# Patient Record
Sex: MALE | Race: WHITE | NOT HISPANIC OR LATINO | Employment: OTHER | URBAN - METROPOLITAN AREA
[De-identification: names, ages, dates, MRNs, and addresses within clinical notes are randomized per-mention and may not be internally consistent; named-entity substitution may affect disease eponyms.]

---

## 2017-01-04 RX ORDER — HYDROCHLOROTHIAZIDE 25 MG/1
TABLET ORAL
Qty: 30 TABLET | Refills: 0 | Status: SHIPPED | OUTPATIENT
Start: 2017-01-04 | End: 2017-03-10 | Stop reason: SDUPTHER

## 2017-01-19 ENCOUNTER — OFFICE VISIT (OUTPATIENT)
Dept: INTERNAL MEDICINE | Facility: CLINIC | Age: 62
End: 2017-01-19
Payer: COMMERCIAL

## 2017-01-19 VITALS
HEIGHT: 67 IN | RESPIRATION RATE: 12 BRPM | TEMPERATURE: 99 F | SYSTOLIC BLOOD PRESSURE: 112 MMHG | WEIGHT: 265 LBS | BODY MASS INDEX: 41.59 KG/M2 | DIASTOLIC BLOOD PRESSURE: 72 MMHG | HEART RATE: 80 BPM

## 2017-01-19 DIAGNOSIS — N40.0 BENIGN NON-NODULAR PROSTATIC HYPERPLASIA WITHOUT LOWER URINARY TRACT SYMPTOMS: ICD-10-CM

## 2017-01-19 DIAGNOSIS — I10 ESSENTIAL HYPERTENSION: ICD-10-CM

## 2017-01-19 DIAGNOSIS — E66.01 MORBID OBESITY WITH BMI OF 40.0-44.9, ADULT: ICD-10-CM

## 2017-01-19 DIAGNOSIS — E11.69 OBESITY, DIABETES, AND HYPERTENSION SYNDROME: ICD-10-CM

## 2017-01-19 DIAGNOSIS — E11.42 TYPE 2 DIABETES MELLITUS WITH DIABETIC POLYNEUROPATHY, WITH LONG-TERM CURRENT USE OF INSULIN: Primary | ICD-10-CM

## 2017-01-19 DIAGNOSIS — I15.2 OBESITY, DIABETES, AND HYPERTENSION SYNDROME: ICD-10-CM

## 2017-01-19 DIAGNOSIS — E66.9 OBESITY, DIABETES, AND HYPERTENSION SYNDROME: ICD-10-CM

## 2017-01-19 DIAGNOSIS — E78.2 MIXED HYPERLIPIDEMIA: ICD-10-CM

## 2017-01-19 DIAGNOSIS — Z12.11 COLON CANCER SCREENING: ICD-10-CM

## 2017-01-19 DIAGNOSIS — Z79.4 TYPE 2 DIABETES MELLITUS WITH DIABETIC POLYNEUROPATHY, WITH LONG-TERM CURRENT USE OF INSULIN: Primary | ICD-10-CM

## 2017-01-19 DIAGNOSIS — Z23 NEED FOR TDAP VACCINATION: ICD-10-CM

## 2017-01-19 DIAGNOSIS — E11.59 HYPERTENSION COMPLICATING DIABETES: ICD-10-CM

## 2017-01-19 DIAGNOSIS — I15.2 HYPERTENSION COMPLICATING DIABETES: ICD-10-CM

## 2017-01-19 DIAGNOSIS — E11.8 TYPE 2 DIABETES MELLITUS WITH COMPLICATION, WITH LONG-TERM CURRENT USE OF INSULIN: ICD-10-CM

## 2017-01-19 DIAGNOSIS — Z79.4 TYPE 2 DIABETES MELLITUS WITH COMPLICATION, WITH LONG-TERM CURRENT USE OF INSULIN: ICD-10-CM

## 2017-01-19 DIAGNOSIS — M16.0 PRIMARY OSTEOARTHRITIS OF BOTH HIPS: ICD-10-CM

## 2017-01-19 DIAGNOSIS — E11.59 OBESITY, DIABETES, AND HYPERTENSION SYNDROME: ICD-10-CM

## 2017-01-19 PROBLEM — E11.9 HYPERTENSION COMPLICATING DIABETES: Status: ACTIVE | Noted: 2017-01-19

## 2017-01-19 PROCEDURE — 1159F MED LIST DOCD IN RCRD: CPT | Mod: S$GLB,,, | Performed by: INTERNAL MEDICINE

## 2017-01-19 PROCEDURE — 3046F HEMOGLOBIN A1C LEVEL >9.0%: CPT | Mod: S$GLB,,, | Performed by: INTERNAL MEDICINE

## 2017-01-19 PROCEDURE — 90471 IMMUNIZATION ADMIN: CPT | Mod: S$GLB,,, | Performed by: INTERNAL MEDICINE

## 2017-01-19 PROCEDURE — 99214 OFFICE O/P EST MOD 30 MIN: CPT | Mod: 25,S$GLB,, | Performed by: INTERNAL MEDICINE

## 2017-01-19 PROCEDURE — 2022F DILAT RTA XM EVC RTNOPTHY: CPT | Mod: S$GLB,,, | Performed by: INTERNAL MEDICINE

## 2017-01-19 PROCEDURE — 4010F ACE/ARB THERAPY RXD/TAKEN: CPT | Mod: S$GLB,,, | Performed by: INTERNAL MEDICINE

## 2017-01-19 PROCEDURE — 3074F SYST BP LT 130 MM HG: CPT | Mod: S$GLB,,, | Performed by: INTERNAL MEDICINE

## 2017-01-19 PROCEDURE — 3078F DIAST BP <80 MM HG: CPT | Mod: S$GLB,,, | Performed by: INTERNAL MEDICINE

## 2017-01-19 PROCEDURE — 99999 PR PBB SHADOW E&M-EST. PATIENT-LVL III: CPT | Mod: PBBFAC,,, | Performed by: INTERNAL MEDICINE

## 2017-01-19 PROCEDURE — 90715 TDAP VACCINE 7 YRS/> IM: CPT | Mod: S$GLB,,, | Performed by: INTERNAL MEDICINE

## 2017-01-19 RX ORDER — LISINOPRIL 40 MG/1
40 TABLET ORAL DAILY
Qty: 90 TABLET | Refills: 3 | Status: SHIPPED | OUTPATIENT
Start: 2017-01-19 | End: 2017-10-24 | Stop reason: SDUPTHER

## 2017-01-19 RX ORDER — TAMSULOSIN HYDROCHLORIDE 0.4 MG/1
1 CAPSULE ORAL DAILY
Qty: 90 CAPSULE | Refills: 3 | Status: SHIPPED | OUTPATIENT
Start: 2017-01-19 | End: 2017-06-06 | Stop reason: SDUPTHER

## 2017-01-19 RX ORDER — NAPROXEN SODIUM 220 MG/1
81 TABLET, FILM COATED ORAL DAILY
COMMUNITY
End: 2018-05-14

## 2017-01-19 NOTE — MR AVS SNAPSHOT
[facility] - Internal Medicine  [address] [place] [address], [address]  [place] [address]  Phone: [phone]  Fax: [phone]                  Andrew Beltran   2017 1:30 PM   Office Visit    Description:  Male : 1955   Provider:  Edin Shetty MD   Department:  [facility] - Internal Medicine           Reason for Visit     Follow-up     Results     Medication Refill     Sciatica           Diagnoses this Visit        Comments    Type 2 diabetes mellitus with diabetic polyneuropathy, with long-term current use of insulin    -  Primary     Essential hypertension         Benign non-nodular prostatic hyperplasia without lower urinary tract symptoms         Mixed hyperlipidemia         Primary osteoarthritis of both hips         Morbid obesity with BMI of 40.0-44.9, adult         Hypertension complicating diabetes         Obesity, diabetes, and hypertension syndrome         Need for Tdap vaccination         Colon cancer screening         Type 2 diabetes mellitus with complication, with long-term current use of insulin                To Do List           Goals (5 Years of Data)     None      Follow-Up and Disposition     Return in about 6 months (around 2017).       These Medications        Disp Refills Start End    lisinopril (PRINIVIL,ZESTRIL) 40 MG tablet 90 tablet 3 2017     Take 1 tablet (40 mg total) by mouth once daily. - Oral    Pharmacy: [facility] - [place], [place] - [address] Ph #: [phone]       Notes to Pharmacy: This prescription was filled today(10/10/2016). Any refills authorized will be placed on file.    tamsulosin (FLOMAX) 0.4 mg Cp24 90 capsule 3 2017     Take 1 capsule (0.4 mg total) by mouth once daily. - Oral    Pharmacy: [facility] - [place], [place] - [address] Ph #: [phone]       Notes to Pharmacy: This prescription was filled today(10/10/2016). Any refills authorized will be placed on file.     insulin NPH and regular human (HUMULIN 70/30 KWIKPEN) 100 unit/mL (70-30) InPn pen 30 mL 3 1/19/2017     60 UNITS TWICE A DAY    Pharmacy: [facility] - [place]  [address] andrew DILLON  #: [phone]       Notes to Pharmacy: This prescription was filled today(12/28/2016). Any refills authorized will be placed on file.      [facility] On Call     [facility] Nurse Care Line - 24/7 Assistance  Registered nurses in the [facility] Center provide clinical advisement, health education, appointment booking, and other advisory services.  Call for this free service at [phone].             Medications           CHANGE how you are taking these medications     Start Taking Instead of    lisinopril (PRINIVIL,ZESTRIL) 40 MG tablet lisinopril (PRINIVIL,ZESTRIL) 40 MG tablet    Dosage:  Take 1 tablet (40 mg total) by mouth once daily. Dosage:  TAKE 1 TABLET BY MOUTH once DAILY    Reason for Change:  Reorder     tamsulosin (FLOMAX) 0.4 mg Cp24 tamsulosin (FLOMAX) 0.4 mg Cp24    Dosage:  Take 1 capsule (0.4 mg total) by mouth once daily. Dosage:  TAKE 1 CAPSULE BY MOUTH once DAILY    Reason for Change:  Reorder     insulin NPH and regular human (HUMULIN 70/30 KWIKPEN) 100 unit/mL (70-30) InPn pen HUMULIN 70/30 KWIKPEN 100 unit/mL (70-30) InPn pen    Dosage:  60 UNITS TWICE A DAY Dosage:  INJECT 60 UNITS TWICE A DAY    Reason for Change:  Reorder       STOP taking these medications     triamcinolone acetonide 0.1% (KENALOG) 0.1 % cream Apply topically 2 (two) times daily.           Verify that the below list of medications is an accurate representation of the medications you are currently taking.  If none reported, the list may be blank. If incorrect, please contact your healthcare provider. Carry this list with you in case of emergency.           Current Medications     allopurinol (ZYLOPRIM) 300 MG tablet Take 1 tablet (300 mg total) by mouth once daily.    aspirin 81 MG Chew Take 81 mg by  "mouth once daily.    blood sugar diagnostic (BLOOD GLUCOSE TEST) Strp by Misc.(Non-Drug; Combo Route) route.    hydrochlorothiazide (HYDRODIURIL) 25 MG tablet TAKE 1 TABLET BY MOUTH ONCE DAILY    insulin NPH and regular human (HUMULIN 70/30 KWIKPEN) 100 unit/mL (70-30) InPn pen 60 UNITS TWICE A DAY    lancets (PRODIGY TWIST TOP LANCET) 28 gauge Misc 1 lancet by Misc.(Non-Drug; Combo Route) route 2 (two) times daily.    lisinopril (PRINIVIL,ZESTRIL) 40 MG tablet Take 1 tablet (40 mg total) by mouth once daily.    MAGNESIUM ORAL Take 750 mg by mouth. 800 mg everyday    metoprolol succinate (TOPROL-XL) 200 MG 24 hr tablet TAKE 1 TABLET BY MOUTH once DAILY    rosuvastatin (CRESTOR) 40 MG Tab Take 1 tablet (40 mg total) by mouth every evening.    SURE COMFORT PEN NEEDLE 31 X 5/16 " Ndle USE THREE TIMES A DAY BEFORE MEALS    tamsulosin (FLOMAX) 0.4 mg Cp24 Take 1 capsule (0.4 mg total) by mouth once daily.    gabapentin (NEURONTIN) 300 MG capsule Take 300 mg by mouth 2 (two) times daily.           Clinical Reference Information           Vital Signs - Last Recorded  Most recent update: 1/19/2017  1:37 PM by Iván Espinal MA    BP Pulse Temp Resp Ht Wt    112/72 80 98.5 °F (36.9 °C) (Oral) 12 5' 7" (1.702 m) 120.2 kg (264 lb 15.9 oz)    BMI                41.5 kg/m2          Blood Pressure          Most Recent Value    BP  112/72      Allergies as of 1/19/2017     No Known Allergies      Immunizations Administered on Date of Encounter - 1/19/2017     Name Date Dose VIS Date Route    TDAP  Incomplete 0.5 mL 2/24/2015 Intramuscular      Orders Placed During Today's Visit      Normal Orders This Visit    Ambulatory referral to Podiatry     Tdap Vaccine (Adult)     Future Labs/Procedures Expected by Expires    Comprehensive metabolic panel  1/19/2017 3/20/2018    Hemoglobin A1c  1/19/2017 3/20/2018    Lipid panel  1/19/2017 3/20/2018      "

## 2017-01-19 NOTE — PROGRESS NOTES
Subjective:       Patient ID: Andrew Beltran is a 61 y.o. male.    Chief Complaint: Follow-up; Results; Medication Refill; and Sciatica (symptoms to right side off and on )    HPI  Checkup.  Labs reviewed.  Noncompliant with diet, not exercising due to r hip pain, occ sciatica.  Eye check next month.  Has occasional pedal paresthesias.  Has had a couple of minor gout flares recently.  No CP, SOB.  Has some ankle edema.  Having nocturia since he ran out of Flomax.  Review of Systems   All other systems reviewed and are negative.      Objective:      Physical Exam   Constitutional: He appears well-developed. No distress.   obese   HENT:   Head: Normocephalic.   Mouth/Throat: Oropharynx is clear and moist.   Eyes: Conjunctivae and EOM are normal. No scleral icterus.   Neck: Normal range of motion. No JVD present. No tracheal deviation present.   Cardiovascular: Normal rate, regular rhythm, normal heart sounds and intact distal pulses.    Pulses:       Dorsalis pedis pulses are 2+ on the right side, and 2+ on the left side.        Posterior tibial pulses are 2+ on the right side, and 2+ on the left side.   Pulmonary/Chest: Effort normal and breath sounds normal. No respiratory distress.   Abdominal: Soft. Bowel sounds are normal. He exhibits no distension and no mass. There is no tenderness.   Musculoskeletal: Normal range of motion. He exhibits edema (1+ ankles).        Right foot: There is normal range of motion and no deformity.        Left foot: There is normal range of motion and no deformity.   Feet:   Right Foot:   Protective Sensation: 6 sites tested. 6 sites sensed.   Skin Integrity: Positive for callus and dry skin.   Left Foot:   Protective Sensation: 6 sites tested. 6 sites sensed.   Skin Integrity: Positive for dry skin.   Neurological: He is alert.   Skin: Skin is warm and dry. No rash noted. He is not diaphoretic. No erythema.   Large SK R CW   Psychiatric: He has a normal mood and affect. His  behavior is normal.   Vitals reviewed.      Assessment:       1. Type 2 diabetes mellitus with diabetic polyneuropathy, with long-term current use of insulin    2. Essential hypertension    3. Benign non-nodular prostatic hyperplasia without lower urinary tract symptoms    4. Mixed hyperlipidemia    5. Primary osteoarthritis of both hips    6. Morbid obesity with BMI of 40.0-44.9, adult    7. Hypertension complicating diabetes    8. Obesity, diabetes, and hypertension syndrome    9. Need for Tdap vaccination    10. Colon cancer screening    11. Type 2 diabetes mellitus with complication, with long-term current use of insulin        Plan:       Andrew was seen today for follow-up, results, medication refill and sciatica.    Diagnoses and all orders for this visit:    Type 2 diabetes mellitus with diabetic polyneuropathy, with long-term current use of insulin  -     Hemoglobin A1c; Future  -     Ambulatory referral to Podiatry   To ride bike daily for exercise    Essential hypertension  -     lisinopril (PRINIVIL,ZESTRIL) 40 MG tablet; Take 1 tablet (40 mg total) by mouth once daily.   Cont other rx    Benign non-nodular prostatic hyperplasia without lower urinary tract symptoms  -     tamsulosin (FLOMAX) 0.4 mg Cp24; Take 1 capsule (0.4 mg total) by mouth once daily.    Mixed hyperlipidemia  -     Comprehensive metabolic panel; Future  -     Lipid panel; Future    Primary osteoarthritis of both hips    Morbid obesity with BMI of 40.0-44.9, adult    Hypertension complicating diabetes    Obesity, diabetes, and hypertension syndrome    Need for Tdap vaccination  -     Tdap Vaccine (Adult)    Colon cancer screening    Type 2 diabetes mellitus with complication, with long-term current use of insulin  -     insulin NPH and regular human (HUMULIN 70/30 KWIKPEN) 100 unit/mL (70-30) InPn pen; 60 UNITS TWICE A DAY      Return in about 6 months (around 7/19/2017).

## 2017-02-09 DIAGNOSIS — M10.9 GOUT OF FOOT, UNSPECIFIED CAUSE, UNSPECIFIED CHRONICITY, UNSPECIFIED LATERALITY: ICD-10-CM

## 2017-02-09 RX ORDER — ALLOPURINOL 300 MG/1
TABLET ORAL
Qty: 90 TABLET | Refills: 3 | Status: SHIPPED | OUTPATIENT
Start: 2017-02-09 | End: 2017-12-05 | Stop reason: SDUPTHER

## 2017-03-10 RX ORDER — HYDROCHLOROTHIAZIDE 25 MG/1
TABLET ORAL
Qty: 90 TABLET | Refills: 3 | Status: SHIPPED | OUTPATIENT
Start: 2017-03-10 | End: 2017-12-11 | Stop reason: SDUPTHER

## 2017-03-16 RX ORDER — PEN NEEDLE, DIABETIC 31 GX3/16"
NEEDLE, DISPOSABLE MISCELLANEOUS
Qty: 270 EACH | Refills: 3 | Status: SHIPPED | OUTPATIENT
Start: 2017-03-16 | End: 2021-01-26

## 2017-04-27 ENCOUNTER — TELEPHONE (OUTPATIENT)
Dept: INTERNAL MEDICINE | Facility: CLINIC | Age: 62
End: 2017-04-27

## 2017-04-27 DIAGNOSIS — L03.119 CELLULITIS OF LOWER EXTREMITY, UNSPECIFIED LATERALITY: Primary | ICD-10-CM

## 2017-04-27 RX ORDER — AMOXICILLIN AND CLAVULANATE POTASSIUM 875; 125 MG/1; MG/1
1 TABLET, FILM COATED ORAL 2 TIMES DAILY
Qty: 20 TABLET | Refills: 0 | Status: SHIPPED | OUTPATIENT
Start: 2017-04-27 | End: 2017-05-07

## 2017-04-27 NOTE — TELEPHONE ENCOUNTER
Patient is out of town in [place] right now and c/o 3-4 inch scratch to right leg from cactus. Poss cellulitis and c/o swollen. No fever. Would like something called in. Thanks

## 2017-04-28 ENCOUNTER — TELEPHONE (OUTPATIENT)
Dept: INTERNAL MEDICINE | Facility: CLINIC | Age: 62
End: 2017-04-28

## 2017-04-28 NOTE — TELEPHONE ENCOUNTER
Since starting antibiotics on yesterday patient has had one episode of urinating a small blood clot all other voids since then has been normal. He states he think he having right kidney pain now too. Should he continue taking antibiotics? He'll be back in town tomorrow. Please advise. Thanks

## 2017-05-04 ENCOUNTER — OFFICE VISIT (OUTPATIENT)
Dept: INTERNAL MEDICINE | Facility: CLINIC | Age: 62
End: 2017-05-04
Payer: COMMERCIAL

## 2017-05-04 VITALS
WEIGHT: 260.94 LBS | HEART RATE: 80 BPM | SYSTOLIC BLOOD PRESSURE: 130 MMHG | BODY MASS INDEX: 40.96 KG/M2 | TEMPERATURE: 96 F | RESPIRATION RATE: 14 BRPM | HEIGHT: 67 IN | DIASTOLIC BLOOD PRESSURE: 70 MMHG

## 2017-05-04 DIAGNOSIS — E11.69 OBESITY, DIABETES, AND HYPERTENSION SYNDROME: ICD-10-CM

## 2017-05-04 DIAGNOSIS — Z12.11 COLON CANCER SCREENING: ICD-10-CM

## 2017-05-04 DIAGNOSIS — Z79.4 TYPE 2 DIABETES MELLITUS WITH DIABETIC POLYNEUROPATHY, WITH LONG-TERM CURRENT USE OF INSULIN: ICD-10-CM

## 2017-05-04 DIAGNOSIS — E78.2 MIXED HYPERLIPIDEMIA: ICD-10-CM

## 2017-05-04 DIAGNOSIS — I15.2 OBESITY, DIABETES, AND HYPERTENSION SYNDROME: ICD-10-CM

## 2017-05-04 DIAGNOSIS — M16.0 PRIMARY OSTEOARTHRITIS OF BOTH HIPS: ICD-10-CM

## 2017-05-04 DIAGNOSIS — R10.9 RIGHT FLANK PAIN: Primary | ICD-10-CM

## 2017-05-04 DIAGNOSIS — R31.9 HEMATURIA: ICD-10-CM

## 2017-05-04 DIAGNOSIS — I10 ESSENTIAL HYPERTENSION: ICD-10-CM

## 2017-05-04 DIAGNOSIS — I15.2 HYPERTENSION COMPLICATING DIABETES: ICD-10-CM

## 2017-05-04 DIAGNOSIS — Z23 NEED FOR PROPHYLACTIC VACCINATION AND INOCULATION AGAINST OTHER SPECIFIED DISEASE: ICD-10-CM

## 2017-05-04 DIAGNOSIS — E11.59 HYPERTENSION COMPLICATING DIABETES: ICD-10-CM

## 2017-05-04 DIAGNOSIS — E11.59 OBESITY, DIABETES, AND HYPERTENSION SYNDROME: ICD-10-CM

## 2017-05-04 DIAGNOSIS — E11.42 TYPE 2 DIABETES MELLITUS WITH DIABETIC POLYNEUROPATHY, WITH LONG-TERM CURRENT USE OF INSULIN: ICD-10-CM

## 2017-05-04 DIAGNOSIS — E66.9 OBESITY, DIABETES, AND HYPERTENSION SYNDROME: ICD-10-CM

## 2017-05-04 LAB
BILIRUB SERPL-MCNC: ABNORMAL MG/DL
BLOOD URINE, POC: ABNORMAL
COLOR, POC UA: YELLOW
GLUCOSE UR QL STRIP: NEGATIVE
KETONES UR QL STRIP: NEGATIVE
LEUKOCYTE ESTERASE URINE, POC: ABNORMAL
NITRITE, POC UA: NEGATIVE
PH, POC UA: 6
PROTEIN, POC: 30
SPECIFIC GRAVITY, POC UA: 1.01
UROBILINOGEN, POC UA: 0.2

## 2017-05-04 PROCEDURE — 99999 PR PBB SHADOW E&M-EST. PATIENT-LVL III: CPT | Mod: PBBFAC,,, | Performed by: INTERNAL MEDICINE

## 2017-05-04 PROCEDURE — 3078F DIAST BP <80 MM HG: CPT | Mod: S$GLB,,, | Performed by: INTERNAL MEDICINE

## 2017-05-04 PROCEDURE — 81002 URINALYSIS NONAUTO W/O SCOPE: CPT | Mod: S$GLB,,, | Performed by: INTERNAL MEDICINE

## 2017-05-04 PROCEDURE — 4010F ACE/ARB THERAPY RXD/TAKEN: CPT | Mod: S$GLB,,, | Performed by: INTERNAL MEDICINE

## 2017-05-04 PROCEDURE — 1160F RVW MEDS BY RX/DR IN RCRD: CPT | Mod: S$GLB,,, | Performed by: INTERNAL MEDICINE

## 2017-05-04 PROCEDURE — 99214 OFFICE O/P EST MOD 30 MIN: CPT | Mod: 25,S$GLB,, | Performed by: INTERNAL MEDICINE

## 2017-05-04 PROCEDURE — 3075F SYST BP GE 130 - 139MM HG: CPT | Mod: S$GLB,,, | Performed by: INTERNAL MEDICINE

## 2017-05-04 PROCEDURE — 90471 IMMUNIZATION ADMIN: CPT | Mod: S$GLB,,, | Performed by: INTERNAL MEDICINE

## 2017-05-04 PROCEDURE — 3046F HEMOGLOBIN A1C LEVEL >9.0%: CPT | Mod: S$GLB,,, | Performed by: INTERNAL MEDICINE

## 2017-05-04 PROCEDURE — 90736 HZV VACCINE LIVE SUBQ: CPT | Mod: S$GLB,,, | Performed by: INTERNAL MEDICINE

## 2017-05-04 RX ORDER — DICYCLOMINE HYDROCHLORIDE 20 MG/1
20 TABLET ORAL 2 TIMES DAILY
Qty: 30 TABLET | Refills: 6 | Status: SHIPPED | OUTPATIENT
Start: 2017-05-04 | End: 2017-06-03

## 2017-05-04 NOTE — MR AVS SNAPSHOT
[facility] - Internal Medicine  [address] [place] [address], [address]  [place] [address]  Phone: [phone]  Fax: [phone]                  Andrew Beltran   2017 1:15 PM   Office Visit    Description:  Male : 1955   Provider:  Edin Shetty MD   Department:  [facility] - Internal Medicine           Reason for Visit     Nephrolithiasis           Diagnoses this Visit        Comments    Right flank pain    -  Primary     Essential hypertension         Type 2 diabetes mellitus with diabetic polyneuropathy, with long-term current use of insulin         Mixed hyperlipidemia         Primary osteoarthritis of both hips         Hypertension complicating diabetes         Obesity, diabetes, and hypertension syndrome         Colon cancer screening         Hematuria         Need for prophylactic vaccination and inoculation against other specified disease                To Do List           Goals (5 Years of Data)     None      Follow-Up and Disposition     Return if symptoms worsen or fail to improve.       These Medications        Disp Refills Start End    dicyclomine (BENTYL) 20 mg tablet 30 tablet 6 2017 6/3/2017    Take 1 tablet (20 mg total) by mouth 2 (two) times daily. - Oral    Pharmacy: [facility] - [place], [place] - [address] Ph #: [phone]         [facility] On Call     [facility] On Call Nurse Care Line -  Assistance  Unless otherwise directed by your provider, please contact [facility], our nurse care line that is available for  assistance.     Registered nurses in the [facility] Center provide: appointment scheduling, clinical advisement, health education, and other advisory services.  Call: [phone] (toll free)               Medications           START taking these NEW medications        Refills    dicyclomine (BENTYL) 20 mg tablet 6    Sig: Take 1 tablet (20 mg total) by mouth 2 (two) times daily.    Class: Normal    Route: Oral          "  Verify that the below list of medications is an accurate representation of the medications you are currently taking.  If none reported, the list may be blank. If incorrect, please contact your healthcare provider. Carry this list with you in case of emergency.           Current Medications     allopurinol (ZYLOPRIM) 300 MG tablet TAKE 1 TABLET BY MOUTH once DAILY    amoxicillin-clavulanate 875-125mg (AUGMENTIN) 875-125 mg per tablet Take 1 tablet by mouth 2 (two) times daily.    aspirin 81 MG Chew Take 81 mg by mouth once daily.    blood sugar diagnostic (BLOOD GLUCOSE TEST) Strp by Misc.(Non-Drug; Combo Route) route.    gabapentin (NEURONTIN) 300 MG capsule Take 300 mg by mouth 2 (two) times daily.    hydrochlorothiazide (HYDRODIURIL) 25 MG tablet TAKE 1 TABLET BY MOUTH ONCE DAILY    insulin NPH and regular human (HUMULIN 70/30 KWIKPEN) 100 unit/mL (70-30) InPn pen 60 UNITS TWICE A DAY    lancets (PRODIGY TWIST TOP LANCET) 28 gauge Misc 1 lancet by Misc.(Non-Drug; Combo Route) route 2 (two) times daily.    lisinopril (PRINIVIL,ZESTRIL) 40 MG tablet Take 1 tablet (40 mg total) by mouth once daily.    MAGNESIUM ORAL Take 750 mg by mouth. 800 mg everyday    metoprolol succinate (TOPROL-XL) 200 MG 24 hr tablet TAKE 1 TABLET BY MOUTH once DAILY    rosuvastatin (CRESTOR) 40 MG Tab Take 1 tablet (40 mg total) by mouth every evening.    SURE COMFORT PEN NEEDLE 31 gauge x 5/16" Ndle USE THREE TIMES A DAY BEFORE MEALS    tamsulosin (FLOMAX) 0.4 mg Cp24 Take 1 capsule (0.4 mg total) by mouth once daily.    dicyclomine (BENTYL) 20 mg tablet Take 1 tablet (20 mg total) by mouth 2 (two) times daily.           Clinical Reference Information           Your Vitals Were     BP Pulse Temp Resp Height Weight    130/70 80 96 °F (35.6 °C) (Oral) 14 5' 7" (1.702 m) 118.3 kg (260 lb 14.6 oz)    BMI                40.86 kg/m2          Blood Pressure          Most Recent Value    BP  130/70      Allergies as of 5/4/2017     No Known " Allergies      Immunizations Administered on Date of Encounter - 5/4/2017     Name Date Dose VIS Date Route    Zoster  Incomplete 0.65 mL 10/6/2009 Subcutaneous      Orders Placed During Today's Visit      Normal Orders This Visit    Case request GI: COLONOSCOPY     Zoster Vaccine - Live     Future Labs/Procedures Expected by Expires    CT Abdomen Pelvis  Without Contrast  5/4/2017 5/4/2018    Prostate Specific Antigen, Diagnostic  5/4/2017 7/3/2018    Microalbumin/creatinine urine ratio  As directed 5/4/2018      Language Assistance Services     ATTENTION: Language assistance services are available, free of charge. Please call [phone].      ATENCIÓN: Si gregoria woodward, tiene a caputo disposición servicios gratuitos de asistencia lingüística. Llame al [phone].     CHÚ Ý: N?u b?n nói Ti?ng Vi?t, có các d?ch v? h? tr? ngôn ng? mi?n phí dành cho b?n. G?i s? [phone].         [facility] complies with applicable Federal civil rights laws and does not discriminate on the basis of race, color, national origin, age, disability, or sex.

## 2017-05-04 NOTE — PROGRESS NOTES
Subjective:       Patient ID: Andrew Beltran is a 61 y.o. male.    Chief Complaint: Nephrolithiasis (possible kidney stone abdominal pain on right side x2 weeks)    HPI  Pt passed what he thought was a clot in his urine 2 weeks ago and since then has had R flank pain radiating to his RLQ.  No more gross hematuria.   No dysuria. No nausea.  BSs have been 140+.  R hip pain has let up some.  Review of Systems    Objective:      Physical Exam   Constitutional: He appears well-developed. No distress.   HENT:   Head: Normocephalic.   Eyes: EOM are normal. No scleral icterus.   Neck: Normal range of motion. No tracheal deviation present.   Cardiovascular: Normal rate, regular rhythm, normal heart sounds and intact distal pulses.    Pulmonary/Chest: Effort normal and breath sounds normal. No respiratory distress.   Abdominal: Soft. Bowel sounds are normal. He exhibits no distension and no mass. There is tenderness (mild RLQ over appendectomy scar). There is no guarding. A hernia (ventral) is present.   Musculoskeletal: Normal range of motion. He exhibits edema (1+ LEs).   Neurological: He is alert.   Skin: Skin is warm and dry. No rash noted. He is not diaphoretic. No erythema.   Psychiatric: He has a normal mood and affect. His behavior is normal.   Vitals reviewed.      Assessment:       1. Right flank pain    2. Essential hypertension    3. Type 2 diabetes mellitus with diabetic polyneuropathy, with long-term current use of insulin    4. Mixed hyperlipidemia    5. Primary osteoarthritis of both hips    6. Hypertension complicating diabetes    7. Obesity, diabetes, and hypertension syndrome    8. Colon cancer screening    9. Hematuria    10. Need for prophylactic vaccination and inoculation against other specified disease        Plan:       Andrew was seen today for nephrolithiasis.    Diagnoses and all orders for this visit:    Right flank pain  -     Microalbumin/creatinine urine ratio; Future  -     dicyclomine  (BENTYL) 20 mg tablet; Take 1 tablet (20 mg total) by mouth 2 (two) times daily.    Essential hypertension   Well-cont    Type 2 diabetes mellitus with diabetic polyneuropathy, with long-term current use of insulin    Mixed hyperlipidemia   Cont rx    Primary osteoarthritis of both hips    Hypertension complicating diabetes    Obesity, diabetes, and hypertension syndrome    Colon cancer screening  -     Case request GI: COLONOSCOPY    Hematuria  -     Prostate Specific Antigen, Diagnostic; Future  -     CT Abdomen Pelvis  Without Contrast; Future    Need for prophylactic vaccination and inoculation against other specified disease  -     Zoster Vaccine - Live      Return if symptoms worsen or fail to improve.

## 2017-05-09 ENCOUNTER — PATIENT MESSAGE (OUTPATIENT)
Dept: INTERNAL MEDICINE | Facility: CLINIC | Age: 62
End: 2017-05-09

## 2017-05-09 DIAGNOSIS — N28.89 LEFT RENAL MASS: Primary | ICD-10-CM

## 2017-05-10 ENCOUNTER — PATIENT MESSAGE (OUTPATIENT)
Dept: INTERNAL MEDICINE | Facility: CLINIC | Age: 62
End: 2017-05-10

## 2017-05-10 DIAGNOSIS — N32.89 BLADDER MASS: Primary | ICD-10-CM

## 2017-05-12 DIAGNOSIS — E11.8 TYPE 2 DIABETES MELLITUS WITH COMPLICATION, WITH LONG-TERM CURRENT USE OF INSULIN: ICD-10-CM

## 2017-05-12 DIAGNOSIS — Z79.4 TYPE 2 DIABETES MELLITUS WITH COMPLICATION, WITH LONG-TERM CURRENT USE OF INSULIN: ICD-10-CM

## 2017-05-12 NOTE — TELEPHONE ENCOUNTER
----- Message from Manisha Cavanaugh sent at 5/12/2017  8:51 AM CDT -----  Need refill for Humulin 70/30 Kwipen  60 unit twice a day. Please send to [facility]

## 2017-05-17 ENCOUNTER — TELEPHONE (OUTPATIENT)
Dept: GASTROENTEROLOGY | Facility: CLINIC | Age: 62
End: 2017-05-17

## 2017-05-19 ENCOUNTER — PROCEDURE VISIT (OUTPATIENT)
Dept: UROLOGY | Facility: CLINIC | Age: 62
End: 2017-05-19
Payer: COMMERCIAL

## 2017-05-19 ENCOUNTER — TELEPHONE (OUTPATIENT)
Dept: UROLOGY | Facility: CLINIC | Age: 62
End: 2017-05-19

## 2017-05-19 ENCOUNTER — OFFICE VISIT (OUTPATIENT)
Dept: UROLOGY | Facility: CLINIC | Age: 62
End: 2017-05-19
Payer: COMMERCIAL

## 2017-05-19 VITALS
HEART RATE: 65 BPM | WEIGHT: 257.94 LBS | TEMPERATURE: 99 F | DIASTOLIC BLOOD PRESSURE: 60 MMHG | SYSTOLIC BLOOD PRESSURE: 130 MMHG | HEIGHT: 67 IN | BODY MASS INDEX: 40.48 KG/M2

## 2017-05-19 VITALS
WEIGHT: 257.94 LBS | BODY MASS INDEX: 40.48 KG/M2 | SYSTOLIC BLOOD PRESSURE: 130 MMHG | HEART RATE: 65 BPM | HEIGHT: 67 IN | TEMPERATURE: 99 F | DIASTOLIC BLOOD PRESSURE: 60 MMHG

## 2017-05-19 DIAGNOSIS — N32.89 BLADDER MASS: Primary | ICD-10-CM

## 2017-05-19 DIAGNOSIS — R35.1 NOCTURIA: ICD-10-CM

## 2017-05-19 DIAGNOSIS — N40.1 BENIGN NON-NODULAR PROSTATIC HYPERPLASIA WITH LOWER URINARY TRACT SYMPTOMS: ICD-10-CM

## 2017-05-19 DIAGNOSIS — N42.0 CALCULI, PROSTATE: ICD-10-CM

## 2017-05-19 DIAGNOSIS — N32.89 BLADDER MASS: ICD-10-CM

## 2017-05-19 DIAGNOSIS — R33.9 INCOMPLETE BLADDER EMPTYING: ICD-10-CM

## 2017-05-19 DIAGNOSIS — R35.0 URINARY FREQUENCY: ICD-10-CM

## 2017-05-19 DIAGNOSIS — R31.9 HEMATURIA: ICD-10-CM

## 2017-05-19 PROCEDURE — 52000 CYSTOURETHROSCOPY: CPT | Mod: S$GLB,,, | Performed by: UROLOGY

## 2017-05-19 PROCEDURE — 99999 PR PBB SHADOW E&M-EST. PATIENT-LVL III: CPT | Mod: PBBFAC,,, | Performed by: UROLOGY

## 2017-05-19 PROCEDURE — 99244 OFF/OP CNSLTJ NEW/EST MOD 40: CPT | Mod: 57,S$GLB,, | Performed by: UROLOGY

## 2017-05-19 RX ORDER — DUTASTERIDE 0.5 MG/1
0.5 CAPSULE, LIQUID FILLED ORAL DAILY
Qty: 30 CAPSULE | Refills: 11 | Status: SHIPPED | OUTPATIENT
Start: 2017-05-19 | End: 2017-05-19 | Stop reason: SDUPTHER

## 2017-05-19 RX ORDER — DUTASTERIDE 0.5 MG/1
0.5 CAPSULE, LIQUID FILLED ORAL DAILY
Qty: 90 CAPSULE | Refills: 3 | Status: SHIPPED | OUTPATIENT
Start: 2017-05-19 | End: 2017-10-04 | Stop reason: SDUPTHER

## 2017-05-19 RX ORDER — SODIUM CHLORIDE 9 MG/ML
INJECTION, SOLUTION INTRAVENOUS CONTINUOUS
Status: CANCELLED | OUTPATIENT
Start: 2017-05-19

## 2017-05-19 RX ORDER — CIPROFLOXACIN 2 MG/ML
400 INJECTION, SOLUTION INTRAVENOUS
Status: CANCELLED | OUTPATIENT
Start: 2017-05-19

## 2017-05-19 RX ORDER — CIPROFLOXACIN 500 MG/1
500 TABLET ORAL 2 TIMES DAILY
Qty: 10 TABLET | Refills: 0 | Status: ON HOLD | OUTPATIENT
Start: 2017-05-19 | End: 2017-05-25 | Stop reason: HOSPADM

## 2017-05-19 NOTE — LETTER
May 19, 2017        Edin Shetty MD  [address] [place], [place] [address]  [address]  [place] [address]             [facility]  [address] [place] [address] [address]  [facility] [address]  Phone: [phone]  Fax: [phone]   Patient: Andrew Beltran   MR Number: [id number]   YOB: 1955   Date of Visit: 5/19/2017       Dear Dr. Shetty:    Thank you for referring Andrew Beltran to me for evaluation. Below are the relevant portions of my assessment and plan of care.       1. Hematuria    2. Bladder mass    3. Calculi, prostate    4. Nocturia    5. Urinary frequency    6. Benign non-nodular prostatic hyperplasia with lower urinary tract symptoms    7. Incomplete bladder emptying         Patient Instructions   Urgent Cysto in office this afternoon to evaluate Bladder mass         If you have questions, please do not hesitate to call me. I look forward to following Andrew along with you.    Sincerely,      Monty Cee MD           CC  No Recipients

## 2017-05-19 NOTE — LETTER
May 19, 2017      Edin Shetty MD  [address] [place], [place] [address]  [address]  [facility] [address]           [facility]  [address] [place] [address] [address]  [facility] [address]  Phone: [phone]  Fax: [phone]          Patient: Andrew Beltran   MR Number: [id number]   YOB: 1955   Date of Visit: 5/19/2017       Dear Dr. Edin Shetty:    Thank you for referring Andrew Beltran to me for evaluation. Attached you will find relevant portions of my assessment and plan of care.    If you have questions, please do not hesitate to call me. I look forward to following Andrew Beltran along with you.    Sincerely,    Monty Cee MD    Enclosure  CC:  No Recipients    If you would like to receive this communication electronically, please contact [email or web address] or [phone] to request more information on [facility] access.    For providers and/or their staff who would like to refer a patient to [facility], please contact us through our one-stop-shop provider referral line, [facility], at [phone].    If you feel you have received this communication in error or would no longer like to receive these types of communications, please e-mail [email or web address]

## 2017-05-19 NOTE — MR AVS SNAPSHOT
[facility]  [address]  [facility] [address]  Phone: [phone]  Fax: [phone]                  Andrew Beltran   2017 4:30 PM   Procedure visit    Description:  Male : 1955   Provider:  Monty Cee MD   Department:  [facility]           Reason for Visit     Hematuria           Diagnoses this Visit        Comments    Bladder mass    -  Primary            To Do List           Future Appointments        Provider Department Dept Phone    2017 4:30 PM Monty Cee MD [facility] [phone]    2017 1:30 PM Monty Cee MD [facility] [phone]      Your Future Surgeries/Procedures     May 25, 2017   Surgery with Monty Cee MD   [facility] (--)    [address] [address]   [phone]              Goals (5 Years of Data)     None      Follow-Up and Disposition     Return in about 5 weeks (around 2017) for f/u..    Follow-up and Disposition History       These Medications        Disp Refills Start End    ciprofloxacin HCl (CIPRO) 500 MG tablet 10 tablet 0 2017    Take 1 tablet (500 mg total) by mouth 2 (two) times daily. - Oral    Pharmacy: [facility] - [place], [place] - [address] Ph #: [phone]         [facility]     [facility] Care [facility] -  Assistance  Unless otherwise directed by your provider, please contact [facility], our nurse care line that is available for  assistance.     Registered nurses in the [facility] Center provide: appointment scheduling, clinical advisement, health education, and other advisory services.  Call: [phone] (toll free)               Medications           START taking these NEW medications        Refills    ciprofloxacin HCl (CIPRO) 500 MG tablet 0    Sig: Take 1 tablet (500 mg total) by mouth 2 (two) times daily.    Class: Normal    Route: Oral     "       Verify that the below list of medications is an accurate representation of the medications you are currently taking.  If none reported, the list may be blank. If incorrect, please contact your healthcare provider. Carry this list with you in case of emergency.           Current Medications     allopurinol (ZYLOPRIM) 300 MG tablet TAKE 1 TABLET BY MOUTH once DAILY    aspirin 81 MG Chew Take 81 mg by mouth once daily.    blood sugar diagnostic (BLOOD GLUCOSE TEST) Strp by Misc.(Non-Drug; Combo Route) route.    dicyclomine (BENTYL) 20 mg tablet Take 1 tablet (20 mg total) by mouth 2 (two) times daily.    gabapentin (NEURONTIN) 300 MG capsule Take 300 mg by mouth 2 (two) times daily.    hydrochlorothiazide (HYDRODIURIL) 25 MG tablet TAKE 1 TABLET BY MOUTH ONCE DAILY    insulin NPH and regular human (HUMULIN 70/30 KWIKPEN) 100 unit/mL (70-30) InPn pen 60 UNITS TWICE A DAY    lancets (PRODIGY TWIST TOP LANCET) 28 gauge Misc 1 lancet by Misc.(Non-Drug; Combo Route) route 2 (two) times daily.    lisinopril (PRINIVIL,ZESTRIL) 40 MG tablet Take 1 tablet (40 mg total) by mouth once daily.    MAGNESIUM ORAL Take 750 mg by mouth. 800 mg everyday    metoprolol succinate (TOPROL-XL) 200 MG 24 hr tablet TAKE 1 TABLET BY MOUTH once DAILY    rosuvastatin (CRESTOR) 40 MG Tab Take 1 tablet (40 mg total) by mouth every evening.    SURE COMFORT PEN NEEDLE 31 gauge x 5/16" Ndle USE THREE TIMES A DAY BEFORE MEALS    tamsulosin (FLOMAX) 0.4 mg Cp24 Take 1 capsule (0.4 mg total) by mouth once daily.    ciprofloxacin HCl (CIPRO) 500 MG tablet Take 1 tablet (500 mg total) by mouth 2 (two) times daily.    dutasteride (AVODART) 0.5 mg capsule Take 1 capsule (0.5 mg total) by mouth once daily.           Clinical Reference Information           Your Vitals Were     BP Pulse Temp Height Weight BMI    130/60 65 98.5 °F (36.9 °C) 5' 7" (1.702 m) 117 kg (257 lb 15 oz) 40.4 kg/m2      Blood Pressure          Most Recent Value    BP  130/60    "   Allergies as of 5/19/2017     No Known Allergies      Immunizations Administered on Date of Encounter - 5/19/2017     None      Orders Placed During Today's Visit      Normal Orders This Visit    Case Request Operating Room: EXCISION-BLADDER TUMOR-TRANSURETHRAL (TURBT) W BIPOLAR, CYSTOSCOPY WITH RETROGRADE PYELOGRAM, PYELOGRAM-RETROGRADE     Cystoscopy     Future Labs/Procedures Expected by Expires    CBC auto differential  5/19/2017 7/18/2018    Urinalysis  5/19/2017 7/18/2018    Urine culture  5/19/2017 7/18/2018      Instructions    [facility].  Cipro  X 5 days.       Language Assistance Services     ATTENTION: Language assistance services are available, free of charge. Please call [phone].      ATENCIÓN: Si gregoria woodward, tiene a caputo disposición servicios gratuitos de asistencia lingüística. Llame al [phone].     CHÚ Ý: N?u b?n nói Ti?ng Vi?t, có các d?ch v? h? tr? ngôn ng? mi?n phí dành cho b?n. G?i s? [phone].         [facility] complies with applicable Federal civil rights laws and does not discriminate on the basis of race, color, national origin, age, disability, or sex.

## 2017-05-19 NOTE — MR AVS SNAPSHOT
[facility]  [address]  [place] [address]  Phone: [phone]  Fax: [phone]                  Andrew Beltran   2017 9:30 AM   Office Visit    Description:  Male : 1955   Provider:  Monty Cee MD   Department:  [place] [facility]           Reason for Visit     Other           Diagnoses this Visit        Comments    Hematuria         Bladder mass         Calculi, prostate         Nocturia         Urinary frequency         Benign non-nodular prostatic hyperplasia with lower urinary tract symptoms         Incomplete bladder emptying                To Do List           Future Appointments        Provider Department Dept Phone    2017 4:30 PM Monty Cee MD [facility] [phone]    2017 1:30 PM [facility] [phone]      Your Future Surgeries/Procedures     May 25, 2017   Surgery with Monty Cee MD   [facility] (--)    [address] [place] [address]   [phone]              Goals (5 Years of Data)     None      Follow-Up and Disposition     Return in about 4 weeks (around 2017).    Follow-up and Disposition History       These Medications        Disp Refills Start End    dutasteride (AVODART) 0.5 mg capsule 90 capsule 3 2017    Take 1 capsule (0.5 mg total) by mouth once daily. - Oral    Pharmacy: [facility] - [place] - [address] Ph #: [phone]         [facility]     [facility] On Call Nurse Care Line -  Assistance  Unless otherwise directed by your provider, please contact [facility], our nurse care line that is available for  assistance.     Registered nurses in the [facility] provide: appointment scheduling, clinical advisement, health education, and other advisory services.  Call: [phone] (toll free)               Medications           START taking these NEW  "medications        Refills    dutasteride (AVODART) 0.5 mg capsule 3    Sig: Take 1 capsule (0.5 mg total) by mouth once daily.    Class: Normal    Route: Oral           Verify that the below list of medications is an accurate representation of the medications you are currently taking.  If none reported, the list may be blank. If incorrect, please contact your healthcare provider. Carry this list with you in case of emergency.           Current Medications     allopurinol (ZYLOPRIM) 300 MG tablet TAKE 1 TABLET BY MOUTH once DAILY    aspirin 81 MG Chew Take 81 mg by mouth once daily.    blood sugar diagnostic (BLOOD GLUCOSE TEST) Strp by Misc.(Non-Drug; Combo Route) route.    dicyclomine (BENTYL) 20 mg tablet Take 1 tablet (20 mg total) by mouth 2 (two) times daily.    gabapentin (NEURONTIN) 300 MG capsule Take 300 mg by mouth 2 (two) times daily.    hydrochlorothiazide (HYDRODIURIL) 25 MG tablet TAKE 1 TABLET BY MOUTH ONCE DAILY    insulin NPH and regular human (HUMULIN 70/30 KWIKPEN) 100 unit/mL (70-30) InPn pen 60 UNITS TWICE A DAY    lancets (PRODIGY TWIST TOP LANCET) 28 gauge Misc 1 lancet by Misc.(Non-Drug; Combo Route) route 2 (two) times daily.    lisinopril (PRINIVIL,ZESTRIL) 40 MG tablet Take 1 tablet (40 mg total) by mouth once daily.    MAGNESIUM ORAL Take 750 mg by mouth. 800 mg everyday    metoprolol succinate (TOPROL-XL) 200 MG 24 hr tablet TAKE 1 TABLET BY MOUTH once DAILY    rosuvastatin (CRESTOR) 40 MG Tab Take 1 tablet (40 mg total) by mouth every evening.    SURE COMFORT PEN NEEDLE 31 gauge x 5/16" Ndle USE THREE TIMES A DAY BEFORE MEALS    tamsulosin (FLOMAX) 0.4 mg Cp24 Take 1 capsule (0.4 mg total) by mouth once daily.    ciprofloxacin HCl (CIPRO) 500 MG tablet Take 1 tablet (500 mg total) by mouth 2 (two) times daily.    dutasteride (AVODART) 0.5 mg capsule Take 1 capsule (0.5 mg total) by mouth once daily.           Clinical Reference Information           Your Vitals Were     BP Pulse Temp " "Height Weight BMI    130/60 65 98.5 °F (36.9 °C) 5' 7" (1.702 m) 117 kg (257 lb 15 oz) 40.4 kg/m2      Blood Pressure          Most Recent Value    BP  130/60      Allergies as of 5/19/2017     No Known Allergies      Immunizations Administered on Date of Encounter - 5/19/2017     None      Orders Placed During Today's Visit      Normal Orders This Visit    Ambulatory Referral to Gastroenterology       Instructions    Urgent Cysto in office this afternoon to evaluate Bladder mass       Language Assistance Services     ATTENTION: Language assistance services are available, free of charge. Please call [phone].      ATENCIÓN: Si habla español, tiene a caputo disposición servicios gratuitos de asistencia lingüística. Llame al [phone].     CHÚ Ý: N?u b?n nói Ti?ng Vi?t, có các d?ch v? h? tr? ngôn ng? mi?n phí dành cho b?n. G?i s? [phone].         [facility] complies with applicable Federal civil rights laws and does not discriminate on the basis of race, color, national origin, age, disability, or sex.        "

## 2017-05-19 NOTE — PROCEDURES
"Cystoscopy  Date/Time: 5/19/2017 11:52 AM  Performed by: VALENTINA BARTHOLOMEW  Authorized by: VALENTINA BARTHOLOMEW     Consent Done?:  Yes (Written)  Time out: Immediately prior to procedure a "time out" was called to verify the correct patient, procedure, equipment, support staff and site/side marked as required.    Indications: hematuria    Indications comment:  Bladder Mass  Position:  Supine  Anesthesia:  See MAR for details  Patient sedated?: No    Preparation: Patient was prepped and draped in usual sterile fashion      Scope type:  Flexible cystoscope  Stent inserted: No    Stent removed: No    External exam normal: Yes    Digital exam performed: No    Urethra normal: Yes    Prostate normal: No          Hyperplasia (Trilobar)(Length (cm):  7)Bladder neck normal: Bladder neck normal   Bladder normal: No         Number of tumors:  1       Tumor 1:          Size (mm):  60         Anatomy:  Sessile         Location:  Floor    Patient tolerance:  Patient tolerated the procedure well with no immediate complications      "

## 2017-05-19 NOTE — TELEPHONE ENCOUNTER
Patient's questions answered and medications went over. Patient will hold his ASA starting 5.20.17 till surgery on 5.25.17. Patient verbalized understanding.

## 2017-05-19 NOTE — PROGRESS NOTES
Subjective:       Patient ID: Andrew Beltran is a 61 y.o. male.    Chief Complaint: Other (Referred from Dr. Shetty for a bladder mass.)    Hematuria   This is a recurrent problem. The current episode started more than 1 month ago. The problem has been waxing and waning since onset. He describes the hematuria as gross hematuria. The hematuria occurs throughout his entire urinary stream. He reports clotting at the beginning of his urine stream. His pain is at a severity of 0/10. He is experiencing no pain. He describes his urine color as bright red. Irritative symptoms include frequency (q 2 hours) and nocturia (x 2). Irritative symptoms do not include urgency. Associated symptoms include flank pain and hesitancy (at night). Pertinent negatives include no abdominal pain, chills, dysuria, facial swelling, fever, genital pain, inability to urinate, nausea, vomiting or sore throat. He is sexually active. His past medical history is significant for hypertension and kidney stones. There is no history of  trauma, recent infection, sickle cell disease, STDs, tobacco use or UTI.   Flank Pain   This is a new problem. The current episode started more than 1 month ago. The problem occurs intermittently. The problem has been waxing and waning since onset. The pain is present in the costovertebral angle. Radiates to: Right lower quadrant. The pain is at a severity of 4/10. The pain is moderate. The pain is worse during the day. The symptoms are aggravated by urinating and bending. Stiffness is present in the morning. Pertinent negatives include no abdominal pain, bladder incontinence, bowel incontinence, chest pain, dysuria, fever, headaches, leg pain, numbness, paresis, paresthesias, pelvic pain, perianal numbness, tingling, weakness or weight loss. Risk factors include lack of exercise, obesity, history of steroid use, renal stones and sedentary lifestyle. He has tried nothing for the symptoms.     Review of Systems    Constitutional: Negative for chills, fever and weight loss.   HENT: Negative for facial swelling and sore throat.    Cardiovascular: Negative for chest pain.   Gastrointestinal: Negative for abdominal pain, bowel incontinence, nausea and vomiting.   Genitourinary: Positive for flank pain, frequency (q 2 hours), hematuria, hesitancy (at night) and nocturia (x 2). Negative for bladder incontinence, dysuria, pelvic pain and urgency.   Neurological: Negative for tingling, weakness, numbness, headaches and paresthesias.       Objective:      Physical Exam   Nursing note and vitals reviewed.  Constitutional: He is oriented to person, place, and time. He appears well-developed and well-nourished.   HENT:   Head: Normocephalic.   Nose: Nose normal.   Mouth/Throat: Oropharynx is clear and moist.   Eyes: Conjunctivae and EOM are normal. Pupils are equal, round, and reactive to light.   Neck: Normal range of motion. Neck supple.   Cardiovascular: Normal rate, regular rhythm, normal heart sounds and intact distal pulses.    Pulmonary/Chest: Effort normal and breath sounds normal.   Abdominal: Soft. Bowel sounds are normal.   Genitourinary: Rectum normal, testes normal and penis normal. Prostate is enlarged. Prostate is not tender.   Genitourinary Comments: Palpable prostate calculi. No Nodules.   Musculoskeletal: Normal range of motion.   Neurological: He is alert and oriented to person, place, and time. He has normal reflexes.   Skin: Skin is warm and dry.     Psychiatric: He has a normal mood and affect. His behavior is normal. Judgment and thought content normal.       Assessment:       1. Hematuria    2. Bladder mass    3. Calculi, prostate    4. Nocturia    5. Urinary frequency    6. Benign non-nodular prostatic hyperplasia with lower urinary tract symptoms    7. Incomplete bladder emptying        Plan:       Patient Instructions   Urgent Cysto in office this afternoon to evaluate Bladder mass

## 2017-05-19 NOTE — TELEPHONE ENCOUNTER
----- Message from Mariann Watts sent at 5/19/2017  2:06 PM CDT -----  Contact: [phone]/self  Patient called in returning your call. Please advise.

## 2017-05-31 ENCOUNTER — TELEPHONE (OUTPATIENT)
Dept: UROLOGY | Facility: CLINIC | Age: 62
End: 2017-05-31

## 2017-05-31 NOTE — TELEPHONE ENCOUNTER
----- Message from Mauro Triana sent at 5/31/2017 11:00 AM CDT -----  Contact: self/[phone]  Patient called to find out about Pathology test results he saw online and have some questions.    Please advise.

## 2017-06-06 ENCOUNTER — OFFICE VISIT (OUTPATIENT)
Dept: UROLOGY | Facility: CLINIC | Age: 62
End: 2017-06-06
Payer: COMMERCIAL

## 2017-06-06 VITALS
BODY MASS INDEX: 38.76 KG/M2 | HEIGHT: 68 IN | DIASTOLIC BLOOD PRESSURE: 80 MMHG | SYSTOLIC BLOOD PRESSURE: 122 MMHG | TEMPERATURE: 98 F | HEART RATE: 84 BPM | WEIGHT: 255.75 LBS

## 2017-06-06 DIAGNOSIS — C67.8 MALIGNANT NEOPLASM OF OVERLAPPING SITES OF BLADDER: ICD-10-CM

## 2017-06-06 DIAGNOSIS — Z98.890 POST-OPERATIVE STATE: ICD-10-CM

## 2017-06-06 DIAGNOSIS — R10.9 RIGHT FLANK PAIN: ICD-10-CM

## 2017-06-06 PROCEDURE — 99999 PR PBB SHADOW E&M-EST. PATIENT-LVL III: CPT | Mod: PBBFAC,,, | Performed by: UROLOGY

## 2017-06-06 PROCEDURE — 99024 POSTOP FOLLOW-UP VISIT: CPT | Mod: S$GLB,,, | Performed by: UROLOGY

## 2017-06-06 NOTE — PROGRESS NOTES
"Andrew Beltran is a 61 y.o. male patient.   1. Malignant neoplasm of overlapping sites of bladder    2. Right flank pain    3. Post-operative state    s/p TURP    Past Medical History:   Diagnosis Date    BPH (benign prostatic hyperplasia)     Diabetes     type 2    Gout     High cholesterol     Hypertension     Sciatica 2016    had injection 3 weeks ago     No past surgical history pertinent negatives on file.  Scheduled Meds:  Continuous Infusions:  PRN Meds:    Review of patient's allergies indicates:  No Known Allergies  There are no hospital problems to display for this patient.    Blood pressure 122/80, pulse 84, temperature 98.4 °F (36.9 °C), height 5' 8" (1.727 m), weight 116 kg (255 lb 11.7 oz).    Subjective:   Diet: Adequate intake.  Patient reports no nausea or vomiting.    Activity level: Returning to normal.    Pain control: Well controlled.      Objective:  Vital signs (most recent): Blood pressure 122/80, pulse 84, temperature 98.4 °F (36.9 °C), height 5' 8" (1.727 m), weight 116 kg (255 lb 11.7 oz).  General appearance: Comfortable, well-appearing, in no acute distress and not in pain.    Lungs:  Normal effort.    Heart: Normal rate.  Regular rhythm.  S1 normal.    Chest: Symmetric chest wall expansion.    Abdomen: Abdomen is soft.    Bowel sounds:  Bowel sounds are hyperactive.   Wound:  Clean.  There is no drainage.    Extremities: There is normal range of motion.    Neurological: The patient is alert and oriented to person, place and time.  Normal strength.  Pupils are equal, round, and reactive to light.       Assessment:   Post-op: 12 days.    Condition: In stable condition.     Plan:  Discharge home.  Encourage ambulation.  Regular diet.  Resume oral medications.      Patient Instructions   F/U in 3 months for Cystoscopy  Continue flomax and dutasteride.  F/U 3 months for cystoscopy.         Monty Cee MD  6/6/2017  "

## 2017-08-04 DIAGNOSIS — E11.8 TYPE 2 DIABETES MELLITUS WITH COMPLICATION, WITH LONG-TERM CURRENT USE OF INSULIN: ICD-10-CM

## 2017-08-04 DIAGNOSIS — Z79.4 TYPE 2 DIABETES MELLITUS WITH COMPLICATION, WITH LONG-TERM CURRENT USE OF INSULIN: ICD-10-CM

## 2017-08-04 RX ORDER — INSULIN HUMAN 100 [IU]/ML
INJECTION, SUSPENSION SUBCUTANEOUS
Qty: 30 ML | Refills: 3 | Status: SHIPPED | OUTPATIENT
Start: 2017-08-04 | End: 2017-11-21 | Stop reason: SDUPTHER

## 2017-08-28 DIAGNOSIS — Z79.4 TYPE 2 DIABETES MELLITUS WITH COMPLICATION, WITH LONG-TERM CURRENT USE OF INSULIN: ICD-10-CM

## 2017-08-28 DIAGNOSIS — E11.8 TYPE 2 DIABETES MELLITUS WITH COMPLICATION, WITH LONG-TERM CURRENT USE OF INSULIN: ICD-10-CM

## 2017-08-28 NOTE — TELEPHONE ENCOUNTER
----- Message from Leeanne Montes De Oca sent at 8/28/2017 12:14 PM CDT -----  Contact: The pt called  Need refill    Insulin

## 2017-09-05 ENCOUNTER — PROCEDURE VISIT (OUTPATIENT)
Dept: UROLOGY | Facility: CLINIC | Age: 62
End: 2017-09-05
Payer: COMMERCIAL

## 2017-09-05 VITALS
WEIGHT: 255 LBS | DIASTOLIC BLOOD PRESSURE: 66 MMHG | HEART RATE: 74 BPM | SYSTOLIC BLOOD PRESSURE: 136 MMHG | HEIGHT: 68 IN | BODY MASS INDEX: 38.65 KG/M2

## 2017-09-05 DIAGNOSIS — D49.4 BLADDER TUMOR: Primary | ICD-10-CM

## 2017-09-05 DIAGNOSIS — E78.2 MIXED HYPERLIPIDEMIA: ICD-10-CM

## 2017-09-05 DIAGNOSIS — G89.29 CHRONIC RIGHT FLANK PAIN: ICD-10-CM

## 2017-09-05 DIAGNOSIS — R10.9 CHRONIC RIGHT FLANK PAIN: ICD-10-CM

## 2017-09-05 DIAGNOSIS — N40.1 BENIGN NON-NODULAR PROSTATIC HYPERPLASIA WITH LOWER URINARY TRACT SYMPTOMS: ICD-10-CM

## 2017-09-05 PROCEDURE — 52000 CYSTOURETHROSCOPY: CPT | Mod: S$GLB,,, | Performed by: UROLOGY

## 2017-09-05 RX ORDER — ROSUVASTATIN CALCIUM 40 MG/1
40 TABLET, COATED ORAL NIGHTLY
Qty: 90 TABLET | Refills: 3 | Status: SHIPPED | OUTPATIENT
Start: 2017-09-05 | End: 2018-10-02 | Stop reason: SDUPTHER

## 2017-09-05 RX ORDER — CIPROFLOXACIN 2 MG/ML
400 INJECTION, SOLUTION INTRAVENOUS
Status: CANCELLED | OUTPATIENT
Start: 2017-09-05

## 2017-09-05 RX ORDER — CIPROFLOXACIN 500 MG/1
500 TABLET ORAL 2 TIMES DAILY
Qty: 10 TABLET | Refills: 0 | Status: SHIPPED | OUTPATIENT
Start: 2017-09-05 | End: 2017-09-10

## 2017-09-05 RX ORDER — SODIUM CHLORIDE 9 MG/ML
INJECTION, SOLUTION INTRAVENOUS CONTINUOUS
Status: CANCELLED | OUTPATIENT
Start: 2017-09-05

## 2017-09-05 NOTE — H&P
Subjective:       Patient ID: Andrew Beltran is a 62 y.o. male.    Chief Complaint: Procedure (cysto)  Right Flank Pain, BPH, Nocturia, Frequency and History of Right Trigone Bladder cancer - Low Grade    Past Medical History:   Diagnosis Date    BPH (benign prostatic hyperplasia)     Diabetes     type 2    Gout     High cholesterol     Hypertension     Sciatica 2016    had injection 3 weeks ago    Urinary tract infection      Past Surgical History:   Procedure Laterality Date    APPENDECTOMY  1968    CYSTOSCOPY      HAND SURGERY Right 12/08/2016    trigger finger    TONSILLECTOMY  1960     Family History   Problem Relation Age of Onset    Diabetes Mother     Prostate cancer Neg Hx     Kidney disease Neg Hx      Social History     Social History    Marital status:      Spouse name: N/A    Number of children: N/A    Years of education: N/A     Social History Main Topics    Smoking status: Former Smoker     Types: Pipe    Smokeless tobacco: Never Used      Comment: quit 40 yrs ago    Alcohol use 0.0 oz/week      Comment: rarely    Drug use: No    Sexual activity: Not Currently     Other Topics Concern    None     Social History Narrative    None       Current Outpatient Prescriptions   Medication Sig Dispense Refill    acetaminophen (TYLENOL) 325 MG tablet Take 1 tablet (325 mg total) by mouth every 6 (six) hours as needed for Pain.      allopurinol (ZYLOPRIM) 300 MG tablet TAKE 1 TABLET BY MOUTH once DAILY 90 tablet 3    aspirin 81 MG Chew Take 81 mg by mouth once daily.      blood sugar diagnostic (BLOOD GLUCOSE TEST) Strp by Misc.(Non-Drug; Combo Route) route.      dutasteride (AVODART) 0.5 mg capsule Take 1 capsule (0.5 mg total) by mouth once daily. 90 capsule 3    HUMULIN 70/30 KWIKPEN 100 unit/mL (70-30) InPn pen INJECT 60 UNITS SUBCUTANEOUSLY TWICE A DAY 30 mL 3    hydrochlorothiazide (HYDRODIURIL) 25 MG tablet TAKE 1 TABLET BY MOUTH ONCE DAILY 90 tablet 3     "hydrocodone-acetaminophen 5-325mg (NORCO) 5-325 mg per tablet Take 1 tablet by mouth every 6 (six) hours as needed for Pain. 30 tablet 0    lancets (PRODIGY TWIST TOP LANCET) 28 gauge Misc 1 lancet by Misc.(Non-Drug; Combo Route) route 2 (two) times daily. 180 each 4    lisinopril (PRINIVIL,ZESTRIL) 40 MG tablet Take 1 tablet (40 mg total) by mouth once daily. 90 tablet 3    MAGNESIUM ORAL Take 500 mg by mouth once daily at 6am. 800 mg everyday       methallen-chucky.blue-s.phos-phsal-hyo (URIBEL) 118-10-40.8-36 mg Cap Take 1 capsule by mouth every 6 to 8 hours as needed (Dysuria). 20 capsule 3    metoprolol succinate (TOPROL-XL) 200 MG 24 hr tablet TAKE 1 TABLET BY MOUTH once DAILY 90 tablet 3    rosuvastatin (CRESTOR) 40 MG Tab Take 1 tablet (40 mg total) by mouth every evening. 90 tablet 3    SURE COMFORT PEN NEEDLE 31 gauge x 5/16" Ndle USE THREE TIMES A DAY BEFORE MEALS 270 each 3    tamsulosin (FLOMAX) 0.4 mg Cp24 Take 1 capsule (0.4 mg total) by mouth every evening. 30 capsule 11    ciprofloxacin HCl (CIPRO) 500 MG tablet Take 1 tablet (500 mg total) by mouth 2 (two) times daily. 10 tablet 0     No current facility-administered medications for this visit.      Review of patient's allergies indicates:  No Known Allergies    Review of Systems   Constitutional: Negative for activity change, appetite change, chills, diaphoresis, fatigue, fever and unexpected weight change.   HENT: Negative for congestion, hearing loss, sinus pressure and trouble swallowing.    Eyes: Negative for photophobia, pain, discharge and visual disturbance.   Respiratory: Negative for apnea, cough and shortness of breath.    Cardiovascular: Negative for chest pain, palpitations and leg swelling.   Gastrointestinal: Negative for abdominal distention, abdominal pain, anal bleeding, blood in stool, constipation, diarrhea, nausea, rectal pain and vomiting.   Endocrine: Negative for cold intolerance, heat intolerance, polydipsia, polyphagia " "and polyuria.   Genitourinary: Positive for flank pain (right, worse with eating) and frequency. Negative for decreased urine volume, difficulty urinating, discharge, dysuria, enuresis, genital sores, hematuria, penile pain, penile swelling, scrotal swelling, testicular pain and urgency.        Nocturia > 3 times   Musculoskeletal: Negative for arthralgias, back pain and myalgias.   Skin: Negative for color change, pallor, rash and wound.   Allergic/Immunologic: Negative for environmental allergies, food allergies and immunocompromised state.   Neurological: Negative for dizziness, seizures, weakness and headaches.   Hematological: Negative for adenopathy. Does not bruise/bleed easily.   Psychiatric/Behavioral: Negative.        Objective:      Vitals:    09/05/17 0825   BP: 136/66   Pulse: 74   Weight: 115.7 kg (255 lb)   Height: 5' 8" (1.727 m)     Physical Exam   Constitutional: He appears well-developed and well-nourished. No distress.   Morbidly Obese   HENT:   Head: Normocephalic and atraumatic.   Nose: Nose normal.   Mouth/Throat: Oropharynx is clear and moist.   Eyes: Conjunctivae and EOM are normal. Pupils are equal, round, and reactive to light.   Neck: Normal range of motion. Neck supple. No thyromegaly present.   Cardiovascular: Normal rate, regular rhythm, normal heart sounds and intact distal pulses.    Pulmonary/Chest: Effort normal and breath sounds normal. No stridor.   Abdominal: Soft. Bowel sounds are normal. He exhibits no distension and no mass. There is no tenderness. There is no rebound and no guarding. No hernia.   Genitourinary: Rectum normal, testes normal and penis normal. Prostate is enlarged. Prostate is not tender. Cremasteric reflex is present. Circumcised.   Genitourinary Comments: Enlarged prostate - Trilobar, > 5 cm   Skin: He is not diaphoretic.   Nursing note and vitals reviewed.      Lab Review:   CBC:   Lab Results   Component Value Date    WBC 10.90 05/22/2017    RBC 6.38 (H) " 05/22/2017    HGB 17.2 05/22/2017    HCT 52.3 05/22/2017     05/22/2017     BMP:   Lab Results   Component Value Date     (H) 05/10/2017     05/10/2017    K 4.5 05/10/2017    CL 98 05/10/2017    CO2 32 (H) 05/10/2017    BUN 21 (H) 05/10/2017    BUN 19 12/22/2014    CREATININE 0.89 05/10/2017    CALCIUM 10.2 05/10/2017          Assessment:       1. Bladder tumor    2. Benign non-nodular prostatic hyperplasia with lower urinary tract symptoms    3. Chronic right flank pain        Plan:       Patient Instructions   Schedule TURBT Small, Right retrograde pyelogram and TURP on 9/18/17.

## 2017-09-05 NOTE — PROCEDURES
"Cystoscopy  Date/Time: 9/5/2017 9:36 AM  Performed by: VALENTINA BARTHOLOMEW  Authorized by: VALENTINA BARTHOLOMEW     Consent Done?:  Yes (Verbal)  Time out: Immediately prior to procedure a "time out" was called to verify the correct patient, procedure, equipment, support staff and site/side marked as required.    Indications: history bladder cancer    Anesthesia:  See MAR for details  Preparation: Patient was prepped and draped in usual sterile fashion      Scope type:  Flexible cystoscope  Stent inserted: No    Stent removed: No    External exam normal: Yes    Digital exam performed: No    Urethra normal: Yes    Prostate normal: No          Hyperplasia (Trilobar)(Length (cm):  5)Bladder neck normal: Bladder neck normal   Bladder normal: No         Number of tumors:  1       Tumor 1:          Size (mm):  10         Anatomy:  Sessile         Location:  Floor    Patient tolerance:  Patient tolerated the procedure well with no immediate complications     Tumor Right  Trigone      "

## 2017-09-14 PROCEDURE — 88112 CYTOPATH CELL ENHANCE TECH: CPT | Mod: 26,,, | Performed by: PATHOLOGY

## 2017-09-14 PROCEDURE — 88112 CYTOPATH CELL ENHANCE TECH: CPT | Performed by: PATHOLOGY

## 2017-09-18 PROBLEM — D49.4 BLADDER TUMOR: Status: ACTIVE | Noted: 2017-09-18

## 2017-09-19 ENCOUNTER — TELEPHONE (OUTPATIENT)
Dept: UROLOGY | Facility: CLINIC | Age: 62
End: 2017-09-19

## 2017-09-19 NOTE — TELEPHONE ENCOUNTER
----- Message from Kenton Nelson sent at 9/19/2017  8:58 AM CDT -----  Contact: self/[phone]  He is returning the nurse call.

## 2017-09-19 NOTE — TELEPHONE ENCOUNTER
----- Message from Leah Fernandez sent at 9/19/2017  8:32 AM CDT -----  No. [phone]   Patient had surgery on Monday, 9/18/17.   He has a question about his medication.   Please call.

## 2017-09-20 ENCOUNTER — TELEPHONE (OUTPATIENT)
Dept: UROLOGY | Facility: CLINIC | Age: 62
End: 2017-09-20

## 2017-09-20 ENCOUNTER — OFFICE VISIT (OUTPATIENT)
Dept: UROLOGY | Facility: CLINIC | Age: 62
End: 2017-09-20
Payer: COMMERCIAL

## 2017-09-20 VITALS — BODY MASS INDEX: 39.4 KG/M2 | HEIGHT: 68 IN | WEIGHT: 260 LBS

## 2017-09-20 DIAGNOSIS — Z98.890 POST-OPERATIVE STATE: Primary | ICD-10-CM

## 2017-09-20 DIAGNOSIS — N40.1 BENIGN NON-NODULAR PROSTATIC HYPERPLASIA WITH LOWER URINARY TRACT SYMPTOMS: ICD-10-CM

## 2017-09-20 PROCEDURE — 99999 PR PBB SHADOW E&M-EST. PATIENT-LVL III: CPT | Mod: PBBFAC,,, | Performed by: UROLOGY

## 2017-09-20 PROCEDURE — 99024 POSTOP FOLLOW-UP VISIT: CPT | Mod: S$GLB,,, | Performed by: UROLOGY

## 2017-09-20 NOTE — TELEPHONE ENCOUNTER
----- Message from Fany Monroe sent at 9/20/2017  1:14 PM CDT -----  Contact: Self/ [phone]  Patient would like to speak with you about a personal matter. Patient stated it is urgent. Please advise.

## 2017-09-20 NOTE — PROGRESS NOTES
"Andrew Beltran is a 62 y.o. male patient.   No diagnosis found.  Past Medical History:   Diagnosis Date    BPH (benign prostatic hyperplasia)     Diabetes     type 2    Gout     High cholesterol     Hypertension     Sciatica 2016    had injection 3 weeks ago    Urinary tract infection      Past Surgical History Pertinent Negatives:   Procedure Date Noted    PROSTATE SURGERY 09/05/2017    VASECTOMY 09/05/2017     Scheduled Meds:  Continuous Infusions:  PRN Meds:    Review of patient's allergies indicates:  No Known Allergies  There are no hospital problems to display for this patient.    Height 5' 8" (1.727 m), weight 117.9 kg (260 lb).    Subjective:   Diet: Adequate intake.  Patient reports no nausea or vomiting.    Activity level: Returning to normal.    Pain control: Well controlled.      Objective:  Vital signs (most recent): Height 5' 8" (1.727 m), weight 117.9 kg (260 lb).  General appearance: Comfortable, well-appearing, in no acute distress and not in pain.    Lungs:  Normal respiratory rate and normal effort.  Breath sounds normal.    Heart: Normal rate.  Regular rhythm.  S1 normal.    Chest: Symmetric chest wall expansion.    Abdomen: Abdomen is soft.    Bowel sounds:  Bowel sounds are normal.    Tenderness: There is no abdominal tenderness tenderness.    Wound:  Objective wound description: Mckeon in place, blood tinged.    Extremities: There is normal range of motion.    Neurological: The patient is alert and oriented to person, place and time.  Normal strength.  Pupils are equal, round, and reactive to light.       Assessment:   Post-op: 2 days.    Condition: In stable condition.     Plan:  Encourage ambulation.  Discontinue drain (Remove mckeon).  Regular diet.  Resume oral medications.    Voiding Trial and F/U 3 weeks       Monty Cee MD  9/20/2017  "

## 2017-09-20 NOTE — TELEPHONE ENCOUNTER
Still have not urinated. Will wait another hour then if no urination will come in for mckeon placement

## 2017-09-21 ENCOUNTER — PATIENT MESSAGE (OUTPATIENT)
Dept: UROLOGY | Facility: CLINIC | Age: 62
End: 2017-09-21

## 2017-09-21 ENCOUNTER — TELEPHONE (OUTPATIENT)
Dept: UROLOGY | Facility: CLINIC | Age: 62
End: 2017-09-21

## 2017-09-21 NOTE — TELEPHONE ENCOUNTER
Spoke to patient. He is urinating. He also wanted a msg sent to dr alberto about taking uribel everyday or prn and also does he take 1 or 2 flomax.    msg sent to dr alberto: Patient would like to speak to you regarding taking the uribel everyday or prn.  He also needs to know if he should be taking 1 flomax or 2 flomax everyday. And she he take flomax and uribel together?

## 2017-09-21 NOTE — TELEPHONE ENCOUNTER
----- Message from Leah Fernandez sent at 9/21/2017  9:38 AM CDT -----  No. [phone]    Patient said the call is a follow up from yesterday.   He would not say more.   Please call.

## 2017-09-25 ENCOUNTER — TELEPHONE (OUTPATIENT)
Dept: FAMILY MEDICINE | Facility: CLINIC | Age: 62
End: 2017-09-25

## 2017-09-25 NOTE — TELEPHONE ENCOUNTER
----- Message from Kenton Nelson sent at 9/25/2017  8:07 AM CDT -----  Contact: self/[phone]  He had surgery a week ago; he has sensations and difficulty with flow and control; please advise.

## 2017-09-27 ENCOUNTER — TELEPHONE (OUTPATIENT)
Dept: UROLOGY | Facility: CLINIC | Age: 62
End: 2017-09-27

## 2017-09-27 NOTE — TELEPHONE ENCOUNTER
----- Message from Mauro Triana sent at 9/27/2017  3:23 PM CDT -----  Contact: Self/ [phone] after 4:15pm  [phone]  Patient would like to speak with you concerning his recovery from his surgery he had on 9/18.     Please call and advise.

## 2017-10-04 ENCOUNTER — OFFICE VISIT (OUTPATIENT)
Dept: UROLOGY | Facility: CLINIC | Age: 62
End: 2017-10-04
Payer: COMMERCIAL

## 2017-10-04 VITALS — WEIGHT: 260 LBS | HEIGHT: 68 IN | BODY MASS INDEX: 39.4 KG/M2

## 2017-10-04 DIAGNOSIS — R35.1 NOCTURIA: ICD-10-CM

## 2017-10-04 DIAGNOSIS — R35.0 URINARY FREQUENCY: ICD-10-CM

## 2017-10-04 DIAGNOSIS — N40.1 BENIGN NON-NODULAR PROSTATIC HYPERPLASIA WITH LOWER URINARY TRACT SYMPTOMS: ICD-10-CM

## 2017-10-04 DIAGNOSIS — Z98.890 POST-OPERATIVE STATE: Primary | ICD-10-CM

## 2017-10-04 PROCEDURE — 99024 POSTOP FOLLOW-UP VISIT: CPT | Mod: S$GLB,,, | Performed by: UROLOGY

## 2017-10-04 PROCEDURE — 99999 PR PBB SHADOW E&M-EST. PATIENT-LVL II: CPT | Mod: PBBFAC,,, | Performed by: UROLOGY

## 2017-10-04 RX ORDER — DUTASTERIDE 0.5 MG/1
0.5 CAPSULE, LIQUID FILLED ORAL DAILY
Qty: 90 CAPSULE | Refills: 3 | Status: SHIPPED | OUTPATIENT
Start: 2017-10-04 | End: 2018-05-14

## 2017-10-04 NOTE — PROGRESS NOTES
"Andrew Beltran is a 62 y.o. male patient.   1. Post-operative state    2. Benign non-nodular prostatic hyperplasia with lower urinary tract symptoms    3. Nocturia    4. Urinary frequency      Past Medical History:   Diagnosis Date    BPH (benign prostatic hyperplasia)     Diabetes     type 2    Gout     High cholesterol     Hypertension     Sciatica 2016    had injection 3 weeks ago    Urinary tract infection      Past Surgical History Pertinent Negatives:   Procedure Date Noted    PROSTATE SURGERY 09/05/2017    VASECTOMY 09/05/2017     Scheduled Meds:  Continuous Infusions:  PRN Meds:    Review of patient's allergies indicates:  No Known Allergies  There are no hospital problems to display for this patient.    Height 5' 8" (1.727 m), weight 117.9 kg (260 lb).    Subjective:   Diet: Adequate intake.  Patient reports no nausea or vomiting.    Activity level: Returning to normal.    Pain control: Well controlled.      Objective:  Vital signs (most recent): Height 5' 8" (1.727 m), weight 117.9 kg (260 lb).  General appearance: Comfortable, well-appearing, in no acute distress and not in pain.    Lungs:  Normal respiratory rate and normal effort.  Breath sounds normal.    Heart: Normal rate.    Chest: Symmetric chest wall expansion.    Abdomen: Abdomen is soft.    Bowel sounds:  Bowel sounds are normal.    Tenderness: There is no abdominal tenderness tenderness.    Extremities: There is normal range of motion.    Neurological: The patient is alert and oriented to person, place and time.  Normal strength.  Pupils are equal, round, and reactive to light.       Assessment:   Post-op: 22 days.    Condition: In stable condition.     Plan:  Encourage ambulation.  Regular diet.  Resume oral medications.      Patient Instructions   Keep water intake high.  Continue Tamsulosin and Dutasteride  F/U 4 weeks           Monty Cee MD  10/4/2017  "

## 2017-10-13 ENCOUNTER — TELEPHONE (OUTPATIENT)
Dept: UROLOGY | Facility: CLINIC | Age: 62
End: 2017-10-13

## 2017-10-13 NOTE — TELEPHONE ENCOUNTER
----- Message from Hellen Farias sent at 10/13/2017  2:19 PM CDT -----  Contact: [phone]  Patient Is requesting to speak with you

## 2017-10-16 ENCOUNTER — TELEPHONE (OUTPATIENT)
Dept: UROLOGY | Facility: CLINIC | Age: 62
End: 2017-10-16

## 2017-10-16 ENCOUNTER — CLINICAL SUPPORT (OUTPATIENT)
Dept: UROLOGY | Facility: CLINIC | Age: 62
End: 2017-10-16
Payer: COMMERCIAL

## 2017-10-16 PROCEDURE — 99999 PR PBB SHADOW E&M-EST. PATIENT-LVL I: CPT | Mod: PBBFAC,,,

## 2017-10-16 NOTE — TELEPHONE ENCOUNTER
----- Message from Seda Denney sent at 10/16/2017  3:42 PM CDT -----  Contact: Self [phone]  Patient Returning Your Phone Call

## 2017-10-17 ENCOUNTER — PROCEDURE VISIT (OUTPATIENT)
Dept: UROLOGY | Facility: CLINIC | Age: 62
End: 2017-10-17
Payer: COMMERCIAL

## 2017-10-17 ENCOUNTER — TELEPHONE (OUTPATIENT)
Dept: UROLOGY | Facility: CLINIC | Age: 62
End: 2017-10-17

## 2017-10-17 DIAGNOSIS — R33.8 CLOT RETENTION OF URINE: ICD-10-CM

## 2017-10-17 DIAGNOSIS — R31.9 HEMATURIA, UNSPECIFIED TYPE: Primary | ICD-10-CM

## 2017-10-17 PROCEDURE — 52000 CYSTOURETHROSCOPY: CPT | Mod: S$GLB,,, | Performed by: UROLOGY

## 2017-10-17 RX ORDER — CIPROFLOXACIN 500 MG/1
500 TABLET ORAL 2 TIMES DAILY
Qty: 10 TABLET | Refills: 0 | Status: SHIPPED | OUTPATIENT
Start: 2017-10-17 | End: 2017-10-22

## 2017-10-17 RX ORDER — SODIUM CHLORIDE 9 MG/ML
INJECTION, SOLUTION INTRAVENOUS CONTINUOUS
Status: CANCELLED | OUTPATIENT
Start: 2017-10-17

## 2017-10-17 RX ORDER — CIPROFLOXACIN 2 MG/ML
400 INJECTION, SOLUTION INTRAVENOUS
Status: CANCELLED | OUTPATIENT
Start: 2017-10-17

## 2017-10-17 NOTE — PROCEDURES
"Cystoscopy  Date/Time: 10/17/2017 11:31 AM  Performed by: VALENTINA BARTHOLOMEW  Authorized by: VALENTINA BARTHOLOMEW     Consent Done?:  Yes (Written)  Time out: Immediately prior to procedure a "time out" was called to verify the correct patient, procedure, equipment, support staff and site/side marked as required.    Indications: history bladder cancer and BPH    Anesthesia:  See MAR for details  Patient sedated?: No    Preparation: Patient was prepped and draped in usual sterile fashion      Scope type:  Flexible cystoscope  Stent inserted: No    Stent removed: No    External exam normal: Yes    Digital exam performed: No    Urethra normal: Yes    Prostate normal: No (S/P TURP)  Bladder neck normal: Bladder neck normal   Bladder normal: No (Clot)      Patient tolerance:  Patient tolerated the procedure well with no immediate complications  Bladder Catheterization  Date/Time: 10/17/2017 11:34 AM  Location procedure was performed: [facility]  Performed by: VALENTINA BARTHOLOMEW  Authorized by: VALENTINA BARTHOLOMEW   Assisting provider: VALENTINA BARTHOLOMEW  Pre-operative diagnosis: clot urinary retention  Post-operative diagnosis: clot urinary retention  Consent Done: Yes  Consent: Verbal consent not obtained. Written consent obtained.  Risks and benefits: risks, benefits and alternatives were discussed  Patient understanding: patient states understanding of the procedure being performed  Patient consent: the patient's understanding of the procedure matches consent given  Procedure consent: procedure consent matches procedure scheduled  Relevant documents: relevant documents present and verified  Test results: test results available and properly labeled  Site marked: the operative site was not marked  Imaging studies: imaging studies not available  Time out: Immediately prior to procedure a "time out" was called to verify the correct patient, procedure, equipment, support staff and site/side marked as required.  Indications: hematuria " and urinary retention  Local anesthesia used: yes  Anesthesia: see MAR for details    Anesthesia:  Local anesthesia used: yes  Local Anesthetic: lidocaine 2% without epinephrine  Anesthetic total: 10 mL  Patient sedated: no  Preparation: Patient was prepped and draped in the usual sterile fashion.  Description of findings: Large amount of clots irrigated   Catheter insertion: indwelling  Catheter type: Phan  Catheter size: 22 Fr  Complicated insertion: no  Altered anatomy: no  Bladder irrigation: yes  Number of attempts: 1  Urine volume: 0 ml  Urine characteristics: bloody and dark  Technical procedures used: Hand irrigation  Significant surgical tasks conducted by the assistant(s): none  Complications: No  Estimated blood loss (mL): 50  Specimens: No  Implants: No  Patient tolerance: Patient tolerated the procedure well with no immediate complications

## 2017-10-17 NOTE — TELEPHONE ENCOUNTER
----- Message from Seda Denney sent at 10/17/2017  2:45 PM CDT -----  Contact: Self [phone]  Calling to talk to nurse has a question concerning his procedure. Please advice

## 2017-10-17 NOTE — ADDENDUM NOTE
Addended by: VALENTINA BARTHOLOMEW on: 10/17/2017 12:12 PM     Modules accepted: Poppy, SmartSet

## 2017-10-24 DIAGNOSIS — I10 ESSENTIAL HYPERTENSION: ICD-10-CM

## 2017-10-24 RX ORDER — METOPROLOL SUCCINATE 200 MG/1
TABLET, EXTENDED RELEASE ORAL
Qty: 90 TABLET | Refills: 1 | Status: SHIPPED | OUTPATIENT
Start: 2017-10-24 | End: 2018-08-13 | Stop reason: SDUPTHER

## 2017-10-24 RX ORDER — LISINOPRIL 40 MG/1
TABLET ORAL
Qty: 90 TABLET | Refills: 1 | Status: SHIPPED | OUTPATIENT
Start: 2017-10-24 | End: 2018-05-15 | Stop reason: SDUPTHER

## 2017-11-03 ENCOUNTER — OFFICE VISIT (OUTPATIENT)
Dept: UROLOGY | Facility: CLINIC | Age: 62
End: 2017-11-03
Payer: COMMERCIAL

## 2017-11-03 VITALS — HEIGHT: 69 IN | BODY MASS INDEX: 38.51 KG/M2 | WEIGHT: 260 LBS

## 2017-11-03 DIAGNOSIS — R35.1 NOCTURIA: ICD-10-CM

## 2017-11-03 DIAGNOSIS — D49.4 BLADDER TUMOR: ICD-10-CM

## 2017-11-03 DIAGNOSIS — R35.0 URINARY FREQUENCY: ICD-10-CM

## 2017-11-03 DIAGNOSIS — N39.3 SUI (STRESS URINARY INCONTINENCE), MALE: ICD-10-CM

## 2017-11-03 DIAGNOSIS — Z98.890 POST-OPERATIVE STATE: Primary | ICD-10-CM

## 2017-11-03 PROCEDURE — 99024 POSTOP FOLLOW-UP VISIT: CPT | Mod: S$GLB,,, | Performed by: UROLOGY

## 2017-11-03 PROCEDURE — 99999 PR PBB SHADOW E&M-EST. PATIENT-LVL II: CPT | Mod: PBBFAC,,, | Performed by: UROLOGY

## 2017-11-03 RX ORDER — METHENAMINE, SODIUM PHOSPHATE, MONOBASIC, MONOHYDRATE, PHENYL SALICYLATE, METHYLENE BLUE, AND HYOSCYAMINE SULFATE 118; 40.8; 36; 10; .12 MG/1; MG/1; MG/1; MG/1; MG/1
CAPSULE ORAL
COMMUNITY
Start: 2017-10-17 | End: 2018-04-04 | Stop reason: HOSPADM

## 2017-11-03 NOTE — PROGRESS NOTES
"Andrew Beltran is a 62 y.o. male patient.   No diagnosis found.  Past Medical History:   Diagnosis Date    BPH (benign prostatic hyperplasia)     Diabetes     type 2    Gout     High cholesterol     Hypertension     Sciatica 2016    had injection 3 weeks ago    Urinary tract infection      Past Surgical History Pertinent Negatives:   Procedure Date Noted    PROSTATE SURGERY 09/05/2017    VASECTOMY 09/05/2017     Scheduled Meds:  Continuous Infusions:  PRN Meds:    Review of patient's allergies indicates:  No Known Allergies  There are no hospital problems to display for this patient.    Height 5' 9" (1.753 m), weight 117.9 kg (260 lb).    Subjective:   Diet: Adequate intake.  Patient reports no nausea or vomiting.    Activity level: Normal.    Pain control: Well controlled.      Objective:  Vital signs (most recent): Height 5' 9" (1.753 m), weight 117.9 kg (260 lb).  General appearance: Comfortable, well-appearing, in no acute distress and not in pain.    Lungs:  Normal respiratory rate and normal effort.  Breath sounds normal.    Heart: Normal rate.  Regular rhythm.  S1 normal.    Chest: Symmetric chest wall expansion.    Abdomen: Abdomen is soft.    Bowel sounds:  Bowel sounds are normal.    Tenderness: There is no abdominal tenderness tenderness.    Extremities: There is normal range of motion.    Neurological: The patient is alert and oriented to person, place and time.  Normal strength.  Pupils are equal, round, and reactive to light.       Assessment:   Post-op: 46 days.    Condition: In stable condition.     Plan:  Encourage ambulation (Kegel exercises).  Regular diet.  Resume oral medications.           Monty Cee MD  11/3/2017  "

## 2017-11-21 DIAGNOSIS — E11.8 TYPE 2 DIABETES MELLITUS WITH COMPLICATION, WITH LONG-TERM CURRENT USE OF INSULIN: ICD-10-CM

## 2017-11-21 DIAGNOSIS — Z79.4 TYPE 2 DIABETES MELLITUS WITH COMPLICATION, WITH LONG-TERM CURRENT USE OF INSULIN: ICD-10-CM

## 2017-11-21 RX ORDER — INSULIN HUMAN 100 [IU]/ML
INJECTION, SUSPENSION SUBCUTANEOUS
Qty: 30 ML | Refills: 0 | Status: SHIPPED | OUTPATIENT
Start: 2017-11-21 | End: 2017-11-27

## 2017-11-27 ENCOUNTER — TELEPHONE (OUTPATIENT)
Dept: INTERNAL MEDICINE | Facility: CLINIC | Age: 62
End: 2017-11-27

## 2017-11-27 ENCOUNTER — PATIENT MESSAGE (OUTPATIENT)
Dept: INTERNAL MEDICINE | Facility: CLINIC | Age: 62
End: 2017-11-27

## 2017-11-27 DIAGNOSIS — E11.8 TYPE 2 DIABETES MELLITUS WITH COMPLICATION, WITH LONG-TERM CURRENT USE OF INSULIN: Primary | ICD-10-CM

## 2017-11-27 DIAGNOSIS — Z79.4 TYPE 2 DIABETES MELLITUS WITH COMPLICATION, WITH LONG-TERM CURRENT USE OF INSULIN: Primary | ICD-10-CM

## 2017-11-27 NOTE — TELEPHONE ENCOUNTER
----- Message from Leeanne Montes De Oca sent at 11/27/2017 10:11 AM CST -----  Contact: Coby irving/[facility]  The pt is OUT of insulin.  The insurance WILL NOT cover the current insulin.  They suggest using Novolin instead.  The pt is ok with it if you are.  Please send to the pharmacy at [facility]

## 2017-12-05 DIAGNOSIS — M10.9 GOUT OF FOOT, UNSPECIFIED CAUSE, UNSPECIFIED CHRONICITY, UNSPECIFIED LATERALITY: ICD-10-CM

## 2017-12-05 RX ORDER — ALLOPURINOL 300 MG/1
TABLET ORAL
Qty: 90 TABLET | Refills: 3 | Status: SHIPPED | OUTPATIENT
Start: 2017-12-05 | End: 2019-01-03 | Stop reason: SDUPTHER

## 2017-12-11 RX ORDER — HYDROCHLOROTHIAZIDE 25 MG/1
TABLET ORAL
Qty: 90 TABLET | Refills: 3 | Status: SHIPPED | OUTPATIENT
Start: 2017-12-11 | End: 2019-01-07 | Stop reason: SDUPTHER

## 2017-12-15 ENCOUNTER — OFFICE VISIT (OUTPATIENT)
Dept: UROLOGY | Facility: CLINIC | Age: 62
End: 2017-12-15
Payer: COMMERCIAL

## 2017-12-15 VITALS — WEIGHT: 260 LBS | BODY MASS INDEX: 38.51 KG/M2 | HEIGHT: 69 IN

## 2017-12-15 DIAGNOSIS — R35.0 URINARY FREQUENCY: ICD-10-CM

## 2017-12-15 DIAGNOSIS — R35.1 NOCTURIA: ICD-10-CM

## 2017-12-15 DIAGNOSIS — N39.3 SUI (STRESS URINARY INCONTINENCE), MALE: Primary | ICD-10-CM

## 2017-12-15 DIAGNOSIS — N40.1 BENIGN NON-NODULAR PROSTATIC HYPERPLASIA WITH LOWER URINARY TRACT SYMPTOMS: ICD-10-CM

## 2017-12-15 LAB
BACTERIA #/AREA URNS AUTO: ABNORMAL /HPF
BILIRUB UR QL STRIP: NEGATIVE
CLARITY UR REFRACT.AUTO: CLEAR
COLOR UR AUTO: ABNORMAL
GLUCOSE UR QL STRIP: ABNORMAL
HGB UR QL STRIP: ABNORMAL
KETONES UR QL STRIP: NEGATIVE
LEUKOCYTE ESTERASE UR QL STRIP: ABNORMAL
MICROSCOPIC COMMENT: ABNORMAL
NITRITE UR QL STRIP: NEGATIVE
PH UR STRIP: 6 [PH] (ref 5–8)
PROT UR QL STRIP: NEGATIVE
RBC #/AREA URNS AUTO: 2 /HPF (ref 0–4)
SP GR UR STRIP: 1.02 (ref 1–1.03)
URN SPEC COLLECT METH UR: ABNORMAL
UROBILINOGEN UR STRIP-ACNC: NEGATIVE EU/DL
WBC #/AREA URNS AUTO: 8 /HPF (ref 0–5)
YEAST UR QL AUTO: ABNORMAL

## 2017-12-15 PROCEDURE — 87086 URINE CULTURE/COLONY COUNT: CPT

## 2017-12-15 PROCEDURE — 99999 PR PBB SHADOW E&M-EST. PATIENT-LVL III: CPT | Mod: PBBFAC,,, | Performed by: UROLOGY

## 2017-12-15 PROCEDURE — 99214 OFFICE O/P EST MOD 30 MIN: CPT | Mod: 24,S$GLB,, | Performed by: UROLOGY

## 2017-12-15 PROCEDURE — 81001 URINALYSIS AUTO W/SCOPE: CPT

## 2017-12-15 RX ORDER — SYRINGE AND NEEDLE,INSULIN,1ML 31 GX5/16"
SYRINGE, EMPTY DISPOSABLE MISCELLANEOUS
COMMUNITY
Start: 2017-11-27 | End: 2018-04-02 | Stop reason: SDUPTHER

## 2017-12-15 NOTE — PATIENT INSTRUCTIONS
ISD - Continue Kegals, consider Urodynamics.   Investigate treatment with Durasphere for ISD treatment with Pt insurance company.  Stop Finasteride.

## 2017-12-16 LAB — BACTERIA UR CULT: NO GROWTH

## 2018-01-16 ENCOUNTER — TELEPHONE (OUTPATIENT)
Dept: UROLOGY | Facility: CLINIC | Age: 63
End: 2018-01-16

## 2018-01-16 NOTE — TELEPHONE ENCOUNTER
----- Message from Rose Mary Steven sent at 1/16/2018  2:33 PM CST -----  Contact: [phone]/self  Pt requesting to speak with you concerning a procedure.  Please call and advise

## 2018-01-19 DIAGNOSIS — Z79.4 TYPE 2 DIABETES MELLITUS WITH COMPLICATION, WITH LONG-TERM CURRENT USE OF INSULIN: ICD-10-CM

## 2018-01-19 DIAGNOSIS — E11.8 TYPE 2 DIABETES MELLITUS WITH COMPLICATION, WITH LONG-TERM CURRENT USE OF INSULIN: ICD-10-CM

## 2018-01-19 NOTE — TELEPHONE ENCOUNTER
----- Message from Mauro Triana sent at 1/19/2018  2:25 PM CST -----  Contact: self/[phone]  Patient is requesting a refill on his medication.  He also requested the medication below be called in today as he will take his last dose later.    Please call and advise.    insulin NPH-insulin regular, 70/30, (NOVOLIN 70/30) 100 unit/mL (70-30) injection    [facility] [place] - [address]

## 2018-01-26 ENCOUNTER — TELEPHONE (OUTPATIENT)
Dept: UROLOGY | Facility: CLINIC | Age: 63
End: 2018-01-26

## 2018-01-26 NOTE — TELEPHONE ENCOUNTER
----- Message from Sofia Lomax sent at 1/26/2018  3:04 PM CST -----  Contact: [phone] self  Patient advised he had 2 procedures done on 09/18/17 and 10/19/17 and need the operative report for the two procedures for his insurance. Please call and advise.

## 2018-03-07 ENCOUNTER — PATIENT MESSAGE (OUTPATIENT)
Dept: UROLOGY | Facility: CLINIC | Age: 63
End: 2018-03-07

## 2018-03-09 DIAGNOSIS — Z79.4 TYPE 2 DIABETES MELLITUS WITH COMPLICATION, WITH LONG-TERM CURRENT USE OF INSULIN: ICD-10-CM

## 2018-03-09 DIAGNOSIS — E11.8 TYPE 2 DIABETES MELLITUS WITH COMPLICATION, WITH LONG-TERM CURRENT USE OF INSULIN: ICD-10-CM

## 2018-03-09 RX ORDER — HUMAN INSULIN 100 [USP'U]/ML
60 INJECTION, SUSPENSION SUBCUTANEOUS 2 TIMES DAILY
Qty: 60 ML | Refills: 0 | Status: SHIPPED | OUTPATIENT
Start: 2018-03-09 | End: 2018-05-01 | Stop reason: SDUPTHER

## 2018-04-02 RX ORDER — SYRINGE AND NEEDLE,INSULIN,1ML 31 GX5/16"
SYRINGE, EMPTY DISPOSABLE MISCELLANEOUS
Qty: 180 EACH | Refills: 3 | Status: SHIPPED | OUTPATIENT
Start: 2018-04-02 | End: 2019-08-27

## 2018-04-04 ENCOUNTER — OFFICE VISIT (OUTPATIENT)
Dept: UROLOGY | Facility: CLINIC | Age: 63
End: 2018-04-04
Payer: COMMERCIAL

## 2018-04-04 ENCOUNTER — LAB VISIT (OUTPATIENT)
Dept: LAB | Facility: HOSPITAL | Age: 63
End: 2018-04-04
Attending: UROLOGY
Payer: COMMERCIAL

## 2018-04-04 VITALS
HEIGHT: 69 IN | DIASTOLIC BLOOD PRESSURE: 72 MMHG | RESPIRATION RATE: 17 BRPM | WEIGHT: 260 LBS | HEART RATE: 84 BPM | SYSTOLIC BLOOD PRESSURE: 126 MMHG | BODY MASS INDEX: 38.51 KG/M2 | OXYGEN SATURATION: 98 %

## 2018-04-04 DIAGNOSIS — R35.1 NOCTURIA: ICD-10-CM

## 2018-04-04 DIAGNOSIS — N40.1 BENIGN NON-NODULAR PROSTATIC HYPERPLASIA WITH LOWER URINARY TRACT SYMPTOMS: Primary | ICD-10-CM

## 2018-04-04 DIAGNOSIS — N39.3 SUI (STRESS URINARY INCONTINENCE), MALE: ICD-10-CM

## 2018-04-04 DIAGNOSIS — R35.0 URINARY FREQUENCY: ICD-10-CM

## 2018-04-04 DIAGNOSIS — N40.1 BENIGN NON-NODULAR PROSTATIC HYPERPLASIA WITH LOWER URINARY TRACT SYMPTOMS: ICD-10-CM

## 2018-04-04 LAB
BACTERIA #/AREA URNS AUTO: NORMAL /HPF
BILIRUB UR QL STRIP: NEGATIVE
CLARITY UR REFRACT.AUTO: CLEAR
COLOR UR AUTO: YELLOW
GLUCOSE UR QL STRIP: ABNORMAL
HGB UR QL STRIP: NEGATIVE
KETONES UR QL STRIP: NEGATIVE
LEUKOCYTE ESTERASE UR QL STRIP: NEGATIVE
MICROSCOPIC COMMENT: NORMAL
NITRITE UR QL STRIP: NEGATIVE
PH UR STRIP: 7 [PH] (ref 5–8)
PROT UR QL STRIP: NEGATIVE
RBC #/AREA URNS AUTO: 0 /HPF (ref 0–4)
SP GR UR STRIP: 1.01 (ref 1–1.03)
URN SPEC COLLECT METH UR: ABNORMAL
UROBILINOGEN UR STRIP-ACNC: NEGATIVE EU/DL
WBC #/AREA URNS AUTO: 1 /HPF (ref 0–5)
YEAST UR QL AUTO: NORMAL

## 2018-04-04 PROCEDURE — 88120 CYTP URNE 3-5 PROBES EA SPEC: CPT

## 2018-04-04 PROCEDURE — 99999 PR PBB SHADOW E&M-EST. PATIENT-LVL III: CPT | Mod: PBBFAC,,, | Performed by: UROLOGY

## 2018-04-04 PROCEDURE — 3074F SYST BP LT 130 MM HG: CPT | Mod: CPTII,S$GLB,, | Performed by: UROLOGY

## 2018-04-04 PROCEDURE — 3078F DIAST BP <80 MM HG: CPT | Mod: CPTII,S$GLB,, | Performed by: UROLOGY

## 2018-04-04 PROCEDURE — 99214 OFFICE O/P EST MOD 30 MIN: CPT | Mod: S$GLB,,, | Performed by: UROLOGY

## 2018-04-04 PROCEDURE — 81001 URINALYSIS AUTO W/SCOPE: CPT

## 2018-04-04 PROCEDURE — 88112 CYTOPATH CELL ENHANCE TECH: CPT | Performed by: PATHOLOGY

## 2018-04-04 PROCEDURE — 88112 CYTOPATH CELL ENHANCE TECH: CPT | Mod: 26,,, | Performed by: PATHOLOGY

## 2018-04-04 NOTE — PROGRESS NOTES
Subjective:       Patient ID: Andrew Beltran is a 62 y.o. male.    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  Mild SAHARA 2-3 damp pads daily. Concerned over repeated cystoscopic exams. Discussed and will  Monitor Urine studies quarterly and cysto as indicated.      Benign Prostatic Hypertrophy   This is a chronic problem. The current episode started more than 1 year ago. The problem has been gradually improving since onset. Irritative symptoms include frequency (q 2 hr), nocturia ( x 2) and urgency (mild). Obstructive symptoms include dribbling. Obstructive symptoms do not include incomplete emptying, an intermittent stream, a slower stream, straining or a weak stream. Pertinent negatives include no chills, dysuria, genital pain, hematuria, hesitancy, nausea or vomiting. AUA score is 0-7. He is sexually active. Nothing aggravates the symptoms. Treatments tried: TURP x 2. The treatment provided significant relief. He has been using treatment for 6 to 12 months.     Review of Systems   Constitutional: Negative for activity change, appetite change, chills, diaphoresis, fatigue, fever and unexpected weight change.   HENT: Negative for congestion, hearing loss, sinus pressure and trouble swallowing.    Eyes: Negative for photophobia, pain, discharge and visual disturbance.   Respiratory: Negative for apnea, cough and shortness of breath.    Cardiovascular: Negative for chest pain, palpitations and leg swelling.   Gastrointestinal: Negative for abdominal distention, abdominal pain, anal bleeding, blood in stool, constipation, diarrhea, nausea, rectal pain and vomiting.   Endocrine: Negative for cold intolerance, heat intolerance, polydipsia, polyphagia and polyuria.   Genitourinary: Positive for frequency (q 2 hr), nocturia ( x 2) and urgency (mild). Negative for decreased urine volume, difficulty urinating, discharge, dysuria, enuresis, flank  pain, genital sores, hematuria, hesitancy, incomplete emptying, penile pain, penile swelling, scrotal swelling and testicular pain.   Musculoskeletal: Negative for arthralgias, back pain and myalgias.   Skin: Negative for color change, pallor, rash and wound.   Allergic/Immunologic: Negative for environmental allergies, food allergies and immunocompromised state.   Neurological: Negative for dizziness, seizures, weakness and headaches.   Hematological: Negative for adenopathy. Does not bruise/bleed easily.   Psychiatric/Behavioral: Negative.        Objective:      Physical Exam   Nursing note and vitals reviewed.  Constitutional: He is oriented to person, place, and time. He appears well-developed and well-nourished.   HENT:   Head: Normocephalic.   Nose: Nose normal.   Mouth/Throat: Oropharynx is clear and moist.   Eyes: Conjunctivae and EOM are normal. Pupils are equal, round, and reactive to light.   Neck: Normal range of motion. Neck supple.   Cardiovascular: Normal rate, regular rhythm, normal heart sounds and intact distal pulses.    Pulmonary/Chest: Effort normal and breath sounds normal.   Abdominal: Soft. Bowel sounds are normal.   Genitourinary: Penis normal.   Musculoskeletal: Normal range of motion.   Neurological: He is alert and oriented to person, place, and time. He has normal reflexes.   Skin: Skin is warm and dry.     Psychiatric: He has a normal mood and affect. His behavior is normal. Judgment and thought content normal.       Assessment:       1. Benign non-nodular prostatic hyperplasia with lower urinary tract symptoms    2. Nocturia    3. SAHARA (stress urinary incontinence), male    4. Urinary frequency        Plan:       Patient Instructions   Check U/A, FISH test and Cytology   F/U 3 months  Continue Kegal's  Cysto as needed p[ending  Urine test f/u.

## 2018-04-04 NOTE — PATIENT INSTRUCTIONS
Check U/A, FISH test and Cytology   F/U 3 months  Continue Kegal's  Cysto as needed p[ending  Urine test f/u.

## 2018-04-05 ENCOUNTER — PATIENT MESSAGE (OUTPATIENT)
Dept: ADMINISTRATIVE | Facility: HOSPITAL | Age: 63
End: 2018-04-05

## 2018-04-06 DIAGNOSIS — E11.9 TYPE 2 DIABETES MELLITUS WITHOUT COMPLICATION: ICD-10-CM

## 2018-04-13 LAB
FUROC - INTERPRETATION: NORMAL
FUROC - REASON FOR REFERRAL: NORMAL
FUROC - RELEASED BY: NORMAL
FUROC - RESULT SUMMARY: NEGATIVE
FUROC - RESULT: NORMAL
FUROC - SPECIMEN: NORMAL
SPECIMEN SOURCE: NORMAL

## 2018-05-01 DIAGNOSIS — E11.8 TYPE 2 DIABETES MELLITUS WITH COMPLICATION, WITH LONG-TERM CURRENT USE OF INSULIN: ICD-10-CM

## 2018-05-01 DIAGNOSIS — Z79.4 TYPE 2 DIABETES MELLITUS WITH COMPLICATION, WITH LONG-TERM CURRENT USE OF INSULIN: ICD-10-CM

## 2018-05-01 RX ORDER — HUMAN INSULIN 100 [USP'U]/ML
60 INJECTION, SUSPENSION SUBCUTANEOUS 2 TIMES DAILY
Qty: 60 ML | Refills: 0 | Status: SHIPPED | OUTPATIENT
Start: 2018-05-01 | End: 2018-06-22 | Stop reason: SDUPTHER

## 2018-05-14 ENCOUNTER — OFFICE VISIT (OUTPATIENT)
Dept: INTERNAL MEDICINE | Facility: CLINIC | Age: 63
End: 2018-05-14
Payer: COMMERCIAL

## 2018-05-14 VITALS
RESPIRATION RATE: 12 BRPM | SYSTOLIC BLOOD PRESSURE: 128 MMHG | BODY MASS INDEX: 39.2 KG/M2 | WEIGHT: 264.69 LBS | DIASTOLIC BLOOD PRESSURE: 78 MMHG | HEART RATE: 68 BPM | HEIGHT: 69 IN | TEMPERATURE: 98 F

## 2018-05-14 DIAGNOSIS — M10.9 GOUT OF FOOT, UNSPECIFIED CAUSE, UNSPECIFIED CHRONICITY, UNSPECIFIED LATERALITY: ICD-10-CM

## 2018-05-14 DIAGNOSIS — I10 ESSENTIAL HYPERTENSION: ICD-10-CM

## 2018-05-14 DIAGNOSIS — E78.2 MIXED HYPERLIPIDEMIA: ICD-10-CM

## 2018-05-14 DIAGNOSIS — M16.0 PRIMARY OSTEOARTHRITIS OF BOTH HIPS: ICD-10-CM

## 2018-05-14 DIAGNOSIS — Z00.00 ROUTINE GENERAL MEDICAL EXAMINATION AT A HEALTH CARE FACILITY: Primary | ICD-10-CM

## 2018-05-14 DIAGNOSIS — E11.42 TYPE 2 DIABETES MELLITUS WITH DIABETIC POLYNEUROPATHY, WITH LONG-TERM CURRENT USE OF INSULIN: ICD-10-CM

## 2018-05-14 DIAGNOSIS — Z79.4 TYPE 2 DIABETES MELLITUS WITH DIABETIC POLYNEUROPATHY, WITH LONG-TERM CURRENT USE OF INSULIN: ICD-10-CM

## 2018-05-14 DIAGNOSIS — Z85.51 HISTORY OF BLADDER CANCER: ICD-10-CM

## 2018-05-14 DIAGNOSIS — E66.01 MORBID OBESITY WITH BMI OF 40.0-44.9, ADULT: ICD-10-CM

## 2018-05-14 PROBLEM — R10.9 RIGHT FLANK PAIN: Status: RESOLVED | Noted: 2017-06-06 | Resolved: 2018-05-14

## 2018-05-14 PROCEDURE — 3046F HEMOGLOBIN A1C LEVEL >9.0%: CPT | Mod: CPTII,S$GLB,, | Performed by: INTERNAL MEDICINE

## 2018-05-14 PROCEDURE — 99396 PREV VISIT EST AGE 40-64: CPT | Mod: S$GLB,,, | Performed by: INTERNAL MEDICINE

## 2018-05-14 PROCEDURE — 3074F SYST BP LT 130 MM HG: CPT | Mod: CPTII,S$GLB,, | Performed by: INTERNAL MEDICINE

## 2018-05-14 PROCEDURE — 3078F DIAST BP <80 MM HG: CPT | Mod: CPTII,S$GLB,, | Performed by: INTERNAL MEDICINE

## 2018-05-14 PROCEDURE — 99999 PR PBB SHADOW E&M-EST. PATIENT-LVL III: CPT | Mod: PBBFAC,,, | Performed by: INTERNAL MEDICINE

## 2018-05-14 RX ORDER — PREDNISONE 20 MG/1
20 TABLET ORAL DAILY
Refills: 0 | COMMUNITY
Start: 2018-05-11 | End: 2019-08-12 | Stop reason: ALTCHOICE

## 2018-05-14 NOTE — PROGRESS NOTES
Subjective:       Patient ID: Andrew Beltran is a 62 y.o. male.    Chief Complaint: Results and Follow-up    [facility]  Wellness check.  Labs reviewed.  Most BSs in 200s.  No paresthesias.  Eye check UTD.  On prednisone for an URI.  No hematuria, having some incontience and nocturia x 2.  Hip pain at baseline.  Noncompliant with diet, no exercise.  Review of Systems   Constitutional: Negative for activity change and unexpected weight change.   HENT: Negative for hearing loss, rhinorrhea and trouble swallowing.    Eyes: Negative for discharge and visual disturbance.   Respiratory: Negative for chest tightness and wheezing.    Cardiovascular: Negative for chest pain and palpitations.   Gastrointestinal: Positive for constipation. Negative for blood in stool, diarrhea and vomiting.   Endocrine: Negative for polydipsia and polyuria.   Genitourinary: Positive for urgency. Negative for difficulty urinating and hematuria.   Musculoskeletal: Negative for arthralgias, joint swelling and neck pain.   Neurological: Negative for weakness and headaches.   Psychiatric/Behavioral: Negative for confusion and dysphoric mood.   All other systems reviewed and are negative.      Objective:      Physical Exam   Constitutional: He appears well-developed. No distress.   obese   HENT:   Head: Normocephalic.   Eyes: EOM are normal. No scleral icterus.   Neck: Normal range of motion. No tracheal deviation present.   Cardiovascular: Normal rate, regular rhythm, normal heart sounds and intact distal pulses.    Pulses:       Dorsalis pedis pulses are 3+ on the right side, and 3+ on the left side.        Posterior tibial pulses are 2+ on the right side, and 2+ on the left side.   Pulmonary/Chest: Effort normal and breath sounds normal. No respiratory distress.   Abdominal: Soft. Bowel sounds are normal. He exhibits no distension. There is no tenderness.   Musculoskeletal: Normal range of motion. He exhibits no edema.   Feet:   Right Foot:    Protective Sensation: 6 sites tested. 6 sites sensed.   Skin Integrity: Positive for callus and dry skin.   Left Foot:   Protective Sensation: 6 sites tested. 6 sites sensed.   Skin Integrity: Positive for dry skin.   Neurological: He is alert.   Skin: Skin is warm and dry. No rash noted. He is not diaphoretic. No erythema.   Psychiatric: He has a normal mood and affect. His behavior is normal.   Vitals reviewed.      Assessment:       1. Routine general medical examination at a health care facility    2. History of bladder cancer    3. Type 2 diabetes mellitus with diabetic polyneuropathy, with long-term current use of insulin    4. Essential hypertension    5. Morbid obesity with BMI of 40.0-44.9, adult    6. Mixed hyperlipidemia    7. Primary osteoarthritis of both hips    8. Gout of foot, unspecified cause, unspecified chronicity, unspecified laterality        Plan:       Andrew was seen today for results and follow-up.    Diagnoses and all orders for this visit:    Routine general medical examination at a health care facility  -     Case request GI: COLONOSCOPY    History of bladder cancer   Per Jaye    Type 2 diabetes mellitus with diabetic polyneuropathy, with long-term current use of insulin  -     Hemoglobin A1c; Future    Essential hypertension   Well-cont    Morbid obesity with BMI of 40.0-44.9, adult   Urged weight loss    Mixed hyperlipidemia   Cont rx    Primary osteoarthritis of both hips   Stable    Gout of foot, unspecified cause, unspecified chronicity, unspecified laterality   In remission    Follow-up in about 3 months (around 8/14/2018).

## 2018-05-15 DIAGNOSIS — I10 ESSENTIAL HYPERTENSION: ICD-10-CM

## 2018-05-15 RX ORDER — LISINOPRIL 40 MG/1
TABLET ORAL
Qty: 90 TABLET | Refills: 3 | Status: SHIPPED | OUTPATIENT
Start: 2018-05-15 | End: 2019-03-08

## 2018-05-25 ENCOUNTER — TELEPHONE (OUTPATIENT)
Dept: GASTROENTEROLOGY | Facility: CLINIC | Age: 63
End: 2018-05-25

## 2018-05-25 NOTE — TELEPHONE ENCOUNTER
Colonoscopy Referral    Referring Physician: Dr. Shetty       Date: 07/11/2018    Reason for Referral: Screening Colon     Family History of:  None   Colon polyp:  Relationship/Age of Onset:     Colon cancer:   Relationship/Age of Onset:     Patient with:   Hemoccults Done: No    Iron deficient: No     On Blood Thinner: No      Valvular heart disease/valve replacement: No      Anemia Present: No     On NSAID: No     Lung disease: No     Kidney disease: No     Hx of polyps: no     Hx of colon cancer: no     Previous colon evalations:  Yes   When: 12 years ago  Where: [facility]  Pertinent symptoms:       Review of patient's allergies indicates:  No Known Allergies    Patient was scheduled for colonoscopy on  07/11/2018 with Dr. Sanchez at [facility]. Golytely  instructions were reviewed with patient.

## 2018-05-25 NOTE — TELEPHONE ENCOUNTER
----- Message from Nayely Castellanos sent at 5/25/2018 11:15 AM CDT -----  Contact: PT  Pt is returning your call and can be reached at [phone].    Thank you

## 2018-05-31 ENCOUNTER — TELEPHONE (OUTPATIENT)
Dept: GASTROENTEROLOGY | Facility: CLINIC | Age: 63
End: 2018-05-31

## 2018-05-31 NOTE — TELEPHONE ENCOUNTER
Referral was sent from Dr. Shetty to schedule an Colonoscopy, left an voicemail for patient to call the office back in regards to scheduling procedure.

## 2018-06-01 RX ORDER — TAMSULOSIN HYDROCHLORIDE 0.4 MG/1
0.4 CAPSULE ORAL NIGHTLY
Qty: 90 CAPSULE | Refills: 3 | Status: SHIPPED | OUTPATIENT
Start: 2018-06-01 | End: 2019-06-03

## 2018-06-04 ENCOUNTER — TELEPHONE (OUTPATIENT)
Dept: GASTROENTEROLOGY | Facility: CLINIC | Age: 63
End: 2018-06-04

## 2018-06-05 ENCOUNTER — PATIENT MESSAGE (OUTPATIENT)
Dept: GASTROENTEROLOGY | Facility: CLINIC | Age: 63
End: 2018-06-05

## 2018-06-08 ENCOUNTER — TELEPHONE (OUTPATIENT)
Dept: GASTROENTEROLOGY | Facility: CLINIC | Age: 63
End: 2018-06-08

## 2018-06-08 NOTE — TELEPHONE ENCOUNTER
----- Message from Rose Mary Steven sent at 6/8/2018 10:13 AM CDT -----  Contact: [phone]/self  Patient requesting to speak with you concerning clarification on instructions and the date of his colonoscopy.    Please call and advise

## 2018-06-22 DIAGNOSIS — E11.8 TYPE 2 DIABETES MELLITUS WITH COMPLICATION, WITH LONG-TERM CURRENT USE OF INSULIN: ICD-10-CM

## 2018-06-22 DIAGNOSIS — Z79.4 TYPE 2 DIABETES MELLITUS WITH COMPLICATION, WITH LONG-TERM CURRENT USE OF INSULIN: ICD-10-CM

## 2018-06-22 RX ORDER — HUMAN INSULIN 100 [USP'U]/ML
60 INJECTION, SUSPENSION SUBCUTANEOUS 2 TIMES DAILY
Qty: 60 ML | Refills: 3 | Status: SHIPPED | OUTPATIENT
Start: 2018-06-22 | End: 2018-11-26 | Stop reason: SDUPTHER

## 2018-07-03 ENCOUNTER — TELEPHONE (OUTPATIENT)
Dept: INTERNAL MEDICINE | Facility: CLINIC | Age: 63
End: 2018-07-03

## 2018-07-03 ENCOUNTER — PATIENT MESSAGE (OUTPATIENT)
Dept: INTERNAL MEDICINE | Facility: CLINIC | Age: 63
End: 2018-07-03

## 2018-07-03 DIAGNOSIS — L03.119 CELLULITIS OF LOWER EXTREMITY, UNSPECIFIED LATERALITY: Primary | ICD-10-CM

## 2018-07-03 RX ORDER — AMOXICILLIN AND CLAVULANATE POTASSIUM 875; 125 MG/1; MG/1
1 TABLET, FILM COATED ORAL 2 TIMES DAILY
Qty: 20 TABLET | Refills: 0 | Status: SHIPPED | OUTPATIENT
Start: 2018-07-03 | End: 2018-07-13

## 2018-07-03 NOTE — TELEPHONE ENCOUNTER
Patient scraped right leg 2 days ago requesting antibiotic. I offered patient an appointment but he's at work and can not come in. Please advise.

## 2018-07-10 ENCOUNTER — ANESTHESIA EVENT (OUTPATIENT)
Dept: ENDOSCOPY | Facility: HOSPITAL | Age: 63
End: 2018-07-10
Payer: COMMERCIAL

## 2018-07-10 ENCOUNTER — SURGERY (OUTPATIENT)
Age: 63
End: 2018-07-10

## 2018-07-10 ENCOUNTER — HOSPITAL ENCOUNTER (OUTPATIENT)
Facility: HOSPITAL | Age: 63
Discharge: HOME | End: 2018-07-10
Attending: INTERNAL MEDICINE | Admitting: INTERNAL MEDICINE
Payer: COMMERCIAL

## 2018-07-10 ENCOUNTER — ANESTHESIA (OUTPATIENT)
Dept: ENDOSCOPY | Facility: HOSPITAL | Age: 63
End: 2018-07-10
Payer: COMMERCIAL

## 2018-07-10 VITALS
OXYGEN SATURATION: 95 % | TEMPERATURE: 98 F | BODY MASS INDEX: 39.25 KG/M2 | HEIGHT: 69 IN | WEIGHT: 265 LBS | HEART RATE: 81 BPM | SYSTOLIC BLOOD PRESSURE: 144 MMHG | DIASTOLIC BLOOD PRESSURE: 83 MMHG | RESPIRATION RATE: 13 BRPM

## 2018-07-10 DIAGNOSIS — Z12.11 SCREENING FOR MALIGNANT NEOPLASM OF COLON: Primary | ICD-10-CM

## 2018-07-10 LAB — GLUCOSE SERPL-MCNC: 117 MG/DL (ref 70–110)

## 2018-07-10 PROCEDURE — 37000008 HC ANESTHESIA 1ST 15 MINUTES: Performed by: INTERNAL MEDICINE

## 2018-07-10 PROCEDURE — 37000009 HC ANESTHESIA EA ADD 15 MINS: Performed by: INTERNAL MEDICINE

## 2018-07-10 PROCEDURE — 63600175 PHARM REV CODE 636 W HCPCS: Performed by: NURSE ANESTHETIST, CERTIFIED REGISTERED

## 2018-07-10 PROCEDURE — 45380 COLONOSCOPY AND BIOPSY: CPT | Performed by: INTERNAL MEDICINE

## 2018-07-10 PROCEDURE — 45380 COLONOSCOPY AND BIOPSY: CPT | Mod: 33,,, | Performed by: INTERNAL MEDICINE

## 2018-07-10 PROCEDURE — 88305 TISSUE EXAM BY PATHOLOGIST: CPT | Mod: 26,,, | Performed by: PATHOLOGY

## 2018-07-10 PROCEDURE — 88305 TISSUE EXAM BY PATHOLOGIST: CPT | Performed by: PATHOLOGY

## 2018-07-10 PROCEDURE — 82962 GLUCOSE BLOOD TEST: CPT | Performed by: INTERNAL MEDICINE

## 2018-07-10 PROCEDURE — 25000003 PHARM REV CODE 250: Performed by: INTERNAL MEDICINE

## 2018-07-10 PROCEDURE — 27201012 HC FORCEPS, HOT/COLD, DISP: Performed by: INTERNAL MEDICINE

## 2018-07-10 RX ORDER — PROPOFOL 10 MG/ML
VIAL (ML) INTRAVENOUS
Status: DISCONTINUED | OUTPATIENT
Start: 2018-07-10 | End: 2018-07-10

## 2018-07-10 RX ORDER — SODIUM CHLORIDE 0.9 % (FLUSH) 0.9 %
3 SYRINGE (ML) INJECTION
Status: DISCONTINUED | OUTPATIENT
Start: 2018-07-10 | End: 2018-07-10 | Stop reason: HOSPADM

## 2018-07-10 RX ORDER — PROPOFOL 10 MG/ML
VIAL (ML) INTRAVENOUS CONTINUOUS PRN
Status: DISCONTINUED | OUTPATIENT
Start: 2018-07-10 | End: 2018-07-10

## 2018-07-10 RX ORDER — LIDOCAINE HCL/PF 100 MG/5ML
SYRINGE (ML) INTRAVENOUS
Status: DISCONTINUED | OUTPATIENT
Start: 2018-07-10 | End: 2018-07-10

## 2018-07-10 RX ORDER — SODIUM CHLORIDE 9 MG/ML
INJECTION, SOLUTION INTRAVENOUS CONTINUOUS
Status: DISCONTINUED | OUTPATIENT
Start: 2018-07-10 | End: 2018-07-10 | Stop reason: HOSPADM

## 2018-07-10 RX ADMIN — SODIUM CHLORIDE: 0.9 INJECTION, SOLUTION INTRAVENOUS at 11:07

## 2018-07-10 RX ADMIN — PROPOFOL 100 MCG/KG/MIN: 10 INJECTION, EMULSION INTRAVENOUS at 01:07

## 2018-07-10 RX ADMIN — LIDOCAINE HYDROCHLORIDE 100 MG: 20 INJECTION, SOLUTION INTRAVENOUS at 01:07

## 2018-07-10 RX ADMIN — PROPOFOL 100 MG: 10 INJECTION, EMULSION INTRAVENOUS at 01:07

## 2018-07-10 NOTE — [employer] PATIENT INSTRUCTIONS
Discharge Summary/Instructions after an Endoscopic Procedure  Patient Name: Andrew Beltran  Patient MRN: [id number]  Patient YOB: 1955  Tuesday, July 10, 2018  Azalea Sanchez MD  RESTRICTIONS:  During your procedure today, you received medications for sedation.  These   medications may affect your judgment, balance and coordination.  Therefore,   for 24 hours, you have the following restrictions:   - DO NOT drive a car, operate machinery, make legal/financial decisions,   sign important papers or drink alcohol.    ACTIVITY:  Today: no heavy lifting, straining or running due to procedural   sedation/anesthesia.  The following day: return to full activity including work.  DIET:  Eat and drink normally unless instructed otherwise.     TREATMENT FOR COMMON SIDE EFFECTS:  - Mild abdominal pain, nausea, belching, bloating or excessive gas:  rest,   eat lightly and use a heating pad.  - Sore Throat: treat with throat lozenges and/or gargle with warm salt   water.  - Because air was used during the procedure, expelling large amounts of air   from your rectum or belching is normal.  - If a bowel prep was taken, you may not have a bowel movement for 1-3 days.    This is normal.  SYMPTOMS TO WATCH FOR AND REPORT TO YOUR PHYSICIAN:  1. Abdominal pain or bloating, other than gas cramps.  2. Chest pain.  3. Back pain.  4. Signs of infection such as: chills or fever occurring within 24 hours   after the procedure.  5. Rectal bleeding, which would show as bright red, maroon, or black stools.   (A tablespoon of blood from the rectum is not serious, especially if   hemorrhoids are present.)  6. Vomiting.  7. Weakness or dizziness.  GO DIRECTLY TO THE NEAREST EMERGENCY ROOM IF YOU HAVE ANY OF THE FOLLOWING:      Difficulty breathing              Chills and/or fever over 101 F   Persistent vomiting and/or vomiting blood   Severe abdominal pain   Severe chest pain   Black, tarry stools   Bleeding- more than one  tablespoon   Any other symptom or condition that you feel may need urgent attention  Your doctor recommends these additional instructions:  If any biopsies were taken, your doctors clinic will contact you in 1 to 2   weeks with any results.  - Discharge patient to home (via wheelchair).   - Patient has a contact number available for emergencies.  The signs and   symptoms of potential delayed complications were discussed with the   patient.  Return to normal activities tomorrow.  Written discharge   instructions were provided to the patient.   - Resume previous diet.   - Continue present medications.   - Await pathology results.   - Repeat colonoscopy date to be determined after pending pathology results   are reviewed for surveillance.  For questions, problems or results please call your physician - Azalea Sanchez MD at Work:  [phone].  EMERGENCY PHONE NUMBER: [phone],  LAB RESULTS: [phone]  IF A COMPLICATION OR EMERGENCY SITUATION ARISES AND YOU ARE UNABLE TO REACH   YOUR PHYSICIAN - GO DIRECTLY TO THE EMERGENCY ROOM.  Azalea Sanchez MD  7/10/2018 1:25:44 PM  This report has been verified and signed electronically.  [employer]

## 2018-07-10 NOTE — ANESTHESIA PREPROCEDURE EVALUATION
07/10/2018  Andrew Beltran is a 63 y.o., male for colonoscopy under MAC    Past Medical History:   Diagnosis Date    BPH (benign prostatic hyperplasia)     Cancer     bladder    Diabetes     type 2    Gout     High cholesterol     Hypertension     Sciatica 2016    had injection 3 weeks ago    Urinary tract infection            Anesthesia Evaluation    I have reviewed the Patient Summary Reports.     I have reviewed the Medications.     Review of Systems  Social:  Former Smoker, Alcohol Use    Hematology/Oncology:         -- Cancer in past history: surgery  Oncology Comments: bladder     Cardiovascular:   Hypertension hyperlipidemia    Hepatic/GI:   Bowel Prep.    Endocrine:   Diabetes        Physical Exam  General:  Well nourished, Obesity       Chest/Lungs:  Chest/Lungs Clear    Heart/Vascular:  Heart Findings: Normal            Anesthesia Plan  Type of Anesthesia, risks & benefits discussed:  Anesthesia Type:  MAC  Patient's Preference:   Intra-op Monitoring Plan:   Intra-op Monitoring Plan Comments:   Post Op Pain Control Plan:   Post Op Pain Control Plan Comments:   Induction:    Beta Blocker:  Patient is on a Beta-Blocker and has received one dose within the past 24 hours (No further documentation required).       Informed Consent: Patient understands risks and agrees with Anesthesia plan.  Questions answered. Anesthesia consent signed with patient.  ASA Score: 3     Day of Surgery Review of History & Physical:            Ready For Surgery From Anesthesia Perspective.

## 2018-07-10 NOTE — ANESTHESIA POSTPROCEDURE EVALUATION
"Anesthesia Post Evaluation    Patient: Andrew Beltran    Procedure(s) Performed: Procedure(s) (LRB):  COLONOSCOPY (N/A)    Final Anesthesia Type: MAC  Patient location during evaluation: GI PACU  Patient participation: Yes- Able to Participate  Level of consciousness: awake and alert  Post-procedure vital signs: reviewed and stable  Pain management: adequate  Airway patency: patent  PONV status at discharge: No PONV  Anesthetic complications: no      Cardiovascular status: blood pressure returned to baseline and hemodynamically stable  Respiratory status: unassisted, spontaneous ventilation and room air  Hydration status: euvolemic  Follow-up not needed.        Visit Vitals  /73 (BP Location: Left arm, Patient Position: Lying)   Pulse 62   Temp 36.4 °C (97.5 °F) (Temporal)   Resp 20   Ht 5' 9" (1.753 m)   Wt 120.2 kg (265 lb)   SpO2 (!) 93%   BMI 39.13 kg/m²       Pain/Juan Score: Pain Assessment Performed: Yes (7/10/2018  1:27 PM)  Presence of Pain: denies (7/10/2018  1:27 PM)  Juan Score: 9 (7/10/2018  1:27 PM)      "

## 2018-07-10 NOTE — TRANSFER OF CARE
"Anesthesia Transfer of Care Note    Patient: Andrew Beltran    Procedure(s) Performed: Procedure(s) (LRB):  COLONOSCOPY (N/A)    Patient location: GI    Anesthesia Type: MAC    Transport from OR: Transported from OR on room air with adequate spontaneous ventilation    Post pain: adequate analgesia    Post assessment: no apparent anesthetic complications    Post vital signs: stable    Level of consciousness: awake    Nausea/Vomiting: no nausea/vomiting    Complications: none          Last vitals:   Visit Vitals  /73 (BP Location: Left arm, Patient Position: Lying)   Pulse 62   Temp 36.4 °C (97.5 °F) (Temporal)   Resp 20   Ht 5' 9" (1.753 m)   Wt 120.2 kg (265 lb)   SpO2 (!) 93%   BMI 39.13 kg/m²     "

## 2018-07-10 NOTE — H&P
[facility]  Gastroenterology  H&P    Patient Name: Andrew Beltran  MRN: [id number]  Admission Date: 7/10/2018  Code Status: No Order    Attending Provider: Azalea Sanchez MD   Primary Care Physician: Edin Shetty MD  Principal Problem:<principal problem not specified>    Subjective:     History of Present Illness: Colon cancer screening    Past Medical History:   Diagnosis Date    BPH (benign prostatic hyperplasia)     Cancer     bladder    Diabetes     type 2    Gout     High cholesterol     Hypertension     Sciatica 2016    had injection 3 weeks ago    Urinary tract infection        Past Surgical History:   Procedure Laterality Date    APPENDECTOMY  1968    BLADDER SURGERY      CYSTOSCOPY      HAND SURGERY Right 12/08/2016    trigger finger    TONSILLECTOMY  1960       Review of patient's allergies indicates:  No Known Allergies  Family History     Problem Relation (Age of Onset)    Diabetes Mother        Social History Main Topics    Smoking status: Former Smoker     Types: Pipe    Smokeless tobacco: Never Used      Comment: quit 40 yrs ago    Alcohol use 0.0 oz/week      Comment: rarely    Drug use: No    Sexual activity: Not Currently     Review of Systems   Constitutional: Negative for appetite change and unexpected weight change.   Cardiovascular: Negative for leg swelling.   Gastrointestinal: Negative for abdominal distention and abdominal pain.     Objective:     Vital Signs (Most Recent):  Temp: 97.9 °F (36.6 °C) (07/10/18 1135)  Pulse: 87 (07/10/18 1135)  Resp: 20 (07/10/18 1135)  BP: 119/88 (07/10/18 1135)  SpO2: 96 % (07/10/18 1135) Vital Signs (24h Range):  Temp:  [97.9 °F (36.6 °C)] 97.9 °F (36.6 °C)  Pulse:  [87] 87  Resp:  [20] 20  SpO2:  [96 %] 96 %  BP: (119)/(88) 119/88     Weight: 120.2 kg (265 lb) (07/10/18 1135)  Body mass index is 39.13 kg/m².    No intake or output data in the 24 hours ending 07/10/18 1212    Lines/Drains/Airways     Peripheral  Intravenous Line                 Peripheral IV - Single Lumen 07/10/18 1145 Right Forearm less than 1 day                Physical Exam   Constitutional: He is oriented to person, place, and time. He appears well-developed and well-nourished. No distress.   HENT:   Head: Normocephalic.   Eyes: Conjunctivae are normal. No scleral icterus.   Neck: No tracheal deviation present. No thyromegaly present.   Cardiovascular: Normal rate, regular rhythm and normal heart sounds.  Exam reveals no gallop and no friction rub.    No murmur heard.  Pulmonary/Chest: Effort normal and breath sounds normal. He has no wheezes. He has no rales.   Abdominal: Soft. Bowel sounds are normal. He exhibits no distension. There is no tenderness. There is no rebound and no guarding.   Musculoskeletal: Normal range of motion. He exhibits no edema or tenderness.   Neurological: He is alert and oriented to person, place, and time.   Skin: He is not diaphoretic.   Psychiatric: He has a normal mood and affect. His behavior is normal.         Assessment/Plan:     Active Diagnoses:    Diagnosis Date Noted POA    Screening for malignant neoplasm of colon [Z12.11] 07/10/2018 Not Applicable      Problems Resolved During this Admission:    Diagnosis Date Noted Date Resolved POA       Plan  1. Colonoscopy, risks/benefits explained in detail    Azalea Sanchez MD  Gastroenterology  [facility]

## 2018-07-12 PROBLEM — D36.9 TUBULAR ADENOMA: Status: ACTIVE | Noted: 2018-07-12

## 2018-07-24 ENCOUNTER — TELEPHONE (OUTPATIENT)
Dept: GASTROENTEROLOGY | Facility: CLINIC | Age: 63
End: 2018-07-24

## 2018-07-24 NOTE — TELEPHONE ENCOUNTER
----- Message from Azalea Sanchez MD sent at 7/19/2018  4:38 AM CDT -----  Tubular adenoma, 5 year recall colonoscopy

## 2018-07-24 NOTE — LETTER
August 1, 2018        Andrew Beltran  [address]  [place] [address]             [facility]  [address] [place] [address]  [place] [address]  Phone: [phone] Dear Mr. Beltran:    The final pathology report on the polyps removed during your recent colonoscopy did not show any cancerous growth. This type of polyp, if not removed, can potentially grow and become malignant. Fortunately, we identified and removed the polyp at a very early stage. I would recommend that you undergo a repeat colonoscopy in approximately five years.    In addition to the repeat tests indicated above, I suggest you adopt the following healthy habits to lower your risk of future polyps and colon cancer: exercise regularly, dont smoke, limit red meat in your diet, and include ample fruits and vegetables in your diet.    If you have any questions or concerns, please do not hesitate to call.    Sincerely,    Azalea Sanchez MD

## 2018-08-13 DIAGNOSIS — I10 ESSENTIAL HYPERTENSION: ICD-10-CM

## 2018-08-13 RX ORDER — METOPROLOL SUCCINATE 200 MG/1
TABLET, EXTENDED RELEASE ORAL
Qty: 90 TABLET | Refills: 1 | Status: SHIPPED | OUTPATIENT
Start: 2018-08-13 | End: 2018-11-19 | Stop reason: SDUPTHER

## 2018-10-02 DIAGNOSIS — E78.2 MIXED HYPERLIPIDEMIA: ICD-10-CM

## 2018-10-02 RX ORDER — ROSUVASTATIN CALCIUM 40 MG/1
TABLET, COATED ORAL
Qty: 90 TABLET | Refills: 3 | Status: SHIPPED | OUTPATIENT
Start: 2018-10-02 | End: 2019-10-27 | Stop reason: SDUPTHER

## 2018-11-19 DIAGNOSIS — I10 ESSENTIAL HYPERTENSION: ICD-10-CM

## 2018-11-19 RX ORDER — METOPROLOL SUCCINATE 200 MG/1
TABLET, EXTENDED RELEASE ORAL
Qty: 90 TABLET | Refills: 1 | Status: SHIPPED | OUTPATIENT
Start: 2018-11-19 | End: 2019-09-12 | Stop reason: SDUPTHER

## 2018-11-26 DIAGNOSIS — E11.8 TYPE 2 DIABETES MELLITUS WITH COMPLICATION, WITH LONG-TERM CURRENT USE OF INSULIN: ICD-10-CM

## 2018-11-26 DIAGNOSIS — Z79.4 TYPE 2 DIABETES MELLITUS WITH COMPLICATION, WITH LONG-TERM CURRENT USE OF INSULIN: ICD-10-CM

## 2018-11-26 RX ORDER — HUMAN INSULIN 100 [USP'U]/ML
INJECTION, SUSPENSION SUBCUTANEOUS
Qty: 60 ML | Refills: 3 | Status: SHIPPED | OUTPATIENT
Start: 2018-11-26 | End: 2019-08-12 | Stop reason: SDUPTHER

## 2019-01-03 DIAGNOSIS — M10.9 GOUT OF FOOT, UNSPECIFIED CAUSE, UNSPECIFIED CHRONICITY, UNSPECIFIED LATERALITY: ICD-10-CM

## 2019-01-03 RX ORDER — ALLOPURINOL 300 MG/1
TABLET ORAL
Qty: 90 TABLET | Refills: 3 | Status: SHIPPED | OUTPATIENT
Start: 2019-01-03 | End: 2020-02-05 | Stop reason: SDUPTHER

## 2019-01-07 RX ORDER — HYDROCHLOROTHIAZIDE 25 MG/1
TABLET ORAL
Qty: 90 TABLET | Refills: 3 | Status: SHIPPED | OUTPATIENT
Start: 2019-01-07 | End: 2019-03-26

## 2019-03-08 ENCOUNTER — PATIENT MESSAGE (OUTPATIENT)
Dept: INTERNAL MEDICINE | Facility: CLINIC | Age: 64
End: 2019-03-08

## 2019-03-08 ENCOUNTER — OFFICE VISIT (OUTPATIENT)
Dept: INTERNAL MEDICINE | Facility: CLINIC | Age: 64
End: 2019-03-08
Payer: COMMERCIAL

## 2019-03-08 VITALS
OXYGEN SATURATION: 91 % | SYSTOLIC BLOOD PRESSURE: 142 MMHG | HEIGHT: 69 IN | HEART RATE: 102 BPM | WEIGHT: 270.19 LBS | BODY MASS INDEX: 40.02 KG/M2 | DIASTOLIC BLOOD PRESSURE: 72 MMHG

## 2019-03-08 DIAGNOSIS — E11.9 TYPE 2 DIABETES MELLITUS WITHOUT COMPLICATION, UNSPECIFIED WHETHER LONG TERM INSULIN USE: ICD-10-CM

## 2019-03-08 DIAGNOSIS — I10 ESSENTIAL HYPERTENSION: ICD-10-CM

## 2019-03-08 DIAGNOSIS — E11.8 TYPE 2 DIABETES MELLITUS WITH COMPLICATION, WITH LONG-TERM CURRENT USE OF INSULIN: ICD-10-CM

## 2019-03-08 DIAGNOSIS — L02.415 CELLULITIS AND ABSCESS OF RIGHT LOWER EXTREMITY: Primary | ICD-10-CM

## 2019-03-08 DIAGNOSIS — E78.00 PURE HYPERCHOLESTEROLEMIA: ICD-10-CM

## 2019-03-08 DIAGNOSIS — Z79.4 TYPE 2 DIABETES MELLITUS WITH COMPLICATION, WITH LONG-TERM CURRENT USE OF INSULIN: ICD-10-CM

## 2019-03-08 DIAGNOSIS — E66.01 MORBID OBESITY WITH BMI OF 40.0-44.9, ADULT: ICD-10-CM

## 2019-03-08 DIAGNOSIS — L03.115 CELLULITIS AND ABSCESS OF RIGHT LOWER EXTREMITY: Primary | ICD-10-CM

## 2019-03-08 PROBLEM — D49.4 BLADDER TUMOR: Status: RESOLVED | Noted: 2017-09-18 | Resolved: 2019-03-08

## 2019-03-08 PROBLEM — N32.89 BLADDER MASS: Status: RESOLVED | Noted: 2017-05-19 | Resolved: 2019-03-08

## 2019-03-08 PROBLEM — Z12.11 SCREENING FOR MALIGNANT NEOPLASM OF COLON: Status: RESOLVED | Noted: 2018-07-10 | Resolved: 2019-03-08

## 2019-03-08 PROBLEM — N40.1 BENIGN NON-NODULAR PROSTATIC HYPERPLASIA WITH LOWER URINARY TRACT SYMPTOMS: Status: RESOLVED | Noted: 2017-05-19 | Resolved: 2019-03-08

## 2019-03-08 PROBLEM — Z98.890 POST-OPERATIVE STATE: Status: RESOLVED | Noted: 2017-06-06 | Resolved: 2019-03-08

## 2019-03-08 PROBLEM — R31.9 HEMATURIA: Status: RESOLVED | Noted: 2017-05-19 | Resolved: 2019-03-08

## 2019-03-08 PROCEDURE — 3077F SYST BP >= 140 MM HG: CPT | Mod: CPTII,S$GLB,, | Performed by: INTERNAL MEDICINE

## 2019-03-08 PROCEDURE — 3008F BODY MASS INDEX DOCD: CPT | Mod: CPTII,S$GLB,, | Performed by: INTERNAL MEDICINE

## 2019-03-08 PROCEDURE — 3077F PR MOST RECENT SYSTOLIC BLOOD PRESSURE >= 140 MM HG: ICD-10-PCS | Mod: CPTII,S$GLB,, | Performed by: INTERNAL MEDICINE

## 2019-03-08 PROCEDURE — 99214 OFFICE O/P EST MOD 30 MIN: CPT | Mod: S$GLB,,, | Performed by: INTERNAL MEDICINE

## 2019-03-08 PROCEDURE — 3046F HEMOGLOBIN A1C LEVEL >9.0%: CPT | Mod: CPTII,S$GLB,, | Performed by: INTERNAL MEDICINE

## 2019-03-08 PROCEDURE — 3078F DIAST BP <80 MM HG: CPT | Mod: CPTII,S$GLB,, | Performed by: INTERNAL MEDICINE

## 2019-03-08 PROCEDURE — 99999 PR PBB SHADOW E&M-EST. PATIENT-LVL III: CPT | Mod: PBBFAC,,, | Performed by: INTERNAL MEDICINE

## 2019-03-08 PROCEDURE — 99999 PR PBB SHADOW E&M-EST. PATIENT-LVL III: ICD-10-PCS | Mod: PBBFAC,,, | Performed by: INTERNAL MEDICINE

## 2019-03-08 PROCEDURE — 3046F PR MOST RECENT HEMOGLOBIN A1C LEVEL > 9.0%: ICD-10-PCS | Mod: CPTII,S$GLB,, | Performed by: INTERNAL MEDICINE

## 2019-03-08 PROCEDURE — 3008F PR BODY MASS INDEX (BMI) DOCUMENTED: ICD-10-PCS | Mod: CPTII,S$GLB,, | Performed by: INTERNAL MEDICINE

## 2019-03-08 PROCEDURE — 3078F PR MOST RECENT DIASTOLIC BLOOD PRESSURE < 80 MM HG: ICD-10-PCS | Mod: CPTII,S$GLB,, | Performed by: INTERNAL MEDICINE

## 2019-03-08 PROCEDURE — 99214 PR OFFICE/OUTPT VISIT, EST, LEVL IV, 30-39 MIN: ICD-10-PCS | Mod: S$GLB,,, | Performed by: INTERNAL MEDICINE

## 2019-03-08 RX ORDER — SULFAMETHOXAZOLE AND TRIMETHOPRIM 800; 160 MG/1; MG/1
1 TABLET ORAL 2 TIMES DAILY
Qty: 20 TABLET | Refills: 0 | Status: SHIPPED | OUTPATIENT
Start: 2019-03-08 | End: 2019-03-18

## 2019-03-08 RX ORDER — LOSARTAN POTASSIUM 100 MG/1
100 TABLET ORAL DAILY
Qty: 90 TABLET | Refills: 3 | Status: SHIPPED | OUTPATIENT
Start: 2019-03-08 | End: 2020-03-31

## 2019-03-08 NOTE — PROGRESS NOTES
Subjective:       Patient ID: Andrew Beltran is a 63 y.o. male.    Chief Complaint: Recurrent Skin Infections    HPI  Pt with recurrence or RLE cellulitis.  It had prev responded to a 5 day course of Bactrim 4-6 weeks ago.  BSs 150-250.  Denies trauma.  Review of Systems   Constitutional: Negative for activity change and unexpected weight change.   HENT: Negative for hearing loss, rhinorrhea and trouble swallowing.    Eyes: Negative for discharge and visual disturbance.   Respiratory: Negative for wheezing.    Cardiovascular: Negative for chest pain and palpitations.   Gastrointestinal: Negative for blood in stool, constipation, diarrhea and vomiting.   Endocrine: Negative for polydipsia and polyuria.   Genitourinary: Negative for difficulty urinating, hematuria and urgency.   Musculoskeletal: Negative for arthralgias, joint swelling and neck pain.   Neurological: Negative for weakness and headaches.   Psychiatric/Behavioral: Negative for confusion and dysphoric mood.   All other systems reviewed and are negative.      Objective:      Physical Exam   Constitutional: He appears well-developed. No distress.   obese   HENT:   Head: Normocephalic.   Eyes: EOM are normal. No scleral icterus.   Neck: Normal range of motion. No tracheal deviation present.   Cardiovascular: Normal rate, regular rhythm, normal heart sounds and intact distal pulses.   Pulmonary/Chest: Effort normal and breath sounds normal. No respiratory distress.   Abdominal: He exhibits no distension.   Musculoskeletal: Normal range of motion. He exhibits no edema.   R shin red, warm   Neurological: He is alert.   Skin: Skin is warm and dry. No rash noted. He is not diaphoretic. No erythema.   Psychiatric: He has a normal mood and affect. His behavior is normal.   Vitals reviewed.      Assessment:       1. Cellulitis and abscess of right lower extremity    2. Essential hypertension    3. Morbid obesity with BMI of 40.0-44.9, adult    4. Type 2 diabetes  mellitus with complication, with long-term current use of insulin    5. Pure hypercholesterolemia        Plan:       Andrew was seen today for recurrent skin infections.    Diagnoses and all orders for this visit:    Cellulitis and abscess of right lower extremity  -     sulfamethoxazole-trimethoprim 800-160mg (BACTRIM DS) 800-160 mg Tab; Take 1 tablet by mouth 2 (two) times daily. for 10 days    Essential hypertension  -     losartan (COZAAR) 100 MG tablet; Take 1 tablet (100 mg total) by mouth once daily.    Morbid obesity with BMI of 40.0-44.9, adult   Monitor    Type 2 diabetes mellitus with complication, with long-term current use of insulin  -     Hemoglobin A1c; Future    Pure hypercholesterolemia  -     Lipid panel; Future      Follow-up in about 2 weeks (around 3/22/2019).

## 2019-03-18 ENCOUNTER — PATIENT MESSAGE (OUTPATIENT)
Dept: INTERNAL MEDICINE | Facility: CLINIC | Age: 64
End: 2019-03-18

## 2019-03-18 DIAGNOSIS — L03.90 CELLULITIS, UNSPECIFIED CELLULITIS SITE: Primary | ICD-10-CM

## 2019-03-18 RX ORDER — LEVOFLOXACIN 500 MG/1
500 TABLET, FILM COATED ORAL DAILY
Qty: 10 TABLET | Refills: 0 | Status: SHIPPED | OUTPATIENT
Start: 2019-03-18 | End: 2019-03-28

## 2019-03-26 ENCOUNTER — OFFICE VISIT (OUTPATIENT)
Dept: INTERNAL MEDICINE | Facility: CLINIC | Age: 64
End: 2019-03-26
Payer: COMMERCIAL

## 2019-03-26 VITALS
WEIGHT: 265.75 LBS | SYSTOLIC BLOOD PRESSURE: 138 MMHG | HEART RATE: 98 BPM | BODY MASS INDEX: 39.25 KG/M2 | OXYGEN SATURATION: 93 % | DIASTOLIC BLOOD PRESSURE: 80 MMHG

## 2019-03-26 DIAGNOSIS — I10 ESSENTIAL HYPERTENSION: ICD-10-CM

## 2019-03-26 DIAGNOSIS — R60.0 LOWER EXTREMITY EDEMA: ICD-10-CM

## 2019-03-26 DIAGNOSIS — E11.42 TYPE 2 DIABETES MELLITUS WITH DIABETIC POLYNEUROPATHY, WITH LONG-TERM CURRENT USE OF INSULIN: ICD-10-CM

## 2019-03-26 DIAGNOSIS — Z79.4 TYPE 2 DIABETES MELLITUS WITH DIABETIC POLYNEUROPATHY, WITH LONG-TERM CURRENT USE OF INSULIN: ICD-10-CM

## 2019-03-26 DIAGNOSIS — L03.115 CELLULITIS AND ABSCESS OF RIGHT LOWER EXTREMITY: Primary | ICD-10-CM

## 2019-03-26 DIAGNOSIS — E78.00 PURE HYPERCHOLESTEROLEMIA: ICD-10-CM

## 2019-03-26 DIAGNOSIS — L02.415 CELLULITIS AND ABSCESS OF RIGHT LOWER EXTREMITY: Primary | ICD-10-CM

## 2019-03-26 PROCEDURE — 3075F PR MOST RECENT SYSTOLIC BLOOD PRESS GE 130-139MM HG: ICD-10-PCS | Mod: CPTII,S$GLB,, | Performed by: INTERNAL MEDICINE

## 2019-03-26 PROCEDURE — 3046F PR MOST RECENT HEMOGLOBIN A1C LEVEL > 9.0%: ICD-10-PCS | Mod: CPTII,S$GLB,, | Performed by: INTERNAL MEDICINE

## 2019-03-26 PROCEDURE — 99396 PREV VISIT EST AGE 40-64: CPT | Mod: S$GLB,,, | Performed by: INTERNAL MEDICINE

## 2019-03-26 PROCEDURE — 99999 PR PBB SHADOW E&M-EST. PATIENT-LVL IV: CPT | Mod: PBBFAC,,, | Performed by: INTERNAL MEDICINE

## 2019-03-26 PROCEDURE — 99999 PR PBB SHADOW E&M-EST. PATIENT-LVL IV: ICD-10-PCS | Mod: PBBFAC,,, | Performed by: INTERNAL MEDICINE

## 2019-03-26 PROCEDURE — 3075F SYST BP GE 130 - 139MM HG: CPT | Mod: CPTII,S$GLB,, | Performed by: INTERNAL MEDICINE

## 2019-03-26 PROCEDURE — 3079F DIAST BP 80-89 MM HG: CPT | Mod: CPTII,S$GLB,, | Performed by: INTERNAL MEDICINE

## 2019-03-26 PROCEDURE — 3046F HEMOGLOBIN A1C LEVEL >9.0%: CPT | Mod: CPTII,S$GLB,, | Performed by: INTERNAL MEDICINE

## 2019-03-26 PROCEDURE — 99396 PR PREVENTIVE VISIT,EST,40-64: ICD-10-PCS | Mod: S$GLB,,, | Performed by: INTERNAL MEDICINE

## 2019-03-26 PROCEDURE — 3079F PR MOST RECENT DIASTOLIC BLOOD PRESSURE 80-89 MM HG: ICD-10-PCS | Mod: CPTII,S$GLB,, | Performed by: INTERNAL MEDICINE

## 2019-03-26 RX ORDER — FUROSEMIDE 20 MG/1
20 TABLET ORAL DAILY
Qty: 90 TABLET | Refills: 3 | Status: SHIPPED | OUTPATIENT
Start: 2019-03-26 | End: 2020-04-14 | Stop reason: SDUPTHER

## 2019-03-26 NOTE — PROGRESS NOTES
Subjective:       Patient ID: Andrew Beltran is a 63 y.o. male.    Chief Complaint: Follow-up    HPI  Wellness check.  RLE slowly healing.  Has persistent edema to LEs.  BSs staying high.  No other complaints.  Review of Systems   All other systems reviewed and are negative.      Objective:      Physical Exam   Constitutional: He appears well-developed. No distress.   HENT:   Head: Normocephalic.   Eyes: EOM are normal. No scleral icterus.   Neck: Normal range of motion. No tracheal deviation present.   Cardiovascular: Normal rate, regular rhythm, normal heart sounds and intact distal pulses.   Pulses:       Dorsalis pedis pulses are 2+ on the right side, and 2+ on the left side.        Posterior tibial pulses are 2+ on the right side, and 2+ on the left side.   Pulmonary/Chest: Effort normal. No respiratory distress.   Abdominal: He exhibits no distension.   Musculoskeletal: Normal range of motion. He exhibits edema (1+ ankles).        Right foot: There is normal range of motion and no deformity.        Left foot: There is normal range of motion and no deformity.   Feet:   Right Foot:   Protective Sensation: 6 sites tested. 5 sites sensed.   Skin Integrity: Positive for dry skin.   Left Foot:   Protective Sensation: 6 sites tested. 6 sites sensed.   Skin Integrity: Positive for dry skin.   Neurological: He is alert.   Skin: Skin is warm and dry. No rash noted. He is not diaphoretic. There is erythema (R shin).   Psychiatric: He has a normal mood and affect. His behavior is normal.   Vitals reviewed.      Assessment:       1. Cellulitis and abscess of right lower extremity    2. Essential hypertension    3. Type 2 diabetes mellitus with diabetic polyneuropathy, with long-term current use of insulin    4. Pure hypercholesterolemia    5. Lower extremity edema        Plan:       Andrew was seen today for follow-up.    Diagnoses and all orders for this visit:    Cellulitis and abscess of right lower extremity   To  finish Levaquin    Essential hypertension   Well-cont    Type 2 diabetes mellitus with diabetic polyneuropathy, with long-term current use of insulin  -     Hemoglobin A1c; Future    Pure hypercholesterolemia   Well-cont    Lower extremity edema  -     furosemide (LASIX) 20 MG tablet; Take 1 tablet (20 mg total) by mouth once daily.      Follow up in about 3 months (around 6/26/2019).

## 2019-05-13 ENCOUNTER — TELEPHONE (OUTPATIENT)
Dept: INTERNAL MEDICINE | Facility: CLINIC | Age: 64
End: 2019-05-13

## 2019-05-13 NOTE — TELEPHONE ENCOUNTER
----- Message from Ally Akins sent at 5/13/2019  2:30 PM CDT -----  Contact: [phone]  Type:  Needs Medical Advice    Who Called: PT  Symptoms (please be specific): BLISTER ON TOP OF LOWER RIGHT LEG    How long has patient had these symptoms: 6 DAYS  Pharmacy name and phone #: [facility]  Would the patient rather a call back or a response via [employer]? CALL BACK  Best Call Back Number: [phone]  Additional Information:

## 2019-06-03 RX ORDER — TAMSULOSIN HYDROCHLORIDE 0.4 MG/1
0.4 CAPSULE ORAL NIGHTLY
Qty: 90 CAPSULE | Refills: 3 | Status: SHIPPED | OUTPATIENT
Start: 2019-06-03 | End: 2020-06-02 | Stop reason: SDUPTHER

## 2019-07-24 ENCOUNTER — PATIENT OUTREACH (OUTPATIENT)
Dept: ADMINISTRATIVE | Facility: OTHER | Age: 64
End: 2019-07-24

## 2019-07-26 ENCOUNTER — OFFICE VISIT (OUTPATIENT)
Dept: UROLOGY | Facility: CLINIC | Age: 64
End: 2019-07-26
Payer: COMMERCIAL

## 2019-07-26 VITALS — HEIGHT: 69 IN | WEIGHT: 265.63 LBS | BODY MASS INDEX: 39.34 KG/M2

## 2019-07-26 DIAGNOSIS — R31.0 HEMATURIA, GROSS: ICD-10-CM

## 2019-07-26 DIAGNOSIS — Z85.51 HISTORY OF BLADDER CANCER: Primary | ICD-10-CM

## 2019-07-26 PROCEDURE — 88112 CYTOPATH CELL ENHANCE TECH: CPT | Performed by: PATHOLOGY

## 2019-07-26 PROCEDURE — 3008F PR BODY MASS INDEX (BMI) DOCUMENTED: ICD-10-PCS | Mod: CPTII,S$GLB,, | Performed by: UROLOGY

## 2019-07-26 PROCEDURE — 99999 PR PBB SHADOW E&M-EST. PATIENT-LVL III: CPT | Mod: PBBFAC,,, | Performed by: UROLOGY

## 2019-07-26 PROCEDURE — 99214 OFFICE O/P EST MOD 30 MIN: CPT | Mod: S$GLB,,, | Performed by: UROLOGY

## 2019-07-26 PROCEDURE — 99214 PR OFFICE/OUTPT VISIT, EST, LEVL IV, 30-39 MIN: ICD-10-PCS | Mod: S$GLB,,, | Performed by: UROLOGY

## 2019-07-26 PROCEDURE — 88112 PR  CYTOPATH, CELL ENHANCE TECH: ICD-10-PCS | Mod: 26,,, | Performed by: PATHOLOGY

## 2019-07-26 PROCEDURE — 99999 PR PBB SHADOW E&M-EST. PATIENT-LVL III: ICD-10-PCS | Mod: PBBFAC,,, | Performed by: UROLOGY

## 2019-07-26 PROCEDURE — 88112 CYTOPATH CELL ENHANCE TECH: CPT | Mod: 26,,, | Performed by: PATHOLOGY

## 2019-07-26 PROCEDURE — 3008F BODY MASS INDEX DOCD: CPT | Mod: CPTII,S$GLB,, | Performed by: UROLOGY

## 2019-07-26 PROCEDURE — 88120 CYTP URNE 3-5 PROBES EA SPEC: CPT

## 2019-07-26 RX ORDER — SODIUM CHLORIDE 9 MG/ML
INJECTION, SOLUTION INTRAVENOUS CONTINUOUS
Status: CANCELLED | OUTPATIENT
Start: 2019-07-26

## 2019-07-26 NOTE — PROGRESS NOTES
Subjective:       Patient ID: Andrew Beltran is a 64 y.o. male.    Chief Complaint: Hematuria    65 yo WM with history of TURBT 5/17. Bladder tumor was low grade and non-invasive. Lost to f/u for 6-8 mo.  Pt began passing blood  7/8/19. Now with Microhematuria. Here for f/u and evaluation of bleeding. Poor diabetes control.    Hematuria   This is a new problem. The current episode started 1 to 4 weeks ago. The problem has been resolved since onset. He describes the hematuria as gross hematuria. The hematuria occurs during the terminal portion of his urinary stream. He reports no clotting in his urine stream. His pain is at a severity of 0/10. He is experiencing no pain. He describes his urine color as light pink. Irritative symptoms include frequency and urgency. Irritative symptoms do not include nocturia. Pertinent negatives include no abdominal pain, chills, dysuria, facial swelling, fever, flank pain, genital pain, hesitancy, inability to urinate, nausea, vomiting or sore throat. He is sexually active. (Hx of TURBT)     Review of Systems   Constitutional: Negative for activity change, appetite change, chills, diaphoresis, fatigue, fever and unexpected weight change.   HENT: Negative for congestion, facial swelling, hearing loss, sinus pressure, sore throat and trouble swallowing.    Eyes: Negative for photophobia, pain, discharge and visual disturbance.   Respiratory: Negative for apnea, cough and shortness of breath.    Cardiovascular: Negative for chest pain, palpitations and leg swelling.   Gastrointestinal: Negative for abdominal distention, abdominal pain, anal bleeding, blood in stool, constipation, diarrhea, nausea, rectal pain and vomiting.   Endocrine: Negative for cold intolerance, heat intolerance, polydipsia, polyphagia and polyuria.   Genitourinary: Positive for frequency, hematuria and urgency. Negative for decreased urine volume, difficulty urinating, discharge, dysuria, enuresis, flank pain,  genital sores, hesitancy, nocturia, penile pain, penile swelling, scrotal swelling and testicular pain.   Musculoskeletal: Negative for arthralgias, back pain and myalgias.   Skin: Negative for color change, pallor, rash and wound.   Allergic/Immunologic: Negative for environmental allergies, food allergies and immunocompromised state.   Neurological: Negative for dizziness, seizures, weakness and headaches.   Hematological: Negative for adenopathy. Does not bruise/bleed easily.   Psychiatric/Behavioral: Negative.        Objective:      Physical Exam   Nursing note and vitals reviewed.  Constitutional: He is oriented to person, place, and time. He appears well-developed and well-nourished.   obese   HENT:   Head: Normocephalic.   Nose: Nose normal.   Mouth/Throat: Oropharynx is clear and moist.   Eyes: Conjunctivae and EOM are normal. Pupils are equal, round, and reactive to light.   Neck: Normal range of motion. Neck supple.   Cardiovascular: Normal rate, regular rhythm, normal heart sounds and intact distal pulses.    Pulmonary/Chest: Effort normal and breath sounds normal.   Abdominal: Soft. Bowel sounds are normal.   Genitourinary: Rectum normal, testes normal and penis normal. Prostate is enlarged.   Musculoskeletal: Normal range of motion.   Neurological: He is alert and oriented to person, place, and time. He has normal reflexes.   Skin: Skin is warm and dry.     Psychiatric: He has a normal mood and affect. His behavior is normal. Judgment and thought content normal.       Assessment:       1. History of bladder cancer    2. Hematuria, gross        Plan:       Patient Instructions   TURBT 8/14/19.

## 2019-08-14 PROBLEM — N32.0 BLADDER NECK CONTRACTURE: Status: ACTIVE | Noted: 2019-08-14

## 2019-08-22 NOTE — TELEPHONE ENCOUNTER
----- Message from Smita Hannon sent at 8/21/2019  4:44 PM CDT -----  Contact: [phone]/self  Type:  RX Refill Request    Who Called:  self  Refill or New Rx: NEW  RX Name and Strength: insulin NPH-insulin regular, 70/30, (NOVOLIN 70/30) 100 unit/mL (70-30) injection  How is the patient currently taking it? (ex. 1XDay): INJECT 60 UNITS INTO THE SKIN TWO TIMES Is this a 30 day or 90 day RX: 60/3 refill  Preferred Pharmacy with phone number: [facility] [place] [facility]  Local or Mail Order:   Ordering Provider:   Would the patient rather a call back or a response via [facility]?  call  Best Call Back Number:   Additional Information:  Patient can not come to office at this time. He had surgery on 8/14.

## 2019-08-27 RX ORDER — SYRINGE,SAFETY WITH NEEDLE,1ML 25GX1"
SYRINGE (EA) MISCELLANEOUS
Qty: 200 EACH | Refills: 4 | Status: SHIPPED | OUTPATIENT
Start: 2019-08-27 | End: 2020-09-21 | Stop reason: SDUPTHER

## 2019-08-30 ENCOUNTER — OFFICE VISIT (OUTPATIENT)
Dept: UROLOGY | Facility: CLINIC | Age: 64
End: 2019-08-30
Payer: COMMERCIAL

## 2019-08-30 VITALS
OXYGEN SATURATION: 98 % | WEIGHT: 276 LBS | RESPIRATION RATE: 18 BRPM | SYSTOLIC BLOOD PRESSURE: 146 MMHG | BODY MASS INDEX: 40.88 KG/M2 | DIASTOLIC BLOOD PRESSURE: 79 MMHG | HEIGHT: 69 IN | HEART RATE: 90 BPM

## 2019-08-30 DIAGNOSIS — C67.8 MALIGNANT NEOPLASM OF OVERLAPPING SITES OF BLADDER: ICD-10-CM

## 2019-08-30 DIAGNOSIS — C67.9 MALIGNANT NEOPLASM OF URINARY BLADDER, UNSPECIFIED SITE: Primary | ICD-10-CM

## 2019-08-30 PROCEDURE — 99024 POSTOP FOLLOW-UP VISIT: CPT | Mod: S$GLB,,, | Performed by: UROLOGY

## 2019-08-30 PROCEDURE — 99999 PR PBB SHADOW E&M-EST. PATIENT-LVL IV: ICD-10-PCS | Mod: PBBFAC,,, | Performed by: UROLOGY

## 2019-08-30 PROCEDURE — 99024 PR POST-OP FOLLOW-UP VISIT: ICD-10-PCS | Mod: S$GLB,,, | Performed by: UROLOGY

## 2019-08-30 PROCEDURE — 99999 PR PBB SHADOW E&M-EST. PATIENT-LVL IV: CPT | Mod: PBBFAC,,, | Performed by: UROLOGY

## 2019-08-30 RX ORDER — CIPROFLOXACIN 2 MG/ML
400 INJECTION, SOLUTION INTRAVENOUS
Status: CANCELLED | OUTPATIENT
Start: 2019-08-30

## 2019-08-30 RX ORDER — SODIUM CHLORIDE 9 MG/ML
INJECTION, SOLUTION INTRAVENOUS CONTINUOUS
Status: CANCELLED | OUTPATIENT
Start: 2019-08-30

## 2019-08-30 NOTE — PROGRESS NOTES
"Andrew Beltran is a 64 y.o. male patient.   No diagnosis found.  Past Medical History:   Diagnosis Date    BPH (benign prostatic hyperplasia)     Cancer     bladder    Diabetes     type 2    Gout     High cholesterol     Hypertension     Sciatica 2016    had injection 3 weeks ago    Urinary tract infection      Past Surgical History Pertinent Negatives:   Procedure Date Noted    PROSTATE SURGERY 09/05/2017    VASECTOMY 09/05/2017     Scheduled Meds:  Continuous Infusions:  PRN Meds:    Review of patient's allergies indicates:  No Known Allergies  There are no hospital problems to display for this patient.    Blood pressure (!) 146/79, pulse 90, resp. rate 18, height 5' 9" (1.753 m), weight 125.2 kg (276 lb), SpO2 98 %.    Subjective:   Diet: Adequate intake.    Activity level: Normal.    Pain control: Well controlled.    Dysuria, Bladder spasm,    Objective:  Vital signs (most recent): Blood pressure (!) 146/79, pulse 90, resp. rate 18, height 5' 9" (1.753 m), weight 125.2 kg (276 lb), SpO2 98 %.  General appearance: Comfortable, well-appearing, in no acute distress and not in pain.    Lungs:  Normal respiratory rate and normal effort.  Breath sounds normal.    Heart: Normal rate.  Regular rhythm.  S1 normal.    Chest: Symmetric chest wall expansion.    Abdomen: Abdomen is soft.    Bowel sounds:  Bowel sounds are normal.    Tenderness: There is no abdominal tenderness tenderness.    Wound:  Clean.    Extremities: There is normal range of motion.    Neurological: The patient is alert and oriented to person, place and time.  Normal strength.  Pupils are equal, round, and reactive to light.       Assessment:   Post-op: 16 days.    Condition: In stable condition.     Plan:  Encourage ambulation.  Regular diet.    Repeat TURBT and Left ureteroscopy with excision/ablation of lesion       Monty Cee MD  8/30/2019  "

## 2019-09-12 DIAGNOSIS — I10 ESSENTIAL HYPERTENSION: ICD-10-CM

## 2019-09-12 RX ORDER — METOPROLOL SUCCINATE 200 MG/1
TABLET, EXTENDED RELEASE ORAL
Qty: 90 TABLET | Refills: 4 | Status: SHIPPED | OUTPATIENT
Start: 2019-09-12 | End: 2020-10-06 | Stop reason: SDUPTHER

## 2019-09-18 ENCOUNTER — TELEPHONE (OUTPATIENT)
Dept: UROLOGY | Facility: CLINIC | Age: 64
End: 2019-09-18

## 2019-09-18 PROBLEM — C61: Status: ACTIVE | Noted: 2019-09-18

## 2019-09-18 PROBLEM — R82.89 URINE CYTOLOGY ABNORMAL: Status: ACTIVE | Noted: 2019-09-18

## 2019-09-18 PROBLEM — C67.9 BLADDER CANCER: Status: ACTIVE | Noted: 2019-09-18

## 2019-09-18 NOTE — TELEPHONE ENCOUNTER
----- Message from Vanesa Araujo sent at 9/18/2019 12:21 PM CDT -----  Contact: Nikki ( Nurse)  [facility]  No [phone]    Nikki is calling to schedule a three week follow up appointment for the patient after he is released from the hospital. The first available is in November.

## 2019-09-19 ENCOUNTER — TELEPHONE (OUTPATIENT)
Dept: FAMILY MEDICINE | Facility: CLINIC | Age: 64
End: 2019-09-19

## 2019-09-19 ENCOUNTER — PATIENT MESSAGE (OUTPATIENT)
Dept: INTERNAL MEDICINE | Facility: CLINIC | Age: 64
End: 2019-09-19

## 2019-09-19 NOTE — TELEPHONE ENCOUNTER
Called and spoke with pt in regards of him only peeing a few drops of urine. Informed pt that per Dr. Cee request that pt needs to go to the ER to replace mckeon and irrigate the patient. Pt verbalized understanding of message.

## 2019-09-19 NOTE — TELEPHONE ENCOUNTER
Called and spoke with pt in regards of his post-op faisal. Pt made his post-op appt for 10/11/2019 with Dr. Cee. Pt also informed me that he noticed this morning that he is peeing only a few drops of urine this morning. Pt states that he is drinking liquids , and is having some pain on his left side. Informed pt will get in touch with Dr. Cee and we will call him back.

## 2019-09-20 ENCOUNTER — TELEPHONE (OUTPATIENT)
Dept: UROLOGY | Facility: CLINIC | Age: 64
End: 2019-09-20

## 2019-09-20 NOTE — TELEPHONE ENCOUNTER
----- Message from Seda Marion sent at 9/20/2019  9:25 AM CDT -----  Contact: Self [phone]  Patient Returning Your Phone Call

## 2019-09-26 ENCOUNTER — TELEPHONE (OUTPATIENT)
Dept: INTERNAL MEDICINE | Facility: CLINIC | Age: 64
End: 2019-09-26

## 2019-09-26 NOTE — TELEPHONE ENCOUNTER
----- Message from Rose Mary Steven sent at 9/26/2019  8:23 AM CDT -----  Contact: [phone]/self  Patient is returning your call.  Please call back to assist at [phone]

## 2019-10-11 ENCOUNTER — OFFICE VISIT (OUTPATIENT)
Dept: UROLOGY | Facility: CLINIC | Age: 64
End: 2019-10-11
Payer: COMMERCIAL

## 2019-10-11 VITALS
TEMPERATURE: 99 F | RESPIRATION RATE: 18 BRPM | SYSTOLIC BLOOD PRESSURE: 133 MMHG | DIASTOLIC BLOOD PRESSURE: 62 MMHG | WEIGHT: 269 LBS | HEIGHT: 69 IN | HEART RATE: 77 BPM | BODY MASS INDEX: 39.84 KG/M2 | OXYGEN SATURATION: 97 %

## 2019-10-11 DIAGNOSIS — R82.89 URINE CYTOLOGY ABNORMAL: ICD-10-CM

## 2019-10-11 DIAGNOSIS — C67.8 MALIGNANT NEOPLASM OF OVERLAPPING SITES OF BLADDER: Primary | ICD-10-CM

## 2019-10-11 DIAGNOSIS — C67.9 MALIGNANT NEOPLASM OF URINARY BLADDER, UNSPECIFIED SITE: ICD-10-CM

## 2019-10-11 PROCEDURE — 99215 PR OFFICE/OUTPT VISIT, EST, LEVL V, 40-54 MIN: ICD-10-PCS | Mod: 24,S$GLB,, | Performed by: UROLOGY

## 2019-10-11 PROCEDURE — 99215 OFFICE O/P EST HI 40 MIN: CPT | Mod: 24,S$GLB,, | Performed by: UROLOGY

## 2019-10-11 PROCEDURE — 99999 PR PBB SHADOW E&M-EST. PATIENT-LVL IV: CPT | Mod: PBBFAC,,, | Performed by: UROLOGY

## 2019-10-11 PROCEDURE — 99999 PR PBB SHADOW E&M-EST. PATIENT-LVL IV: ICD-10-PCS | Mod: PBBFAC,,, | Performed by: UROLOGY

## 2019-10-11 PROCEDURE — 3008F PR BODY MASS INDEX (BMI) DOCUMENTED: ICD-10-PCS | Mod: CPTII,S$GLB,, | Performed by: UROLOGY

## 2019-10-11 PROCEDURE — 3008F BODY MASS INDEX DOCD: CPT | Mod: CPTII,S$GLB,, | Performed by: UROLOGY

## 2019-10-11 RX ORDER — ATORVASTATIN CALCIUM 80 MG/1
TABLET, FILM COATED ORAL
COMMUNITY
End: 2019-10-28

## 2019-10-11 RX ORDER — SOLIFENACIN SUCCINATE 10 MG/1
10 TABLET, FILM COATED ORAL DAILY
Qty: 90 TABLET | Refills: 3 | Status: SHIPPED | OUTPATIENT
Start: 2019-10-11 | End: 2020-03-06

## 2019-10-11 RX ORDER — PREDNISONE 20 MG/1
TABLET ORAL
COMMUNITY
End: 2020-03-06

## 2019-10-11 RX ORDER — AMOXICILLIN 500 MG/1
CAPSULE ORAL
COMMUNITY
End: 2020-02-28

## 2019-10-11 RX ORDER — HYDROCODONE BITARTRATE AND HOMATROPINE METHYLBROMIDE ORAL SOLUTION 5; 1.5 MG/5ML; MG/5ML
LIQUID ORAL
COMMUNITY
End: 2020-03-06

## 2019-10-11 RX ORDER — HYDROCODONE BITARTRATE AND ACETAMINOPHEN 5; 325 MG/1; MG/1
TABLET ORAL
COMMUNITY
End: 2020-06-18 | Stop reason: ALTCHOICE

## 2019-10-11 RX ORDER — LISINOPRIL 40 MG/1
TABLET ORAL
COMMUNITY
End: 2020-03-06

## 2019-10-11 RX ORDER — GABAPENTIN 300 MG/1
CAPSULE ORAL
COMMUNITY
End: 2020-03-06

## 2019-10-11 RX ORDER — AZELASTINE HYDROCHLORIDE, FLUTICASONE PROPIONATE 137; 50 UG/1; UG/1
SPRAY, METERED NASAL
COMMUNITY
End: 2020-03-06 | Stop reason: ALTCHOICE

## 2019-10-11 RX ORDER — HYDROCHLOROTHIAZIDE 25 MG/1
TABLET ORAL
COMMUNITY
End: 2021-01-05

## 2019-10-11 RX ORDER — DICLOFENAC SODIUM 75 MG/1
TABLET, DELAYED RELEASE ORAL
COMMUNITY
End: 2020-03-06 | Stop reason: ALTCHOICE

## 2019-10-11 NOTE — PATIENT INSTRUCTIONS
BCG cycle 1 tx 1 of 6 starting in 2 weeks.  F/U 3 months to Schedule  Cystoscopy and left ureteroscopy with laser tumor ablation and treat with Mytomicin  Vesicare 10 mg daily

## 2019-10-11 NOTE — PROGRESS NOTES
Subjective:       Patient ID: Andrew Beltran is a 64 y.o. male.    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 Here to discuss treatment plans and pathology.    Other   This is a recurrent (TCCA of Bladder and Left Ureter) problem. The current episode started more than 1 month ago. The problem occurs intermittently. The problem has been waxing and waning. Associated symptoms include urinary symptoms (Urgency, Frequency, Bladder Spasm). Pertinent negatives include no abdominal pain, anorexia, arthralgias, change in bowel habit, chest pain, chills, congestion, coughing, diaphoresis, fatigue, fever, headaches, joint swelling, myalgias, nausea, neck pain, numbness, rash, sore throat, swollen glands, vertigo, visual change, vomiting or weakness. Nothing aggravates the symptoms. Treatments tried: TURBT and Laser ablation of renal lesion. The treatment provided significant relief.     Review of Systems   Constitutional: Negative for activity change, appetite change, chills, diaphoresis, fatigue, fever and unexpected weight change.   HENT: Negative for congestion, hearing loss, sinus pressure, sore throat and trouble swallowing.    Eyes: Negative for photophobia, pain, discharge and visual disturbance.   Respiratory: Negative for apnea, cough and shortness of breath.    Cardiovascular: Negative for chest pain, palpitations and leg swelling.   Gastrointestinal: Negative for abdominal distention, abdominal pain, anal bleeding, anorexia, blood in stool, change in bowel habit, constipation, diarrhea, nausea, rectal pain and vomiting.   Endocrine: Negative for cold intolerance, heat intolerance, polydipsia, polyphagia and polyuria.   Genitourinary: Positive for frequency and urgency. Negative for decreased urine volume, difficulty urinating, discharge, dysuria, enuresis, flank pain, genital sores, hematuria, penile pain, penile swelling,  scrotal swelling and testicular pain.        Bladder Spasm, Urgency Incontinence   Musculoskeletal: Negative for arthralgias, back pain, joint swelling, myalgias and neck pain.   Skin: Negative for color change, pallor, rash and wound.   Allergic/Immunologic: Negative for environmental allergies, food allergies and immunocompromised state.   Neurological: Negative for dizziness, vertigo, seizures, weakness, numbness and headaches.   Hematological: Negative for adenopathy. Does not bruise/bleed easily.   Psychiatric/Behavioral: Negative.        Objective:      Physical Exam   Nursing note and vitals reviewed.  Constitutional: He is oriented to person, place, and time. He appears well-developed and well-nourished.   HENT:   Head: Normocephalic.   Nose: Nose normal.   Mouth/Throat: Oropharynx is clear and moist.   Eyes: Conjunctivae and EOM are normal. Pupils are equal, round, and reactive to light.   Neck: Normal range of motion. Neck supple.   Cardiovascular: Normal rate, regular rhythm, normal heart sounds and intact distal pulses.    Pulmonary/Chest: Effort normal and breath sounds normal.   Abdominal: Soft. Bowel sounds are normal.   Genitourinary: Penis normal.   Musculoskeletal: Normal range of motion.   Neurological: He is alert and oriented to person, place, and time. He has normal reflexes.   Skin: Skin is warm and dry.     Psychiatric: He has a normal mood and affect. His behavior is normal. Judgment and thought content normal.       Assessment:       1. Malignant neoplasm of overlapping sites of bladder    2. Malignant neoplasm of urinary bladder, unspecified site    3. Urine cytology abnormal        Plan:       Patient Instructions   BCG cycle 1 tx 1 of 6 starting in 2 weeks.  F/U 3 months to Schedule  Cystoscopy and left ureteroscopy with laser tumor ablation and treat with Mytomicin  Vesicare 10 mg daily

## 2019-10-23 ENCOUNTER — CLINICAL SUPPORT (OUTPATIENT)
Dept: UROLOGY | Facility: CLINIC | Age: 64
End: 2019-10-23
Payer: COMMERCIAL

## 2019-10-23 VITALS
BODY MASS INDEX: 39.83 KG/M2 | SYSTOLIC BLOOD PRESSURE: 134 MMHG | WEIGHT: 268.94 LBS | HEART RATE: 82 BPM | DIASTOLIC BLOOD PRESSURE: 84 MMHG | HEIGHT: 69 IN | TEMPERATURE: 99 F

## 2019-10-23 DIAGNOSIS — C67.9 MALIGNANT NEOPLASM OF URINARY BLADDER, UNSPECIFIED SITE: Primary | ICD-10-CM

## 2019-10-23 PROCEDURE — 51720 PR INSTILL, ANTICANCER AGENT, BLADDER: ICD-10-PCS | Mod: 58,S$GLB,, | Performed by: UROLOGY

## 2019-10-23 PROCEDURE — 99999 PR PBB SHADOW E&M-EST. PATIENT-LVL V: CPT | Mod: PBBFAC,,,

## 2019-10-23 PROCEDURE — 99999 PR PBB SHADOW E&M-EST. PATIENT-LVL V: ICD-10-PCS | Mod: PBBFAC,,,

## 2019-10-23 PROCEDURE — 51720 TREATMENT OF BLADDER LESION: CPT | Mod: 58,S$GLB,, | Performed by: UROLOGY

## 2019-10-27 DIAGNOSIS — E78.2 MIXED HYPERLIPIDEMIA: ICD-10-CM

## 2019-10-28 RX ORDER — ROSUVASTATIN CALCIUM 40 MG/1
TABLET, COATED ORAL
Qty: 90 TABLET | Refills: 4 | Status: SHIPPED | OUTPATIENT
Start: 2019-10-28 | End: 2020-12-01 | Stop reason: SDUPTHER

## 2019-10-30 ENCOUNTER — CLINICAL SUPPORT (OUTPATIENT)
Dept: UROLOGY | Facility: CLINIC | Age: 64
End: 2019-10-30
Payer: COMMERCIAL

## 2019-10-30 VITALS
BODY MASS INDEX: 39.69 KG/M2 | HEIGHT: 69 IN | HEART RATE: 66 BPM | TEMPERATURE: 98 F | RESPIRATION RATE: 18 BRPM | WEIGHT: 268 LBS | SYSTOLIC BLOOD PRESSURE: 130 MMHG | DIASTOLIC BLOOD PRESSURE: 62 MMHG | OXYGEN SATURATION: 98 %

## 2019-10-30 DIAGNOSIS — C67.9 MALIGNANT NEOPLASM OF URINARY BLADDER, UNSPECIFIED SITE: Primary | ICD-10-CM

## 2019-10-30 PROCEDURE — 99999 PR PBB SHADOW E&M-EST. PATIENT-LVL V: ICD-10-PCS | Mod: PBBFAC,,,

## 2019-10-30 PROCEDURE — 99999 PR PBB SHADOW E&M-EST. PATIENT-LVL V: CPT | Mod: PBBFAC,,,

## 2019-10-30 PROCEDURE — 51720 PR INSTILL, ANTICANCER AGENT, BLADDER: ICD-10-PCS | Mod: 58,S$GLB,, | Performed by: UROLOGY

## 2019-10-30 PROCEDURE — 51720 TREATMENT OF BLADDER LESION: CPT | Mod: 58,S$GLB,, | Performed by: UROLOGY

## 2019-11-06 ENCOUNTER — CLINICAL SUPPORT (OUTPATIENT)
Dept: UROLOGY | Facility: CLINIC | Age: 64
End: 2019-11-06
Payer: COMMERCIAL

## 2019-11-06 VITALS
BODY MASS INDEX: 39.69 KG/M2 | SYSTOLIC BLOOD PRESSURE: 130 MMHG | HEART RATE: 66 BPM | HEIGHT: 69 IN | RESPIRATION RATE: 17 BRPM | TEMPERATURE: 98 F | WEIGHT: 268 LBS | OXYGEN SATURATION: 98 % | DIASTOLIC BLOOD PRESSURE: 70 MMHG

## 2019-11-06 DIAGNOSIS — C67.9 MALIGNANT NEOPLASM OF URINARY BLADDER, UNSPECIFIED SITE: Primary | ICD-10-CM

## 2019-11-06 PROCEDURE — 99999 PR PBB SHADOW E&M-EST. PATIENT-LVL V: CPT | Mod: PBBFAC,,,

## 2019-11-06 PROCEDURE — 51720 PR INSTILL, ANTICANCER AGENT, BLADDER: ICD-10-PCS | Mod: 58,S$GLB,, | Performed by: UROLOGY

## 2019-11-06 PROCEDURE — 99999 PR PBB SHADOW E&M-EST. PATIENT-LVL V: ICD-10-PCS | Mod: PBBFAC,,,

## 2019-11-06 PROCEDURE — 51720 TREATMENT OF BLADDER LESION: CPT | Mod: 58,S$GLB,, | Performed by: UROLOGY

## 2019-11-13 ENCOUNTER — CLINICAL SUPPORT (OUTPATIENT)
Dept: UROLOGY | Facility: CLINIC | Age: 64
End: 2019-11-13
Payer: COMMERCIAL

## 2019-11-13 VITALS
DIASTOLIC BLOOD PRESSURE: 76 MMHG | HEIGHT: 69 IN | HEART RATE: 74 BPM | BODY MASS INDEX: 39.7 KG/M2 | WEIGHT: 268.06 LBS | SYSTOLIC BLOOD PRESSURE: 118 MMHG

## 2019-11-13 DIAGNOSIS — C67.9 MALIGNANT NEOPLASM OF URINARY BLADDER, UNSPECIFIED SITE: Primary | ICD-10-CM

## 2019-11-13 PROCEDURE — 99999 PR PBB SHADOW E&M-EST. PATIENT-LVL IV: CPT | Mod: PBBFAC,,,

## 2019-11-13 PROCEDURE — 51720 TREATMENT OF BLADDER LESION: CPT | Mod: 58,S$GLB,, | Performed by: UROLOGY

## 2019-11-13 PROCEDURE — 51720 PR INSTILL, ANTICANCER AGENT, BLADDER: ICD-10-PCS | Mod: 58,S$GLB,, | Performed by: UROLOGY

## 2019-11-13 PROCEDURE — 99999 PR PBB SHADOW E&M-EST. PATIENT-LVL IV: ICD-10-PCS | Mod: PBBFAC,,,

## 2019-11-20 ENCOUNTER — CLINICAL SUPPORT (OUTPATIENT)
Dept: UROLOGY | Facility: CLINIC | Age: 64
End: 2019-11-20
Payer: COMMERCIAL

## 2019-11-20 VITALS
TEMPERATURE: 98 F | HEIGHT: 69 IN | SYSTOLIC BLOOD PRESSURE: 132 MMHG | BODY MASS INDEX: 39.69 KG/M2 | WEIGHT: 268 LBS | DIASTOLIC BLOOD PRESSURE: 65 MMHG | OXYGEN SATURATION: 98 % | RESPIRATION RATE: 18 BRPM | HEART RATE: 74 BPM

## 2019-11-20 DIAGNOSIS — C67.9 MALIGNANT NEOPLASM OF URINARY BLADDER, UNSPECIFIED SITE: Primary | ICD-10-CM

## 2019-11-20 PROCEDURE — 51720 TREATMENT OF BLADDER LESION: CPT | Mod: 79,S$GLB,, | Performed by: UROLOGY

## 2019-11-20 PROCEDURE — 99999 PR PBB SHADOW E&M-EST. PATIENT-LVL V: ICD-10-PCS | Mod: PBBFAC,,,

## 2019-11-20 PROCEDURE — 51720 PR INSTILL, ANTICANCER AGENT, BLADDER: ICD-10-PCS | Mod: 79,S$GLB,, | Performed by: UROLOGY

## 2019-11-20 PROCEDURE — 99999 PR PBB SHADOW E&M-EST. PATIENT-LVL V: CPT | Mod: PBBFAC,,,

## 2019-11-27 ENCOUNTER — CLINICAL SUPPORT (OUTPATIENT)
Dept: UROLOGY | Facility: CLINIC | Age: 64
End: 2019-11-27
Payer: COMMERCIAL

## 2019-11-27 VITALS
WEIGHT: 268 LBS | TEMPERATURE: 99 F | SYSTOLIC BLOOD PRESSURE: 122 MMHG | HEIGHT: 69 IN | DIASTOLIC BLOOD PRESSURE: 80 MMHG | HEART RATE: 76 BPM | BODY MASS INDEX: 39.69 KG/M2

## 2019-11-27 DIAGNOSIS — C67.9 MALIGNANT NEOPLASM OF URINARY BLADDER, UNSPECIFIED SITE: Primary | ICD-10-CM

## 2019-11-27 PROCEDURE — 96402 PR CHEMOTHER HORMON ANTINEOPL SUB-Q/IM: ICD-10-PCS | Mod: S$GLB,,, | Performed by: UROLOGY

## 2019-11-27 PROCEDURE — 96402 CHEMO HORMON ANTINEOPL SQ/IM: CPT | Mod: S$GLB,,, | Performed by: UROLOGY

## 2019-11-27 PROCEDURE — 99999 PR PBB SHADOW E&M-EST. PATIENT-LVL IV: CPT | Mod: PBBFAC,,,

## 2019-11-27 PROCEDURE — 99999 PR PBB SHADOW E&M-EST. PATIENT-LVL IV: ICD-10-PCS | Mod: PBBFAC,,,

## 2019-12-03 ENCOUNTER — PATIENT MESSAGE (OUTPATIENT)
Dept: UROLOGY | Facility: CLINIC | Age: 64
End: 2019-12-03

## 2020-01-07 ENCOUNTER — TELEPHONE (OUTPATIENT)
Dept: UROLOGY | Facility: CLINIC | Age: 65
End: 2020-01-07

## 2020-01-07 NOTE — TELEPHONE ENCOUNTER
----- Message from Fany Monroe sent at 1/7/2020  2:09 PM CST -----  Contact: Self [phone]  Patient would like to speak with you about his paperwork. Please advise

## 2020-02-05 DIAGNOSIS — M10.9 GOUT OF FOOT, UNSPECIFIED CAUSE, UNSPECIFIED CHRONICITY, UNSPECIFIED LATERALITY: ICD-10-CM

## 2020-02-05 RX ORDER — ALLOPURINOL 300 MG/1
300 TABLET ORAL DAILY
Qty: 90 TABLET | Refills: 4 | Status: SHIPPED | OUTPATIENT
Start: 2020-02-05 | End: 2021-03-01 | Stop reason: SDUPTHER

## 2020-02-27 ENCOUNTER — TELEPHONE (OUTPATIENT)
Dept: UROLOGY | Facility: CLINIC | Age: 65
End: 2020-02-27

## 2020-02-27 NOTE — TELEPHONE ENCOUNTER
----- Message from Chelsea BRENNEREboni Lopez sent at 2/27/2020 11:58 AM CST -----  Contact: [phone] self   Pt is requesting to speak with you re: Upcoming appt instructions. Pt would like to know if he needs to fast. Please advise

## 2020-02-28 ENCOUNTER — PROCEDURE VISIT (OUTPATIENT)
Dept: UROLOGY | Facility: CLINIC | Age: 65
End: 2020-02-28
Payer: COMMERCIAL

## 2020-02-28 ENCOUNTER — LAB VISIT (OUTPATIENT)
Dept: LAB | Facility: HOSPITAL | Age: 65
End: 2020-02-28
Attending: UROLOGY
Payer: COMMERCIAL

## 2020-02-28 VITALS
HEART RATE: 101 BPM | HEIGHT: 69 IN | WEIGHT: 268 LBS | RESPIRATION RATE: 18 BRPM | OXYGEN SATURATION: 98 % | BODY MASS INDEX: 39.69 KG/M2 | SYSTOLIC BLOOD PRESSURE: 130 MMHG | TEMPERATURE: 98 F | DIASTOLIC BLOOD PRESSURE: 70 MMHG

## 2020-02-28 DIAGNOSIS — C67.9 MALIGNANT NEOPLASM OF URINARY BLADDER, UNSPECIFIED SITE: Primary | ICD-10-CM

## 2020-02-28 DIAGNOSIS — C67.9 MALIGNANT NEOPLASM OF URINARY BLADDER, UNSPECIFIED SITE: ICD-10-CM

## 2020-02-28 LAB
BACTERIA #/AREA URNS AUTO: ABNORMAL /HPF
BILIRUB UR QL STRIP: NEGATIVE
CLARITY UR REFRACT.AUTO: ABNORMAL
COLOR UR AUTO: YELLOW
GLUCOSE UR QL STRIP: NEGATIVE
HGB UR QL STRIP: ABNORMAL
HYALINE CASTS UR QL AUTO: 0 /LPF
KETONES UR QL STRIP: NEGATIVE
LEUKOCYTE ESTERASE UR QL STRIP: NEGATIVE
MICROSCOPIC COMMENT: ABNORMAL
NITRITE UR QL STRIP: NEGATIVE
PH UR STRIP: 6 [PH] (ref 5–8)
PROT UR QL STRIP: ABNORMAL
RBC #/AREA URNS AUTO: 13 /HPF (ref 0–4)
SP GR UR STRIP: 1.01 (ref 1–1.03)
SQUAMOUS #/AREA URNS AUTO: 0 /HPF
URN SPEC COLLECT METH UR: ABNORMAL
WBC #/AREA URNS AUTO: 2 /HPF (ref 0–5)

## 2020-02-28 PROCEDURE — 81001 URINALYSIS AUTO W/SCOPE: CPT

## 2020-02-28 PROCEDURE — 88112 CYTOPATH CELL ENHANCE TECH: CPT | Mod: 26,,, | Performed by: PATHOLOGY

## 2020-02-28 PROCEDURE — 52000 CYSTOSCOPY: ICD-10-PCS | Mod: S$GLB,,, | Performed by: UROLOGY

## 2020-02-28 PROCEDURE — 88120 CYTP URNE 3-5 PROBES EA SPEC: CPT

## 2020-02-28 PROCEDURE — 87086 URINE CULTURE/COLONY COUNT: CPT

## 2020-02-28 PROCEDURE — 88112 CYTOPATH CELL ENHANCE TECH: CPT | Performed by: PATHOLOGY

## 2020-02-28 PROCEDURE — 88112 PR  CYTOPATH, CELL ENHANCE TECH: ICD-10-PCS | Mod: 26,,, | Performed by: PATHOLOGY

## 2020-02-28 PROCEDURE — 52000 CYSTOURETHROSCOPY: CPT | Mod: S$GLB,,, | Performed by: UROLOGY

## 2020-02-28 RX ORDER — CIPROFLOXACIN 2 MG/ML
400 INJECTION, SOLUTION INTRAVENOUS
Status: CANCELLED | OUTPATIENT
Start: 2020-02-28

## 2020-02-28 RX ORDER — CIPROFLOXACIN 500 MG/1
500 TABLET ORAL 2 TIMES DAILY
Qty: 10 TABLET | Refills: 0 | Status: SHIPPED | OUTPATIENT
Start: 2020-02-28 | End: 2020-03-04

## 2020-02-28 RX ORDER — SODIUM CHLORIDE 9 MG/ML
INJECTION, SOLUTION INTRAVENOUS CONTINUOUS
Status: CANCELLED | OUTPATIENT
Start: 2020-02-28

## 2020-02-28 RX ORDER — LIDOCAINE HYDROCHLORIDE 20 MG/ML
JELLY TOPICAL ONCE
Status: CANCELLED | OUTPATIENT
Start: 2020-02-28 | End: 2020-02-28

## 2020-02-28 NOTE — PATIENT INSTRUCTIONS
Cipro x 5 days  U/A, U C+S, Urine cytology, Urine frequency  Cytology  FISH  TURBT on 3/11/20 and possible Mitomycin -C

## 2020-02-28 NOTE — PROCEDURES
"Cystoscopy  Date/Time: 2/28/2020 8:30 AM  Performed by: Monty Cee MD  Authorized by: Monty Cee MD     Consent Done?:  Yes (Written)  Time out: Immediately prior to procedure a "time out" was called to verify the correct patient, procedure, equipment, support staff and site/side marked as required.    Indications: history bladder cancer    Position:  Supine  Anesthesia:  Intraurethral instillation  Patient sedated?: No    Preparation: Patient was prepped and draped in usual sterile fashion      Scope type:  Flexible cystoscope  Stent inserted: No    Stent removed: No    External exam normal: Yes    Digital exam performed: No    Urethra normal: Yes    Prostate normal: Yes  Bladder neck normal: Bladder neck normal   Bladder normal: No         Number of tumors:  3       Tumor 1:          Size (mm):  10         Anatomy:  Pedunculated         Location:  Dome       Tumor 2:          Size (mm):  10         Anatomy:  Pedunculated         Location:  Dome       Tumor 3:          Size (mm):  40         Anatomy:  Sessile    Patient tolerance:  Patient tolerated the procedure well with no immediate complications     Lesions on anterior wall and anterior bladder neck      "

## 2020-02-29 LAB — BACTERIA UR CULT: NO GROWTH

## 2020-03-04 LAB — FINAL PATHOLOGIC DIAGNOSIS: ABNORMAL

## 2020-03-11 PROBLEM — C67.9 MALIGNANT NEOPLASM OF URINARY BLADDER: Status: ACTIVE | Noted: 2020-03-11

## 2020-03-25 ENCOUNTER — PATIENT MESSAGE (OUTPATIENT)
Dept: UROLOGY | Facility: CLINIC | Age: 65
End: 2020-03-25

## 2020-03-31 DIAGNOSIS — I10 ESSENTIAL HYPERTENSION: ICD-10-CM

## 2020-03-31 RX ORDER — LOSARTAN POTASSIUM 50 MG/1
100 TABLET ORAL DAILY
Qty: 180 TABLET | Refills: 0 | Status: SHIPPED | OUTPATIENT
Start: 2020-03-31 | End: 2020-07-01 | Stop reason: SDUPTHER

## 2020-04-14 ENCOUNTER — PATIENT MESSAGE (OUTPATIENT)
Dept: FAMILY MEDICINE | Facility: CLINIC | Age: 65
End: 2020-04-14

## 2020-04-14 DIAGNOSIS — R60.0 LOWER EXTREMITY EDEMA: ICD-10-CM

## 2020-04-14 RX ORDER — FUROSEMIDE 20 MG/1
20 TABLET ORAL DAILY
Qty: 30 TABLET | Refills: 0 | Status: SHIPPED | OUTPATIENT
Start: 2020-04-14 | End: 2020-05-18 | Stop reason: SDUPTHER

## 2020-04-23 ENCOUNTER — TELEPHONE (OUTPATIENT)
Dept: UROLOGY | Facility: CLINIC | Age: 65
End: 2020-04-23

## 2020-04-23 NOTE — TELEPHONE ENCOUNTER
Has been taking vesicare 10 mg , does not sound like it is working , up every 2 hours at night. Also having some urgency.

## 2020-04-23 NOTE — TELEPHONE ENCOUNTER
----- Message from Jodi Hernandez sent at 4/23/2020  9:17 AM CDT -----  Contact: [phone]-self  Patient is requesting a call back concerning his medication for solifenacin (VESICARE) 10 MG tablet , pt would like to know if the Dr discontinue the medication for the pt. Please call

## 2020-04-24 ENCOUNTER — PATIENT MESSAGE (OUTPATIENT)
Dept: UROLOGY | Facility: CLINIC | Age: 65
End: 2020-04-24

## 2020-05-11 ENCOUNTER — PATIENT MESSAGE (OUTPATIENT)
Dept: ADMINISTRATIVE | Facility: HOSPITAL | Age: 65
End: 2020-05-11

## 2020-05-18 DIAGNOSIS — R60.0 LOWER EXTREMITY EDEMA: ICD-10-CM

## 2020-05-18 RX ORDER — FUROSEMIDE 20 MG/1
20 TABLET ORAL DAILY
Qty: 30 TABLET | Refills: 0 | Status: SHIPPED | OUTPATIENT
Start: 2020-05-18 | End: 2020-06-19 | Stop reason: SDUPTHER

## 2020-05-20 ENCOUNTER — PATIENT OUTREACH (OUTPATIENT)
Dept: ADMINISTRATIVE | Facility: HOSPITAL | Age: 65
End: 2020-05-20

## 2020-05-20 DIAGNOSIS — E11.42 TYPE 2 DIABETES MELLITUS WITH DIABETIC POLYNEUROPATHY, WITH LONG-TERM CURRENT USE OF INSULIN: Primary | ICD-10-CM

## 2020-05-20 DIAGNOSIS — Z79.4 TYPE 2 DIABETES MELLITUS WITH DIABETIC POLYNEUROPATHY, WITH LONG-TERM CURRENT USE OF INSULIN: Primary | ICD-10-CM

## 2020-05-28 ENCOUNTER — TELEPHONE (OUTPATIENT)
Dept: UROLOGY | Facility: CLINIC | Age: 65
End: 2020-05-28

## 2020-05-28 NOTE — TELEPHONE ENCOUNTER
----- Message from Ally Akins sent at 5/28/2020  9:43 AM CDT -----  Contact: patient  Type:  Needs Medical Advice    Who Called: pt  Advice Regarding: should he continue mirabegron (MYRBETRIQ) 25 mg Tb24 ER tablet or wait until his appt on 6/10  Would the patient rather a call back or a response via [facility]? call  Best Call Back Number: [phone]  Additional Information: n/a

## 2020-05-28 NOTE — TELEPHONE ENCOUNTER
Called patient he was asking should he continue to fill the medication or stop it until he sees Dr Cee he stated he will fill the medicine and see Dr Cee on 6/10

## 2020-06-02 RX ORDER — TAMSULOSIN HYDROCHLORIDE 0.4 MG/1
0.4 CAPSULE ORAL NIGHTLY
Qty: 90 CAPSULE | Refills: 4 | Status: SHIPPED | OUTPATIENT
Start: 2020-06-02 | End: 2021-06-07 | Stop reason: SDUPTHER

## 2020-06-09 ENCOUNTER — PATIENT OUTREACH (OUTPATIENT)
Dept: ADMINISTRATIVE | Facility: HOSPITAL | Age: 65
End: 2020-06-09

## 2020-06-10 ENCOUNTER — OFFICE VISIT (OUTPATIENT)
Dept: UROLOGY | Facility: CLINIC | Age: 65
End: 2020-06-10
Payer: COMMERCIAL

## 2020-06-10 VITALS
TEMPERATURE: 98 F | DIASTOLIC BLOOD PRESSURE: 86 MMHG | BODY MASS INDEX: 41.14 KG/M2 | WEIGHT: 277.75 LBS | HEART RATE: 76 BPM | HEIGHT: 69 IN | SYSTOLIC BLOOD PRESSURE: 142 MMHG

## 2020-06-10 DIAGNOSIS — R82.89 URINE CYTOLOGY ABNORMAL: ICD-10-CM

## 2020-06-10 DIAGNOSIS — C67.2 MALIGNANT NEOPLASM OF LATERAL WALL OF URINARY BLADDER: ICD-10-CM

## 2020-06-10 DIAGNOSIS — C67.8 MALIGNANT NEOPLASM OF OVERLAPPING SITES OF BLADDER: Primary | ICD-10-CM

## 2020-06-10 DIAGNOSIS — Z01.818 PRE-OP TESTING: ICD-10-CM

## 2020-06-10 PROCEDURE — 3077F SYST BP >= 140 MM HG: CPT | Mod: CPTII,S$GLB,, | Performed by: UROLOGY

## 2020-06-10 PROCEDURE — 3079F PR MOST RECENT DIASTOLIC BLOOD PRESSURE 80-89 MM HG: ICD-10-PCS | Mod: CPTII,S$GLB,, | Performed by: UROLOGY

## 2020-06-10 PROCEDURE — 3079F DIAST BP 80-89 MM HG: CPT | Mod: CPTII,S$GLB,, | Performed by: UROLOGY

## 2020-06-10 PROCEDURE — 99215 OFFICE O/P EST HI 40 MIN: CPT | Mod: S$GLB,,, | Performed by: UROLOGY

## 2020-06-10 PROCEDURE — 3008F BODY MASS INDEX DOCD: CPT | Mod: CPTII,S$GLB,, | Performed by: UROLOGY

## 2020-06-10 PROCEDURE — 99999 PR PBB SHADOW E&M-EST. PATIENT-LVL V: ICD-10-PCS | Mod: PBBFAC,,, | Performed by: UROLOGY

## 2020-06-10 PROCEDURE — 99215 PR OFFICE/OUTPT VISIT, EST, LEVL V, 40-54 MIN: ICD-10-PCS | Mod: S$GLB,,, | Performed by: UROLOGY

## 2020-06-10 PROCEDURE — 3077F PR MOST RECENT SYSTOLIC BLOOD PRESSURE >= 140 MM HG: ICD-10-PCS | Mod: CPTII,S$GLB,, | Performed by: UROLOGY

## 2020-06-10 PROCEDURE — 3008F PR BODY MASS INDEX (BMI) DOCUMENTED: ICD-10-PCS | Mod: CPTII,S$GLB,, | Performed by: UROLOGY

## 2020-06-10 PROCEDURE — 99999 PR PBB SHADOW E&M-EST. PATIENT-LVL V: CPT | Mod: PBBFAC,,, | Performed by: UROLOGY

## 2020-06-10 RX ORDER — CIPROFLOXACIN 2 MG/ML
400 INJECTION, SOLUTION INTRAVENOUS
Status: CANCELLED | OUTPATIENT
Start: 2020-06-10

## 2020-06-10 RX ORDER — SODIUM CHLORIDE 9 MG/ML
INJECTION, SOLUTION INTRAVENOUS CONTINUOUS
Status: CANCELLED | OUTPATIENT
Start: 2020-06-10

## 2020-06-10 RX ORDER — LIDOCAINE HYDROCHLORIDE 20 MG/ML
JELLY TOPICAL ONCE
Status: CANCELLED | OUTPATIENT
Start: 2020-06-10 | End: 2020-06-10

## 2020-06-10 NOTE — PATIENT INSTRUCTIONS
Schedule cystourethroscopy with bilateral retrograde pyelograms and ureteral renal washings, bladder washings and transurethral section of bladder tumor if noted.  Right-sided ureteral pyeloscopy with resection lesion or laser treatment of lesion if noted.  Possible mitomycin-C installation postoperatively.  I will see a later be safe this will be on June 22, 2020 starting at 9:00 a.m. attached [facility].      The risk, benefits and complications of the procedure were explained to the patient and his wife and informed consent was obtained prior to leaving the office.

## 2020-06-10 NOTE — PROGRESS NOTES
Subjective:       Patient ID: Andrew Beltran is a 64 y.o. male.    Chief Complaint: Bladder Cancer    The patient is 64-year-old gentleman with a history of recurrent bladder cancer.  The patient has history noninvasive high-grade bladder cancer that has been treated with recurrent resection and most recently mitomycin C installation.  On his last bladder tumor resection he was noted to have atypical cytology from the right and left ureters with right-sided have and possibly papillary neoplastic appearing cells.  Patient is here for follow-up in the schedule his 3 month cystourethroscopy with possible bladder tumor resection as well as ureteral washings, renal washings, bladder washings and right ureteral pyeloscopy with treatment a lesion identified.  The patient's wife is with him to discuss his cancer follow-up and treatment.    Other   This is a recurrent (Recurrent bladder cancer) problem. The current episode started more than 1 year ago. The problem occurs constantly. The problem has been waxing and waning. Pertinent negatives include no abdominal pain, anorexia, arthralgias, change in bowel habit, chest pain, chills, congestion, coughing, diaphoresis, fatigue, fever, headaches, joint swelling, myalgias, nausea, neck pain, numbness, rash, sore throat, swollen glands, urinary symptoms, vertigo, visual change, vomiting or weakness. Nothing aggravates the symptoms. Treatments tried: Recurrent bladder tumor resection. The treatment provided significant relief.     Review of Systems   Constitutional: Negative for activity change, appetite change, chills, diaphoresis, fatigue, fever and unexpected weight change.   HENT: Negative for congestion, hearing loss, sinus pressure, sore throat and trouble swallowing.    Eyes: Negative for photophobia, pain, discharge and visual disturbance.   Respiratory: Negative for apnea, cough and shortness of breath.    Cardiovascular: Negative for chest pain, palpitations and leg  swelling.   Gastrointestinal: Negative for abdominal distention, abdominal pain, anal bleeding, anorexia, blood in stool, change in bowel habit, constipation, diarrhea, nausea, rectal pain and vomiting.   Endocrine: Negative for cold intolerance, heat intolerance, polydipsia, polyphagia and polyuria.   Genitourinary: Positive for decreased urine volume (At times at night), frequency and urgency. Negative for difficulty urinating, discharge, dysuria, enuresis, flank pain, genital sores, hematuria, penile pain, penile swelling, scrotal swelling and testicular pain.   Musculoskeletal: Positive for gait problem ( agoDo to sciatic nerve pain). Negative for arthralgias, back pain, joint swelling, myalgias and neck pain.   Skin: Negative for color change, pallor, rash and wound.   Allergic/Immunologic: Negative for environmental allergies, food allergies and immunocompromised state.   Neurological: Negative for dizziness, vertigo, seizures, weakness, numbness and headaches.   Hematological: Negative for adenopathy. Does not bruise/bleed easily.   Psychiatric/Behavioral: The patient is nervous/anxious.        Objective:      Physical Exam   Nursing note and vitals reviewed.  Constitutional: He is oriented to person, place, and time. He appears well-developed and well-nourished.   Morbidly obese   HENT:   Head: Normocephalic.   Nose: Nose normal.   Mouth/Throat: Oropharynx is clear and moist.   Eyes: Conjunctivae and EOM are normal. Pupils are equal, round, and reactive to light.   Neck: Normal range of motion. Neck supple.   Cardiovascular: Normal rate, regular rhythm, normal heart sounds and intact distal pulses.    Pulmonary/Chest: Effort normal and breath sounds normal.   Abdominal: Soft. Bowel sounds are normal.   Genitourinary: Penis normal.   Genitourinary Comments: No CVA tenderness or bladder tenderness   Musculoskeletal: Normal range of motion.   Neurological: He is alert and oriented to person, place, and time. He  has normal reflexes.   Skin: Skin is warm and dry.     Psychiatric: He has a normal mood and affect. His behavior is normal. Judgment and thought content normal.       Assessment:       1. Malignant neoplasm of overlapping sites of bladder    2. Malignant neoplasm of lateral wall of urinary bladder    3. Urine cytology abnormal    4. Pre-op testing        Plan:       Patient Instructions   Schedule cystourethroscopy with bilateral retrograde pyelograms and ureteral renal washings, bladder washings and transurethral section of bladder tumor if noted.  Right-sided ureteral pyeloscopy with resection lesion or laser treatment of lesion if noted.  Possible mitomycin-C installation postoperatively.  I will see a later be safe this will be on June 22, 2020 starting at 9:00 a.m. attached [facility].      The risk, benefits and complications of the procedure were explained to the patient and his wife and informed consent was obtained prior to leaving the office.   Was

## 2020-06-19 ENCOUNTER — LAB VISIT (OUTPATIENT)
Dept: FAMILY MEDICINE | Facility: CLINIC | Age: 65
End: 2020-06-19
Payer: COMMERCIAL

## 2020-06-19 DIAGNOSIS — R60.0 LOWER EXTREMITY EDEMA: ICD-10-CM

## 2020-06-19 DIAGNOSIS — Z01.818 PRE-OP TESTING: ICD-10-CM

## 2020-06-19 PROCEDURE — U0003 INFECTIOUS AGENT DETECTION BY NUCLEIC ACID (DNA OR RNA); SEVERE ACUTE RESPIRATORY SYNDROME CORONAVIRUS 2 (SARS-COV-2) (CORONAVIRUS DISEASE [COVID-19]), AMPLIFIED PROBE TECHNIQUE, MAKING USE OF HIGH THROUGHPUT TECHNOLOGIES AS DESCRIBED BY CMS-2020-01-R: HCPCS

## 2020-06-19 RX ORDER — FUROSEMIDE 20 MG/1
20 TABLET ORAL DAILY
Qty: 30 TABLET | Refills: 4 | Status: SHIPPED | OUTPATIENT
Start: 2020-06-19 | End: 2020-12-01 | Stop reason: SDUPTHER

## 2020-06-20 LAB — SARS-COV-2 RNA RESP QL NAA+PROBE: NOT DETECTED

## 2020-06-24 ENCOUNTER — TELEPHONE (OUTPATIENT)
Dept: UROLOGY | Facility: CLINIC | Age: 65
End: 2020-06-24

## 2020-06-24 NOTE — TELEPHONE ENCOUNTER
Yes. Ibuprofen or Toradol, Norco for breakthrough pain this message is per Dr Cee called patient to inform.

## 2020-06-24 NOTE — TELEPHONE ENCOUNTER
----- Message from Seda Marion sent at 6/24/2020  1:05 PM CDT -----  Regarding: Call back  Contact: [phone]  Patient is calling to talk to nurse in regards to see if he can start taking Ibuprofen. Please advice

## 2020-06-24 NOTE — TELEPHONE ENCOUNTER
Called patient and informed that he could take the ibuprofen. He stated that he is having pain horizontally is this were the sent is?

## 2020-07-01 DIAGNOSIS — I10 ESSENTIAL HYPERTENSION: ICD-10-CM

## 2020-07-01 RX ORDER — LOSARTAN POTASSIUM 100 MG/1
100 TABLET ORAL DAILY
Qty: 90 TABLET | Refills: 0 | Status: SHIPPED | OUTPATIENT
Start: 2020-07-01 | End: 2020-10-14 | Stop reason: SDUPTHER

## 2020-07-15 ENCOUNTER — PROCEDURE VISIT (OUTPATIENT)
Dept: UROLOGY | Facility: CLINIC | Age: 65
End: 2020-07-15
Payer: COMMERCIAL

## 2020-07-15 VITALS
HEART RATE: 77 BPM | OXYGEN SATURATION: 98 % | HEIGHT: 69 IN | DIASTOLIC BLOOD PRESSURE: 62 MMHG | RESPIRATION RATE: 18 BRPM | TEMPERATURE: 99 F | SYSTOLIC BLOOD PRESSURE: 132 MMHG | WEIGHT: 274 LBS | BODY MASS INDEX: 40.58 KG/M2

## 2020-07-15 DIAGNOSIS — Z85.51 HISTORY OF BLADDER CANCER: Primary | ICD-10-CM

## 2020-07-15 PROCEDURE — 52310 CYSTOSCOPY AND TREATMENT: CPT | Mod: S$GLB,,, | Performed by: UROLOGY

## 2020-07-15 PROCEDURE — 52310 CYSTOSCOPY: ICD-10-PCS | Mod: S$GLB,,, | Performed by: UROLOGY

## 2020-07-15 RX ORDER — CIPROFLOXACIN 500 MG/1
500 TABLET ORAL 2 TIMES DAILY
Qty: 10 TABLET | Refills: 0 | Status: SHIPPED | OUTPATIENT
Start: 2020-07-15 | End: 2020-07-20

## 2020-07-15 NOTE — PROCEDURES
"Cystoscopy    Date/Time: 7/15/2020 1:00 PM  Performed by: Monty Cee MD  Authorized by: Monty Cee MD     Consent Done?:  Yes (Written)  Time out: Immediately prior to procedure a "time out" was called to verify the correct patient, procedure, equipment, support staff and site/side marked as required.    Indications: history bladder cancer    Position:  Supine  Anesthesia:  Intraurethral instillation  Patient sedated?: No    Preparation: Patient was prepped and draped in usual sterile fashion      Scope type:  Flexible cystoscope  Stent inserted: No    Stent removed: Yes    Stent encrusted: No    External exam normal: Yes    Digital exam performed: No    Urethra normal: Yes    Prostate normal: Yes  Bladder neck normal: Bladder neck normal   Bladder normal: Yes      Patient tolerance:  Patient tolerated the procedure well with no immediate complications      "

## 2020-09-22 ENCOUNTER — PROCEDURE VISIT (OUTPATIENT)
Dept: UROLOGY | Facility: CLINIC | Age: 65
End: 2020-09-22
Payer: COMMERCIAL

## 2020-09-22 VITALS
WEIGHT: 274 LBS | HEIGHT: 69 IN | BODY MASS INDEX: 40.58 KG/M2 | TEMPERATURE: 98 F | RESPIRATION RATE: 18 BRPM | DIASTOLIC BLOOD PRESSURE: 66 MMHG | SYSTOLIC BLOOD PRESSURE: 130 MMHG | OXYGEN SATURATION: 98 % | HEART RATE: 74 BPM

## 2020-09-22 DIAGNOSIS — N43.40 SPERMATOCELE OF EPIDIDYMIS: Primary | ICD-10-CM

## 2020-09-22 DIAGNOSIS — N39.41 URGE URINARY INCONTINENCE: ICD-10-CM

## 2020-09-22 DIAGNOSIS — C67.9 MALIGNANT NEOPLASM OF URINARY BLADDER, UNSPECIFIED SITE: ICD-10-CM

## 2020-09-22 PROCEDURE — 52000 CYSTOSCOPY: ICD-10-PCS | Mod: S$GLB,,, | Performed by: UROLOGY

## 2020-09-22 PROCEDURE — 52000 CYSTOURETHROSCOPY: CPT | Mod: S$GLB,,, | Performed by: UROLOGY

## 2020-09-22 RX ORDER — CIPROFLOXACIN 500 MG/1
500 TABLET ORAL 2 TIMES DAILY
Qty: 60 TABLET | Refills: 1 | Status: SHIPPED | OUTPATIENT
Start: 2020-09-22 | End: 2020-10-22

## 2020-09-22 RX ORDER — SYRINGE,SAFETY WITH NEEDLE,1ML 25GX1"
SYRINGE (EA) MISCELLANEOUS
Qty: 200 EACH | Refills: 4 | Status: SHIPPED | OUTPATIENT
Start: 2020-09-22 | End: 2022-03-03 | Stop reason: SDUPTHER

## 2020-09-22 NOTE — PROCEDURES
"Cystoscopy    Date/Time: 9/22/2020 1:30 PM  Performed by: Monty Cee MD  Authorized by: Monty Cee MD     Consent Done?:  Yes (Written)  Time out: Immediately prior to procedure a "time out" was called to verify the correct patient, procedure, equipment, support staff and site/side marked as required.    Indications: history bladder cancer    Position:  Supine  Anesthesia:  Intraurethral instillation  Patient sedated?: No    Preparation: Patient was prepped and draped in usual sterile fashion      Scope type:  Flexible cystoscope  Stent inserted: No    Stent removed: No    External exam normal: Yes    Digital exam performed: No    Urethra normal: Yes    Prostate normal: Yes  Bladder neck normal: Bladder neck normal   Bladder normal: Yes (multiple areas of old scab/healing from prior TURBT)        "

## 2020-10-05 ENCOUNTER — PATIENT MESSAGE (OUTPATIENT)
Dept: INTERNAL MEDICINE | Facility: CLINIC | Age: 65
End: 2020-10-05

## 2020-10-06 ENCOUNTER — CLINICAL SUPPORT (OUTPATIENT)
Dept: UROLOGY | Facility: CLINIC | Age: 65
End: 2020-10-06
Payer: COMMERCIAL

## 2020-10-06 DIAGNOSIS — Z85.51 HISTORY OF BLADDER CANCER: Primary | ICD-10-CM

## 2020-10-06 DIAGNOSIS — C67.2 MALIGNANT NEOPLASM OF LATERAL WALL OF URINARY BLADDER: ICD-10-CM

## 2020-10-06 DIAGNOSIS — I10 ESSENTIAL HYPERTENSION: ICD-10-CM

## 2020-10-06 DIAGNOSIS — C67.9 MALIGNANT NEOPLASM OF URINARY BLADDER, UNSPECIFIED SITE: ICD-10-CM

## 2020-10-06 PROCEDURE — 99999 PR PBB SHADOW E&M-EST. PATIENT-LVL I: CPT | Mod: PBBFAC,,,

## 2020-10-06 PROCEDURE — 96372 THER/PROPH/DIAG INJ SC/IM: CPT | Mod: S$GLB,,, | Performed by: UROLOGY

## 2020-10-06 PROCEDURE — 96372 PR INJECTION,THERAP/PROPH/DIAG2ST, IM OR SUBCUT: ICD-10-PCS | Mod: S$GLB,,, | Performed by: UROLOGY

## 2020-10-06 PROCEDURE — 99999 PR PBB SHADOW E&M-EST. PATIENT-LVL I: ICD-10-PCS | Mod: PBBFAC,,,

## 2020-10-07 RX ORDER — METOPROLOL SUCCINATE 200 MG/1
TABLET, EXTENDED RELEASE ORAL
Qty: 90 TABLET | Refills: 4 | Status: SHIPPED | OUTPATIENT
Start: 2020-10-07 | End: 2021-06-18 | Stop reason: ALTCHOICE

## 2020-10-07 NOTE — TELEPHONE ENCOUNTER
----- Message from Isabellabello Fry sent at 10/7/2020  2:08 PM CDT -----  Regarding: refill  Type:  RX Refill Request    Who Called: patient   Refill or New Rx:refill   RX Name and Strength:metoprolol succinate (TOPROL-XL) 200 MG 24 hr   How is the patient currently taking it? (ex. 1XDay):Sig: TAKE 1 TABLET BY MOUTH DAILY  Is this a 30 day or 90 day RX:90  Preferred Pharmacy with phone number:[facility] [place] [facility]  Local or Mail Order:both   Ordering Provider:Edin Shetty MD  Would the patient rather a call back or a response via [facility]? N/a  Best Call Back Number:n/a  Additional Information: n/a

## 2020-10-12 ENCOUNTER — TELEPHONE (OUTPATIENT)
Dept: UROLOGY | Facility: CLINIC | Age: 65
End: 2020-10-12

## 2020-10-12 NOTE — TELEPHONE ENCOUNTER
----- Message from Paloma Arcos sent at 10/12/2020 10:39 AM CDT -----  Contact: [phone]/Self  Patient is requesting to speak with nurse regarding tomorrow's appointments. Please advise.

## 2020-10-12 NOTE — TELEPHONE ENCOUNTER
Called patient he need to verify that his appointment was not for 11pm. Confirmed appointment for tomorrow.

## 2020-10-13 ENCOUNTER — CLINICAL SUPPORT (OUTPATIENT)
Dept: UROLOGY | Facility: CLINIC | Age: 65
End: 2020-10-13
Payer: COMMERCIAL

## 2020-10-13 DIAGNOSIS — C67.8 MALIGNANT NEOPLASM OF OVERLAPPING SITES OF BLADDER: ICD-10-CM

## 2020-10-13 DIAGNOSIS — C67.9 MALIGNANT NEOPLASM OF URINARY BLADDER, UNSPECIFIED SITE: ICD-10-CM

## 2020-10-13 DIAGNOSIS — C67.2 MALIGNANT NEOPLASM OF LATERAL WALL OF URINARY BLADDER: ICD-10-CM

## 2020-10-13 DIAGNOSIS — Z85.51 HISTORY OF BLADDER CANCER: Primary | ICD-10-CM

## 2020-10-13 PROCEDURE — 99999 PR PBB SHADOW E&M-EST. PATIENT-LVL I: ICD-10-PCS | Mod: PBBFAC,,,

## 2020-10-13 PROCEDURE — 99999 PR PBB SHADOW E&M-EST. PATIENT-LVL I: CPT | Mod: PBBFAC,,,

## 2020-10-13 PROCEDURE — 96372 PR INJECTION,THERAP/PROPH/DIAG2ST, IM OR SUBCUT: ICD-10-PCS | Mod: S$GLB,,, | Performed by: UROLOGY

## 2020-10-13 PROCEDURE — 96372 THER/PROPH/DIAG INJ SC/IM: CPT | Mod: S$GLB,,, | Performed by: UROLOGY

## 2020-10-14 ENCOUNTER — TELEPHONE (OUTPATIENT)
Dept: UROLOGY | Facility: CLINIC | Age: 65
End: 2020-10-14

## 2020-10-14 DIAGNOSIS — I10 ESSENTIAL HYPERTENSION: ICD-10-CM

## 2020-10-14 DIAGNOSIS — Z00.00 ROUTINE GENERAL MEDICAL EXAMINATION AT A HEALTH CARE FACILITY: Primary | ICD-10-CM

## 2020-10-14 RX ORDER — LOSARTAN POTASSIUM 100 MG/1
100 TABLET ORAL DAILY
Qty: 30 TABLET | Refills: 0 | Status: SHIPPED | OUTPATIENT
Start: 2020-10-14 | End: 2020-11-17 | Stop reason: SDUPTHER

## 2020-10-14 NOTE — TELEPHONE ENCOUNTER
----- Message from Monty Cee MD sent at 10/13/2020  5:32 PM CDT -----  U/S findings :Impression:     1. No spermatocele identified.  Small left-sided hydrocele.  2. Fat filled right inguinal hernia noted.

## 2020-10-20 ENCOUNTER — CLINICAL SUPPORT (OUTPATIENT)
Dept: UROLOGY | Facility: CLINIC | Age: 65
End: 2020-10-20
Payer: COMMERCIAL

## 2020-10-20 DIAGNOSIS — C67.2 MALIGNANT NEOPLASM OF LATERAL WALL OF URINARY BLADDER: Primary | ICD-10-CM

## 2020-10-20 DIAGNOSIS — Z85.51 HISTORY OF BLADDER CANCER: ICD-10-CM

## 2020-10-20 DIAGNOSIS — C67.9 MALIGNANT NEOPLASM OF URINARY BLADDER, UNSPECIFIED SITE: ICD-10-CM

## 2020-10-20 DIAGNOSIS — C67.8 MALIGNANT NEOPLASM OF OVERLAPPING SITES OF BLADDER: ICD-10-CM

## 2020-10-20 PROCEDURE — 99999 PR PBB SHADOW E&M-EST. PATIENT-LVL I: ICD-10-PCS | Mod: PBBFAC,,,

## 2020-10-20 PROCEDURE — 99999 PR PBB SHADOW E&M-EST. PATIENT-LVL I: CPT | Mod: PBBFAC,,,

## 2020-10-20 PROCEDURE — 96372 THER/PROPH/DIAG INJ SC/IM: CPT | Mod: S$GLB,,, | Performed by: UROLOGY

## 2020-10-20 PROCEDURE — 96372 PR INJECTION,THERAP/PROPH/DIAG2ST, IM OR SUBCUT: ICD-10-PCS | Mod: S$GLB,,, | Performed by: UROLOGY

## 2020-11-17 ENCOUNTER — TELEPHONE (OUTPATIENT)
Dept: INTERNAL MEDICINE | Facility: CLINIC | Age: 65
End: 2020-11-17

## 2020-11-17 DIAGNOSIS — I10 ESSENTIAL HYPERTENSION: ICD-10-CM

## 2020-11-17 RX ORDER — LOSARTAN POTASSIUM 100 MG/1
100 TABLET ORAL DAILY
Qty: 30 TABLET | Refills: 0 | Status: SHIPPED | OUTPATIENT
Start: 2020-11-17 | End: 2020-12-18 | Stop reason: SDUPTHER

## 2020-11-17 RX ORDER — LOSARTAN POTASSIUM 100 MG/1
100 TABLET ORAL DAILY
Qty: 30 TABLET | Refills: 0 | OUTPATIENT
Start: 2020-11-17 | End: 2021-11-17

## 2020-11-17 NOTE — TELEPHONE ENCOUNTER
----- Message from Jodi Hernandez sent at 11/17/2020  9:52 AM CST -----  Contact: Ourvglo-[phone]  Type:  Patient Returning Call    Who Called:PT  Who Left Message for Patient: Melissa  Does the patient know what this is regarding?: Medication  Would the patient rather a call back or a response via [facility]? Call back  Best Call Back Number: [phone]

## 2020-11-24 ENCOUNTER — PATIENT OUTREACH (OUTPATIENT)
Dept: ADMINISTRATIVE | Facility: HOSPITAL | Age: 65
End: 2020-11-24

## 2020-12-01 DIAGNOSIS — R60.0 LOWER EXTREMITY EDEMA: ICD-10-CM

## 2020-12-01 DIAGNOSIS — E78.2 MIXED HYPERLIPIDEMIA: ICD-10-CM

## 2020-12-01 RX ORDER — ROSUVASTATIN CALCIUM 40 MG/1
TABLET, COATED ORAL
Qty: 90 TABLET | Refills: 4 | Status: SHIPPED | OUTPATIENT
Start: 2020-12-01 | End: 2022-02-11 | Stop reason: SDUPTHER

## 2020-12-01 RX ORDER — FUROSEMIDE 20 MG/1
20 TABLET ORAL DAILY
Qty: 30 TABLET | Refills: 4 | Status: SHIPPED | OUTPATIENT
Start: 2020-12-01 | End: 2020-12-07 | Stop reason: SDUPTHER

## 2020-12-07 ENCOUNTER — OFFICE VISIT (OUTPATIENT)
Dept: INTERNAL MEDICINE | Facility: CLINIC | Age: 65
End: 2020-12-07
Payer: COMMERCIAL

## 2020-12-07 VITALS
OXYGEN SATURATION: 98 % | DIASTOLIC BLOOD PRESSURE: 98 MMHG | BODY MASS INDEX: 40.06 KG/M2 | HEART RATE: 84 BPM | WEIGHT: 271.25 LBS | SYSTOLIC BLOOD PRESSURE: 184 MMHG

## 2020-12-07 DIAGNOSIS — Z79.4 TYPE 2 DIABETES MELLITUS WITH DIABETIC POLYNEUROPATHY, WITH LONG-TERM CURRENT USE OF INSULIN: ICD-10-CM

## 2020-12-07 DIAGNOSIS — E66.01 MORBID OBESITY WITH BMI OF 40.0-44.9, ADULT: ICD-10-CM

## 2020-12-07 DIAGNOSIS — E78.00 PURE HYPERCHOLESTEROLEMIA: ICD-10-CM

## 2020-12-07 DIAGNOSIS — I10 ESSENTIAL HYPERTENSION: ICD-10-CM

## 2020-12-07 DIAGNOSIS — Z00.00 ROUTINE GENERAL MEDICAL EXAMINATION AT A HEALTH CARE FACILITY: Primary | ICD-10-CM

## 2020-12-07 DIAGNOSIS — E11.42 TYPE 2 DIABETES MELLITUS WITH DIABETIC POLYNEUROPATHY, WITH LONG-TERM CURRENT USE OF INSULIN: ICD-10-CM

## 2020-12-07 DIAGNOSIS — M10.9 GOUT OF FOOT, UNSPECIFIED CAUSE, UNSPECIFIED CHRONICITY, UNSPECIFIED LATERALITY: ICD-10-CM

## 2020-12-07 DIAGNOSIS — R60.0 LOWER EXTREMITY EDEMA: ICD-10-CM

## 2020-12-07 PROBLEM — R82.89 URINE CYTOLOGY ABNORMAL: Status: RESOLVED | Noted: 2019-09-18 | Resolved: 2020-12-07

## 2020-12-07 PROBLEM — R35.1 NOCTURIA: Status: RESOLVED | Noted: 2017-05-19 | Resolved: 2020-12-07

## 2020-12-07 PROBLEM — C67.9 BLADDER CANCER: Status: RESOLVED | Noted: 2019-09-18 | Resolved: 2020-12-07

## 2020-12-07 PROBLEM — Z85.51 HISTORY OF BLADDER CANCER: Status: RESOLVED | Noted: 2018-05-14 | Resolved: 2020-12-07

## 2020-12-07 PROBLEM — C67.8 MALIGNANT NEOPLASM OF OVERLAPPING SITES OF BLADDER: Status: RESOLVED | Noted: 2017-06-06 | Resolved: 2020-12-07

## 2020-12-07 PROBLEM — R35.0 URINARY FREQUENCY: Status: RESOLVED | Noted: 2017-05-19 | Resolved: 2020-12-07

## 2020-12-07 PROCEDURE — 99397 PER PM REEVAL EST PAT 65+ YR: CPT | Mod: S$GLB,,, | Performed by: INTERNAL MEDICINE

## 2020-12-07 PROCEDURE — 3008F PR BODY MASS INDEX (BMI) DOCUMENTED: ICD-10-PCS | Mod: CPTII,S$GLB,, | Performed by: INTERNAL MEDICINE

## 2020-12-07 PROCEDURE — 3077F SYST BP >= 140 MM HG: CPT | Mod: CPTII,S$GLB,, | Performed by: INTERNAL MEDICINE

## 2020-12-07 PROCEDURE — 3052F PR MOST RECENT HEMOGLOBIN A1C LEVEL 8.0 - < 9.0%: ICD-10-PCS | Mod: CPTII,S$GLB,, | Performed by: INTERNAL MEDICINE

## 2020-12-07 PROCEDURE — 1101F PT FALLS ASSESS-DOCD LE1/YR: CPT | Mod: CPTII,S$GLB,, | Performed by: INTERNAL MEDICINE

## 2020-12-07 PROCEDURE — 1157F PR ADVANCE CARE PLAN OR EQUIV PRESENT IN MEDICAL RECORD: ICD-10-PCS | Mod: S$GLB,,, | Performed by: INTERNAL MEDICINE

## 2020-12-07 PROCEDURE — 3080F PR MOST RECENT DIASTOLIC BLOOD PRESSURE >= 90 MM HG: ICD-10-PCS | Mod: CPTII,S$GLB,, | Performed by: INTERNAL MEDICINE

## 2020-12-07 PROCEDURE — 1157F ADVNC CARE PLAN IN RCRD: CPT | Mod: S$GLB,,, | Performed by: INTERNAL MEDICINE

## 2020-12-07 PROCEDURE — 3077F PR MOST RECENT SYSTOLIC BLOOD PRESSURE >= 140 MM HG: ICD-10-PCS | Mod: CPTII,S$GLB,, | Performed by: INTERNAL MEDICINE

## 2020-12-07 PROCEDURE — 1126F PR PAIN SEVERITY QUANTIFIED, NO PAIN PRESENT: ICD-10-PCS | Mod: S$GLB,,, | Performed by: INTERNAL MEDICINE

## 2020-12-07 PROCEDURE — 99397 PR PREVENTIVE VISIT,EST,65 & OVER: ICD-10-PCS | Mod: S$GLB,,, | Performed by: INTERNAL MEDICINE

## 2020-12-07 PROCEDURE — 99999 PR PBB SHADOW E&M-EST. PATIENT-LVL IV: ICD-10-PCS | Mod: PBBFAC,,, | Performed by: INTERNAL MEDICINE

## 2020-12-07 PROCEDURE — 1126F AMNT PAIN NOTED NONE PRSNT: CPT | Mod: S$GLB,,, | Performed by: INTERNAL MEDICINE

## 2020-12-07 PROCEDURE — 1101F PR PT FALLS ASSESS DOC 0-1 FALLS W/OUT INJ PAST YR: ICD-10-PCS | Mod: CPTII,S$GLB,, | Performed by: INTERNAL MEDICINE

## 2020-12-07 PROCEDURE — 3008F BODY MASS INDEX DOCD: CPT | Mod: CPTII,S$GLB,, | Performed by: INTERNAL MEDICINE

## 2020-12-07 PROCEDURE — 3080F DIAST BP >= 90 MM HG: CPT | Mod: CPTII,S$GLB,, | Performed by: INTERNAL MEDICINE

## 2020-12-07 PROCEDURE — 3288F FALL RISK ASSESSMENT DOCD: CPT | Mod: CPTII,S$GLB,, | Performed by: INTERNAL MEDICINE

## 2020-12-07 PROCEDURE — 99999 PR PBB SHADOW E&M-EST. PATIENT-LVL IV: CPT | Mod: PBBFAC,,, | Performed by: INTERNAL MEDICINE

## 2020-12-07 PROCEDURE — 3288F PR FALLS RISK ASSESSMENT DOCUMENTED: ICD-10-PCS | Mod: CPTII,S$GLB,, | Performed by: INTERNAL MEDICINE

## 2020-12-07 PROCEDURE — 3052F HG A1C>EQUAL 8.0%<EQUAL 9.0%: CPT | Mod: CPTII,S$GLB,, | Performed by: INTERNAL MEDICINE

## 2020-12-07 RX ORDER — FUROSEMIDE 20 MG/1
20 TABLET ORAL DAILY
Qty: 90 TABLET | Refills: 4 | Status: SHIPPED | OUTPATIENT
Start: 2020-12-07 | End: 2021-01-05

## 2020-12-07 NOTE — PROGRESS NOTES
Subjective:       Patient ID: Andrew Beltran is a 65 y.o. male.    Chief Complaint: Follow-up    HPI  Wellness check.  Chart reviewed.  Weight up 6 lbs/ 2 years.  Last bS 1 week ago 180.  C/O polyuria.  Eye check due.  No CP, SOB.  No gout flares.  Out of Lasix.  Review of Systems   Constitutional: Negative for fatigue.   Cardiovascular: Negative for chest pain.   Endocrine: Positive for polyuria. Negative for polydipsia and polyphagia.   Skin: Negative for pallor.   Neurological: Negative for dizziness, tremors, seizures, speech difficulty, weakness and headaches.   Psychiatric/Behavioral: Negative for confusion. The patient is not nervous/anxious.    All other systems reviewed and are negative.      Objective:      Physical Exam  Vitals signs reviewed.   Constitutional:       General: He is not in acute distress.     Appearance: He is well-developed. He is obese.   HENT:      Head: Normocephalic.      Mouth/Throat:      Mouth: Mucous membranes are moist.   Eyes:      General: No scleral icterus.     Extraocular Movements: Extraocular movements intact.      Conjunctiva/sclera: Conjunctivae normal.   Neck:      Musculoskeletal: Normal range of motion.   Cardiovascular:      Rate and Rhythm: Normal rate and regular rhythm.      Pulses:           Dorsalis pedis pulses are 2+ on the right side and 2+ on the left side.        Posterior tibial pulses are 1+ on the right side and 1+ on the left side.      Heart sounds: Normal heart sounds.   Pulmonary:      Effort: Pulmonary effort is normal.      Breath sounds: Normal breath sounds.   Abdominal:      General: Bowel sounds are normal. There is no distension.      Palpations: There is no mass.   Musculoskeletal: Normal range of motion.      Right lower leg: Edema (2+) present.      Left lower leg: Left lower leg edema: 2+   Feet:      Right foot:      Protective Sensation: 0 sites tested. 0 sites sensed.      Skin integrity: Dry skin present.      Toenail Condition:  Right toenails are long.      Left foot:      Protective Sensation: 0 sites tested. 0 sites sensed.      Skin integrity: Dry skin present.      Toenail Condition: Left toenails are long.   Skin:     General: Skin is warm and dry.   Neurological:      General: No focal deficit present.      Mental Status: He is alert.   Psychiatric:         Mood and Affect: Mood normal.         Assessment:       1. Routine general medical examination at a health care facility    2. Essential hypertension    3. Type 2 diabetes mellitus with diabetic polyneuropathy, with long-term current use of insulin    4. Pure hypercholesterolemia    5. Morbid obesity with BMI of 40.0-44.9, adult    6. Gout of foot, unspecified cause, unspecified chronicity, unspecified laterality    7. Lower extremity edema        Plan:       Andrew was seen today for follow-up.    Diagnoses and all orders for this visit:    Routine general medical examination at a health care facility    Essential hypertension  -     furosemide (LASIX) 20 MG tablet; Take 1 tablet (20 mg total) by mouth once daily.    Type 2 diabetes mellitus with diabetic polyneuropathy, with long-term current use of insulin  -     insulin NPH-insulin regular, 70/30, (NOVOLIN 70/30) 100 unit/mL (70-30) injection; INJECT 60 UNITS INTO THE SKIN TWO TIMES DAILY  -     Hemoglobin A1C; Future  -     Ambulatory referral/consult to Podiatry; Future    Pure hypercholesterolemia  -     Comprehensive Metabolic Panel; Future  -     Lipid Panel; Future    Morbid obesity with BMI of 40.0-44.9, adult   Monitor    Gout of foot, unspecified cause, unspecified chronicity, unspecified laterality  -     CBC Auto Differential; Future  -     Uric Acid; Future    Lower extremity edema  -     furosemide (LASIX) 20 MG tablet; Take 1 tablet (20 mg total) by mouth once daily.      Follow up in about 1 month (around 1/7/2021).

## 2020-12-08 ENCOUNTER — TELEPHONE (OUTPATIENT)
Dept: INTERNAL MEDICINE | Facility: CLINIC | Age: 65
End: 2020-12-08

## 2020-12-09 ENCOUNTER — PATIENT OUTREACH (OUTPATIENT)
Dept: ADMINISTRATIVE | Facility: OTHER | Age: 65
End: 2020-12-09

## 2020-12-09 DIAGNOSIS — E11.9 TYPE 2 DIABETES MELLITUS WITHOUT COMPLICATION, UNSPECIFIED WHETHER LONG TERM INSULIN USE: ICD-10-CM

## 2020-12-09 DIAGNOSIS — Z79.4 TYPE 2 DIABETES MELLITUS WITH DIABETIC POLYNEUROPATHY, WITH LONG-TERM CURRENT USE OF INSULIN: Primary | ICD-10-CM

## 2020-12-09 DIAGNOSIS — E11.42 TYPE 2 DIABETES MELLITUS WITH DIABETIC POLYNEUROPATHY, WITH LONG-TERM CURRENT USE OF INSULIN: Primary | ICD-10-CM

## 2020-12-09 NOTE — PROGRESS NOTES
Updates were requested from care everywhere.  Chart was reviewed for overdue Proactive [facility] Encounters ([facility]) topics (CRS, Breast Cancer Screening, Eye exam)  Health Maintenance has been updated.  LINKS not responding.

## 2020-12-11 ENCOUNTER — OFFICE VISIT (OUTPATIENT)
Dept: PODIATRY | Facility: CLINIC | Age: 65
End: 2020-12-11
Payer: COMMERCIAL

## 2020-12-11 VITALS
DIASTOLIC BLOOD PRESSURE: 76 MMHG | HEIGHT: 69 IN | SYSTOLIC BLOOD PRESSURE: 138 MMHG | HEART RATE: 64 BPM | RESPIRATION RATE: 16 BRPM | BODY MASS INDEX: 41.35 KG/M2 | WEIGHT: 279.19 LBS

## 2020-12-11 DIAGNOSIS — Z79.4 TYPE 2 DIABETES MELLITUS WITH DIABETIC POLYNEUROPATHY, WITH LONG-TERM CURRENT USE OF INSULIN: ICD-10-CM

## 2020-12-11 DIAGNOSIS — R60.9 PITTING EDEMA: ICD-10-CM

## 2020-12-11 DIAGNOSIS — I73.9 PERIPHERAL VASCULAR DISEASE: ICD-10-CM

## 2020-12-11 DIAGNOSIS — E11.42 TYPE 2 DIABETES MELLITUS WITH DIABETIC POLYNEUROPATHY, WITH LONG-TERM CURRENT USE OF INSULIN: ICD-10-CM

## 2020-12-11 DIAGNOSIS — B35.1 ONYCHOMYCOSIS DUE TO DERMATOPHYTE: ICD-10-CM

## 2020-12-11 DIAGNOSIS — E11.9 COMPREHENSIVE DIABETIC FOOT EXAMINATION, TYPE 2 DM, ENCOUNTER FOR: Primary | ICD-10-CM

## 2020-12-11 PROCEDURE — 1126F AMNT PAIN NOTED NONE PRSNT: CPT | Mod: S$GLB,,, | Performed by: PODIATRIST

## 2020-12-11 PROCEDURE — 3075F SYST BP GE 130 - 139MM HG: CPT | Mod: CPTII,S$GLB,, | Performed by: PODIATRIST

## 2020-12-11 PROCEDURE — 3078F PR MOST RECENT DIASTOLIC BLOOD PRESSURE < 80 MM HG: ICD-10-PCS | Mod: CPTII,S$GLB,, | Performed by: PODIATRIST

## 2020-12-11 PROCEDURE — 1126F PR PAIN SEVERITY QUANTIFIED, NO PAIN PRESENT: ICD-10-PCS | Mod: S$GLB,,, | Performed by: PODIATRIST

## 2020-12-11 PROCEDURE — 3078F DIAST BP <80 MM HG: CPT | Mod: CPTII,S$GLB,, | Performed by: PODIATRIST

## 2020-12-11 PROCEDURE — 99204 PR OFFICE/OUTPT VISIT, NEW, LEVL IV, 45-59 MIN: ICD-10-PCS | Mod: 25,S$GLB,, | Performed by: PODIATRIST

## 2020-12-11 PROCEDURE — 1157F ADVNC CARE PLAN IN RCRD: CPT | Mod: S$GLB,,, | Performed by: PODIATRIST

## 2020-12-11 PROCEDURE — 1101F PT FALLS ASSESS-DOCD LE1/YR: CPT | Mod: CPTII,S$GLB,, | Performed by: PODIATRIST

## 2020-12-11 PROCEDURE — 3288F PR FALLS RISK ASSESSMENT DOCUMENTED: ICD-10-PCS | Mod: CPTII,S$GLB,, | Performed by: PODIATRIST

## 2020-12-11 PROCEDURE — 3075F PR MOST RECENT SYSTOLIC BLOOD PRESS GE 130-139MM HG: ICD-10-PCS | Mod: CPTII,S$GLB,, | Performed by: PODIATRIST

## 2020-12-11 PROCEDURE — 1157F PR ADVANCE CARE PLAN OR EQUIV PRESENT IN MEDICAL RECORD: ICD-10-PCS | Mod: S$GLB,,, | Performed by: PODIATRIST

## 2020-12-11 PROCEDURE — 99999 PR PBB SHADOW E&M-EST. PATIENT-LVL V: ICD-10-PCS | Mod: PBBFAC,,, | Performed by: PODIATRIST

## 2020-12-11 PROCEDURE — 3008F PR BODY MASS INDEX (BMI) DOCUMENTED: ICD-10-PCS | Mod: CPTII,S$GLB,, | Performed by: PODIATRIST

## 2020-12-11 PROCEDURE — 3052F HG A1C>EQUAL 8.0%<EQUAL 9.0%: CPT | Mod: CPTII,S$GLB,, | Performed by: PODIATRIST

## 2020-12-11 PROCEDURE — 99999 PR PBB SHADOW E&M-EST. PATIENT-LVL V: CPT | Mod: PBBFAC,,, | Performed by: PODIATRIST

## 2020-12-11 PROCEDURE — 3052F PR MOST RECENT HEMOGLOBIN A1C LEVEL 8.0 - < 9.0%: ICD-10-PCS | Mod: CPTII,S$GLB,, | Performed by: PODIATRIST

## 2020-12-11 PROCEDURE — 3288F FALL RISK ASSESSMENT DOCD: CPT | Mod: CPTII,S$GLB,, | Performed by: PODIATRIST

## 2020-12-11 PROCEDURE — 11721 PR DEBRIDEMENT OF NAILS, 6 OR MORE: ICD-10-PCS | Mod: Q8,S$GLB,, | Performed by: PODIATRIST

## 2020-12-11 PROCEDURE — 1101F PR PT FALLS ASSESS DOC 0-1 FALLS W/OUT INJ PAST YR: ICD-10-PCS | Mod: CPTII,S$GLB,, | Performed by: PODIATRIST

## 2020-12-11 PROCEDURE — 99204 OFFICE O/P NEW MOD 45 MIN: CPT | Mod: 25,S$GLB,, | Performed by: PODIATRIST

## 2020-12-11 PROCEDURE — 11721 DEBRIDE NAIL 6 OR MORE: CPT | Mod: Q8,S$GLB,, | Performed by: PODIATRIST

## 2020-12-11 PROCEDURE — 3008F BODY MASS INDEX DOCD: CPT | Mod: CPTII,S$GLB,, | Performed by: PODIATRIST

## 2020-12-11 NOTE — PROGRESS NOTES
Subjective:      Patient ID: Andrew Beltran is a 65 y.o. male.    Chief Complaint: Diabetic Foot Exam (B/L feet), bump (right foot been there for 20yrs.), Nail Care (B/L feet), and Dr. Shetty 12/07/2020 (PCP visit)      Andrew is a 65 y.o. male who presents to the clinic upon referral from Dr. Shetty  for evaluation and treatment of diabetic feet. Andrew has a past medical history of BPH (benign prostatic hyperplasia), Cancer, Diabetes, Gout, High cholesterol, Hypertension, Sciatica (2016), and Urinary tract infection. Patient relates no major problem with feet. Only complaints today consist of numbness and tingling bilateral lower extremities, painful elongated nails times 10, pitting edema bilateral lower extremity.  History of cellulitis bilateral lower extremity due to peripheral vascular disease.       PCP: Edin Shetty MD    Date Last Seen by PCP: in epic     Current shoe gear: Tennis shoes    Hemoglobin A1C   Date Value Ref Range Status   06/19/2020 8.8 (H) 4.0 - 5.6 % Final     Comment:     ADA Screening Guidelines:  5.7-6.4%  Consistent with prediabetes  >or=6.5%  Consistent with diabetes  High levels of fetal hemoglobin interfere with the HbA1C  assay. Heterozygous hemoglobin variants (HbS, HgC, etc)do  not significantly interfere with this assay.   However, presence of multiple variants may affect accuracy.     08/14/2019 11.4 (H) 4.0 - 5.6 % Final     Comment:     ADA Screening Guidelines:  5.7-6.4%  Consistent with prediabetes  >or=6.5%  Consistent with diabetes  High levels of fetal hemoglobin interfere with the HbA1C  assay. Heterozygous hemoglobin variants (HbS, HgC, etc)do  not significantly interfere with this assay.   However, presence of multiple variants may affect accuracy.     03/21/2019 9.8 (H) 4.0 - 5.6 % Final     Comment:     ADA Screening Guidelines:  5.7-6.4%  Consistent with prediabetes  >or=6.5%  Consistent with diabetes  High levels of fetal hemoglobin interfere with the  HbA1C  assay. Heterozygous hemoglobin variants (HbS, HgC, etc)do  not significantly interfere with this assay.   However, presence of multiple variants may affect accuracy.             Review of Systems   Constitution: Negative for chills, decreased appetite, fever and malaise/fatigue.   HENT: Negative for congestion, ear discharge and sore throat.    Eyes: Negative for discharge and pain.   Cardiovascular: Negative for chest pain, claudication and leg swelling.   Respiratory: Negative for cough and shortness of breath.    Skin: Positive for color change. Negative for nail changes and rash.   Musculoskeletal: Positive for arthritis, joint swelling and stiffness. Negative for muscle weakness.   Gastrointestinal: Negative for bloating, abdominal pain, diarrhea, nausea and vomiting.   Genitourinary: Negative for flank pain and hematuria.   Neurological: Positive for numbness. Negative for headaches and weakness.   Psychiatric/Behavioral: Negative for altered mental status.             Past Medical History:   Diagnosis Date    BPH (benign prostatic hyperplasia)     Cancer     bladder    Diabetes     type 2    Gout     High cholesterol     Hypertension     Sciatica 2016    had injection 3 weeks ago    Urinary tract infection        Past Surgical History:   Procedure Laterality Date    APPENDECTOMY  1968    BALLOON DILATION OF URETER Left 9/18/2019    Procedure: DILATION, URETER, USING BALLOON;  Surgeon: Monty Cee MD;  Location: [facility] OR;  Service: Urology;  Laterality: Left;    BARBOTAGE OF BLADDER N/A 8/14/2019    Procedure: BARBOTAGE, BLADDER;  Surgeon: Monty Cee MD;  Location: [facility] OR;  Service: Urology;  Laterality: N/A;    BARBOTAGE OF URETER Bilateral 8/14/2019    Procedure: BARBOTAGE, URETER;  Surgeon: Monty Cee MD;  Location: [facility] OR;  Service: Urology;  Laterality: Bilateral;    BARBOTAGE OF URETER Left 9/18/2019    Procedure: BARBOTAGE, URETER;  Surgeon: Monty eCe MD;   Location: [facility];  Service: Urology;  Laterality: Left;  left renal/ ureteral washing.    BLADDER SURGERY      COLONOSCOPY N/A 7/10/2018    Procedure: COLONOSCOPY;  Surgeon: Azalea Sanchez MD;  Location: [facility] ENDO;  Service: Endoscopy;  Laterality: N/A;  [facility], faxed to [employer] [phone]    CYSTOSCOPY      CYSTOSCOPY W/ RETROGRADES Bilateral 8/14/2019    Procedure: CYSTOSCOPY, WITH RETROGRADE PYELOGRAM;  Surgeon: Monty Cee MD;  Location: [facility];  Service: Urology;  Laterality: Bilateral;    CYSTOSCOPY W/ RETROGRADES Left 9/18/2019    Procedure: CYSTOSCOPY, WITH RETROGRADE PYELOGRAM;  Surgeon: Monty Cee MD;  Location: [facility];  Service: Urology;  Laterality: Left;  cystourethroscopy with left retrograde pyelogram     CYSTOSCOPY W/ RETROGRADES Bilateral 3/11/2020    Procedure: CYSTOSCOPY, WITH RETROGRADE PYELOGRAM Ureteral renal washings and bladder washings;  Surgeon: Monty Cee MD;  Location: [facility];  Service: Urology;  Laterality: Bilateral;    CYSTOSCOPY WITH URETEROSCOPY, RETROGRADE PYELOGRAPHY, AND INSERTION OF STENT Bilateral 6/22/2020    Procedure: CYSTOSCOPY, WITH RETROGRADE PYELOGRAM AND URETERAL STENT INSERTION;  Surgeon: Monty Cee MD;  Location: [facility];  Service: Urology;  Laterality: Bilateral;    HAND SURGERY Right 12/08/2016    trigger finger    INSTILLATION OF URINARY BLADDER N/A 3/11/2020    Procedure: INSTILLATION, BLADDER - mitomyin - c;  Surgeon: Monty Cee MD;  Location: [facility];  Service: Urology;  Laterality: N/A;    INSTILLATION OF URINARY BLADDER  6/22/2020    Procedure: INSTILLATION, BLADDER;  Surgeon: Monty Cee MD;  Location: [facility];  Service: Urology;;  mitomycin 40mg    LASER ABLATION Left 9/18/2019    Procedure: ABLATION, USING LASER;  Surgeon: Monty Cee MD;  Location: [facility];  Service: Urology;  Laterality: Left;  laser ablation of lower pole calyceal transitional cell lesion     PYELOSCOPY Left 9/18/2019    Procedure: PYELOSCOPY;  Surgeon: Monty Cee MD;  Location: [facility] OR;  Service: Urology;  Laterality: Left;  Left ureteral pyeloscopy    TONSILLECTOMY  1960    TRANSURETHRAL RESECTION OF PROSTATE N/A 9/18/2019    Procedure: TURP (TRANSURETHRAL RESECTION OF PROSTATE);  Surgeon: Monty Cee MD;  Location: [facility] OR;  Service: Urology;  Laterality: N/A;  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    TRANSURETHRAL RESECTION OF PROSTATE USING BIPOLAR CAUTERY N/A 3/11/2020    Procedure: TURP, USING BIPOLAR CAUTERY;  Surgeon: Monty Cee MD;  Location: [facility] OR;  Service: Urology;  Laterality: N/A;    TRANSURETHRAL SURGICAL REMOVAL OF STRICTURE OF BLADDER NECK  8/14/2019    Procedure: EXCISION, CONTRACTURE, BLADDER NECK, TRANSURETHRAL;  Surgeon: Monty Cee MD;  Location: [facility] OR;  Service: Urology;;    URETEROSCOPY Left 9/18/2019    Procedure: URETEROSCOPY;  Surgeon: Monty Cee MD;  Location: [facility] OR;  Service: Urology;  Laterality: Left;    URETEROSCOPY Right 6/22/2020    Procedure: URETEROSCOPY;  Surgeon: Monty Cee MD;  Location: [facility] OR;  Service: Urology;  Laterality: Right;       Family History   Problem Relation Age of Onset    Diabetes Mother     Prostate cancer Neg Hx     Kidney disease Neg Hx        Social History     Socioeconomic History    Marital status:      Spouse name: Not on file    Number of children: Not on file    Years of education: Not on file    Highest education level: Not on file   Occupational History    Not on file   Social Needs    Financial resource strain: Not hard at all    Food insecurity     Worry: Never true     Inability: Never true    Transportation needs     Medical: No     Non-medical: No   Tobacco Use    Smoking status: Former Smoker     Types: Pipe    Smokeless tobacco: Never Used    Tobacco comment: quit 40 yrs ago   Substance and Sexual Activity    Alcohol use: Yes      Alcohol/week: 0.0 standard drinks     Frequency: Monthly or less     Drinks per session: 1 or 2     Binge frequency: Never     Comment: rarely    Drug use: No    Sexual activity: Not Currently   Lifestyle    Physical activity     Days per week: 1 day     Minutes per session: 30 min    Stress: Only a little   Relationships    Social connections     Talks on phone: More than three times a week     Gets together: Twice a week     Attends Buddhism service: Not on file     Active member of club or organization: Yes     Attends meetings of clubs or organizations: More than 4 times per year     Relationship status:    Other Topics Concern    Not on file   Social History Narrative    Not on file       Current Outpatient Medications   Medication Sig Dispense Refill    allopurinoL (ZYLOPRIM) 300 MG tablet Take 1 tablet (300 mg total) by mouth once daily. 90 tablet 4    blood sugar diagnostic (BLOOD GLUCOSE TEST) Strp by Misc.(Non-Drug; Combo Route) route.      furosemide (LASIX) 20 MG tablet Take 1 tablet (20 mg total) by mouth once daily. 90 tablet 4    hydroCHLOROthiazide (HYDRODIURIL) 25 MG tablet hydrochlorothiazide 25 mg tablet      ibuprofen (ADVIL,MOTRIN) 400 MG tablet Take 400 mg by mouth every 6 (six) hours as needed for Other.      insulin NPH-insulin regular, 70/30, (NOVOLIN 70/30) 100 unit/mL (70-30) injection INJECT 60 UNITS INTO THE SKIN TWO TIMES DAILY 60 mL 4    insulin syringe-needle U-100 1 mL 31 gauge x 5/16 Syrg USE AS DIRECTED TWICE A  each 4    lancets (PRODIGY TWIST TOP LANCET) 28 gauge Misc 1 lancet by Misc.(Non-Drug; Combo Route) route 2 (two) times daily. 180 each 4    losartan (COZAAR) 100 MG tablet Take 1 tablet (100 mg total) by mouth once daily. 30 tablet 0    MAGNESIUM ORAL Take 400 mg by mouth once daily at 6am. 800 mg everyday       metoprolol succinate (TOPROL-XL) 200 MG 24 hr tablet TAKE 1 TABLET BY MOUTH DAILY 90 tablet 4    rosuvastatin (CRESTOR) 40 MG  "Tab TAKE 1 TABLET BY MOUTH EVERY EVENING 90 tablet 4    tamsulosin (FLOMAX) 0.4 mg Cap Take 1 capsule (0.4 mg total) by mouth every evening. 90 capsule 4    HYDROcodone-acetaminophen (NORCO) 5-325 mg per tablet Take 1 tablet by mouth every 6 (six) hours as needed for Pain. (Patient not taking: Reported on 7/15/2020) 28 tablet 0    mirabegron (MYRBETRIQ) 25 mg Tb24 ER tablet Take 1 tablet (25 mg total) by mouth once daily. 30 tablet 11    SURE COMFORT PEN NEEDLE 31 gauge x 5/16" Ndle USE THREE TIMES A DAY BEFORE MEALS 270 each 3     No current facility-administered medications for this visit.      Facility-Administered Medications Ordered in Other Visits   Medication Dose Route Frequency Provider Last Rate Last Dose    lidocaine HCl 2% urojet   Mucous Membrane Once Monty Cee MD        mitoMYcin (MUTAMYCIN) 40 mg in sodium chloride 0.9% infusion  40 mg Intravesical Once Monty Cee MD           Review of patient's allergies indicates:  No Known Allergies    Vitals:    12/11/20 1041   BP: 138/76   Pulse: 64   Resp: 16   Weight: 126.6 kg (279 lb 3.2 oz)   Height: 5' 9" (1.753 m)   PainSc: 0-No pain       Objective:      Physical Exam  Constitutional:       General: He is not in acute distress.     Appearance: He is well-developed.   HENT:      Nose: Nose normal.   Eyes:      Conjunctiva/sclera: Conjunctivae normal.   Neck:      Musculoskeletal: Normal range of motion.   Pulmonary:      Effort: Pulmonary effort is normal.   Chest:      Chest wall: No tenderness.   Abdominal:      Tenderness: There is no abdominal tenderness.   Neurological:      Mental Status: He is alert and oriented to person, place, and time.   Psychiatric:         Behavior: Behavior normal.         Vascular: Distal DP/PT pulses palpable 0/4. CRT < 3 sec to tips of toes. + vericosities noted to LEs. Hair growth present LE, warm to touch LE, + pitting edema noted to LE.    Dermatologic: No open lesions, lacerations or wounds. " Interdigital spaces clean, dry and intact. No erythema, rubor, calor noted LE  Toenails 1-5 bilaterally are elongated by 2-3 mm, thickened by 2-3 mm, discolored/yellowed, dystrophic, brittle with subungual debris. No incurvation.     Musculoskeletal: MMT 5/5 in DF/PF/Inv/Ev resistance with no reproduction of pain in any direction. Passive range of motion of ankle and pedal joints is painless. No calf tenderness LE, Compartments soft/compressible. ROM of ankles with < 10 deg DF is noted b/l     Neurological: Light touch, proprioception, and sharp/dull sensation are all intact. Protective threshold with the Lodi-Wienstein monofilament is intact. Vibratory sensation intact.           Assessment:       Encounter Diagnoses   Name Primary?    Type 2 diabetes mellitus with diabetic polyneuropathy, with long-term current use of insulin     Pitting edema     Comprehensive diabetic foot examination, type 2 DM, encounter for Yes    Onychomycosis due to dermatophyte     Peripheral vascular disease          Plan:       Andrew was seen today for diabetic foot exam, bump, nail care and dr. solares 12/07/2020.    Diagnoses and all orders for this visit:    Comprehensive diabetic foot examination, type 2 DM, encounter for    Type 2 diabetes mellitus with diabetic polyneuropathy, with long-term current use of insulin  -     Ambulatory referral/consult to Podiatry    Pitting edema  -     DIABETIC SHOES FOR HOME USE  -     COMPRESSION STOCKINGS    Onychomycosis due to dermatophyte    Peripheral vascular disease      I counseled the patient on his conditions, their implications and medical management.    65 y.o. male with diabetic foot risk assessment.     -Rx diabetic shoe, compression stocking  -nails times 10 sharply debrided with nail Nipper.  Patient tolerated well.  Verbal consent was obtained.  I recommend continue with compression stocking to prevent any problems with lower extremities.    -It was discussed the importance  of wearing shoes with adequate room in toe box to accommodate toe deformities. Recommended New Balance/Asics shoe brands with adequate arch supports to alleviate abnormal pressure and improve stability of foot while walking. Avoid flat shoes and barefoot walking as these will exacerbate or worsen symptoms.   -Advised for optimal glucose control and maintenance per primary care physician. Patient was also educated on healthy diet that is naturally rich in nutrients and low in fat and calories.   -Patient was advised of signs and symptoms of infection including redness, drainage, purulence, odor, streaking, fever, chills and I advised patient to seek medical attention (ER or urgent care) if these symptoms arise.   -The nature of the condition, options for management, as well as potential risks and complications were discussed in detail with patient. Patient was amenable to my recommendations and left my office fully informed and will follow up as instructed or sooner if necessary.    -f/u 6 months or sooner     Note dictated with voice recognition software, please excuse any grammatical errors.

## 2020-12-11 NOTE — LETTER
December 11, 2020      Edin Shetty MD  [address]  [address]  [place] [address]           [facility]  [address], [facility] [address]  [place] [address]  Phone: [phone]  Fax: [phone]          Patient: Andrew Beltran   MR Number: [id number]   YOB: 1955   Date of Visit: 12/11/2020       Dear Dr. Edin Shetty:    Thank you for referring Andrew Beltran to me for evaluation. Attached you will find relevant portions of my assessment and plan of care.    If you have questions, please do not hesitate to call me. I look forward to following Andrew Beltran along with you.    Sincerely,    Candelaria Fuller, KARINA    Enclosure  CC:  No Recipients    If you would like to receive this communication electronically, please contact [email or web address] or [phone] to request more information on [employer] Link access.    For providers and/or their staff who would like to refer a patient to [facility], please contact us through our one-stop-shop provider referral line, [facility], at [phone].    If you feel you have received this communication in error or would no longer like to receive these types of communications, please e-mail [email or web address]

## 2020-12-18 DIAGNOSIS — I10 ESSENTIAL HYPERTENSION: ICD-10-CM

## 2020-12-18 RX ORDER — LOSARTAN POTASSIUM 100 MG/1
100 TABLET ORAL DAILY
Qty: 90 TABLET | Refills: 4 | Status: SHIPPED | OUTPATIENT
Start: 2020-12-18 | End: 2021-08-04 | Stop reason: SDUPTHER

## 2021-01-05 ENCOUNTER — OFFICE VISIT (OUTPATIENT)
Dept: INTERNAL MEDICINE | Facility: CLINIC | Age: 66
End: 2021-01-05
Payer: COMMERCIAL

## 2021-01-05 VITALS
HEART RATE: 93 BPM | WEIGHT: 279.56 LBS | HEIGHT: 69 IN | SYSTOLIC BLOOD PRESSURE: 138 MMHG | BODY MASS INDEX: 41.41 KG/M2 | DIASTOLIC BLOOD PRESSURE: 70 MMHG | OXYGEN SATURATION: 98 %

## 2021-01-05 DIAGNOSIS — E78.00 PURE HYPERCHOLESTEROLEMIA: ICD-10-CM

## 2021-01-05 DIAGNOSIS — E11.42 TYPE 2 DIABETES MELLITUS WITH DIABETIC POLYNEUROPATHY, WITH LONG-TERM CURRENT USE OF INSULIN: ICD-10-CM

## 2021-01-05 DIAGNOSIS — M10.9 GOUT OF FOOT, UNSPECIFIED CAUSE, UNSPECIFIED CHRONICITY, UNSPECIFIED LATERALITY: ICD-10-CM

## 2021-01-05 DIAGNOSIS — Z79.4 TYPE 2 DIABETES MELLITUS WITH DIABETIC POLYNEUROPATHY, WITH LONG-TERM CURRENT USE OF INSULIN: ICD-10-CM

## 2021-01-05 DIAGNOSIS — E66.01 MORBID OBESITY WITH BMI OF 40.0-44.9, ADULT: ICD-10-CM

## 2021-01-05 DIAGNOSIS — I10 ESSENTIAL HYPERTENSION: Primary | ICD-10-CM

## 2021-01-05 PROCEDURE — 99999 PR PBB SHADOW E&M-EST. PATIENT-LVL IV: ICD-10-PCS | Mod: PBBFAC,,, | Performed by: INTERNAL MEDICINE

## 2021-01-05 PROCEDURE — 99214 PR OFFICE/OUTPT VISIT, EST, LEVL IV, 30-39 MIN: ICD-10-PCS | Mod: S$GLB,,, | Performed by: INTERNAL MEDICINE

## 2021-01-05 PROCEDURE — 3075F PR MOST RECENT SYSTOLIC BLOOD PRESS GE 130-139MM HG: ICD-10-PCS | Mod: CPTII,S$GLB,, | Performed by: INTERNAL MEDICINE

## 2021-01-05 PROCEDURE — 3078F DIAST BP <80 MM HG: CPT | Mod: CPTII,S$GLB,, | Performed by: INTERNAL MEDICINE

## 2021-01-05 PROCEDURE — 3078F PR MOST RECENT DIASTOLIC BLOOD PRESSURE < 80 MM HG: ICD-10-PCS | Mod: CPTII,S$GLB,, | Performed by: INTERNAL MEDICINE

## 2021-01-05 PROCEDURE — 3008F PR BODY MASS INDEX (BMI) DOCUMENTED: ICD-10-PCS | Mod: CPTII,S$GLB,, | Performed by: INTERNAL MEDICINE

## 2021-01-05 PROCEDURE — 99999 PR PBB SHADOW E&M-EST. PATIENT-LVL IV: CPT | Mod: PBBFAC,,, | Performed by: INTERNAL MEDICINE

## 2021-01-05 PROCEDURE — 1101F PT FALLS ASSESS-DOCD LE1/YR: CPT | Mod: CPTII,S$GLB,, | Performed by: INTERNAL MEDICINE

## 2021-01-05 PROCEDURE — 99214 OFFICE O/P EST MOD 30 MIN: CPT | Mod: S$GLB,,, | Performed by: INTERNAL MEDICINE

## 2021-01-05 PROCEDURE — 3288F PR FALLS RISK ASSESSMENT DOCUMENTED: ICD-10-PCS | Mod: CPTII,S$GLB,, | Performed by: INTERNAL MEDICINE

## 2021-01-05 PROCEDURE — 1126F PR PAIN SEVERITY QUANTIFIED, NO PAIN PRESENT: ICD-10-PCS | Mod: S$GLB,,, | Performed by: INTERNAL MEDICINE

## 2021-01-05 PROCEDURE — 1157F ADVNC CARE PLAN IN RCRD: CPT | Mod: S$GLB,,, | Performed by: INTERNAL MEDICINE

## 2021-01-05 PROCEDURE — 3052F PR MOST RECENT HEMOGLOBIN A1C LEVEL 8.0 - < 9.0%: ICD-10-PCS | Mod: CPTII,S$GLB,, | Performed by: INTERNAL MEDICINE

## 2021-01-05 PROCEDURE — 3288F FALL RISK ASSESSMENT DOCD: CPT | Mod: CPTII,S$GLB,, | Performed by: INTERNAL MEDICINE

## 2021-01-05 PROCEDURE — 1101F PR PT FALLS ASSESS DOC 0-1 FALLS W/OUT INJ PAST YR: ICD-10-PCS | Mod: CPTII,S$GLB,, | Performed by: INTERNAL MEDICINE

## 2021-01-05 PROCEDURE — 3052F HG A1C>EQUAL 8.0%<EQUAL 9.0%: CPT | Mod: CPTII,S$GLB,, | Performed by: INTERNAL MEDICINE

## 2021-01-05 PROCEDURE — 1157F PR ADVANCE CARE PLAN OR EQUIV PRESENT IN MEDICAL RECORD: ICD-10-PCS | Mod: S$GLB,,, | Performed by: INTERNAL MEDICINE

## 2021-01-05 PROCEDURE — 3008F BODY MASS INDEX DOCD: CPT | Mod: CPTII,S$GLB,, | Performed by: INTERNAL MEDICINE

## 2021-01-05 PROCEDURE — 1126F AMNT PAIN NOTED NONE PRSNT: CPT | Mod: S$GLB,,, | Performed by: INTERNAL MEDICINE

## 2021-01-05 PROCEDURE — 3075F SYST BP GE 130 - 139MM HG: CPT | Mod: CPTII,S$GLB,, | Performed by: INTERNAL MEDICINE

## 2021-01-05 RX ORDER — FUROSEMIDE 80 MG/1
80 TABLET ORAL DAILY
Qty: 90 TABLET | Refills: 3 | Status: SHIPPED | OUTPATIENT
Start: 2021-01-05 | End: 2021-06-18 | Stop reason: SDUPTHER

## 2021-01-22 ENCOUNTER — IMMUNIZATION (OUTPATIENT)
Dept: FAMILY MEDICINE | Facility: CLINIC | Age: 66
End: 2021-01-22
Payer: COMMERCIAL

## 2021-01-22 DIAGNOSIS — Z23 NEED FOR VACCINATION: Primary | ICD-10-CM

## 2021-01-22 PROCEDURE — 91300 COVID-19, MRNA, LNP-S, PF, 30 MCG/0.3 ML DOSE VACCINE: CPT | Mod: PBBFAC | Performed by: FAMILY MEDICINE

## 2021-01-26 ENCOUNTER — PROCEDURE VISIT (OUTPATIENT)
Dept: UROLOGY | Facility: CLINIC | Age: 66
End: 2021-01-26
Payer: COMMERCIAL

## 2021-01-26 VITALS
TEMPERATURE: 98 F | RESPIRATION RATE: 18 BRPM | BODY MASS INDEX: 41.32 KG/M2 | DIASTOLIC BLOOD PRESSURE: 62 MMHG | HEART RATE: 74 BPM | HEIGHT: 69 IN | SYSTOLIC BLOOD PRESSURE: 130 MMHG | WEIGHT: 279 LBS | OXYGEN SATURATION: 98 %

## 2021-01-26 DIAGNOSIS — C67.8 MALIGNANT NEOPLASM OF OVERLAPPING SITES OF BLADDER: Primary | ICD-10-CM

## 2021-01-26 DIAGNOSIS — N39.41 URGE URINARY INCONTINENCE: ICD-10-CM

## 2021-01-26 DIAGNOSIS — N32.0 BLADDER NECK CONTRACTURE: ICD-10-CM

## 2021-01-26 DIAGNOSIS — C61 TRANSITIONAL CELL CARCINOMA OF PROSTATE: ICD-10-CM

## 2021-01-26 PROCEDURE — 88112 PR  CYTOPATH, CELL ENHANCE TECH: ICD-10-PCS | Mod: 26,,, | Performed by: PATHOLOGY

## 2021-01-26 PROCEDURE — 52000 CYSTOSCOPY: ICD-10-PCS | Mod: S$GLB,,, | Performed by: UROLOGY

## 2021-01-26 PROCEDURE — 88112 CYTOPATH CELL ENHANCE TECH: CPT | Mod: 26,,, | Performed by: PATHOLOGY

## 2021-01-26 PROCEDURE — 52000 CYSTOURETHROSCOPY: CPT | Mod: S$GLB,,, | Performed by: UROLOGY

## 2021-01-26 PROCEDURE — 88112 CYTOPATH CELL ENHANCE TECH: CPT | Performed by: PATHOLOGY

## 2021-01-26 PROCEDURE — 88120 CYTP URNE 3-5 PROBES EA SPEC: CPT

## 2021-01-26 RX ORDER — CIPROFLOXACIN 500 MG/1
500 TABLET ORAL 2 TIMES DAILY
Qty: 10 TABLET | Refills: 0 | Status: SHIPPED | OUTPATIENT
Start: 2021-01-26 | End: 2021-01-31

## 2021-02-01 LAB — FINAL PATHOLOGIC DIAGNOSIS: NORMAL

## 2021-02-12 ENCOUNTER — IMMUNIZATION (OUTPATIENT)
Dept: FAMILY MEDICINE | Facility: CLINIC | Age: 66
End: 2021-02-12
Payer: COMMERCIAL

## 2021-02-12 DIAGNOSIS — Z23 NEED FOR VACCINATION: Primary | ICD-10-CM

## 2021-02-12 PROCEDURE — 0002A COVID-19, MRNA, LNP-S, PF, 30 MCG/0.3 ML DOSE VACCINE: CPT | Mod: PBBFAC | Performed by: FAMILY MEDICINE

## 2021-02-12 PROCEDURE — 91300 COVID-19, MRNA, LNP-S, PF, 30 MCG/0.3 ML DOSE VACCINE: CPT | Mod: PBBFAC | Performed by: FAMILY MEDICINE

## 2021-03-01 DIAGNOSIS — M10.9 GOUT OF FOOT, UNSPECIFIED CAUSE, UNSPECIFIED CHRONICITY, UNSPECIFIED LATERALITY: ICD-10-CM

## 2021-03-01 RX ORDER — ALLOPURINOL 300 MG/1
300 TABLET ORAL DAILY
Qty: 90 TABLET | Refills: 4 | Status: SHIPPED | OUTPATIENT
Start: 2021-03-01 | End: 2021-08-04 | Stop reason: SDUPTHER

## 2021-03-23 ENCOUNTER — PATIENT OUTREACH (OUTPATIENT)
Dept: ADMINISTRATIVE | Facility: HOSPITAL | Age: 66
End: 2021-03-23

## 2021-04-12 ENCOUNTER — OFFICE VISIT (OUTPATIENT)
Dept: INTERNAL MEDICINE | Facility: CLINIC | Age: 66
End: 2021-04-12
Payer: COMMERCIAL

## 2021-04-12 VITALS
SYSTOLIC BLOOD PRESSURE: 132 MMHG | OXYGEN SATURATION: 95 % | TEMPERATURE: 99 F | BODY MASS INDEX: 42.31 KG/M2 | WEIGHT: 285.63 LBS | DIASTOLIC BLOOD PRESSURE: 78 MMHG | HEIGHT: 69 IN | HEART RATE: 110 BPM

## 2021-04-12 DIAGNOSIS — E66.01 MORBID OBESITY WITH BMI OF 40.0-44.9, ADULT: ICD-10-CM

## 2021-04-12 DIAGNOSIS — E11.42 TYPE 2 DIABETES MELLITUS WITH DIABETIC POLYNEUROPATHY, WITH LONG-TERM CURRENT USE OF INSULIN: Primary | ICD-10-CM

## 2021-04-12 DIAGNOSIS — I10 ESSENTIAL HYPERTENSION: ICD-10-CM

## 2021-04-12 DIAGNOSIS — Z79.4 TYPE 2 DIABETES MELLITUS WITH DIABETIC POLYNEUROPATHY, WITH LONG-TERM CURRENT USE OF INSULIN: Primary | ICD-10-CM

## 2021-04-12 DIAGNOSIS — M16.0 PRIMARY OSTEOARTHRITIS OF BOTH HIPS: ICD-10-CM

## 2021-04-12 DIAGNOSIS — E78.00 PURE HYPERCHOLESTEROLEMIA: ICD-10-CM

## 2021-04-12 DIAGNOSIS — M10.9 GOUT OF FOOT, UNSPECIFIED CAUSE, UNSPECIFIED CHRONICITY, UNSPECIFIED LATERALITY: ICD-10-CM

## 2021-04-12 LAB
BILIRUB SERPL-MCNC: ABNORMAL MG/DL
BLOOD URINE, POC: ABNORMAL
CLARITY, POC UA: CLEAR
COLOR, POC UA: YELLOW
GLUCOSE UR QL STRIP: NORMAL
KETONES UR QL STRIP: ABNORMAL
LEUKOCYTE ESTERASE URINE, POC: ABNORMAL
NITRITE, POC UA: ABNORMAL
PH, POC UA: 5
PROTEIN, POC: ABNORMAL
SPECIFIC GRAVITY, POC UA: 1
UROBILINOGEN, POC UA: NORMAL

## 2021-04-12 PROCEDURE — 3008F BODY MASS INDEX DOCD: CPT | Mod: CPTII,S$GLB,, | Performed by: INTERNAL MEDICINE

## 2021-04-12 PROCEDURE — 81002 POCT URINE DIPSTICK WITHOUT MICROSCOPE: ICD-10-PCS | Mod: S$GLB,,, | Performed by: INTERNAL MEDICINE

## 2021-04-12 PROCEDURE — 1101F PR PT FALLS ASSESS DOC 0-1 FALLS W/OUT INJ PAST YR: ICD-10-PCS | Mod: CPTII,S$GLB,, | Performed by: INTERNAL MEDICINE

## 2021-04-12 PROCEDURE — 99999 PR PBB SHADOW E&M-EST. PATIENT-LVL IV: CPT | Mod: PBBFAC,,, | Performed by: INTERNAL MEDICINE

## 2021-04-12 PROCEDURE — 1101F PT FALLS ASSESS-DOCD LE1/YR: CPT | Mod: CPTII,S$GLB,, | Performed by: INTERNAL MEDICINE

## 2021-04-12 PROCEDURE — 1126F AMNT PAIN NOTED NONE PRSNT: CPT | Mod: S$GLB,,, | Performed by: INTERNAL MEDICINE

## 2021-04-12 PROCEDURE — 3288F PR FALLS RISK ASSESSMENT DOCUMENTED: ICD-10-PCS | Mod: CPTII,S$GLB,, | Performed by: INTERNAL MEDICINE

## 2021-04-12 PROCEDURE — 81002 URINALYSIS NONAUTO W/O SCOPE: CPT | Mod: S$GLB,,, | Performed by: INTERNAL MEDICINE

## 2021-04-12 PROCEDURE — 3288F FALL RISK ASSESSMENT DOCD: CPT | Mod: CPTII,S$GLB,, | Performed by: INTERNAL MEDICINE

## 2021-04-12 PROCEDURE — 1126F PR PAIN SEVERITY QUANTIFIED, NO PAIN PRESENT: ICD-10-PCS | Mod: S$GLB,,, | Performed by: INTERNAL MEDICINE

## 2021-04-12 PROCEDURE — 3046F PR MOST RECENT HEMOGLOBIN A1C LEVEL > 9.0%: ICD-10-PCS | Mod: CPTII,S$GLB,, | Performed by: INTERNAL MEDICINE

## 2021-04-12 PROCEDURE — 3008F PR BODY MASS INDEX (BMI) DOCUMENTED: ICD-10-PCS | Mod: CPTII,S$GLB,, | Performed by: INTERNAL MEDICINE

## 2021-04-12 PROCEDURE — 99215 PR OFFICE/OUTPT VISIT, EST, LEVL V, 40-54 MIN: ICD-10-PCS | Mod: 25,S$GLB,, | Performed by: INTERNAL MEDICINE

## 2021-04-12 PROCEDURE — 1157F ADVNC CARE PLAN IN RCRD: CPT | Mod: S$GLB,,, | Performed by: INTERNAL MEDICINE

## 2021-04-12 PROCEDURE — 3046F HEMOGLOBIN A1C LEVEL >9.0%: CPT | Mod: CPTII,S$GLB,, | Performed by: INTERNAL MEDICINE

## 2021-04-12 PROCEDURE — 99999 PR PBB SHADOW E&M-EST. PATIENT-LVL IV: ICD-10-PCS | Mod: PBBFAC,,, | Performed by: INTERNAL MEDICINE

## 2021-04-12 PROCEDURE — 99215 OFFICE O/P EST HI 40 MIN: CPT | Mod: 25,S$GLB,, | Performed by: INTERNAL MEDICINE

## 2021-04-12 PROCEDURE — 1157F PR ADVANCE CARE PLAN OR EQUIV PRESENT IN MEDICAL RECORD: ICD-10-PCS | Mod: S$GLB,,, | Performed by: INTERNAL MEDICINE

## 2021-04-12 RX ORDER — TRAMADOL HYDROCHLORIDE 50 MG/1
50 TABLET ORAL EVERY 6 HOURS PRN
Qty: 100 TABLET | Refills: 3 | Status: SHIPPED | OUTPATIENT
Start: 2021-04-12 | End: 2021-04-22

## 2021-04-22 LAB
LEFT EYE DM RETINOPATHY: POSITIVE
RIGHT EYE DM RETINOPATHY: POSITIVE

## 2021-04-27 ENCOUNTER — PROCEDURE VISIT (OUTPATIENT)
Dept: UROLOGY | Facility: CLINIC | Age: 66
End: 2021-04-27
Payer: COMMERCIAL

## 2021-04-27 VITALS — BODY MASS INDEX: 42.18 KG/M2 | HEIGHT: 69 IN

## 2021-04-27 DIAGNOSIS — C67.8 MALIGNANT NEOPLASM OF OVERLAPPING SITES OF BLADDER: ICD-10-CM

## 2021-04-27 DIAGNOSIS — N32.0 BLADDER NECK CONTRACTURE: Primary | ICD-10-CM

## 2021-04-27 PROCEDURE — 52000 CYSTOURETHROSCOPY: CPT | Mod: ,,, | Performed by: UROLOGY

## 2021-04-27 PROCEDURE — 88112 PR  CYTOPATH, CELL ENHANCE TECH: ICD-10-PCS | Mod: 26,,, | Performed by: PATHOLOGY

## 2021-04-27 PROCEDURE — 88112 CYTOPATH CELL ENHANCE TECH: CPT | Mod: 26,,, | Performed by: PATHOLOGY

## 2021-04-27 PROCEDURE — 88112 CYTOPATH CELL ENHANCE TECH: CPT | Performed by: PATHOLOGY

## 2021-04-27 PROCEDURE — 52000 CYSTOSCOPY: ICD-10-PCS | Mod: ,,, | Performed by: UROLOGY

## 2021-04-28 ENCOUNTER — LAB VISIT (OUTPATIENT)
Dept: LAB | Facility: HOSPITAL | Age: 66
End: 2021-04-28
Attending: UROLOGY
Payer: COMMERCIAL

## 2021-04-28 DIAGNOSIS — C67.8 MALIGNANT NEOPLASM OF OVERLAPPING SITES OF BLADDER: ICD-10-CM

## 2021-04-28 LAB
FINAL PATHOLOGIC DIAGNOSIS: NORMAL
Lab: NORMAL

## 2021-04-28 PROCEDURE — 88120 CYTP URNE 3-5 PROBES EA SPEC: CPT | Performed by: UROLOGY

## 2021-04-29 ENCOUNTER — PATIENT MESSAGE (OUTPATIENT)
Dept: UROLOGY | Facility: CLINIC | Age: 66
End: 2021-04-29

## 2021-04-30 DIAGNOSIS — C67.8 MALIGNANT NEOPLASM OF OVERLAPPING SITES OF BLADDER: ICD-10-CM

## 2021-04-30 DIAGNOSIS — N32.0 BLADDER NECK CONTRACTURE: Primary | ICD-10-CM

## 2021-04-30 DIAGNOSIS — R33.9 INCOMPLETE BLADDER EMPTYING: ICD-10-CM

## 2021-04-30 DIAGNOSIS — Z01.818 PRE-OP TESTING: ICD-10-CM

## 2021-04-30 RX ORDER — SODIUM CHLORIDE 9 MG/ML
INJECTION, SOLUTION INTRAVENOUS CONTINUOUS
Status: CANCELLED | OUTPATIENT
Start: 2021-04-30

## 2021-06-07 RX ORDER — TAMSULOSIN HYDROCHLORIDE 0.4 MG/1
0.4 CAPSULE ORAL NIGHTLY
Qty: 90 CAPSULE | Refills: 0 | Status: SHIPPED | OUTPATIENT
Start: 2021-06-07 | End: 2021-08-04 | Stop reason: SDUPTHER

## 2021-06-07 RX ORDER — TAMSULOSIN HYDROCHLORIDE 0.4 MG/1
0.4 CAPSULE ORAL NIGHTLY
Qty: 90 CAPSULE | Refills: 4 | Status: CANCELLED | OUTPATIENT
Start: 2021-06-07

## 2021-06-08 ENCOUNTER — TELEPHONE (OUTPATIENT)
Dept: UROLOGY | Facility: CLINIC | Age: 66
End: 2021-06-08

## 2021-06-09 DIAGNOSIS — E11.9 TYPE 2 DIABETES MELLITUS WITHOUT COMPLICATION: ICD-10-CM

## 2021-06-17 ENCOUNTER — PATIENT OUTREACH (OUTPATIENT)
Dept: ADMINISTRATIVE | Facility: OTHER | Age: 66
End: 2021-06-17

## 2021-06-18 ENCOUNTER — OFFICE VISIT (OUTPATIENT)
Dept: CARDIOLOGY | Facility: CLINIC | Age: 66
End: 2021-06-18
Payer: COMMERCIAL

## 2021-06-18 ENCOUNTER — PATIENT MESSAGE (OUTPATIENT)
Dept: CARDIOLOGY | Facility: CLINIC | Age: 66
End: 2021-06-18

## 2021-06-18 VITALS
HEART RATE: 97 BPM | BODY MASS INDEX: 42.21 KG/M2 | OXYGEN SATURATION: 96 % | HEIGHT: 69 IN | WEIGHT: 285 LBS | DIASTOLIC BLOOD PRESSURE: 90 MMHG | SYSTOLIC BLOOD PRESSURE: 168 MMHG

## 2021-06-18 DIAGNOSIS — I10 ESSENTIAL HYPERTENSION: ICD-10-CM

## 2021-06-18 DIAGNOSIS — I15.2 HYPERTENSION COMPLICATING DIABETES: ICD-10-CM

## 2021-06-18 DIAGNOSIS — C61 TRANSITIONAL CELL CARCINOMA OF PROSTATE: ICD-10-CM

## 2021-06-18 DIAGNOSIS — L03.116 CELLULITIS OF LEFT LOWER EXTREMITY: ICD-10-CM

## 2021-06-18 DIAGNOSIS — R60.0 PERIPHERAL EDEMA: ICD-10-CM

## 2021-06-18 DIAGNOSIS — E66.01 MORBID OBESITY WITH BMI OF 40.0-44.9, ADULT: ICD-10-CM

## 2021-06-18 DIAGNOSIS — Z79.4 TYPE 2 DIABETES MELLITUS WITH DIABETIC POLYNEUROPATHY, WITH LONG-TERM CURRENT USE OF INSULIN: ICD-10-CM

## 2021-06-18 DIAGNOSIS — E78.2 MIXED HYPERLIPIDEMIA: ICD-10-CM

## 2021-06-18 DIAGNOSIS — E11.42 TYPE 2 DIABETES MELLITUS WITH DIABETIC POLYNEUROPATHY, WITH LONG-TERM CURRENT USE OF INSULIN: ICD-10-CM

## 2021-06-18 DIAGNOSIS — R60.0 BILATERAL LOWER EXTREMITY EDEMA: ICD-10-CM

## 2021-06-18 DIAGNOSIS — E11.59 HYPERTENSION COMPLICATING DIABETES: ICD-10-CM

## 2021-06-18 PROCEDURE — 99999 PR PBB SHADOW E&M-EST. PATIENT-LVL IV: CPT | Mod: PBBFAC,,, | Performed by: INTERNAL MEDICINE

## 2021-06-18 PROCEDURE — 3046F PR MOST RECENT HEMOGLOBIN A1C LEVEL > 9.0%: ICD-10-PCS | Mod: CPTII,S$GLB,, | Performed by: INTERNAL MEDICINE

## 2021-06-18 PROCEDURE — 3077F PR MOST RECENT SYSTOLIC BLOOD PRESSURE >= 140 MM HG: ICD-10-PCS | Mod: CPTII,S$GLB,, | Performed by: INTERNAL MEDICINE

## 2021-06-18 PROCEDURE — 1126F AMNT PAIN NOTED NONE PRSNT: CPT | Mod: S$GLB,,, | Performed by: INTERNAL MEDICINE

## 2021-06-18 PROCEDURE — 1157F PR ADVANCE CARE PLAN OR EQUIV PRESENT IN MEDICAL RECORD: ICD-10-PCS | Mod: S$GLB,,, | Performed by: INTERNAL MEDICINE

## 2021-06-18 PROCEDURE — 1126F PR PAIN SEVERITY QUANTIFIED, NO PAIN PRESENT: ICD-10-PCS | Mod: S$GLB,,, | Performed by: INTERNAL MEDICINE

## 2021-06-18 PROCEDURE — 99204 OFFICE O/P NEW MOD 45 MIN: CPT | Mod: 25,S$GLB,, | Performed by: INTERNAL MEDICINE

## 2021-06-18 PROCEDURE — 93000 EKG 12-LEAD: ICD-10-PCS | Mod: S$GLB,,, | Performed by: INTERNAL MEDICINE

## 2021-06-18 PROCEDURE — 93000 ELECTROCARDIOGRAM COMPLETE: CPT | Mod: S$GLB,,, | Performed by: INTERNAL MEDICINE

## 2021-06-18 PROCEDURE — 3080F PR MOST RECENT DIASTOLIC BLOOD PRESSURE >= 90 MM HG: ICD-10-PCS | Mod: CPTII,S$GLB,, | Performed by: INTERNAL MEDICINE

## 2021-06-18 PROCEDURE — 3077F SYST BP >= 140 MM HG: CPT | Mod: CPTII,S$GLB,, | Performed by: INTERNAL MEDICINE

## 2021-06-18 PROCEDURE — 1101F PR PT FALLS ASSESS DOC 0-1 FALLS W/OUT INJ PAST YR: ICD-10-PCS | Mod: CPTII,S$GLB,, | Performed by: INTERNAL MEDICINE

## 2021-06-18 PROCEDURE — 3008F BODY MASS INDEX DOCD: CPT | Mod: CPTII,S$GLB,, | Performed by: INTERNAL MEDICINE

## 2021-06-18 PROCEDURE — 3046F HEMOGLOBIN A1C LEVEL >9.0%: CPT | Mod: CPTII,S$GLB,, | Performed by: INTERNAL MEDICINE

## 2021-06-18 PROCEDURE — 99999 PR PBB SHADOW E&M-EST. PATIENT-LVL IV: ICD-10-PCS | Mod: PBBFAC,,, | Performed by: INTERNAL MEDICINE

## 2021-06-18 PROCEDURE — 99204 PR OFFICE/OUTPT VISIT, NEW, LEVL IV, 45-59 MIN: ICD-10-PCS | Mod: 25,S$GLB,, | Performed by: INTERNAL MEDICINE

## 2021-06-18 PROCEDURE — 3008F PR BODY MASS INDEX (BMI) DOCUMENTED: ICD-10-PCS | Mod: CPTII,S$GLB,, | Performed by: INTERNAL MEDICINE

## 2021-06-18 PROCEDURE — 3080F DIAST BP >= 90 MM HG: CPT | Mod: CPTII,S$GLB,, | Performed by: INTERNAL MEDICINE

## 2021-06-18 PROCEDURE — 3288F PR FALLS RISK ASSESSMENT DOCUMENTED: ICD-10-PCS | Mod: CPTII,S$GLB,, | Performed by: INTERNAL MEDICINE

## 2021-06-18 PROCEDURE — 1157F ADVNC CARE PLAN IN RCRD: CPT | Mod: S$GLB,,, | Performed by: INTERNAL MEDICINE

## 2021-06-18 PROCEDURE — 3288F FALL RISK ASSESSMENT DOCD: CPT | Mod: CPTII,S$GLB,, | Performed by: INTERNAL MEDICINE

## 2021-06-18 PROCEDURE — 1101F PT FALLS ASSESS-DOCD LE1/YR: CPT | Mod: CPTII,S$GLB,, | Performed by: INTERNAL MEDICINE

## 2021-06-18 RX ORDER — SPIRONOLACTONE 25 MG/1
25 TABLET ORAL DAILY
Qty: 30 TABLET | Refills: 11 | Status: SHIPPED | OUTPATIENT
Start: 2021-06-18 | End: 2022-08-01 | Stop reason: SDUPTHER

## 2021-06-18 RX ORDER — CARVEDILOL 25 MG/1
25 TABLET ORAL 2 TIMES DAILY WITH MEALS
Qty: 60 TABLET | Refills: 11 | Status: SHIPPED | OUTPATIENT
Start: 2021-06-18 | End: 2021-10-18 | Stop reason: SDUPTHER

## 2021-06-18 RX ORDER — FUROSEMIDE 20 MG/1
20 TABLET ORAL DAILY
Qty: 90 TABLET | Refills: 3
Start: 2021-06-18 | End: 2022-01-18 | Stop reason: SDUPTHER

## 2021-06-25 ENCOUNTER — OFFICE VISIT (OUTPATIENT)
Dept: UROLOGY | Facility: CLINIC | Age: 66
End: 2021-06-25
Payer: COMMERCIAL

## 2021-06-25 VITALS
SYSTOLIC BLOOD PRESSURE: 146 MMHG | HEART RATE: 78 BPM | DIASTOLIC BLOOD PRESSURE: 68 MMHG | HEIGHT: 69 IN | WEIGHT: 285 LBS | BODY MASS INDEX: 42.21 KG/M2

## 2021-06-25 DIAGNOSIS — N32.0 BLADDER NECK CONTRACTURE: Primary | ICD-10-CM

## 2021-06-25 PROCEDURE — 3008F PR BODY MASS INDEX (BMI) DOCUMENTED: ICD-10-PCS | Mod: CPTII,S$GLB,, | Performed by: UROLOGY

## 2021-06-25 PROCEDURE — 99999 PR PBB SHADOW E&M-EST. PATIENT-LVL IV: CPT | Mod: PBBFAC,,, | Performed by: UROLOGY

## 2021-06-25 PROCEDURE — 3008F BODY MASS INDEX DOCD: CPT | Mod: CPTII,S$GLB,, | Performed by: UROLOGY

## 2021-06-25 PROCEDURE — 99024 PR POST-OP FOLLOW-UP VISIT: ICD-10-PCS | Mod: S$GLB,,, | Performed by: UROLOGY

## 2021-06-25 PROCEDURE — 1126F AMNT PAIN NOTED NONE PRSNT: CPT | Mod: S$GLB,,, | Performed by: UROLOGY

## 2021-06-25 PROCEDURE — 99024 POSTOP FOLLOW-UP VISIT: CPT | Mod: S$GLB,,, | Performed by: UROLOGY

## 2021-06-25 PROCEDURE — 1126F PR PAIN SEVERITY QUANTIFIED, NO PAIN PRESENT: ICD-10-PCS | Mod: S$GLB,,, | Performed by: UROLOGY

## 2021-06-25 PROCEDURE — 99999 PR PBB SHADOW E&M-EST. PATIENT-LVL IV: ICD-10-PCS | Mod: PBBFAC,,, | Performed by: UROLOGY

## 2021-06-25 PROCEDURE — 1157F PR ADVANCE CARE PLAN OR EQUIV PRESENT IN MEDICAL RECORD: ICD-10-PCS | Mod: S$GLB,,, | Performed by: UROLOGY

## 2021-06-25 PROCEDURE — 1157F ADVNC CARE PLAN IN RCRD: CPT | Mod: S$GLB,,, | Performed by: UROLOGY

## 2021-07-07 ENCOUNTER — PATIENT MESSAGE (OUTPATIENT)
Dept: ADMINISTRATIVE | Facility: HOSPITAL | Age: 66
End: 2021-07-07

## 2021-07-09 ENCOUNTER — PATIENT OUTREACH (OUTPATIENT)
Dept: ADMINISTRATIVE | Facility: HOSPITAL | Age: 66
End: 2021-07-09

## 2021-07-14 ENCOUNTER — TELEPHONE (OUTPATIENT)
Dept: FAMILY MEDICINE | Facility: CLINIC | Age: 66
End: 2021-07-14

## 2021-07-19 ENCOUNTER — TELEPHONE (OUTPATIENT)
Dept: FAMILY MEDICINE | Facility: CLINIC | Age: 66
End: 2021-07-19

## 2021-07-21 ENCOUNTER — PATIENT OUTREACH (OUTPATIENT)
Dept: ADMINISTRATIVE | Facility: HOSPITAL | Age: 66
End: 2021-07-21

## 2021-08-04 ENCOUNTER — OFFICE VISIT (OUTPATIENT)
Dept: FAMILY MEDICINE | Facility: CLINIC | Age: 66
End: 2021-08-04
Payer: COMMERCIAL

## 2021-08-04 VITALS
WEIGHT: 276.81 LBS | RESPIRATION RATE: 18 BRPM | SYSTOLIC BLOOD PRESSURE: 138 MMHG | HEIGHT: 69 IN | DIASTOLIC BLOOD PRESSURE: 78 MMHG | HEART RATE: 85 BPM | OXYGEN SATURATION: 97 % | BODY MASS INDEX: 41 KG/M2

## 2021-08-04 DIAGNOSIS — E11.42 TYPE 2 DIABETES MELLITUS WITH DIABETIC POLYNEUROPATHY, WITH LONG-TERM CURRENT USE OF INSULIN: ICD-10-CM

## 2021-08-04 DIAGNOSIS — C67.9 MALIGNANT NEOPLASM OF URINARY BLADDER, UNSPECIFIED SITE: Primary | ICD-10-CM

## 2021-08-04 DIAGNOSIS — Z79.4 TYPE 2 DIABETES MELLITUS WITH DIABETIC POLYNEUROPATHY, WITH LONG-TERM CURRENT USE OF INSULIN: ICD-10-CM

## 2021-08-04 DIAGNOSIS — M10.9 GOUT OF FOOT, UNSPECIFIED CAUSE, UNSPECIFIED CHRONICITY, UNSPECIFIED LATERALITY: ICD-10-CM

## 2021-08-04 DIAGNOSIS — I10 ESSENTIAL HYPERTENSION: ICD-10-CM

## 2021-08-04 DIAGNOSIS — C61 TRANSITIONAL CELL CARCINOMA OF PROSTATE: ICD-10-CM

## 2021-08-04 DIAGNOSIS — E66.01 MORBID OBESITY WITH BMI OF 40.0-44.9, ADULT: ICD-10-CM

## 2021-08-04 DIAGNOSIS — E78.2 MIXED HYPERLIPIDEMIA: ICD-10-CM

## 2021-08-04 PROCEDURE — 1157F PR ADVANCE CARE PLAN OR EQUIV PRESENT IN MEDICAL RECORD: ICD-10-PCS | Mod: CPTII,S$GLB,, | Performed by: FAMILY MEDICINE

## 2021-08-04 PROCEDURE — 3288F PR FALLS RISK ASSESSMENT DOCUMENTED: ICD-10-PCS | Mod: CPTII,S$GLB,, | Performed by: FAMILY MEDICINE

## 2021-08-04 PROCEDURE — 99214 OFFICE O/P EST MOD 30 MIN: CPT | Mod: S$GLB,,, | Performed by: FAMILY MEDICINE

## 2021-08-04 PROCEDURE — 1160F RVW MEDS BY RX/DR IN RCRD: CPT | Mod: CPTII,S$GLB,, | Performed by: FAMILY MEDICINE

## 2021-08-04 PROCEDURE — 3075F PR MOST RECENT SYSTOLIC BLOOD PRESS GE 130-139MM HG: ICD-10-PCS | Mod: CPTII,S$GLB,, | Performed by: FAMILY MEDICINE

## 2021-08-04 PROCEDURE — 1126F AMNT PAIN NOTED NONE PRSNT: CPT | Mod: CPTII,S$GLB,, | Performed by: FAMILY MEDICINE

## 2021-08-04 PROCEDURE — 1159F PR MEDICATION LIST DOCUMENTED IN MEDICAL RECORD: ICD-10-PCS | Mod: CPTII,S$GLB,, | Performed by: FAMILY MEDICINE

## 2021-08-04 PROCEDURE — 1160F PR REVIEW ALL MEDS BY PRESCRIBER/CLIN PHARMACIST DOCUMENTED: ICD-10-PCS | Mod: CPTII,S$GLB,, | Performed by: FAMILY MEDICINE

## 2021-08-04 PROCEDURE — 99999 PR PBB SHADOW E&M-EST. PATIENT-LVL V: ICD-10-PCS | Mod: PBBFAC,,, | Performed by: FAMILY MEDICINE

## 2021-08-04 PROCEDURE — 1159F MED LIST DOCD IN RCRD: CPT | Mod: CPTII,S$GLB,, | Performed by: FAMILY MEDICINE

## 2021-08-04 PROCEDURE — 3075F SYST BP GE 130 - 139MM HG: CPT | Mod: CPTII,S$GLB,, | Performed by: FAMILY MEDICINE

## 2021-08-04 PROCEDURE — 3078F PR MOST RECENT DIASTOLIC BLOOD PRESSURE < 80 MM HG: ICD-10-PCS | Mod: CPTII,S$GLB,, | Performed by: FAMILY MEDICINE

## 2021-08-04 PROCEDURE — 3078F DIAST BP <80 MM HG: CPT | Mod: CPTII,S$GLB,, | Performed by: FAMILY MEDICINE

## 2021-08-04 PROCEDURE — 1101F PR PT FALLS ASSESS DOC 0-1 FALLS W/OUT INJ PAST YR: ICD-10-PCS | Mod: CPTII,S$GLB,, | Performed by: FAMILY MEDICINE

## 2021-08-04 PROCEDURE — 3046F PR MOST RECENT HEMOGLOBIN A1C LEVEL > 9.0%: ICD-10-PCS | Mod: CPTII,S$GLB,, | Performed by: FAMILY MEDICINE

## 2021-08-04 PROCEDURE — 3288F FALL RISK ASSESSMENT DOCD: CPT | Mod: CPTII,S$GLB,, | Performed by: FAMILY MEDICINE

## 2021-08-04 PROCEDURE — 3008F PR BODY MASS INDEX (BMI) DOCUMENTED: ICD-10-PCS | Mod: CPTII,S$GLB,, | Performed by: FAMILY MEDICINE

## 2021-08-04 PROCEDURE — 3008F BODY MASS INDEX DOCD: CPT | Mod: CPTII,S$GLB,, | Performed by: FAMILY MEDICINE

## 2021-08-04 PROCEDURE — 99999 PR PBB SHADOW E&M-EST. PATIENT-LVL V: CPT | Mod: PBBFAC,,, | Performed by: FAMILY MEDICINE

## 2021-08-04 PROCEDURE — 99214 PR OFFICE/OUTPT VISIT, EST, LEVL IV, 30-39 MIN: ICD-10-PCS | Mod: S$GLB,,, | Performed by: FAMILY MEDICINE

## 2021-08-04 PROCEDURE — 1126F PR PAIN SEVERITY QUANTIFIED, NO PAIN PRESENT: ICD-10-PCS | Mod: CPTII,S$GLB,, | Performed by: FAMILY MEDICINE

## 2021-08-04 PROCEDURE — 1157F ADVNC CARE PLAN IN RCRD: CPT | Mod: CPTII,S$GLB,, | Performed by: FAMILY MEDICINE

## 2021-08-04 PROCEDURE — 1101F PT FALLS ASSESS-DOCD LE1/YR: CPT | Mod: CPTII,S$GLB,, | Performed by: FAMILY MEDICINE

## 2021-08-04 PROCEDURE — 3046F HEMOGLOBIN A1C LEVEL >9.0%: CPT | Mod: CPTII,S$GLB,, | Performed by: FAMILY MEDICINE

## 2021-08-05 RX ORDER — ALLOPURINOL 300 MG/1
300 TABLET ORAL DAILY
Qty: 90 TABLET | Refills: 4 | Status: SHIPPED | OUTPATIENT
Start: 2021-08-05 | End: 2022-10-24 | Stop reason: SDUPTHER

## 2021-08-05 RX ORDER — ACETAMINOPHEN 500 MG
500 TABLET ORAL EVERY 6 HOURS PRN
Qty: 90 TABLET | Refills: 0 | Status: SHIPPED | OUTPATIENT
Start: 2021-08-05 | End: 2021-11-03

## 2021-08-05 RX ORDER — LOSARTAN POTASSIUM 100 MG/1
100 TABLET ORAL DAILY
Qty: 90 TABLET | Refills: 4 | Status: SHIPPED | OUTPATIENT
Start: 2021-08-05 | End: 2022-11-21 | Stop reason: SDUPTHER

## 2021-08-05 RX ORDER — TAMSULOSIN HYDROCHLORIDE 0.4 MG/1
0.4 CAPSULE ORAL NIGHTLY
Qty: 90 CAPSULE | Refills: 0 | Status: SHIPPED | OUTPATIENT
Start: 2021-08-05 | End: 2021-12-13 | Stop reason: SDUPTHER

## 2021-09-14 ENCOUNTER — TELEPHONE (OUTPATIENT)
Dept: CARDIOLOGY | Facility: CLINIC | Age: 66
End: 2021-09-14

## 2021-09-14 ENCOUNTER — TELEPHONE (OUTPATIENT)
Dept: UROLOGY | Facility: CLINIC | Age: 66
End: 2021-09-14

## 2021-09-21 ENCOUNTER — TELEPHONE (OUTPATIENT)
Dept: UROLOGY | Facility: CLINIC | Age: 66
End: 2021-09-21

## 2021-09-21 ENCOUNTER — PATIENT MESSAGE (OUTPATIENT)
Dept: UROLOGY | Facility: CLINIC | Age: 66
End: 2021-09-21

## 2021-10-04 ENCOUNTER — PATIENT MESSAGE (OUTPATIENT)
Dept: ADMINISTRATIVE | Facility: HOSPITAL | Age: 66
End: 2021-10-04

## 2021-10-11 ENCOUNTER — OFFICE VISIT (OUTPATIENT)
Dept: FAMILY MEDICINE | Facility: CLINIC | Age: 66
End: 2021-10-11
Payer: COMMERCIAL

## 2021-10-11 ENCOUNTER — PATIENT OUTREACH (OUTPATIENT)
Dept: ADMINISTRATIVE | Facility: OTHER | Age: 66
End: 2021-10-11

## 2021-10-11 VITALS
WEIGHT: 281.38 LBS | SYSTOLIC BLOOD PRESSURE: 160 MMHG | HEART RATE: 88 BPM | OXYGEN SATURATION: 97 % | BODY MASS INDEX: 41.68 KG/M2 | HEIGHT: 69 IN | RESPIRATION RATE: 18 BRPM | TEMPERATURE: 98 F | DIASTOLIC BLOOD PRESSURE: 70 MMHG

## 2021-10-11 DIAGNOSIS — E11.42 TYPE 2 DIABETES MELLITUS WITH DIABETIC POLYNEUROPATHY, WITH LONG-TERM CURRENT USE OF INSULIN: ICD-10-CM

## 2021-10-11 DIAGNOSIS — Z79.4 TYPE 2 DIABETES MELLITUS WITH DIABETIC POLYNEUROPATHY, WITH LONG-TERM CURRENT USE OF INSULIN: Primary | ICD-10-CM

## 2021-10-11 DIAGNOSIS — E11.42 TYPE 2 DIABETES MELLITUS WITH DIABETIC POLYNEUROPATHY, WITH LONG-TERM CURRENT USE OF INSULIN: Primary | ICD-10-CM

## 2021-10-11 DIAGNOSIS — Z79.4 TYPE 2 DIABETES MELLITUS WITH DIABETIC POLYNEUROPATHY, WITH LONG-TERM CURRENT USE OF INSULIN: ICD-10-CM

## 2021-10-11 DIAGNOSIS — L03.115 CELLULITIS OF RIGHT LOWER EXTREMITY: Primary | ICD-10-CM

## 2021-10-11 PROCEDURE — 3008F BODY MASS INDEX DOCD: CPT | Mod: CPTII,S$GLB,, | Performed by: FAMILY MEDICINE

## 2021-10-11 PROCEDURE — 3288F PR FALLS RISK ASSESSMENT DOCUMENTED: ICD-10-PCS | Mod: CPTII,S$GLB,, | Performed by: FAMILY MEDICINE

## 2021-10-11 PROCEDURE — 1101F PT FALLS ASSESS-DOCD LE1/YR: CPT | Mod: CPTII,S$GLB,, | Performed by: FAMILY MEDICINE

## 2021-10-11 PROCEDURE — 3062F PR POS MACROALBUMINURIA RESULT DOCUMENTED/REVIEW: ICD-10-PCS | Mod: CPTII,S$GLB,, | Performed by: FAMILY MEDICINE

## 2021-10-11 PROCEDURE — 1159F PR MEDICATION LIST DOCUMENTED IN MEDICAL RECORD: ICD-10-PCS | Mod: CPTII,S$GLB,, | Performed by: FAMILY MEDICINE

## 2021-10-11 PROCEDURE — 1159F MED LIST DOCD IN RCRD: CPT | Mod: CPTII,S$GLB,, | Performed by: FAMILY MEDICINE

## 2021-10-11 PROCEDURE — 3046F PR MOST RECENT HEMOGLOBIN A1C LEVEL > 9.0%: ICD-10-PCS | Mod: CPTII,S$GLB,, | Performed by: FAMILY MEDICINE

## 2021-10-11 PROCEDURE — 3066F PR DOCUMENTATION OF TREATMENT FOR NEPHROPATHY: ICD-10-PCS | Mod: CPTII,S$GLB,, | Performed by: FAMILY MEDICINE

## 2021-10-11 PROCEDURE — 4010F PR ACE/ARB THEARPY RXD/TAKEN: ICD-10-PCS | Mod: CPTII,S$GLB,, | Performed by: FAMILY MEDICINE

## 2021-10-11 PROCEDURE — 3288F FALL RISK ASSESSMENT DOCD: CPT | Mod: CPTII,S$GLB,, | Performed by: FAMILY MEDICINE

## 2021-10-11 PROCEDURE — 3062F POS MACROALBUMINURIA REV: CPT | Mod: CPTII,S$GLB,, | Performed by: FAMILY MEDICINE

## 2021-10-11 PROCEDURE — 1101F PR PT FALLS ASSESS DOC 0-1 FALLS W/OUT INJ PAST YR: ICD-10-PCS | Mod: CPTII,S$GLB,, | Performed by: FAMILY MEDICINE

## 2021-10-11 PROCEDURE — 1157F PR ADVANCE CARE PLAN OR EQUIV PRESENT IN MEDICAL RECORD: ICD-10-PCS | Mod: CPTII,S$GLB,, | Performed by: FAMILY MEDICINE

## 2021-10-11 PROCEDURE — 3066F NEPHROPATHY DOC TX: CPT | Mod: CPTII,S$GLB,, | Performed by: FAMILY MEDICINE

## 2021-10-11 PROCEDURE — 99999 PR PBB SHADOW E&M-EST. PATIENT-LVL V: CPT | Mod: PBBFAC,,, | Performed by: FAMILY MEDICINE

## 2021-10-11 PROCEDURE — 3046F HEMOGLOBIN A1C LEVEL >9.0%: CPT | Mod: CPTII,S$GLB,, | Performed by: FAMILY MEDICINE

## 2021-10-11 PROCEDURE — 4010F ACE/ARB THERAPY RXD/TAKEN: CPT | Mod: CPTII,S$GLB,, | Performed by: FAMILY MEDICINE

## 2021-10-11 PROCEDURE — 3077F SYST BP >= 140 MM HG: CPT | Mod: CPTII,S$GLB,, | Performed by: FAMILY MEDICINE

## 2021-10-11 PROCEDURE — 3078F PR MOST RECENT DIASTOLIC BLOOD PRESSURE < 80 MM HG: ICD-10-PCS | Mod: CPTII,S$GLB,, | Performed by: FAMILY MEDICINE

## 2021-10-11 PROCEDURE — 1160F RVW MEDS BY RX/DR IN RCRD: CPT | Mod: CPTII,S$GLB,, | Performed by: FAMILY MEDICINE

## 2021-10-11 PROCEDURE — 1157F ADVNC CARE PLAN IN RCRD: CPT | Mod: CPTII,S$GLB,, | Performed by: FAMILY MEDICINE

## 2021-10-11 PROCEDURE — 3077F PR MOST RECENT SYSTOLIC BLOOD PRESSURE >= 140 MM HG: ICD-10-PCS | Mod: CPTII,S$GLB,, | Performed by: FAMILY MEDICINE

## 2021-10-11 PROCEDURE — 3078F DIAST BP <80 MM HG: CPT | Mod: CPTII,S$GLB,, | Performed by: FAMILY MEDICINE

## 2021-10-11 PROCEDURE — 99999 PR PBB SHADOW E&M-EST. PATIENT-LVL V: ICD-10-PCS | Mod: PBBFAC,,, | Performed by: FAMILY MEDICINE

## 2021-10-11 PROCEDURE — 1160F PR REVIEW ALL MEDS BY PRESCRIBER/CLIN PHARMACIST DOCUMENTED: ICD-10-PCS | Mod: CPTII,S$GLB,, | Performed by: FAMILY MEDICINE

## 2021-10-11 PROCEDURE — 99214 PR OFFICE/OUTPT VISIT, EST, LEVL IV, 30-39 MIN: ICD-10-PCS | Mod: S$GLB,,, | Performed by: FAMILY MEDICINE

## 2021-10-11 PROCEDURE — 99214 OFFICE O/P EST MOD 30 MIN: CPT | Mod: S$GLB,,, | Performed by: FAMILY MEDICINE

## 2021-10-11 PROCEDURE — 3008F PR BODY MASS INDEX (BMI) DOCUMENTED: ICD-10-PCS | Mod: CPTII,S$GLB,, | Performed by: FAMILY MEDICINE

## 2021-10-11 RX ORDER — MUPIROCIN 20 MG/G
OINTMENT TOPICAL 3 TIMES DAILY
Qty: 22 G | Refills: 0 | Status: SHIPPED | OUTPATIENT
Start: 2021-10-11 | End: 2022-07-25

## 2021-10-11 RX ORDER — CLINDAMYCIN HYDROCHLORIDE 300 MG/1
300 CAPSULE ORAL EVERY 8 HOURS
Qty: 30 CAPSULE | Refills: 0 | Status: SHIPPED | OUTPATIENT
Start: 2021-10-11 | End: 2021-10-21

## 2021-10-11 RX ORDER — CLINDAMYCIN HYDROCHLORIDE 300 MG/1
300 CAPSULE ORAL EVERY 8 HOURS
Qty: 30 CAPSULE | Refills: 0 | Status: SHIPPED | OUTPATIENT
Start: 2021-10-11 | End: 2021-10-11

## 2021-10-12 ENCOUNTER — PATIENT MESSAGE (OUTPATIENT)
Dept: CARDIOLOGY | Facility: CLINIC | Age: 66
End: 2021-10-12

## 2021-10-15 ENCOUNTER — TELEPHONE (OUTPATIENT)
Dept: CARDIOLOGY | Facility: CLINIC | Age: 66
End: 2021-10-15

## 2021-10-15 PROBLEM — T14.8XXA OPEN WOUND: Status: ACTIVE | Noted: 2021-10-15

## 2021-10-18 ENCOUNTER — OFFICE VISIT (OUTPATIENT)
Dept: CARDIOLOGY | Facility: CLINIC | Age: 66
End: 2021-10-18
Payer: COMMERCIAL

## 2021-10-18 VITALS
WEIGHT: 279.81 LBS | OXYGEN SATURATION: 97 % | BODY MASS INDEX: 40.06 KG/M2 | HEIGHT: 70 IN | SYSTOLIC BLOOD PRESSURE: 130 MMHG | HEART RATE: 96 BPM | DIASTOLIC BLOOD PRESSURE: 78 MMHG

## 2021-10-18 DIAGNOSIS — E78.2 MIXED HYPERLIPIDEMIA: ICD-10-CM

## 2021-10-18 DIAGNOSIS — I15.2 HYPERTENSION COMPLICATING DIABETES: ICD-10-CM

## 2021-10-18 DIAGNOSIS — E11.59 HYPERTENSION COMPLICATING DIABETES: ICD-10-CM

## 2021-10-18 DIAGNOSIS — Z79.4 TYPE 2 DIABETES MELLITUS WITH DIABETIC POLYNEUROPATHY, WITH LONG-TERM CURRENT USE OF INSULIN: ICD-10-CM

## 2021-10-18 DIAGNOSIS — E66.01 MORBID OBESITY WITH BMI OF 40.0-44.9, ADULT: ICD-10-CM

## 2021-10-18 DIAGNOSIS — I87.2 CHRONIC VENOUS STASIS DERMATITIS OF BOTH LOWER EXTREMITIES: ICD-10-CM

## 2021-10-18 DIAGNOSIS — C67.5 MALIGNANT NEOPLASM OF URINARY BLADDER NECK: ICD-10-CM

## 2021-10-18 DIAGNOSIS — I10 ESSENTIAL HYPERTENSION: ICD-10-CM

## 2021-10-18 DIAGNOSIS — R60.0 BILATERAL LOWER EXTREMITY EDEMA: ICD-10-CM

## 2021-10-18 DIAGNOSIS — C61 TRANSITIONAL CELL CARCINOMA OF PROSTATE: ICD-10-CM

## 2021-10-18 DIAGNOSIS — E11.42 TYPE 2 DIABETES MELLITUS WITH DIABETIC POLYNEUROPATHY, WITH LONG-TERM CURRENT USE OF INSULIN: ICD-10-CM

## 2021-10-18 PROCEDURE — 1126F PR PAIN SEVERITY QUANTIFIED, NO PAIN PRESENT: ICD-10-PCS | Mod: CPTII,S$GLB,, | Performed by: INTERNAL MEDICINE

## 2021-10-18 PROCEDURE — 4010F PR ACE/ARB THEARPY RXD/TAKEN: ICD-10-PCS | Mod: CPTII,S$GLB,, | Performed by: INTERNAL MEDICINE

## 2021-10-18 PROCEDURE — 1159F MED LIST DOCD IN RCRD: CPT | Mod: CPTII,S$GLB,, | Performed by: INTERNAL MEDICINE

## 2021-10-18 PROCEDURE — 3078F DIAST BP <80 MM HG: CPT | Mod: CPTII,S$GLB,, | Performed by: INTERNAL MEDICINE

## 2021-10-18 PROCEDURE — 1160F PR REVIEW ALL MEDS BY PRESCRIBER/CLIN PHARMACIST DOCUMENTED: ICD-10-PCS | Mod: CPTII,S$GLB,, | Performed by: INTERNAL MEDICINE

## 2021-10-18 PROCEDURE — 1160F RVW MEDS BY RX/DR IN RCRD: CPT | Mod: CPTII,S$GLB,, | Performed by: INTERNAL MEDICINE

## 2021-10-18 PROCEDURE — 4010F ACE/ARB THERAPY RXD/TAKEN: CPT | Mod: CPTII,S$GLB,, | Performed by: INTERNAL MEDICINE

## 2021-10-18 PROCEDURE — 3008F BODY MASS INDEX DOCD: CPT | Mod: CPTII,S$GLB,, | Performed by: INTERNAL MEDICINE

## 2021-10-18 PROCEDURE — 3046F HEMOGLOBIN A1C LEVEL >9.0%: CPT | Mod: CPTII,S$GLB,, | Performed by: INTERNAL MEDICINE

## 2021-10-18 PROCEDURE — 99999 PR PBB SHADOW E&M-EST. PATIENT-LVL V: ICD-10-PCS | Mod: PBBFAC,,, | Performed by: INTERNAL MEDICINE

## 2021-10-18 PROCEDURE — 3066F NEPHROPATHY DOC TX: CPT | Mod: CPTII,S$GLB,, | Performed by: INTERNAL MEDICINE

## 2021-10-18 PROCEDURE — 1101F PR PT FALLS ASSESS DOC 0-1 FALLS W/OUT INJ PAST YR: ICD-10-PCS | Mod: CPTII,S$GLB,, | Performed by: INTERNAL MEDICINE

## 2021-10-18 PROCEDURE — 1126F AMNT PAIN NOTED NONE PRSNT: CPT | Mod: CPTII,S$GLB,, | Performed by: INTERNAL MEDICINE

## 2021-10-18 PROCEDURE — 3066F PR DOCUMENTATION OF TREATMENT FOR NEPHROPATHY: ICD-10-PCS | Mod: CPTII,S$GLB,, | Performed by: INTERNAL MEDICINE

## 2021-10-18 PROCEDURE — 99214 PR OFFICE/OUTPT VISIT, EST, LEVL IV, 30-39 MIN: ICD-10-PCS | Mod: S$GLB,,, | Performed by: INTERNAL MEDICINE

## 2021-10-18 PROCEDURE — 3288F PR FALLS RISK ASSESSMENT DOCUMENTED: ICD-10-PCS | Mod: CPTII,S$GLB,, | Performed by: INTERNAL MEDICINE

## 2021-10-18 PROCEDURE — 3075F SYST BP GE 130 - 139MM HG: CPT | Mod: CPTII,S$GLB,, | Performed by: INTERNAL MEDICINE

## 2021-10-18 PROCEDURE — 3062F POS MACROALBUMINURIA REV: CPT | Mod: CPTII,S$GLB,, | Performed by: INTERNAL MEDICINE

## 2021-10-18 PROCEDURE — 99214 OFFICE O/P EST MOD 30 MIN: CPT | Mod: S$GLB,,, | Performed by: INTERNAL MEDICINE

## 2021-10-18 PROCEDURE — 3075F PR MOST RECENT SYSTOLIC BLOOD PRESS GE 130-139MM HG: ICD-10-PCS | Mod: CPTII,S$GLB,, | Performed by: INTERNAL MEDICINE

## 2021-10-18 PROCEDURE — 1157F ADVNC CARE PLAN IN RCRD: CPT | Mod: CPTII,S$GLB,, | Performed by: INTERNAL MEDICINE

## 2021-10-18 PROCEDURE — 1157F PR ADVANCE CARE PLAN OR EQUIV PRESENT IN MEDICAL RECORD: ICD-10-PCS | Mod: CPTII,S$GLB,, | Performed by: INTERNAL MEDICINE

## 2021-10-18 PROCEDURE — 1101F PT FALLS ASSESS-DOCD LE1/YR: CPT | Mod: CPTII,S$GLB,, | Performed by: INTERNAL MEDICINE

## 2021-10-18 PROCEDURE — 3062F PR POS MACROALBUMINURIA RESULT DOCUMENTED/REVIEW: ICD-10-PCS | Mod: CPTII,S$GLB,, | Performed by: INTERNAL MEDICINE

## 2021-10-18 PROCEDURE — 3288F FALL RISK ASSESSMENT DOCD: CPT | Mod: CPTII,S$GLB,, | Performed by: INTERNAL MEDICINE

## 2021-10-18 PROCEDURE — 99999 PR PBB SHADOW E&M-EST. PATIENT-LVL V: CPT | Mod: PBBFAC,,, | Performed by: INTERNAL MEDICINE

## 2021-10-18 PROCEDURE — 3078F PR MOST RECENT DIASTOLIC BLOOD PRESSURE < 80 MM HG: ICD-10-PCS | Mod: CPTII,S$GLB,, | Performed by: INTERNAL MEDICINE

## 2021-10-18 PROCEDURE — 3008F PR BODY MASS INDEX (BMI) DOCUMENTED: ICD-10-PCS | Mod: CPTII,S$GLB,, | Performed by: INTERNAL MEDICINE

## 2021-10-18 PROCEDURE — 3046F PR MOST RECENT HEMOGLOBIN A1C LEVEL > 9.0%: ICD-10-PCS | Mod: CPTII,S$GLB,, | Performed by: INTERNAL MEDICINE

## 2021-10-18 PROCEDURE — 1159F PR MEDICATION LIST DOCUMENTED IN MEDICAL RECORD: ICD-10-PCS | Mod: CPTII,S$GLB,, | Performed by: INTERNAL MEDICINE

## 2021-10-18 RX ORDER — CARVEDILOL 12.5 MG/1
12.5 TABLET ORAL 2 TIMES DAILY WITH MEALS
Qty: 180 TABLET | Refills: 3 | Status: SHIPPED | OUTPATIENT
Start: 2021-10-18 | End: 2022-12-05 | Stop reason: SDUPTHER

## 2021-10-22 ENCOUNTER — IMMUNIZATION (OUTPATIENT)
Dept: FAMILY MEDICINE | Facility: CLINIC | Age: 66
End: 2021-10-22
Payer: COMMERCIAL

## 2021-10-22 DIAGNOSIS — Z23 NEED FOR VACCINATION: Primary | ICD-10-CM

## 2021-10-22 PROCEDURE — 91300 COVID-19, MRNA, LNP-S, PF, 30 MCG/0.3 ML DOSE VACCINE: CPT | Mod: PBBFAC | Performed by: FAMILY MEDICINE

## 2021-10-22 PROCEDURE — 0003A COVID-19, MRNA, LNP-S, PF, 30 MCG/0.3 ML DOSE VACCINE: CPT | Mod: PBBFAC | Performed by: FAMILY MEDICINE

## 2021-11-01 ENCOUNTER — HOSPITAL ENCOUNTER (OUTPATIENT)
Dept: CARDIOLOGY | Facility: HOSPITAL | Age: 66
Discharge: HOME | End: 2021-11-01
Attending: INTERNAL MEDICINE
Payer: COMMERCIAL

## 2021-11-01 DIAGNOSIS — I87.2 CHRONIC VENOUS STASIS DERMATITIS OF BOTH LOWER EXTREMITIES: ICD-10-CM

## 2021-11-01 DIAGNOSIS — R60.0 BILATERAL LOWER EXTREMITY EDEMA: ICD-10-CM

## 2021-11-01 LAB
IMMEDIATE ARM BP: 210 MMHG
IMMEDIATE LEFT ABI: 1.04
IMMEDIATE LEFT TIBIAL: 218 MMHG
IMMEDIATE RIGHT ABI: 1.07
IMMEDIATE RIGHT TIBIAL: 225 MMHG
LEFT ABI: 1.21
LEFT ARM BP: 156 MMHG
LEFT DORSALIS PEDIS: 182 MMHG
LEFT POSTERIOR TIBIAL: 192 MMHG
LEFT TBI: 0.87
LEFT TOE PRESSURE: 138 MMHG
POST1 ARM BP: 210 MMHG
POST1 LEFT ABI: 0.99
POST1 LEFT TIBIAL: 208 MMHG
POST1 RIGHT ABI: 1.1
POST1 RIGHT TIBIAL: 230 MMHG
POST2 ARM BP: 207 MMHG
POST2 LEFT ABI: 0.92
POST2 LEFT TIBIAL: 190 MMHG
POST2 RIGHT ABI: 0.99
POST2 RIGHT TIBIAL: 204 MMHG
RIGHT ABI: 1.16
RIGHT ARM BP: 159 MMHG
RIGHT DORSALIS PEDIS: 180 MMHG
RIGHT POSTERIOR TIBIAL: 185 MMHG
RIGHT TBI: 1.02
RIGHT TOE PRESSURE: 162 MMHG
TREADMILL GRADE: 10 %
TREADMILL SPEED: 1.7 MPH
TREADMILL TIME: 3 MIN

## 2021-11-01 PROCEDURE — 93924 ANKLE BRACHIAL INDICES (ABI): ICD-10-PCS | Mod: 26,,, | Performed by: INTERNAL MEDICINE

## 2021-11-01 PROCEDURE — 93924 LWR XTR VASC STDY BILAT: CPT

## 2021-11-01 PROCEDURE — 93924 LWR XTR VASC STDY BILAT: CPT | Mod: 26,,, | Performed by: INTERNAL MEDICINE

## 2021-11-04 ENCOUNTER — OFFICE VISIT (OUTPATIENT)
Dept: FAMILY MEDICINE | Facility: CLINIC | Age: 66
End: 2021-11-04
Payer: COMMERCIAL

## 2021-11-04 VITALS
SYSTOLIC BLOOD PRESSURE: 142 MMHG | DIASTOLIC BLOOD PRESSURE: 70 MMHG | BODY MASS INDEX: 40.91 KG/M2 | HEIGHT: 69 IN | WEIGHT: 276.19 LBS | HEART RATE: 73 BPM | OXYGEN SATURATION: 97 %

## 2021-11-04 DIAGNOSIS — Z79.4 TYPE 2 DIABETES MELLITUS WITH DIABETIC POLYNEUROPATHY, WITH LONG-TERM CURRENT USE OF INSULIN: Primary | ICD-10-CM

## 2021-11-04 DIAGNOSIS — E11.42 TYPE 2 DIABETES MELLITUS WITH DIABETIC POLYNEUROPATHY, WITH LONG-TERM CURRENT USE OF INSULIN: Primary | ICD-10-CM

## 2021-11-04 PROCEDURE — 3046F PR MOST RECENT HEMOGLOBIN A1C LEVEL > 9.0%: ICD-10-PCS | Mod: CPTII,S$GLB,, | Performed by: FAMILY MEDICINE

## 2021-11-04 PROCEDURE — 3046F HEMOGLOBIN A1C LEVEL >9.0%: CPT | Mod: CPTII,S$GLB,, | Performed by: FAMILY MEDICINE

## 2021-11-04 PROCEDURE — 99999 PR PBB SHADOW E&M-EST. PATIENT-LVL IV: CPT | Mod: PBBFAC,,, | Performed by: FAMILY MEDICINE

## 2021-11-04 PROCEDURE — 1159F MED LIST DOCD IN RCRD: CPT | Mod: CPTII,S$GLB,, | Performed by: FAMILY MEDICINE

## 2021-11-04 PROCEDURE — 3066F NEPHROPATHY DOC TX: CPT | Mod: CPTII,S$GLB,, | Performed by: FAMILY MEDICINE

## 2021-11-04 PROCEDURE — 3066F PR DOCUMENTATION OF TREATMENT FOR NEPHROPATHY: ICD-10-PCS | Mod: CPTII,S$GLB,, | Performed by: FAMILY MEDICINE

## 2021-11-04 PROCEDURE — 99214 PR OFFICE/OUTPT VISIT, EST, LEVL IV, 30-39 MIN: ICD-10-PCS | Mod: S$GLB,,, | Performed by: FAMILY MEDICINE

## 2021-11-04 PROCEDURE — 3077F SYST BP >= 140 MM HG: CPT | Mod: CPTII,S$GLB,, | Performed by: FAMILY MEDICINE

## 2021-11-04 PROCEDURE — 1157F PR ADVANCE CARE PLAN OR EQUIV PRESENT IN MEDICAL RECORD: ICD-10-PCS | Mod: CPTII,S$GLB,, | Performed by: FAMILY MEDICINE

## 2021-11-04 PROCEDURE — 3288F PR FALLS RISK ASSESSMENT DOCUMENTED: ICD-10-PCS | Mod: CPTII,S$GLB,, | Performed by: FAMILY MEDICINE

## 2021-11-04 PROCEDURE — 99214 OFFICE O/P EST MOD 30 MIN: CPT | Mod: S$GLB,,, | Performed by: FAMILY MEDICINE

## 2021-11-04 PROCEDURE — 1126F AMNT PAIN NOTED NONE PRSNT: CPT | Mod: CPTII,S$GLB,, | Performed by: FAMILY MEDICINE

## 2021-11-04 PROCEDURE — 3008F BODY MASS INDEX DOCD: CPT | Mod: CPTII,S$GLB,, | Performed by: FAMILY MEDICINE

## 2021-11-04 PROCEDURE — 4010F ACE/ARB THERAPY RXD/TAKEN: CPT | Mod: CPTII,S$GLB,, | Performed by: FAMILY MEDICINE

## 2021-11-04 PROCEDURE — 3077F PR MOST RECENT SYSTOLIC BLOOD PRESSURE >= 140 MM HG: ICD-10-PCS | Mod: CPTII,S$GLB,, | Performed by: FAMILY MEDICINE

## 2021-11-04 PROCEDURE — 3078F DIAST BP <80 MM HG: CPT | Mod: CPTII,S$GLB,, | Performed by: FAMILY MEDICINE

## 2021-11-04 PROCEDURE — 1157F ADVNC CARE PLAN IN RCRD: CPT | Mod: CPTII,S$GLB,, | Performed by: FAMILY MEDICINE

## 2021-11-04 PROCEDURE — 1101F PT FALLS ASSESS-DOCD LE1/YR: CPT | Mod: CPTII,S$GLB,, | Performed by: FAMILY MEDICINE

## 2021-11-04 PROCEDURE — 3078F PR MOST RECENT DIASTOLIC BLOOD PRESSURE < 80 MM HG: ICD-10-PCS | Mod: CPTII,S$GLB,, | Performed by: FAMILY MEDICINE

## 2021-11-04 PROCEDURE — 99999 PR PBB SHADOW E&M-EST. PATIENT-LVL IV: ICD-10-PCS | Mod: PBBFAC,,, | Performed by: FAMILY MEDICINE

## 2021-11-04 PROCEDURE — 1159F PR MEDICATION LIST DOCUMENTED IN MEDICAL RECORD: ICD-10-PCS | Mod: CPTII,S$GLB,, | Performed by: FAMILY MEDICINE

## 2021-11-04 PROCEDURE — 1126F PR PAIN SEVERITY QUANTIFIED, NO PAIN PRESENT: ICD-10-PCS | Mod: CPTII,S$GLB,, | Performed by: FAMILY MEDICINE

## 2021-11-04 PROCEDURE — 3062F PR POS MACROALBUMINURIA RESULT DOCUMENTED/REVIEW: ICD-10-PCS | Mod: CPTII,S$GLB,, | Performed by: FAMILY MEDICINE

## 2021-11-04 PROCEDURE — 4010F PR ACE/ARB THEARPY RXD/TAKEN: ICD-10-PCS | Mod: CPTII,S$GLB,, | Performed by: FAMILY MEDICINE

## 2021-11-04 PROCEDURE — 1101F PR PT FALLS ASSESS DOC 0-1 FALLS W/OUT INJ PAST YR: ICD-10-PCS | Mod: CPTII,S$GLB,, | Performed by: FAMILY MEDICINE

## 2021-11-04 PROCEDURE — 3288F FALL RISK ASSESSMENT DOCD: CPT | Mod: CPTII,S$GLB,, | Performed by: FAMILY MEDICINE

## 2021-11-04 PROCEDURE — 3008F PR BODY MASS INDEX (BMI) DOCUMENTED: ICD-10-PCS | Mod: CPTII,S$GLB,, | Performed by: FAMILY MEDICINE

## 2021-11-04 PROCEDURE — 3062F POS MACROALBUMINURIA REV: CPT | Mod: CPTII,S$GLB,, | Performed by: FAMILY MEDICINE

## 2021-11-04 RX ORDER — DULAGLUTIDE 0.75 MG/.5ML
0.75 INJECTION, SOLUTION SUBCUTANEOUS
Qty: 12 PEN | Refills: 3 | Status: SHIPPED | OUTPATIENT
Start: 2021-11-04 | End: 2022-10-20

## 2021-12-14 RX ORDER — TAMSULOSIN HYDROCHLORIDE 0.4 MG/1
0.4 CAPSULE ORAL NIGHTLY
Qty: 90 CAPSULE | Refills: 0 | Status: SHIPPED | OUTPATIENT
Start: 2021-12-14 | End: 2022-03-14 | Stop reason: SDUPTHER

## 2022-01-10 ENCOUNTER — PATIENT MESSAGE (OUTPATIENT)
Dept: ADMINISTRATIVE | Facility: HOSPITAL | Age: 67
End: 2022-01-10
Payer: COMMERCIAL

## 2022-01-18 ENCOUNTER — PATIENT MESSAGE (OUTPATIENT)
Dept: FAMILY MEDICINE | Facility: CLINIC | Age: 67
End: 2022-01-18
Payer: COMMERCIAL

## 2022-01-18 ENCOUNTER — PATIENT MESSAGE (OUTPATIENT)
Dept: UROLOGY | Facility: CLINIC | Age: 67
End: 2022-01-18
Payer: COMMERCIAL

## 2022-01-18 DIAGNOSIS — I10 ESSENTIAL HYPERTENSION: ICD-10-CM

## 2022-01-18 RX ORDER — FUROSEMIDE 20 MG/1
20 TABLET ORAL DAILY
Qty: 90 TABLET | Refills: 3
Start: 2022-01-18 | End: 2022-01-24 | Stop reason: SDUPTHER

## 2022-01-18 NOTE — TELEPHONE ENCOUNTER
Care Due:                  Date            Visit Type   Department     Provider  --------------------------------------------------------------------------------                                             [facility]  Last Visit: 11-      None         FAMILY Seymour Schwab  Next Visit: None Scheduled  None         None Found                                                            Last  Test          Frequency    Reason                     Performed    Due Date  --------------------------------------------------------------------------------    CMP.........  12 months..  allopurinoL..............  Not Found    Overdue    Powered by [employer]. Reference number: [id number].   1/18/2022 1:47:33 PM CST

## 2022-01-24 DIAGNOSIS — I10 ESSENTIAL HYPERTENSION: ICD-10-CM

## 2022-01-24 RX ORDER — FUROSEMIDE 20 MG/1
20 TABLET ORAL DAILY
Qty: 90 TABLET | Refills: 3 | Status: SHIPPED | OUTPATIENT
Start: 2022-01-24 | End: 2023-03-27 | Stop reason: SDUPTHER

## 2022-01-24 NOTE — TELEPHONE ENCOUNTER
No new care gaps identified.  Powered by [employer]. Reference number: [id number].   1/24/2022 9:43:11 AM CST

## 2022-02-04 NOTE — PATIENT INSTRUCTIONS
Follow-up in 1 week to start BCG cycle 2 , treatment 1 of 3.  Follow-up in 4 months for cystoscopy and 1st of 3 treatments for cycle 3 of BCG what undergo.  Will undergo 7 cycles of BCG at 3 month intervals.  Patient to undergo scrotal ultrasound to evaluate spermatocele.  
1

## 2022-02-10 ENCOUNTER — PATIENT OUTREACH (OUTPATIENT)
Dept: ADMINISTRATIVE | Facility: OTHER | Age: 67
End: 2022-02-10
Payer: COMMERCIAL

## 2022-02-10 NOTE — PROGRESS NOTES
Health Maintenance Due   Topic Date Due    Shingles Vaccine (2 of 3) 06/29/2017    Pneumococcal Vaccines (Age 65+) (3 of 4 - PPSV23) 09/21/2021    Hemoglobin A1c  10/12/2021     Updates were requested from care everywhere.  Chart was reviewed for overdue Proactive [facility] Encounters ([facility]) topics (CRS, Breast Cancer Screening, Eye exam)  Health Maintenance has been updated.  LINKS immunization registry triggered.  Immunizations were reconciled.       Subjective:      Ashley Taylor is a 3 y.o. male here with mother. Patient brought in for follow up hospital visit      History of Present Illness:  Pt. Injured finger 4 days ago on 4 [address].  Seen in ER, cleaned well and placed on abx.  Mom says pt. Will c/o pain if bumps it on something but not often.  Taking tylenol about 2x/day.           Review of Systems   Constitutional: Negative for activity change and appetite change.   Skin: Positive for wound.       Objective:     Physical Exam   Constitutional:   crying   Neurological: He is alert.   Skin:        Removed dressing and cleaned with saline and iodine then dressed lesion.  Tolerated ok.        Assessment:        1. Injury of nail bed of finger of left hand, subsequent encounter         Plan:   Ashley was seen today for follow up hospital visit.    Diagnoses and all orders for this visit:    Injury of nail bed of finger of left hand, subsequent encounter      Patient Instructions   Keep wrapped until complete antibiotics  Once completed clean daily with soap and water   Call for any signs of infection or fever  Return to office with any concerns

## 2022-02-11 ENCOUNTER — OFFICE VISIT (OUTPATIENT)
Dept: CARDIOLOGY | Facility: CLINIC | Age: 67
End: 2022-02-11
Payer: COMMERCIAL

## 2022-02-11 VITALS
SYSTOLIC BLOOD PRESSURE: 128 MMHG | WEIGHT: 281.81 LBS | HEART RATE: 68 BPM | DIASTOLIC BLOOD PRESSURE: 76 MMHG | HEIGHT: 70 IN | BODY MASS INDEX: 40.35 KG/M2

## 2022-02-11 DIAGNOSIS — E11.59 HYPERTENSION COMPLICATING DIABETES: ICD-10-CM

## 2022-02-11 DIAGNOSIS — C67.9 MALIGNANT NEOPLASM OF URINARY BLADDER, UNSPECIFIED SITE: ICD-10-CM

## 2022-02-11 DIAGNOSIS — E11.42 TYPE 2 DIABETES MELLITUS WITH DIABETIC POLYNEUROPATHY, WITH LONG-TERM CURRENT USE OF INSULIN: ICD-10-CM

## 2022-02-11 DIAGNOSIS — I15.2 HYPERTENSION COMPLICATING DIABETES: ICD-10-CM

## 2022-02-11 DIAGNOSIS — E78.2 MIXED HYPERLIPIDEMIA: ICD-10-CM

## 2022-02-11 DIAGNOSIS — C61 TRANSITIONAL CELL CARCINOMA OF PROSTATE: ICD-10-CM

## 2022-02-11 DIAGNOSIS — Z79.4 TYPE 2 DIABETES MELLITUS WITH DIABETIC POLYNEUROPATHY, WITH LONG-TERM CURRENT USE OF INSULIN: ICD-10-CM

## 2022-02-11 DIAGNOSIS — I87.2 CHRONIC VENOUS STASIS DERMATITIS OF BOTH LOWER EXTREMITIES: ICD-10-CM

## 2022-02-11 DIAGNOSIS — I10 ESSENTIAL HYPERTENSION: ICD-10-CM

## 2022-02-11 DIAGNOSIS — E66.01 MORBID OBESITY WITH BMI OF 40.0-44.9, ADULT: ICD-10-CM

## 2022-02-11 PROCEDURE — 1126F PR PAIN SEVERITY QUANTIFIED, NO PAIN PRESENT: ICD-10-PCS | Mod: CPTII,S$GLB,, | Performed by: INTERNAL MEDICINE

## 2022-02-11 PROCEDURE — 4010F ACE/ARB THERAPY RXD/TAKEN: CPT | Mod: CPTII,S$GLB,, | Performed by: INTERNAL MEDICINE

## 2022-02-11 PROCEDURE — 99214 OFFICE O/P EST MOD 30 MIN: CPT | Mod: S$GLB,,, | Performed by: INTERNAL MEDICINE

## 2022-02-11 PROCEDURE — 1160F RVW MEDS BY RX/DR IN RCRD: CPT | Mod: CPTII,S$GLB,, | Performed by: INTERNAL MEDICINE

## 2022-02-11 PROCEDURE — 3008F PR BODY MASS INDEX (BMI) DOCUMENTED: ICD-10-PCS | Mod: CPTII,S$GLB,, | Performed by: INTERNAL MEDICINE

## 2022-02-11 PROCEDURE — 1159F MED LIST DOCD IN RCRD: CPT | Mod: CPTII,S$GLB,, | Performed by: INTERNAL MEDICINE

## 2022-02-11 PROCEDURE — 1126F AMNT PAIN NOTED NONE PRSNT: CPT | Mod: CPTII,S$GLB,, | Performed by: INTERNAL MEDICINE

## 2022-02-11 PROCEDURE — 1157F PR ADVANCE CARE PLAN OR EQUIV PRESENT IN MEDICAL RECORD: ICD-10-PCS | Mod: CPTII,S$GLB,, | Performed by: INTERNAL MEDICINE

## 2022-02-11 PROCEDURE — 3078F PR MOST RECENT DIASTOLIC BLOOD PRESSURE < 80 MM HG: ICD-10-PCS | Mod: CPTII,S$GLB,, | Performed by: INTERNAL MEDICINE

## 2022-02-11 PROCEDURE — 99999 PR PBB SHADOW E&M-EST. PATIENT-LVL IV: CPT | Mod: PBBFAC,,, | Performed by: INTERNAL MEDICINE

## 2022-02-11 PROCEDURE — 3008F BODY MASS INDEX DOCD: CPT | Mod: CPTII,S$GLB,, | Performed by: INTERNAL MEDICINE

## 2022-02-11 PROCEDURE — 3074F SYST BP LT 130 MM HG: CPT | Mod: CPTII,S$GLB,, | Performed by: INTERNAL MEDICINE

## 2022-02-11 PROCEDURE — 1157F ADVNC CARE PLAN IN RCRD: CPT | Mod: CPTII,S$GLB,, | Performed by: INTERNAL MEDICINE

## 2022-02-11 PROCEDURE — 4010F PR ACE/ARB THEARPY RXD/TAKEN: ICD-10-PCS | Mod: CPTII,S$GLB,, | Performed by: INTERNAL MEDICINE

## 2022-02-11 PROCEDURE — 3074F PR MOST RECENT SYSTOLIC BLOOD PRESSURE < 130 MM HG: ICD-10-PCS | Mod: CPTII,S$GLB,, | Performed by: INTERNAL MEDICINE

## 2022-02-11 PROCEDURE — 99214 PR OFFICE/OUTPT VISIT, EST, LEVL IV, 30-39 MIN: ICD-10-PCS | Mod: S$GLB,,, | Performed by: INTERNAL MEDICINE

## 2022-02-11 PROCEDURE — 99999 PR PBB SHADOW E&M-EST. PATIENT-LVL IV: ICD-10-PCS | Mod: PBBFAC,,, | Performed by: INTERNAL MEDICINE

## 2022-02-11 PROCEDURE — 3078F DIAST BP <80 MM HG: CPT | Mod: CPTII,S$GLB,, | Performed by: INTERNAL MEDICINE

## 2022-02-11 PROCEDURE — 1159F PR MEDICATION LIST DOCUMENTED IN MEDICAL RECORD: ICD-10-PCS | Mod: CPTII,S$GLB,, | Performed by: INTERNAL MEDICINE

## 2022-02-11 PROCEDURE — 1160F PR REVIEW ALL MEDS BY PRESCRIBER/CLIN PHARMACIST DOCUMENTED: ICD-10-PCS | Mod: CPTII,S$GLB,, | Performed by: INTERNAL MEDICINE

## 2022-02-11 RX ORDER — ROSUVASTATIN CALCIUM 40 MG/1
TABLET, COATED ORAL
Qty: 90 TABLET | Refills: 3 | Status: SHIPPED | OUTPATIENT
Start: 2022-02-11 | End: 2023-02-09 | Stop reason: SDUPTHER

## 2022-02-11 NOTE — ASSESSMENT & PLAN NOTE
Controlled.  Goal BP < 130/80.  Compliant w/ meds.    - continue medical therapy   - risk factor and lifestyle modifications   - pt will plan to enroll in digital medicine prgm when he gets his medicare setup

## 2022-02-11 NOTE — ASSESSMENT & PLAN NOTE
Controlled.  Goal LDL < 100, last LDL 97 4/1/2021.  Compliant w/ rosuvastatin.    - continue medical therapy  - risk factor and lifestyle modifications

## 2022-02-11 NOTE — ASSESSMENT & PLAN NOTE
Pt aware of health complications a/w obesity and desires to lose weight.    - encouraged lifestyle modifications (diet, exercise, and weight loss)

## 2022-02-11 NOTE — PROGRESS NOTES
Subjective:    Patient ID:  Andrew Beltran is a 66 y.o. male who presents for follow-up of Follow-up      PCP: Nato Schwab Jr, MD     Referring Provider: Monty Cee MD    Follow-up  Pertinent negatives include no abdominal pain, chest pain, congestion, diaphoresis, fever or nausea.     Pt is a 65 yo M w/ PMH of HTN, HLD, DM2, and former smoker (4 pk/yrs, quit 1980) who presents for f/u of HTN.  He was last seen 10/18/2021 and was noted to be doing well however his BP carvedilol was decreased to 12.5 mg BID and had an BRISA which was negative for PAD.  He states that he has been doing well and has been following w/ PT for wound care.  He has been walking more often and states that he has diabetic blisters and was started on abx and is in wound care w/ PT.  He is happy that he is about to retire soon.  He notes his BLE edema and wounds has improved.  He denies cp, sob, orthopnea, PND, presyncope, LOC, palpitations, and claudication.  He notes compliance w/ meds and denies side effects.  He does not monitor his BP at home.  He does not exercise due to hip pain but has been doing yard work following the storm and denies limitations.          Past Medical History:   Diagnosis Date    BPH (benign prostatic hyperplasia)     Cancer     bladder    Diabetes     type 2    Gout     High cholesterol     Hypertension     Sciatica 2016    had injection 3 weeks ago    Urinary tract infection      Past Surgical History:   Procedure Laterality Date    APPENDECTOMY  1968    BALLOON DILATION OF URETER Left 9/18/2019    Procedure: DILATION, URETER, USING BALLOON;  Surgeon: Monty Cee MD;  Location: [facility] OR;  Service: Urology;  Laterality: Left;    BARBOTAGE OF BLADDER N/A 8/14/2019    Procedure: BARBOTAGE, BLADDER;  Surgeon: Monty Cee MD;  Location: [facility] OR;  Service: Urology;  Laterality: N/A;    BARBOTAGE OF URETER Bilateral 8/14/2019    Procedure: BARBOTAGE, URETER;  Surgeon: Monty Cee MD;   Location: [facility];  Service: Urology;  Laterality: Bilateral;    BARBOTAGE OF URETER Left 9/18/2019    Procedure: BARBOTAGE, URETER;  Surgeon: Monty Cee MD;  Location: [facility];  Service: Urology;  Laterality: Left;  left renal/ ureteral washing.    BLADDER SURGERY      COLONOSCOPY N/A 7/10/2018    Procedure: COLONOSCOPY;  Surgeon: Azalea Sanchez MD;  Location: [facility] ENDO;  Service: Endoscopy;  Laterality: N/A;  Schedule at [facility], faxed to [employer] [phone]    CYSTOSCOPY      CYSTOSCOPY W/ RETROGRADES Bilateral 8/14/2019    Procedure: CYSTOSCOPY, WITH RETROGRADE PYELOGRAM;  Surgeon: Monty Cee MD;  Location: [facility];  Service: Urology;  Laterality: Bilateral;    CYSTOSCOPY W/ RETROGRADES Left 9/18/2019    Procedure: CYSTOSCOPY, WITH RETROGRADE PYELOGRAM;  Surgeon: Monty Cee MD;  Location: [facility];  Service: Urology;  Laterality: Left;  cystourethroscopy with left retrograde pyelogram     CYSTOSCOPY W/ RETROGRADES Bilateral 3/11/2020    Procedure: CYSTOSCOPY, WITH RETROGRADE PYELOGRAM Ureteral renal washings and bladder washings;  Surgeon: Monty Cee MD;  Location: [facility];  Service: Urology;  Laterality: Bilateral;    CYSTOSCOPY WITH URETEROSCOPY, RETROGRADE PYELOGRAPHY, AND INSERTION OF STENT Bilateral 6/22/2020    Procedure: CYSTOSCOPY, WITH RETROGRADE PYELOGRAM AND URETERAL STENT INSERTION;  Surgeon: Monty Cee MD;  Location: [facility];  Service: Urology;  Laterality: Bilateral;    CYSTOSCOPY WITH URETHRAL DILATION N/A 6/10/2021    Procedure: CYSTOSCOPY, WITH URETHRAL DILATION;  Surgeon: Monty Cee MD;  Location: [facility];  Service: Urology;  Laterality: N/A;    HAND SURGERY Right 12/08/2016    trigger finger    INSTILLATION OF URINARY BLADDER N/A 3/11/2020    Procedure: INSTILLATION, BLADDER - mitomyin - c;  Surgeon: Monty Cee MD;  Location: [facility];  Service: Urology;  Laterality: N/A;    INSTILLATION OF URINARY BLADDER   6/22/2020    Procedure: INSTILLATION, BLADDER;  Surgeon: Monty Cee MD;  Location: [facility] OR;  Service: Urology;;  mitomycin 40mg    LASER ABLATION Left 9/18/2019    Procedure: ABLATION, USING LASER;  Surgeon: Monty Cee MD;  Location: [facility] OR;  Service: Urology;  Laterality: Left;  laser ablation of lower pole calyceal transitional cell lesion    PYELOSCOPY Left 9/18/2019    Procedure: PYELOSCOPY;  Surgeon: Monty Cee MD;  Location: [facility];  Service: Urology;  Laterality: Left;  Left ureteral pyeloscopy    TONSILLECTOMY  1960    TRANSURETHRAL RESECTION OF PROSTATE N/A 9/18/2019    Procedure: TURP (TRANSURETHRAL RESECTION OF PROSTATE);  Surgeon: Monty Cee MD;  Location: [facility];  Service: Urology;  Laterality: N/A;  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    TRANSURETHRAL RESECTION OF PROSTATE USING BIPOLAR CAUTERY N/A 3/11/2020    Procedure: TURP, USING BIPOLAR CAUTERY;  Surgeon: Monty Cee MD;  Location: [facility];  Service: Urology;  Laterality: N/A;    TRANSURETHRAL SURGICAL REMOVAL OF STRICTURE OF BLADDER NECK  8/14/2019    Procedure: EXCISION, CONTRACTURE, BLADDER NECK, TRANSURETHRAL;  Surgeon: Monty Cee MD;  Location: [facility];  Service: Urology;;    TRANSURETHRAL SURGICAL REMOVAL OF STRICTURE OF BLADDER NECK N/A 6/10/2021    Procedure: EXCISION, CONTRACTURE, BLADDER NECK, TRANSURETHRAL;  Surgeon: Monty Cee MD;  Location: [facility];  Service: Urology;  Laterality: N/A;  using laser    URETEROSCOPY Left 9/18/2019    Procedure: URETEROSCOPY;  Surgeon: Monty Cee MD;  Location: [facility] OR;  Service: Urology;  Laterality: Left;    URETEROSCOPY Right 6/22/2020    Procedure: URETEROSCOPY;  Surgeon: Monty Cee MD;  Location: [facility];  Service: Urology;  Laterality: Right;     Social History     Socioeconomic History    Marital status:    Tobacco Use    Smoking status: Former Smoker     Types: Pipe    Smokeless  tobacco: Never Used    Tobacco comment: quit 40 yrs ago   Substance and Sexual Activity    Alcohol use: Yes     Alcohol/week: 0.0 standard drinks     Comment: rarely    Drug use: No    Sexual activity: Yes     Partners: Female     Social Determinants of Health     Financial Resource Strain: Low Risk     Difficulty of Paying Living Expenses: Not hard at all   Food Insecurity: No Food Insecurity    Worried About Running Out of Food in the Last Year: Never true    Ran Out of Food in the Last Year: Never true   Transportation Needs: No Transportation Needs    Lack of Transportation (Medical): No    Lack of Transportation (Non-Medical): No   Physical Activity: Insufficiently Active    Days of Exercise per Week: 1 day    Minutes of Exercise per Session: 30 min   Stress: No Stress Concern Present    Feeling of Stress : Not at all   Social Connections: Unknown    Frequency of Communication with Friends and Family: Three times a week    Frequency of Social Gatherings with Friends and Family: Once a week    Active Member of Clubs or Organizations: Yes    Attends Club or Organization Meetings: More than 4 times per year    Marital Status:    Housing Stability: Low Risk     Unable to Pay for Housing in the Last Year: No    Number of Places Lived in the Last Year: 2    Unstable Housing in the Last Year: No     Family History   Problem Relation Age of Onset    Diabetes Mother     Prostate cancer Neg Hx     Kidney disease Neg Hx        Review of patient's allergies indicates:  No Known Allergies    Medication List with Changes/Refills   Current Medications    ALLOPURINOL (ZYLOPRIM) 300 MG TABLET    Take 1 tablet (300 mg total) by mouth once daily.    ASPIRIN (ECOTRIN) 81 MG EC TABLET    Take 81 mg by mouth once daily.    BLOOD SUGAR DIAGNOSTIC STRP    by Misc.(Non-Drug; Combo Route) route.    CARVEDILOL (COREG) 12.5 MG TABLET    Take 1 tablet (12.5 mg total) by mouth 2 (two) times daily with meals.     DULAGLUTIDE (TRULICITY) 0.75 MG/0.5 ML PEN INJECTOR    Inject 0.75 mg into the skin every 7 days.    FUROSEMIDE (LASIX) 20 MG TABLET    Take 1 tablet (20 mg total) by mouth once daily.    IBUPROFEN (ADVIL,MOTRIN) 400 MG TABLET    Take 400 mg by mouth every 6 (six) hours as needed for Other.    INSULIN NPH-INSULIN REGULAR, 70/30, (NOVOLIN 70/30) 100 UNIT/ML (70-30) INJECTION    INJECT 60 UNITS INTO THE SKIN TWO TIMES DAILY    INSULIN SYRINGE-NEEDLE U-100 1 ML 31 GAUGE X 5/16 SYRG    USE AS DIRECTED TWICE A DAY    LANCETS (PRODIGY TWIST TOP LANCET) 28 GAUGE MISC    1 lancet by Misc.(Non-Drug; Combo Route) route 2 (two) times daily.    LOSARTAN (COZAAR) 100 MG TABLET    Take 1 tablet (100 mg total) by mouth once daily.    MAGNESIUM ORAL    Take 400 mg by mouth once daily at 6am. 800 mg everyday    MUPIROCIN (BACTROBAN) 2 % OINTMENT    Apply topically 2 (two) times daily. TO AFFECTED AREAS UNTIL AREA HEALS    MUPIROCIN (BACTROBAN) 2 % OINTMENT    Apply to affected area 3 (three) times daily.    ROSUVASTATIN (CRESTOR) 40 MG TAB    TAKE 1 TABLET BY MOUTH EVERY EVENING    SPIRONOLACTONE (ALDACTONE) 25 MG TABLET    Take 1 tablet (25 mg total) by mouth once daily.    TAMSULOSIN (FLOMAX) 0.4 MG CAP    Take 1 capsule (0.4 mg total) by mouth every evening.       Review of Systems   Constitutional: Negative for diaphoresis and fever.   HENT: Negative for congestion and hearing loss.    Eyes: Negative for blurred vision and pain.   Cardiovascular: Positive for leg swelling. Negative for chest pain, claudication, dyspnea on exertion, near-syncope, palpitations and syncope.   Respiratory: Negative for shortness of breath and sleep disturbances due to breathing.    Hematologic/Lymphatic: Negative for bleeding problem. Does not bruise/bleed easily.   Skin: Negative for color change and poor wound healing.   Gastrointestinal: Negative for abdominal pain and nausea.   Genitourinary: Negative for bladder incontinence and flank pain.  "  Neurological: Negative for focal weakness and light-headedness.        Objective:   /76   Pulse 68   Ht 5' 9.5" (1.765 m)   Wt 127.8 kg (281 lb 12.8 oz)   BMI 41.02 kg/m²    Physical Exam  Constitutional:       Appearance: He is well-developed. He is not diaphoretic.   HENT:      Head: Normocephalic and atraumatic.   Eyes:      General: No scleral icterus.     Pupils: Pupils are equal, round, and reactive to light.   Neck:      Vascular: No JVD.   Cardiovascular:      Rate and Rhythm: Normal rate and regular rhythm.      Pulses: Intact distal pulses.      Heart sounds: S1 normal and S2 normal. No murmur heard.  No friction rub. No gallop.    Pulmonary:      Effort: Pulmonary effort is normal. No respiratory distress.      Breath sounds: Normal breath sounds. No wheezing or rales.   Chest:      Chest wall: No tenderness.   Abdominal:      General: Bowel sounds are normal. There is no distension.      Palpations: Abdomen is soft. There is no mass.      Tenderness: There is no abdominal tenderness. There is no rebound.   Musculoskeletal:         General: No tenderness. Normal range of motion.      Cervical back: Normal range of motion and neck supple.      Right lower leg: Edema present.      Left lower leg: Edema present.      Comments: Trace pitting edema of BLE    Skin:     General: Skin is warm and dry.      Coloration: Skin is not pale.   Neurological:      Mental Status: He is alert and oriented to person, place, and time.      Coordination: Coordination normal.      Deep Tendon Reflexes: Reflexes normal.   Psychiatric:         Behavior: Behavior normal.         Judgment: Judgment normal.           EC2021- reviewed.  Sinus rhythm w/ 1st AVB, otherwise NL ECG.      Echo: 2021- reviewed.    Summary    · The left ventricle is normal in size with concentric hypertrophy and normal systolic function.  · The estimated ejection fraction is 65%.  · Indeterminate left ventricular diastolic " function.  · The left ventricular global longitudinal strain is -12.6%.  · Mild right ventricular enlargement with normal right ventricular systolic function.  · Mild left atrial enlargement.  · Normal central venous pressure (3 mmHg).    BRISA: 11/1/2021- reviewed.    Conclusion    · Normal bilateral ABIs at rest and with exercise.      Assessment:       1. Mixed hyperlipidemia    2. Chronic venous stasis dermatitis of both lower extremities    3. Essential hypertension    4. Hypertension complicating diabetes    5. Malignant neoplasm of urinary bladder, unspecified site    6. Transitional cell carcinoma of prostate    7. Morbid obesity with BMI of 40.0-44.9, adult    8. Type 2 diabetes mellitus with diabetic polyneuropathy, with long-term current use of insulin         Plan:         Chronic venous stasis dermatitis of both lower extremities  Plan as per BLE edema.  Normal ABIs w/ exercise and at rest.  Improved w/ continued wound care.    Essential hypertension  Controlled.  Goal BP < 130/80.  Compliant w/ meds.    - continue medical therapy   - risk factor and lifestyle modifications   - pt will plan to enroll in [employer] prgm when he gets his medicare setup    Hypertension complicating diabetes  Plan as above    Mixed hyperlipidemia  Controlled.  Goal LDL < 100, last LDL 97 4/1/2021.  Compliant w/ rosuvastatin.    - continue medical therapy  - risk factor and lifestyle modifications     Malignant neoplasm of urinary bladder  Followed by urology.      Transitional cell carcinoma of prostate  Followed by urology.      Morbid obesity with BMI of 40.0-44.9, adult  Pt aware of health complications a/w obesity and desires to lose weight.    - encouraged lifestyle modifications (diet, exercise, and weight loss)     Type 2 diabetes mellitus with diabetic polyneuropathy, with long-term current use of insulin  Followed by PCP.        Total duration of face to face visit time 30 minutes.  Total time spent counseling  greater than fifty percent of total visit time.  Counseling included discussion regarding imaging findings, diagnosis, possibilities, treatment options, risks and benefits.  The patient had many questions regarding the options and long-term effects      Lucian Dodd M.D.  Interventional Cardiology

## 2022-02-18 ENCOUNTER — PATIENT OUTREACH (OUTPATIENT)
Dept: ADMINISTRATIVE | Facility: HOSPITAL | Age: 67
End: 2022-02-18
Payer: COMMERCIAL

## 2022-03-03 RX ORDER — SYRINGE,SAFETY WITH NEEDLE,1ML 25GX1"
SYRINGE (EA) MISCELLANEOUS
Qty: 200 EACH | Refills: 4 | Status: SHIPPED | OUTPATIENT
Start: 2022-03-03 | End: 2023-04-15 | Stop reason: SDUPTHER

## 2022-03-03 NOTE — TELEPHONE ENCOUNTER
Left voicemail and informed him that refill was requested but Dr. Schwab is out of office until Monday.

## 2022-03-03 NOTE — TELEPHONE ENCOUNTER
----- Message from Dinah Arita sent at 3/3/2022  9:12 AM CST -----  Regarding: Needs Syringe By End of Day  Type:  RX Refill Request    Who Called: pt    Refill or New Rx: refill    RX Name and Strength: insulin syringe-needle U-100 1 mL 31 gauge x 5/16 Syrg    Preferred Pharmacy with phone number: [facility]  [id number] [facility] [address] [place] [address]  Phone: [phone]    Would the patient rather a call back or a response via [facility]? Call    Best Call Back Number: [phone]    Additional Information: needs by end of day

## 2022-03-03 NOTE — TELEPHONE ENCOUNTER
----- Message from Dinah Arita sent at 3/3/2022  9:12 AM CST -----  Regarding: Needs Syringe By End of Day  Type:  RX Refill Request    Who Called: pt    Refill or New Rx: refill    RX Name and Strength: insulin syringe-needle U-100 1 mL 31 gauge x 5/16 Syrg    Preferred Pharmacy with phone number: [facility]  [id number] [facility] [address] [address]  Phone: [phone]    Would the patient rather a call back or a response via [facility]? Call    Best Call Back Number: [phone]    Additional Information: needs by end of day

## 2022-03-03 NOTE — TELEPHONE ENCOUNTER
Care Due:                  Date            Visit Type   Department     Provider  --------------------------------------------------------------------------------                                EP -                              PRIMARY [facility]  Last Visit: 11-      CARE ([facility])   [facility] Seymour Schwab                              EP - PRIMARY [facility]  Next Visit: 03-      [facility]                                                            Last  Test          Frequency    Reason                     Performed    Due Date  --------------------------------------------------------------------------------    CMP.........  12 months..  allopurinoL..............  04- 03-    HBA1C.......  6 months...  dulaglutide, insulin.....  07- 01-    Uric Acid...  12 months..  allopurinoL..............  12- 12-    Powered by [employer]. Reference number: [id number].   3/03/2022 9:33:52 AM CST

## 2022-03-07 ENCOUNTER — PATIENT OUTREACH (OUTPATIENT)
Dept: ADMINISTRATIVE | Facility: HOSPITAL | Age: 67
End: 2022-03-07
Payer: COMMERCIAL

## 2022-03-14 RX ORDER — TAMSULOSIN HYDROCHLORIDE 0.4 MG/1
0.4 CAPSULE ORAL NIGHTLY
Qty: 90 CAPSULE | Refills: 0 | Status: SHIPPED | OUTPATIENT
Start: 2022-03-14 | End: 2022-06-16 | Stop reason: SDUPTHER

## 2022-03-14 NOTE — TELEPHONE ENCOUNTER

## 2022-03-14 NOTE — TELEPHONE ENCOUNTER
Care Due:                  Date            Visit Type   Department     Provider  --------------------------------------------------------------------------------                                EP -                              PRIMARY      [facility]  Last Visit: 11-      CARE ([facility])   [facility] Seymour Schwab                               -                              PRIMARY      [facility]  Next Visit: 03-      [facility]   [facility]                                                            Last  Test          Frequency    Reason                     Performed    Due Date  --------------------------------------------------------------------------------    CBC.........  12 months..  allopurinoL..............  06- 06-    Powered by [employer]. Reference number: [id number].   3/14/2022 9:23:38 AM CDT

## 2022-03-21 ENCOUNTER — OFFICE VISIT (OUTPATIENT)
Dept: FAMILY MEDICINE | Facility: CLINIC | Age: 67
End: 2022-03-21
Payer: COMMERCIAL

## 2022-03-21 ENCOUNTER — OFFICE VISIT (OUTPATIENT)
Dept: PODIATRY | Facility: CLINIC | Age: 67
End: 2022-03-21
Payer: COMMERCIAL

## 2022-03-21 VITALS
HEIGHT: 69 IN | SYSTOLIC BLOOD PRESSURE: 140 MMHG | DIASTOLIC BLOOD PRESSURE: 70 MMHG | BODY MASS INDEX: 41.62 KG/M2 | WEIGHT: 281 LBS

## 2022-03-21 DIAGNOSIS — E66.01 MORBID OBESITY WITH BMI OF 40.0-44.9, ADULT: ICD-10-CM

## 2022-03-21 DIAGNOSIS — E11.49 TYPE II DIABETES MELLITUS WITH NEUROLOGICAL MANIFESTATIONS: ICD-10-CM

## 2022-03-21 DIAGNOSIS — Z79.4 TYPE 2 DIABETES MELLITUS WITH DIABETIC POLYNEUROPATHY, WITH LONG-TERM CURRENT USE OF INSULIN: ICD-10-CM

## 2022-03-21 DIAGNOSIS — E11.9 TYPE 2 DIABETES MELLITUS WITHOUT COMPLICATION, WITH LONG-TERM CURRENT USE OF INSULIN: ICD-10-CM

## 2022-03-21 DIAGNOSIS — I83.009 VENOUS STASIS ULCER, UNSPECIFIED SITE, UNSPECIFIED ULCER STAGE, UNSPECIFIED WHETHER VARICOSE VEINS PRESENT: Primary | ICD-10-CM

## 2022-03-21 DIAGNOSIS — L03.90 CELLULITIS, UNSPECIFIED CELLULITIS SITE: ICD-10-CM

## 2022-03-21 DIAGNOSIS — L97.909 VENOUS STASIS ULCER, UNSPECIFIED SITE, UNSPECIFIED ULCER STAGE, UNSPECIFIED WHETHER VARICOSE VEINS PRESENT: Primary | ICD-10-CM

## 2022-03-21 DIAGNOSIS — E66.01 MORBID OBESITY: ICD-10-CM

## 2022-03-21 DIAGNOSIS — E78.2 MIXED HYPERLIPIDEMIA: ICD-10-CM

## 2022-03-21 DIAGNOSIS — L60.0 INGROWN NAIL: ICD-10-CM

## 2022-03-21 DIAGNOSIS — I10 ESSENTIAL HYPERTENSION: ICD-10-CM

## 2022-03-21 DIAGNOSIS — L03.115 CELLULITIS OF FOOT, RIGHT: Primary | ICD-10-CM

## 2022-03-21 DIAGNOSIS — E11.42 TYPE 2 DIABETES MELLITUS WITH DIABETIC POLYNEUROPATHY, WITH LONG-TERM CURRENT USE OF INSULIN: ICD-10-CM

## 2022-03-21 DIAGNOSIS — Z79.4 TYPE 2 DIABETES MELLITUS WITHOUT COMPLICATION, WITH LONG-TERM CURRENT USE OF INSULIN: ICD-10-CM

## 2022-03-21 DIAGNOSIS — I70.209 DIABETES MELLITUS WITH ATHEROSCLEROSIS OF ARTERIES OF EXTREMITIES: ICD-10-CM

## 2022-03-21 DIAGNOSIS — E11.51 DIABETES MELLITUS WITH ATHEROSCLEROSIS OF ARTERIES OF EXTREMITIES: ICD-10-CM

## 2022-03-21 DIAGNOSIS — Z12.5 PROSTATE CANCER SCREENING: ICD-10-CM

## 2022-03-21 DIAGNOSIS — L03.115 CELLULITIS OF RIGHT LOWER EXTREMITY: ICD-10-CM

## 2022-03-21 PROCEDURE — 1157F PR ADVANCE CARE PLAN OR EQUIV PRESENT IN MEDICAL RECORD: ICD-10-PCS | Mod: CPTII,S$GLB,, | Performed by: PODIATRIST

## 2022-03-21 PROCEDURE — 4010F PR ACE/ARB THEARPY RXD/TAKEN: ICD-10-PCS | Mod: CPTII,S$GLB,, | Performed by: FAMILY MEDICINE

## 2022-03-21 PROCEDURE — 1125F PR PAIN SEVERITY QUANTIFIED, PAIN PRESENT: ICD-10-PCS | Mod: CPTII,S$GLB,, | Performed by: FAMILY MEDICINE

## 2022-03-21 PROCEDURE — 99999 PR PBB SHADOW E&M-EST. PATIENT-LVL V: CPT | Mod: PBBFAC,,, | Performed by: FAMILY MEDICINE

## 2022-03-21 PROCEDURE — 96372 PR INJECTION,THERAP/PROPH/DIAG2ST, IM OR SUBCUT: ICD-10-PCS | Mod: S$GLB,,, | Performed by: FAMILY MEDICINE

## 2022-03-21 PROCEDURE — 1159F MED LIST DOCD IN RCRD: CPT | Mod: CPTII,S$GLB,, | Performed by: PODIATRIST

## 2022-03-21 PROCEDURE — 1159F PR MEDICATION LIST DOCUMENTED IN MEDICAL RECORD: ICD-10-PCS | Mod: CPTII,S$GLB,, | Performed by: PODIATRIST

## 2022-03-21 PROCEDURE — 11042 WOUND DEBRIDEMENT: ICD-10-PCS | Mod: S$GLB,,, | Performed by: PODIATRIST

## 2022-03-21 PROCEDURE — 1159F PR MEDICATION LIST DOCUMENTED IN MEDICAL RECORD: ICD-10-PCS | Mod: CPTII,S$GLB,, | Performed by: FAMILY MEDICINE

## 2022-03-21 PROCEDURE — 1157F ADVNC CARE PLAN IN RCRD: CPT | Mod: CPTII,S$GLB,, | Performed by: PODIATRIST

## 2022-03-21 PROCEDURE — 1125F AMNT PAIN NOTED PAIN PRSNT: CPT | Mod: CPTII,S$GLB,, | Performed by: FAMILY MEDICINE

## 2022-03-21 PROCEDURE — 3077F PR MOST RECENT SYSTOLIC BLOOD PRESSURE >= 140 MM HG: ICD-10-PCS | Mod: CPTII,S$GLB,, | Performed by: FAMILY MEDICINE

## 2022-03-21 PROCEDURE — 3008F PR BODY MASS INDEX (BMI) DOCUMENTED: ICD-10-PCS | Mod: CPTII,S$GLB,, | Performed by: FAMILY MEDICINE

## 2022-03-21 PROCEDURE — 3008F BODY MASS INDEX DOCD: CPT | Mod: CPTII,S$GLB,, | Performed by: FAMILY MEDICINE

## 2022-03-21 PROCEDURE — 3288F FALL RISK ASSESSMENT DOCD: CPT | Mod: CPTII,S$GLB,, | Performed by: FAMILY MEDICINE

## 2022-03-21 PROCEDURE — 99214 PR OFFICE/OUTPT VISIT, EST, LEVL IV, 30-39 MIN: ICD-10-PCS | Mod: 25,S$GLB,, | Performed by: FAMILY MEDICINE

## 2022-03-21 PROCEDURE — 11042 DBRDMT SUBQ TIS 1ST 20SQCM/<: CPT | Mod: S$GLB,,, | Performed by: PODIATRIST

## 2022-03-21 PROCEDURE — 4010F ACE/ARB THERAPY RXD/TAKEN: CPT | Mod: CPTII,S$GLB,, | Performed by: FAMILY MEDICINE

## 2022-03-21 PROCEDURE — 11045 WOUND DEBRIDEMENT: ICD-10-PCS | Mod: S$GLB,,, | Performed by: PODIATRIST

## 2022-03-21 PROCEDURE — 99214 OFFICE O/P EST MOD 30 MIN: CPT | Mod: 25,S$GLB,, | Performed by: PODIATRIST

## 2022-03-21 PROCEDURE — 1101F PR PT FALLS ASSESS DOC 0-1 FALLS W/OUT INJ PAST YR: ICD-10-PCS | Mod: CPTII,S$GLB,, | Performed by: FAMILY MEDICINE

## 2022-03-21 PROCEDURE — 1157F ADVNC CARE PLAN IN RCRD: CPT | Mod: CPTII,S$GLB,, | Performed by: FAMILY MEDICINE

## 2022-03-21 PROCEDURE — 3078F PR MOST RECENT DIASTOLIC BLOOD PRESSURE < 80 MM HG: ICD-10-PCS | Mod: CPTII,S$GLB,, | Performed by: FAMILY MEDICINE

## 2022-03-21 PROCEDURE — 99999 PR PBB SHADOW E&M-EST. PATIENT-LVL III: ICD-10-PCS | Mod: PBBFAC,,, | Performed by: PODIATRIST

## 2022-03-21 PROCEDURE — 99999 PR PBB SHADOW E&M-EST. PATIENT-LVL III: CPT | Mod: PBBFAC,,, | Performed by: PODIATRIST

## 2022-03-21 PROCEDURE — 1159F MED LIST DOCD IN RCRD: CPT | Mod: CPTII,S$GLB,, | Performed by: FAMILY MEDICINE

## 2022-03-21 PROCEDURE — 4010F PR ACE/ARB THEARPY RXD/TAKEN: ICD-10-PCS | Mod: CPTII,S$GLB,, | Performed by: PODIATRIST

## 2022-03-21 PROCEDURE — 99214 PR OFFICE/OUTPT VISIT, EST, LEVL IV, 30-39 MIN: ICD-10-PCS | Mod: 25,S$GLB,, | Performed by: PODIATRIST

## 2022-03-21 PROCEDURE — 4010F ACE/ARB THERAPY RXD/TAKEN: CPT | Mod: CPTII,S$GLB,, | Performed by: PODIATRIST

## 2022-03-21 PROCEDURE — 96372 THER/PROPH/DIAG INJ SC/IM: CPT | Mod: S$GLB,,, | Performed by: FAMILY MEDICINE

## 2022-03-21 PROCEDURE — 1157F PR ADVANCE CARE PLAN OR EQUIV PRESENT IN MEDICAL RECORD: ICD-10-PCS | Mod: CPTII,S$GLB,, | Performed by: FAMILY MEDICINE

## 2022-03-21 PROCEDURE — 3077F SYST BP >= 140 MM HG: CPT | Mod: CPTII,S$GLB,, | Performed by: FAMILY MEDICINE

## 2022-03-21 PROCEDURE — 1160F PR REVIEW ALL MEDS BY PRESCRIBER/CLIN PHARMACIST DOCUMENTED: ICD-10-PCS | Mod: CPTII,S$GLB,, | Performed by: PODIATRIST

## 2022-03-21 PROCEDURE — 3288F PR FALLS RISK ASSESSMENT DOCUMENTED: ICD-10-PCS | Mod: CPTII,S$GLB,, | Performed by: FAMILY MEDICINE

## 2022-03-21 PROCEDURE — 11045 DBRDMT SUBQ TISS EACH ADDL: CPT | Mod: S$GLB,,, | Performed by: PODIATRIST

## 2022-03-21 PROCEDURE — 1101F PT FALLS ASSESS-DOCD LE1/YR: CPT | Mod: CPTII,S$GLB,, | Performed by: FAMILY MEDICINE

## 2022-03-21 PROCEDURE — 99999 PR PBB SHADOW E&M-EST. PATIENT-LVL V: ICD-10-PCS | Mod: PBBFAC,,, | Performed by: FAMILY MEDICINE

## 2022-03-21 PROCEDURE — 99214 OFFICE O/P EST MOD 30 MIN: CPT | Mod: 25,S$GLB,, | Performed by: FAMILY MEDICINE

## 2022-03-21 PROCEDURE — 3078F DIAST BP <80 MM HG: CPT | Mod: CPTII,S$GLB,, | Performed by: FAMILY MEDICINE

## 2022-03-21 PROCEDURE — 1160F RVW MEDS BY RX/DR IN RCRD: CPT | Mod: CPTII,S$GLB,, | Performed by: PODIATRIST

## 2022-03-21 RX ORDER — LIDOCAINE HYDROCHLORIDE 10 MG/ML
1 INJECTION, SOLUTION EPIDURAL; INFILTRATION; INTRACAUDAL; PERINEURAL
Status: COMPLETED | OUTPATIENT
Start: 2022-03-21 | End: 2022-03-21

## 2022-03-21 RX ORDER — CLINDAMYCIN HYDROCHLORIDE 300 MG/1
300 CAPSULE ORAL 3 TIMES DAILY
Qty: 30 CAPSULE | Refills: 0 | Status: SHIPPED | OUTPATIENT
Start: 2022-03-21 | End: 2022-03-31

## 2022-03-21 RX ORDER — COLLAGENASE SANTYL 250 [ARB'U]/G
OINTMENT TOPICAL DAILY
Qty: 30 G | Refills: 0 | Status: SHIPPED | OUTPATIENT
Start: 2022-03-21 | End: 2022-07-15

## 2022-03-21 RX ORDER — LIDOCAINE HYDROCHLORIDE 10 MG/ML
2 INJECTION INFILTRATION; PERINEURAL
Status: DISCONTINUED | OUTPATIENT
Start: 2022-03-21 | End: 2022-03-21

## 2022-03-21 RX ORDER — CEFTRIAXONE 500 MG/1
500 INJECTION, POWDER, FOR SOLUTION INTRAMUSCULAR; INTRAVENOUS
Status: COMPLETED | OUTPATIENT
Start: 2022-03-21 | End: 2022-03-21

## 2022-03-21 RX ORDER — LIDOCAINE HYDROCHLORIDE 10 MG/ML
2 INJECTION, SOLUTION EPIDURAL; INFILTRATION; INTRACAUDAL; PERINEURAL ONCE
Status: DISCONTINUED | OUTPATIENT
Start: 2022-03-21 | End: 2022-03-21

## 2022-03-21 RX ORDER — LIDOCAINE HYDROCHLORIDE 10 MG/ML
1 INJECTION INFILTRATION; PERINEURAL
Status: DISCONTINUED | OUTPATIENT
Start: 2022-03-21 | End: 2022-03-21

## 2022-03-21 RX ORDER — MUPIROCIN 20 MG/G
OINTMENT TOPICAL 3 TIMES DAILY
Qty: 22 G | Refills: 0 | Status: SHIPPED | OUTPATIENT
Start: 2022-03-21 | End: 2022-07-15 | Stop reason: SDUPTHER

## 2022-03-21 RX ADMIN — LIDOCAINE HYDROCHLORIDE 10 MG: 10 INJECTION, SOLUTION EPIDURAL; INFILTRATION; INTRACAUDAL; PERINEURAL at 10:03

## 2022-03-21 RX ADMIN — CEFTRIAXONE 500 MG: 500 INJECTION, POWDER, FOR SOLUTION INTRAMUSCULAR; INTRAVENOUS at 10:03

## 2022-03-21 NOTE — PROGRESS NOTES
[facility] Note    Chief Complaint      Chief Complaint   Patient presents with    Recurrent Skin Infections    Follow-up    Leg Swelling    Leg Pain     History of Present Illness     Andrew Beltran is a 66 y.o. male who presents today with:    Patient here for eval of worsening lower extremity swelling with ulcerations to posterior left leg that has been there for the past two months and worsening.  Patient was not seen or nor did he call in about it.  Patient has had poor glucose control and diabetic f/u and compliance.  Patient also noting a week ago having infected right great toes with ascending cellulitis up to right thigh.      Diabetes  He presents for his follow-up diabetic visit. He has type 2 diabetes mellitus. His disease course has been worsening. There are no hypoglycemic associated symptoms. Pertinent negatives for hypoglycemia include no confusion, dizziness, headaches, hunger, seizures, sweats or tremors. Associated symptoms include foot paresthesias and foot ulcerations. Pertinent negatives for diabetes include no blurred vision, no chest pain, no fatigue, no polydipsia, no polyphagia, no polyuria, no visual change, no weakness and no weight loss. There are no hypoglycemic complications. Symptoms are worsening. Diabetic complications include nephropathy, peripheral neuropathy and PVD. Pertinent negatives for diabetic complications include no autonomic neuropathy, CVA, heart disease, impotence or retinopathy. Risk factors for coronary artery disease include diabetes mellitus, dyslipidemia, sedentary lifestyle, hypertension and obesity. Current diabetic treatment includes oral agent (dual therapy). He is compliant with treatment some of the time. He has not had a previous visit with a dietitian. He never participates in exercise. His home blood glucose trend is increasing rapidly. An ACE inhibitor/angiotensin II receptor blocker is being taken. He does not see a  podiatrist.Eye exam is not current.       Problem List Items Addressed This Visit        Cardiac/Vascular    Essential hypertension    Mixed hyperlipidemia    Relevant Orders    Lipid Panel       Endocrine    Morbid obesity with BMI of 40.0-44.9, adult    Type 2 diabetes mellitus with diabetic polyneuropathy, with long-term current use of insulin    Relevant Orders    CBC Auto Differential    Comprehensive Metabolic Panel    Hemoglobin A1C    Microalbumin/Creatinine Ratio, Urine    Ambulatory referral/consult to Podiatry      Other Visit Diagnoses     Cellulitis of foot, right    -  Primary    Relevant Medications    cefTRIAXone injection 500 mg (Completed)    clindamycin (CLEOCIN) 300 MG capsule    mupirocin (BACTROBAN) 2 % ointment    LIDOcaine (PF) 10 mg/ml (1%) injection 10 mg (Completed)    Prostate cancer screening        Relevant Orders    PSA, Screening    Cellulitis of right lower extremity              Health Maintenance   Topic Date Due    Hemoglobin A1c  10/12/2021    Foot Exam  04/12/2022    Lipid Panel  04/01/2022    Eye Exam  04/22/2022    Low Dose Statin  03/21/2023    TETANUS VACCINE  01/19/2027    Hepatitis C Screening  Completed    Abdominal Aortic Aneurysm Screening  Completed       Past Medical History:   Diagnosis Date    BPH (benign prostatic hyperplasia)     Cancer     bladder    Diabetes     type 2    Gout     High cholesterol     Hypertension     Sciatica 2016    had injection 3 weeks ago    Urinary tract infection        Past Surgical History:   Procedure Laterality Date    APPENDECTOMY  1968    BALLOON DILATION OF URETER Left 9/18/2019    Procedure: DILATION, URETER, USING BALLOON;  Surgeon: Monty Cee MD;  Location: [facility] OR;  Service: Urology;  Laterality: Left;    BARBOTAGE OF BLADDER N/A 8/14/2019    Procedure: BARBOTAGE, BLADDER;  Surgeon: Monty Cee MD;  Location: [facility] OR;  Service: Urology;  Laterality: N/A;    BARBOTAGE OF URETER Bilateral 8/14/2019     Procedure: BARBOTAGE, URETER;  Surgeon: Monty Cee MD;  Location: [facility];  Service: Urology;  Laterality: Bilateral;    BARBOTAGE OF URETER Left 9/18/2019    Procedure: BARBOTAGE, URETER;  Surgeon: Monty Cee MD;  Location: [facility];  Service: Urology;  Laterality: Left;  left renal/ ureteral washing.    BLADDER SURGERY      COLONOSCOPY N/A 7/10/2018    Procedure: COLONOSCOPY;  Surgeon: Azalea Sanchez MD;  Location: [facility];  Service: Endoscopy;  Laterality: N/A;  Schedule at [facility], faxed to [employer] [phone]    CYSTOSCOPY      CYSTOSCOPY W/ RETROGRADES Bilateral 8/14/2019    Procedure: CYSTOSCOPY, WITH RETROGRADE PYELOGRAM;  Surgeon: Monty Cee MD;  Location: [facility];  Service: Urology;  Laterality: Bilateral;    CYSTOSCOPY W/ RETROGRADES Left 9/18/2019    Procedure: CYSTOSCOPY, WITH RETROGRADE PYELOGRAM;  Surgeon: Monty Cee MD;  Location: [facility];  Service: Urology;  Laterality: Left;  cystourethroscopy with left retrograde pyelogram     CYSTOSCOPY W/ RETROGRADES Bilateral 3/11/2020    Procedure: CYSTOSCOPY, WITH RETROGRADE PYELOGRAM Ureteral renal washings and bladder washings;  Surgeon: Monty Cee MD;  Location: [facility];  Service: Urology;  Laterality: Bilateral;    CYSTOSCOPY WITH URETEROSCOPY, RETROGRADE PYELOGRAPHY, AND INSERTION OF STENT Bilateral 6/22/2020    Procedure: CYSTOSCOPY, WITH RETROGRADE PYELOGRAM AND URETERAL STENT INSERTION;  Surgeon: Monty Cee MD;  Location: [facility];  Service: Urology;  Laterality: Bilateral;    CYSTOSCOPY WITH URETHRAL DILATION N/A 6/10/2021    Procedure: CYSTOSCOPY, WITH URETHRAL DILATION;  Surgeon: Monty Cee MD;  Location: [facility];  Service: Urology;  Laterality: N/A;    HAND SURGERY Right 12/08/2016    trigger finger    INSTILLATION OF URINARY BLADDER N/A 3/11/2020    Procedure: INSTILLATION, BLADDER - mitomyin - c;  Surgeon: Monty Cee MD;  Location: [facility];  Service:  Urology;  Laterality: N/A;    INSTILLATION OF URINARY BLADDER  6/22/2020    Procedure: INSTILLATION, BLADDER;  Surgeon: Monty Cee MD;  Location: [facility] OR;  Service: Urology;;  mitomycin 40mg    LASER ABLATION Left 9/18/2019    Procedure: ABLATION, USING LASER;  Surgeon: Monty Cee MD;  Location: [facility] OR;  Service: Urology;  Laterality: Left;  laser ablation of lower pole calyceal transitional cell lesion    PYELOSCOPY Left 9/18/2019    Procedure: PYELOSCOPY;  Surgeon: Monty Cee MD;  Location: [facility] OR;  Service: Urology;  Laterality: Left;  Left ureteral pyeloscopy    TONSILLECTOMY  1960    TRANSURETHRAL RESECTION OF PROSTATE N/A 9/18/2019    Procedure: TURP (TRANSURETHRAL RESECTION OF PROSTATE);  Surgeon: Monty Cee MD;  Location: [facility];  Service: Urology;  Laterality: N/A;  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    TRANSURETHRAL RESECTION OF PROSTATE USING BIPOLAR CAUTERY N/A 3/11/2020    Procedure: TURP, USING BIPOLAR CAUTERY;  Surgeon: Monty Cee MD;  Location: [facility] OR;  Service: Urology;  Laterality: N/A;    TRANSURETHRAL SURGICAL REMOVAL OF STRICTURE OF BLADDER NECK  8/14/2019    Procedure: EXCISION, CONTRACTURE, BLADDER NECK, TRANSURETHRAL;  Surgeon: Monty Cee MD;  Location: [facility] OR;  Service: Urology;;    TRANSURETHRAL SURGICAL REMOVAL OF STRICTURE OF BLADDER NECK N/A 6/10/2021    Procedure: EXCISION, CONTRACTURE, BLADDER NECK, TRANSURETHRAL;  Surgeon: Monty Cee MD;  Location: [facility];  Service: Urology;  Laterality: N/A;  using laser    URETEROSCOPY Left 9/18/2019    Procedure: URETEROSCOPY;  Surgeon: Monty Cee MD;  Location: [facility] OR;  Service: Urology;  Laterality: Left;    URETEROSCOPY Right 6/22/2020    Procedure: URETEROSCOPY;  Surgeon: Monty Cee MD;  Location: [facility];  Service: Urology;  Laterality: Right;       family history includes Diabetes in his mother.     Social History     Tobacco Use     Smoking status: Former Smoker     Types: Pipe    Smokeless tobacco: Never Used    Tobacco comment: quit 40 yrs ago   Substance Use Topics    Alcohol use: Yes     Alcohol/week: 0.0 standard drinks     Comment: rarely    Drug use: No       Review of Systems   Constitutional: Negative for chills, fatigue, fever, malaise/fatigue and weight loss.   HENT: Negative for congestion, ear discharge and ear pain.    Eyes: Negative for blurred vision.   Respiratory: Negative for cough and shortness of breath.    Cardiovascular: Negative for chest pain and leg swelling.   Gastrointestinal: Negative for abdominal pain, constipation, diarrhea and vomiting.   Genitourinary: Negative for dysuria and impotence.   Musculoskeletal: Positive for joint pain. Negative for myalgias.   Skin: Positive for rash.   Neurological: Positive for sensory change. Negative for dizziness, tingling, tremors, speech change, focal weakness, seizures, loss of consciousness, weakness and headaches.   Endo/Heme/Allergies: Negative for polydipsia and polyphagia. Does not bruise/bleed easily.   Psychiatric/Behavioral: Negative for confusion, depression and suicidal ideas.        Outpatient Encounter Medications as of 3/21/2022   Medication Sig Note Dispense Refill    aspirin (ECOTRIN) 81 MG EC tablet Take 81 mg by mouth once daily.       blood sugar diagnostic Strp by Misc.(Non-Drug; Combo Route) route.       carvediloL (COREG) 12.5 MG tablet Take 1 tablet (12.5 mg total) by mouth 2 (two) times daily with meals.  180 tablet 3    dulaglutide (TRULICITY) 0.75 mg/0.5 mL pen injector Inject 0.75 mg into the skin every 7 days.  12 pen 3    furosemide (LASIX) 20 MG tablet Take 1 tablet (20 mg total) by mouth once daily.  90 tablet 3    ibuprofen (ADVIL,MOTRIN) 400 MG tablet Take 400 mg by mouth every 6 (six) hours as needed for Other.       insulin NPH-insulin regular, 70/30, (NOVOLIN 70/30) 100 unit/mL (70-30) injection INJECT 60 UNITS INTO THE SKIN TWO  TIMES DAILY  60 mL 4    insulin syringe-needle U-100 1 mL 31 gauge x 5/16 Syrg USE AS DIRECTED TWICE A DAY  200 each 4    lancets (PRODIGY TWIST TOP LANCET) 28 gauge Misc 1 lancet by Misc.(Non-Drug; Combo Route) route 2 (two) times daily.  180 each 4    losartan (COZAAR) 100 MG tablet Take 1 tablet (100 mg total) by mouth once daily.  90 tablet 4    MAGNESIUM ORAL Take 400 mg by mouth once daily at 6am. 800 mg everyday 6/18/2021: States takes 400 mg daily      mupirocin (BACTROBAN) 2 % ointment Apply to affected area 3 (three) times daily.  22 g 0    rosuvastatin (CRESTOR) 40 MG Tab TAKE 1 TABLET BY MOUTH EVERY EVENING  90 tablet 3    spironolactone (ALDACTONE) 25 MG tablet Take 1 tablet (25 mg total) by mouth once daily.  30 tablet 11    tamsulosin (FLOMAX) 0.4 mg Cap Take 1 capsule (0.4 mg total) by mouth every evening.  90 capsule 0    allopurinoL (ZYLOPRIM) 300 MG tablet Take 1 tablet (300 mg total) by mouth once daily. (Patient not taking: No sig reported)  90 tablet 4    clindamycin (CLEOCIN) 300 MG capsule Take 1 capsule (300 mg total) by mouth 3 (three) times daily. for 10 days  30 capsule 0    mupirocin (BACTROBAN) 2 % ointment Apply topically 2 (two) times daily. TO AFFECTED AREAS UNTIL AREA HEALS  22 g 11    mupirocin (BACTROBAN) 2 % ointment Apply topically 3 (three) times daily.  22 g 0     Facility-Administered Encounter Medications as of 3/21/2022   Medication Dose Route Frequency Provider Last Rate Last Admin    [COMPLETED] cefTRIAXone injection 500 mg  500 mg Intramuscular 1 time in Clinic/HOD Nato Schwab Jr., MD   500 mg at 03/21/22 1012    [COMPLETED] LIDOcaine (PF) 10 mg/ml (1%) injection 10 mg  1 mL Other 1 time in Clinic/HOD Nato Schwab Jr., MD   10 mg at 03/21/22 1045    lidocaine HCl 2% urojet   Mucous Membrane Once Monty Cee MD        mitoMYcin (MUTAMYCIN) 40 mg in sodium chloride 0.9% infusion  40 mg Intravesical Once Monty Cee MD         "[DISCONTINUED] LIDOcaine (PF) 10 mg/ml (1%) injection 20 mg  2 mL Other Once Nato Schwab Jr., MD        [DISCONTINUED] LIDOcaine HCL 10 mg/ml (1%) injection 1 mL  1 mL Other 1 time in Clinic/HOD Nato Schwab Jr., MD        [DISCONTINUED] LIDOcaine HCL 10 mg/ml (1%) injection 2 mL  2 mL Other 1 time in Clinic/HOD Nato Schwab Jr., MD           Review of patient's allergies indicates:  No Known Allergies    Physical Exam      Vital Signs  BP: (!) 140/70  Pain Score:   7  Pain Loc: Leg  Height and Weight  Height: 5' 9" (175.3 cm)  Weight: 127.5 kg (281 lb)  BSA (Calculated - sq m): 2.49 sq meters  BMI (Calculated): 41.5  Weight in (lb) to have BMI = 25: 168.9]    Physical Exam  Vitals reviewed.   Constitutional:       General: He is not in acute distress.     Appearance: Normal appearance. He is normal weight. He is not ill-appearing.   HENT:      Head: Normocephalic.      Nose: Nose normal.      Mouth/Throat:      Mouth: Mucous membranes are moist.      Pharynx: Oropharynx is clear.   Eyes:      Extraocular Movements: Extraocular movements intact.      Conjunctiva/sclera: Conjunctivae normal.   Pulmonary:      Effort: Pulmonary effort is normal.      Breath sounds: Normal breath sounds.   Musculoskeletal:         General: Normal range of motion.      Cervical back: Normal range of motion and neck supple.      Right lower leg: Edema present.      Left lower leg: Edema present.        Feet:    Feet:      Right foot:      Skin integrity: Blister, skin breakdown, erythema, warmth and dry skin present.      Toenail Condition: Right toenails are abnormally thick and ingrown.      Left foot:      Toenail Condition: Left toenails are abnormally thick.   Skin:     General: Skin is warm and dry.      Findings: No rash.   Neurological:      General: No focal deficit present.      Mental Status: He is alert and oriented to person, place, and time.      Motor: No weakness.      Gait: Gait normal.   Psychiatric:   "       Mood and Affect: Mood normal.         Behavior: Behavior normal.         Thought Content: Thought content normal.         Judgment: Judgment normal.                                      Laboratory:  CBC:  No results for input(s): WBC, RBC, HGB, HCT, PLT, MCV, MCH, MCHC in the last 2160 hours.  CMP:  No results for input(s): GLU, CALCIUM, ALBUMIN, PROT, NA, K, CO2, CL, BUN, ALKPHOS, ALT, AST, BILITOT in the last 2160 hours.    Invalid input(s): CREATININ  URINALYSIS:  No results for input(s): COLORU, CLARITYU, SPECGRAV, PHUR, PROTEINUA, GLUCOSEU, BILIRUBINCON, BLOODU, WBCU, RBCU, BACTERIA, MUCUS, NITRITE, LEUKOCYTESUR, UROBILINOGEN, HYALINECASTS in the last 2160 hours.   LIPIDS:  No results for input(s): TSH, HDL, CHOL, TRIG, LDLCALC, CHOLHDL, NONHDLCHOL, TOTALCHOLEST in the last 2160 hours.  TSH:  No results for input(s): TSH in the last 2160 hours.  A1C:  No results for input(s): HGBA1C in the last 2160 hours.    Radiology:      Assessment/Plan     Andrew Beltran is a 66 y.o.male with:    1. Cellulitis of foot, right  - cefTRIAXone injection 500 mg  - clindamycin (CLEOCIN) 300 MG capsule; Take 1 capsule (300 mg total) by mouth 3 (three) times daily. for 10 days  Dispense: 30 capsule; Refill: 0  - mupirocin (BACTROBAN) 2 % ointment; Apply topically 3 (three) times daily.  Dispense: 22 g; Refill: 0  - LIDOcaine (PF) 10 mg/ml (1%) injection 10 mg    2. Type 2 diabetes mellitus with diabetic polyneuropathy, with long-term current use of insulin  - CBC Auto Differential; Future  - Comprehensive Metabolic Panel; Future  - Hemoglobin A1C; Future  - Microalbumin/Creatinine Ratio, Urine; Future  - Ambulatory referral/consult to Podiatry; Future    3. Essential hypertension    4. Mixed hyperlipidemia  - Lipid Panel; Future    5. Morbid obesity with BMI of 40.0-44.9, adult    6. Prostate cancer screening  - PSA, Screening; Future    7. Cellulitis of right lower extremity    Had patient seen by podiatry today.   Patient has strict instruction to go to ER if no improvement in 24-48 hours.   Chronic conditions stable.  Will continue to monitor.     Follow up as discussed    If symptoms worsen or do not improve return to clinic, go to Urgent Care or ER  Take Medications as directed    Follow up in 1 week.     -Continue current medications and maintain follow up with specialists.         Nato Schwab Jr, MD  [facility]

## 2022-03-21 NOTE — PROGRESS NOTES
Subjective:      Patient ID: Andrew Beltran is a 66 y.o. male.    Chief Complaint: No chief complaint on file.      Andrew is a 66 y.o. male who presents to the clinic upon referral from Dr. Schwab  for evaluation and treatment of diabetic feet. Andrew has a past medical history of BPH (benign prostatic hyperplasia), Cancer, Diabetes, Gout, High cholesterol, Hypertension, Sciatica (2016), and Urinary tract infection. Patient relates no major problem with feet. Only complaints today consist of numbness and tingling bilateral lower extremities, painful elongated nails times 10, pitting edema bilateral lower extremity.  History of cellulitis bilateral lower extremity due to peripheral vascular disease.     3/21/22 presenting with R hallux ingrown toenail, b/l LE cellulitis with PVD venous stasis ulcer. Was evaluated by Dr. Schwab and referred to me. Pt received ceftriaxone in clinic and discharged with clindamycin. B/l LE wounds has been opened/closed. Has been dealing with re occurrence of ulcer on b/l LE a month ago. Has been treating this with local wound care. Glucose is not well controlled. Tells me he usually wears compression stocking but not presenting with it today. Also points medial hallux of right foot. Tells me it is painful to touch and noticed some redness around. Known to Dr. Dodd.     PCP: Nato Schwab Jr, MD    Date Last Seen by PCP: in epic     Hemoglobin A1C   Date Value Ref Range Status   07/12/2021 9.3 (H) 4.0 - 5.6 % Final     Comment:     ADA Screening Guidelines:  5.7-6.4%  Consistent with prediabetes  >or=6.5%  Consistent with diabetes    High levels of fetal hemoglobin interfere with the HbA1C  assay. Heterozygous hemoglobin variants (HbS, HgC, etc)do  not significantly interfere with this assay.   However, presence of multiple variants may affect accuracy.     04/01/2021 9.6 (H) 4.0 - 5.6 % Final     Comment:     ADA Screening Guidelines:  5.7-6.4%  Consistent with  prediabetes  >or=6.5%  Consistent with diabetes    High levels of fetal hemoglobin interfere with the HbA1C  assay. Heterozygous hemoglobin variants (HbS, HgC, etc)do  not significantly interfere with this assay.   However, presence of multiple variants may affect accuracy.     12/29/2020 8.5 (H) 4.0 - 5.6 % Final     Comment:     ADA Screening Guidelines:  5.7-6.4%  Consistent with prediabetes  >or=6.5%  Consistent with diabetes  High levels of fetal hemoglobin interfere with the HbA1C  assay. Heterozygous hemoglobin variants (HbS, HgC, etc)do  not significantly interfere with this assay.   However, presence of multiple variants may affect accuracy.         Review of Systems   Constitutional: Negative for chills, decreased appetite, fever and malaise/fatigue.   HENT: Negative for congestion, ear discharge and sore throat.    Eyes: Negative for discharge and pain.   Cardiovascular: Positive for leg swelling. Negative for chest pain and claudication.   Respiratory: Negative for cough and shortness of breath.    Skin: Positive for color change. Negative for nail changes and rash.   Musculoskeletal: Positive for arthritis, joint swelling and stiffness. Negative for muscle weakness.   Gastrointestinal: Negative for bloating, abdominal pain, diarrhea, nausea and vomiting.   Genitourinary: Negative for flank pain and hematuria.   Neurological: Positive for numbness. Negative for headaches and weakness.   Psychiatric/Behavioral: Negative for altered mental status.             Past Medical History:   Diagnosis Date    BPH (benign prostatic hyperplasia)     Cancer     bladder    Diabetes     type 2    Gout     High cholesterol     Hypertension     Sciatica 2016    had injection 3 weeks ago    Urinary tract infection        Past Surgical History:   Procedure Laterality Date    APPENDECTOMY  1968    BALLOON DILATION OF URETER Left 9/18/2019    Procedure: DILATION, URETER, USING BALLOON;  Surgeon: Monty Cee MD;   Location: [facility] OR;  Service: Urology;  Laterality: Left;    BARBOTAGE OF BLADDER N/A 8/14/2019    Procedure: BARBOTAGE, BLADDER;  Surgeon: Monty Cee MD;  Location: [facility] OR;  Service: Urology;  Laterality: N/A;    BARBOTAGE OF URETER Bilateral 8/14/2019    Procedure: BARBOTAGE, URETER;  Surgeon: Monty Cee MD;  Location: [facility] OR;  Service: Urology;  Laterality: Bilateral;    BARBOTAGE OF URETER Left 9/18/2019    Procedure: BARBOTAGE, URETER;  Surgeon: Monty Cee MD;  Location: [facility] OR;  Service: Urology;  Laterality: Left;  left renal/ ureteral washing.    BLADDER SURGERY      COLONOSCOPY N/A 7/10/2018    Procedure: COLONOSCOPY;  Surgeon: Azalea Sanchez MD;  Location: [facility] ENDO;  Service: Endoscopy;  Laterality: N/A;  Schedule at [facility], faxed to [facility] office [phone]    CYSTOSCOPY      CYSTOSCOPY W/ RETROGRADES Bilateral 8/14/2019    Procedure: CYSTOSCOPY, WITH RETROGRADE PYELOGRAM;  Surgeon: Monty Cee MD;  Location: [facility];  Service: Urology;  Laterality: Bilateral;    CYSTOSCOPY W/ RETROGRADES Left 9/18/2019    Procedure: CYSTOSCOPY, WITH RETROGRADE PYELOGRAM;  Surgeon: Monty Cee MD;  Location: [facility];  Service: Urology;  Laterality: Left;  cystourethroscopy with left retrograde pyelogram     CYSTOSCOPY W/ RETROGRADES Bilateral 3/11/2020    Procedure: CYSTOSCOPY, WITH RETROGRADE PYELOGRAM Ureteral renal washings and bladder washings;  Surgeon: Monty Cee MD;  Location: [facility];  Service: Urology;  Laterality: Bilateral;    CYSTOSCOPY WITH URETEROSCOPY, RETROGRADE PYELOGRAPHY, AND INSERTION OF STENT Bilateral 6/22/2020    Procedure: CYSTOSCOPY, WITH RETROGRADE PYELOGRAM AND URETERAL STENT INSERTION;  Surgeon: Monty Cee MD;  Location: [facility] OR;  Service: Urology;  Laterality: Bilateral;    CYSTOSCOPY WITH URETHRAL DILATION N/A 6/10/2021    Procedure: CYSTOSCOPY, WITH URETHRAL DILATION;  Surgeon: Monty Cee MD;  Location: [facility]  OR;  Service: Urology;  Laterality: N/A;    HAND SURGERY Right 12/08/2016    trigger finger    INSTILLATION OF URINARY BLADDER N/A 3/11/2020    Procedure: INSTILLATION, BLADDER - mitomyin - c;  Surgeon: Monty Cee MD;  Location: [facility] OR;  Service: Urology;  Laterality: N/A;    INSTILLATION OF URINARY BLADDER  6/22/2020    Procedure: INSTILLATION, BLADDER;  Surgeon: Monty Cee MD;  Location: [facility] OR;  Service: Urology;;  mitomycin 40mg    LASER ABLATION Left 9/18/2019    Procedure: ABLATION, USING LASER;  Surgeon: Monty Cee MD;  Location: [facility] OR;  Service: Urology;  Laterality: Left;  laser ablation of lower pole calyceal transitional cell lesion    PYELOSCOPY Left 9/18/2019    Procedure: PYELOSCOPY;  Surgeon: Monty Cee MD;  Location: [facility] OR;  Service: Urology;  Laterality: Left;  Left ureteral pyeloscopy    TONSILLECTOMY  1960    TRANSURETHRAL RESECTION OF PROSTATE N/A 9/18/2019    Procedure: TURP (TRANSURETHRAL RESECTION OF PROSTATE);  Surgeon: Monty Cee MD;  Location: [facility] OR;  Service: Urology;  Laterality: N/A;  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    TRANSURETHRAL RESECTION OF PROSTATE USING BIPOLAR CAUTERY N/A 3/11/2020    Procedure: TURP, USING BIPOLAR CAUTERY;  Surgeon: Monty Cee MD;  Location: [facility] OR;  Service: Urology;  Laterality: N/A;    TRANSURETHRAL SURGICAL REMOVAL OF STRICTURE OF BLADDER NECK  8/14/2019    Procedure: EXCISION, CONTRACTURE, BLADDER NECK, TRANSURETHRAL;  Surgeon: Monty Cee MD;  Location: [facility] OR;  Service: Urology;;    TRANSURETHRAL SURGICAL REMOVAL OF STRICTURE OF BLADDER NECK N/A 6/10/2021    Procedure: EXCISION, CONTRACTURE, BLADDER NECK, TRANSURETHRAL;  Surgeon: Monty Cee MD;  Location: [facility] OR;  Service: Urology;  Laterality: N/A;  using laser    URETEROSCOPY Left 9/18/2019    Procedure: URETEROSCOPY;  Surgeon: Monty Cee MD;  Location: [facility] OR;  Service: Urology;   Laterality: Left;    URETEROSCOPY Right 6/22/2020    Procedure: URETEROSCOPY;  Surgeon: Monty Cee MD;  Location: [facility];  Service: Urology;  Laterality: Right;       Family History   Problem Relation Age of Onset    Diabetes Mother     Prostate cancer Neg Hx     Kidney disease Neg Hx        Social History     Socioeconomic History    Marital status:    Tobacco Use    Smoking status: Former Smoker     Types: Pipe    Smokeless tobacco: Never Used    Tobacco comment: quit 40 yrs ago   Substance and Sexual Activity    Alcohol use: Yes     Alcohol/week: 0.0 standard drinks     Comment: rarely    Drug use: No    Sexual activity: Yes     Partners: Female     Social Determinants of Health     Financial Resource Strain: Low Risk     Difficulty of Paying Living Expenses: Not very hard   Food Insecurity: No Food Insecurity    Worried About Running Out of Food in the Last Year: Never true    Ran Out of Food in the Last Year: Never true   Transportation Needs: No Transportation Needs    Lack of Transportation (Medical): No    Lack of Transportation (Non-Medical): No   Physical Activity: Insufficiently Active    Days of Exercise per Week: 2 days    Minutes of Exercise per Session: 10 min   Stress: No Stress Concern Present    Feeling of Stress : Only a little   Social Connections: Unknown    Frequency of Communication with Friends and Family: Three times a week    Frequency of Social Gatherings with Friends and Family: Twice a week    Active Member of Clubs or Organizations: Yes    Attends Club or Organization Meetings: More than 4 times per year    Marital Status:    Housing Stability: Low Risk     Unable to Pay for Housing in the Last Year: No    Number of Places Lived in the Last Year: 2    Unstable Housing in the Last Year: No       Current Outpatient Medications   Medication Sig Dispense Refill    allopurinoL (ZYLOPRIM) 300 MG tablet Take 1 tablet (300 mg total) by mouth once  daily. (Patient not taking: No sig reported) 90 tablet 4    aspirin (ECOTRIN) 81 MG EC tablet Take 81 mg by mouth once daily.      blood sugar diagnostic Strp by Misc.(Non-Drug; Combo Route) route.      carvediloL (COREG) 12.5 MG tablet Take 1 tablet (12.5 mg total) by mouth 2 (two) times daily with meals. 180 tablet 3    clindamycin (CLEOCIN) 300 MG capsule Take 1 capsule (300 mg total) by mouth 3 (three) times daily. for 10 days 30 capsule 0    collagenase (SANTYL) ointment Apply topically once daily. 30 g 0    dulaglutide (TRULICITY) 0.75 mg/0.5 mL pen injector Inject 0.75 mg into the skin every 7 days. 12 pen 3    furosemide (LASIX) 20 MG tablet Take 1 tablet (20 mg total) by mouth once daily. 90 tablet 3    ibuprofen (ADVIL,MOTRIN) 400 MG tablet Take 400 mg by mouth every 6 (six) hours as needed for Other.      insulin NPH-insulin regular, 70/30, (NOVOLIN 70/30) 100 unit/mL (70-30) injection INJECT 60 UNITS INTO THE SKIN TWO TIMES DAILY 60 mL 4    insulin syringe-needle U-100 1 mL 31 gauge x 5/16 Syrg USE AS DIRECTED TWICE A  each 4    lancets (PRODIGY TWIST TOP LANCET) 28 gauge Misc 1 lancet by Misc.(Non-Drug; Combo Route) route 2 (two) times daily. 180 each 4    losartan (COZAAR) 100 MG tablet Take 1 tablet (100 mg total) by mouth once daily. 90 tablet 4    MAGNESIUM ORAL Take 400 mg by mouth once daily at 6am. 800 mg everyday      mupirocin (BACTROBAN) 2 % ointment Apply topically 2 (two) times daily. TO AFFECTED AREAS UNTIL AREA HEALS 22 g 11    mupirocin (BACTROBAN) 2 % ointment Apply to affected area 3 (three) times daily. 22 g 0    mupirocin (BACTROBAN) 2 % ointment Apply topically 3 (three) times daily. 22 g 0    rosuvastatin (CRESTOR) 40 MG Tab TAKE 1 TABLET BY MOUTH EVERY EVENING 90 tablet 3    spironolactone (ALDACTONE) 25 MG tablet Take 1 tablet (25 mg total) by mouth once daily. 30 tablet 11    tamsulosin (FLOMAX) 0.4 mg Cap Take 1 capsule (0.4 mg total) by mouth every  evening. 90 capsule 0     No current facility-administered medications for this visit.     Facility-Administered Medications Ordered in Other Visits   Medication Dose Route Frequency Provider Last Rate Last Admin    lidocaine HCl 2% urojet   Mucous Membrane Once Monty Cee MD        mitoMYcin (MUTAMYCIN) 40 mg in sodium chloride 0.9% infusion  40 mg Intravesical Once Monty Cee MD           Review of patient's allergies indicates:  No Known Allergies    There were no vitals filed for this visit.    Objective:      Physical Exam  Constitutional:       General: He is not in acute distress.     Appearance: He is well-developed.   HENT:      Nose: Nose normal.   Eyes:      Conjunctiva/sclera: Conjunctivae normal.   Pulmonary:      Effort: Pulmonary effort is normal.   Chest:      Chest wall: No tenderness.   Abdominal:      Tenderness: There is no abdominal tenderness.   Musculoskeletal:      Cervical back: Normal range of motion.   Neurological:      Mental Status: He is alert and oriented to person, place, and time.   Psychiatric:         Behavior: Behavior normal.         Vascular: Distal DP/PT pulses palpable 0/4. + vericosities noted to LEs. Hair growth absent LE, warm to touch LE, + pitting edema noted to LE.  (11/2021)  Left BRISA 1.21         Right BRISA 1.16         Dermatologic:  LLE: venous stasis ulcer noted lateral lower leg. 5cm x 4cm x 0.3cm. rubor with erythema, serous drainage, no probe to bone. No sinus tract, no undermining, no crepitus, no pus. 4cm x 2cm x 0.3cm venous stasis ulcer noted posterior calf with surrounding erythema, rubor. Fibrotic wound base on both wounds.   RLE: rubor with erythema lower RLE. No deep wounds/ulcers, no crepitus, no fluctuance. Incurvated nail plate along medial border of hallux. Mild rubor along medial border of hallux.     Musculoskeletal: MMT 4/5 in DF/PF/Inv/Ev resistance. Passive range of motion of ankle and pedal joints is painless. +calf tenderness LE,  Compartments soft/compressible. ROM of ankles with < 10 deg DF is noted b/l     Neurological:   Light touch, proprioception, and sharp/dull sensation are decreased. Protective threshold with the [place]ienstein monofilament is decreased. Vibratory sensation decreased.                    Assessment:       Encounter Diagnoses   Name Primary?   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     Type 2 diabetes mellitus without complication, with long-term current use of insulin          Plan:       Diagnoses and all orders for this visit:    Venous stasis ulcer, unspecified site, unspecified ulcer stage, unspecified whether varicose veins present  -     PT wound care; Future    Type 2 diabetes mellitus with diabetic polyneuropathy, with long-term current use of insulin  -     Ambulatory referral/consult to Podiatry    Type II diabetes mellitus with neurological manifestations    Ingrown nail    Cellulitis, unspecified cellulitis site    Morbid obesity    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      I counseled the patient on his conditions, their implications and medical management.    66 y.o. male with b/l LE venous stasis ulcers with cellulitis, R foot ingrown toenail.     -rx. Santyl, physical therapy wound care.   -slant back for ingrown toenail medially of right hallux. Tolerated well. Mild pus noted from the medial hallux. Irrigated with saline.  Recommend to follow soaking instruction with triple abx.   -b/l LE cellulitis with PVD/venous stasis ulcers. Wound debridement. Tolerated well. Received rocephine here in the office (PCP) and discharged with PO clinda. Need to closely monitor. I told him to come to ED for IV abx if redness/swelling continues after 2-3 days. Verbalized understanding. Recommend local wound care with santyl for venous stasis ulcers.     -It was discussed the importance of wearing shoes with adequate room in toe box to accommodate toe deformities. Recommended New Balance/[employer] shoe brands with adequate arch supports to alleviate abnormal pressure and improve stability of foot while walking. Avoid flat shoes and barefoot walking as these will exacerbate or worsen symptoms.   -Advised for optimal glucose control and maintenance per primary care physician. Patient was also educated on healthy diet that is naturally rich in nutrients and low in fat and calories.   -Patient was advised of signs and symptoms of infection including redness, drainage, purulence, odor, streaking, fever, chills and I advised patient to seek medical attention (ER or urgent care) if these symptoms arise.   -The nature of the condition, options for management, as well as potential risks and complications were discussed in detail with patient. Patient was amenable to my recommendations and left my office fully informed and will follow up as instructed or sooner if necessary.    -f/u 1 wk    Note dictated with voice recognition software, please excuse any grammatical errors.

## 2022-03-22 ENCOUNTER — TELEPHONE (OUTPATIENT)
Dept: PODIATRY | Facility: CLINIC | Age: 67
End: 2022-03-22
Payer: COMMERCIAL

## 2022-03-22 ENCOUNTER — PATIENT MESSAGE (OUTPATIENT)
Dept: ADMINISTRATIVE | Facility: HOSPITAL | Age: 67
End: 2022-03-22
Payer: COMMERCIAL

## 2022-03-22 ENCOUNTER — PATIENT MESSAGE (OUTPATIENT)
Dept: FAMILY MEDICINE | Facility: CLINIC | Age: 67
End: 2022-03-22
Payer: COMMERCIAL

## 2022-03-22 DIAGNOSIS — E11.9 TYPE 2 DIABETES MELLITUS WITHOUT COMPLICATION, UNSPECIFIED WHETHER LONG TERM INSULIN USE: ICD-10-CM

## 2022-03-22 NOTE — PROCEDURES
"Wound Debridement    Date/Time: 3/21/2022 9:00 AM  Performed by: Candelaria Fuller DPM  Authorized by: Candelaria Fuller DPM     Time out: Immediately prior to procedure a "time out" was called to verify the correct patient, procedure, equipment, support staff and site/side marked as required.    Consent Done?:  Yes (Verbal)    Preparation: Patient was prepped and draped in usual sterile fashion      Wound Details:    Location:  Left leg    Type of Debridement:  Excisional       Length (cm):  5       Area (sq cm):  20       Width (cm):  4       Percent Debrided (%):  100       Depth (cm):  0.3       Total Area Debrided (sq cm):  20    Depth of debridement:  Subcutaneous tissue    Tissue debrided:  Subcutaneous    Devitalized tissue debrided:  Callus, Biofilm and Slough    Instruments:  Blade and Curette    Additional wounds:  1    2nd Wound Details:     Location:  Left leg    Type of Debridement:  Excisional       Length (cm):  4       Area (sq cm):  8       Width (cm):  2       Percent Debrided (%):  100       Depth (cm):  0.3       Total Area Debrided (sq cm):  8    Depth of debridement:  Subcutaneous tissue    Tissue debrided:  Subcutaneous    Devitalized tissue debrided:  Biofilm, Callus and Slough    Instruments:  Curette and Blade    Bleeding:  Minimal  Hemostasis Achieved: Yes    Method Used:  Pressure  Patient tolerance:  Patient tolerated the procedure well with no immediate complications     Timeout was performed w/ pt in the room. Side, laterality, procedure was confirmed.         "

## 2022-03-22 NOTE — TELEPHONE ENCOUNTER
----- Message from Dinah Arita sent at 3/22/2022  2:31 PM CDT -----  Regarding: Medication & Wound Care Inquiry  Type:  Needs Medical Advice    Who Called: pt    Pharmacy name and phone #:  [facility]  [id number] [place] [address]  Phone: [phone] Fax: [phone]    Would the patient rather a call back or a response via [facility]? Call    Best Call Back Number: [phone]    Additional Information: pharmacy waiting for autho for collagenase (SANTYL) ointment & wound car isn't available until late April//would like advice on the next steps to take

## 2022-03-22 NOTE — TELEPHONE ENCOUNTER
Called pt back to inform him that  and staff are already out of clinic and they will get back with him as soon as possible tomorrow

## 2022-03-23 ENCOUNTER — PATIENT MESSAGE (OUTPATIENT)
Dept: UROLOGY | Facility: CLINIC | Age: 67
End: 2022-03-23
Payer: COMMERCIAL

## 2022-03-28 ENCOUNTER — TELEPHONE (OUTPATIENT)
Dept: PHARMACY | Facility: CLINIC | Age: 67
End: 2022-03-28
Payer: COMMERCIAL

## 2022-03-29 ENCOUNTER — TELEPHONE (OUTPATIENT)
Dept: UROLOGY | Facility: CLINIC | Age: 67
End: 2022-03-29
Payer: COMMERCIAL

## 2022-03-29 ENCOUNTER — PROCEDURE VISIT (OUTPATIENT)
Dept: UROLOGY | Facility: CLINIC | Age: 67
End: 2022-03-29
Payer: COMMERCIAL

## 2022-03-29 VITALS
OXYGEN SATURATION: 96 % | WEIGHT: 281.75 LBS | HEIGHT: 69 IN | SYSTOLIC BLOOD PRESSURE: 140 MMHG | HEART RATE: 101 BPM | RESPIRATION RATE: 16 BRPM | DIASTOLIC BLOOD PRESSURE: 62 MMHG | TEMPERATURE: 98 F | BODY MASS INDEX: 41.73 KG/M2

## 2022-03-29 DIAGNOSIS — N39.41 URGE URINARY INCONTINENCE: ICD-10-CM

## 2022-03-29 DIAGNOSIS — C61 TRANSITIONAL CELL CARCINOMA OF PROSTATE: ICD-10-CM

## 2022-03-29 DIAGNOSIS — C67.9 MALIGNANT NEOPLASM OF URINARY BLADDER, UNSPECIFIED SITE: Primary | ICD-10-CM

## 2022-03-29 DIAGNOSIS — N39.3 SUI (STRESS URINARY INCONTINENCE), MALE: ICD-10-CM

## 2022-03-29 DIAGNOSIS — N32.0 BLADDER NECK CONTRACTURE: ICD-10-CM

## 2022-03-29 PROCEDURE — 52000 CYSTOSCOPY: ICD-10-PCS | Mod: S$GLB,,, | Performed by: UROLOGY

## 2022-03-29 PROCEDURE — 88112 CYTOPATH CELL ENHANCE TECH: CPT | Mod: 26,,, | Performed by: PATHOLOGY

## 2022-03-29 PROCEDURE — 88112 CYTOPATH CELL ENHANCE TECH: CPT | Performed by: PATHOLOGY

## 2022-03-29 PROCEDURE — 88112 PR  CYTOPATH, CELL ENHANCE TECH: ICD-10-PCS | Mod: 26,,, | Performed by: PATHOLOGY

## 2022-03-29 PROCEDURE — 52000 CYSTOURETHROSCOPY: CPT | Mod: S$GLB,,, | Performed by: UROLOGY

## 2022-03-29 RX ORDER — FLUCONAZOLE 200 MG/1
200 TABLET ORAL DAILY
Qty: 5 TABLET | Refills: 0 | Status: SHIPPED | OUTPATIENT
Start: 2022-03-29 | End: 2022-04-03

## 2022-03-29 RX ORDER — CLINDAMYCIN HYDROCHLORIDE 150 MG/1
150 CAPSULE ORAL 3 TIMES DAILY
COMMUNITY
Start: 2022-01-23 | End: 2022-03-29 | Stop reason: ALTCHOICE

## 2022-03-29 NOTE — TELEPHONE ENCOUNTER
Spoke with pt and informed him that fasting was not necessary and that he would be able to drive himself. Pt verbalized understanding

## 2022-03-29 NOTE — TELEPHONE ENCOUNTER
----- Message from Seda Marion sent at 3/29/2022 11:18 AM CDT -----  Regarding: call back  Contact: [phone]  Who Called: PT     Patient is calling to talk to nurse in regards to see if he will need to fast for his procedure today and can he drive himself to the appointment.

## 2022-03-29 NOTE — PROCEDURES
Cystoscopy    Date/Time: 3/29/2022 2:30 PM  Performed by: Monty Cee MD  Authorized by: Monty Cee MD

## 2022-03-29 NOTE — PROCEDURES
"Cystoscopy    Date/Time: 3/29/2022 2:30 PM  Performed by: Monty Cee MD  Authorized by: Monty Cee MD     Consent Done?:  Yes (Written)  Time out: Immediately prior to procedure a "time out" was called to verify the correct patient, procedure, equipment, support staff and site/side marked as required.    Indications: history bladder cancer    Position:  Supine  Anesthesia:  Intraurethral instillation  Patient sedated?: No    Preparation: Patient was prepped and draped in usual sterile fashion      Scope type:  Flexible cystoscope  Stent inserted: No    Stent removed: No    External exam normal: Yes    Digital exam performed: No    Urethra normal: Yes    Prostate normal: No (TUR defect)          Hyperplasia (Bilobar)Bladder neck normal: Bladder neck normal   Bladder normal: Yes       2 areas of scr tissue noted      "

## 2022-03-30 ENCOUNTER — LAB VISIT (OUTPATIENT)
Dept: LAB | Facility: HOSPITAL | Age: 67
End: 2022-03-30
Attending: UROLOGY
Payer: COMMERCIAL

## 2022-03-30 DIAGNOSIS — N39.3 SUI (STRESS URINARY INCONTINENCE), MALE: ICD-10-CM

## 2022-03-30 DIAGNOSIS — C67.9 MALIGNANT NEOPLASM OF URINARY BLADDER, UNSPECIFIED SITE: ICD-10-CM

## 2022-03-30 DIAGNOSIS — N32.0 BLADDER NECK CONTRACTURE: ICD-10-CM

## 2022-03-30 DIAGNOSIS — N39.41 URGE URINARY INCONTINENCE: ICD-10-CM

## 2022-03-30 DIAGNOSIS — C61 TRANSITIONAL CELL CARCINOMA OF PROSTATE: ICD-10-CM

## 2022-03-30 PROCEDURE — 88120 CYTP URNE 3-5 PROBES EA SPEC: CPT | Performed by: UROLOGY

## 2022-03-31 LAB
FINAL PATHOLOGIC DIAGNOSIS: NORMAL
Lab: NORMAL

## 2022-04-05 ENCOUNTER — PATIENT MESSAGE (OUTPATIENT)
Dept: PODIATRY | Facility: CLINIC | Age: 67
End: 2022-04-05
Payer: MEDICARE

## 2022-04-06 ENCOUNTER — PATIENT MESSAGE (OUTPATIENT)
Dept: UROLOGY | Facility: CLINIC | Age: 67
End: 2022-04-06
Payer: MEDICARE

## 2022-04-07 ENCOUNTER — PATIENT MESSAGE (OUTPATIENT)
Dept: UROLOGY | Facility: CLINIC | Age: 67
End: 2022-04-07
Payer: MEDICARE

## 2022-04-14 ENCOUNTER — PATIENT MESSAGE (OUTPATIENT)
Dept: PODIATRY | Facility: CLINIC | Age: 67
End: 2022-04-14
Payer: MEDICARE

## 2022-04-14 ENCOUNTER — TELEPHONE (OUTPATIENT)
Dept: PODIATRY | Facility: CLINIC | Age: 67
End: 2022-04-14
Payer: MEDICARE

## 2022-04-14 NOTE — TELEPHONE ENCOUNTER
----- Message from Chris Amaya sent at 4/14/2022  3:35 PM CDT -----  Type:  Patient Requesting Call Back    Who Called:Andrew Beltran  Would the patient rather a call back or a response via [facility]? Call back  Best Call Back Number:[phone]  Additional Information:  Patient Requesting Call Back regarding podiatry

## 2022-04-14 NOTE — TELEPHONE ENCOUNTER
Informed patient that he can continue to use the santyl until his wound care appt next week. He gave verbal understanding. Asked patient about signs of infection, patient states wound is clean and cool to the touch and does not smell.

## 2022-04-18 ENCOUNTER — TELEPHONE (OUTPATIENT)
Dept: FAMILY MEDICINE | Facility: CLINIC | Age: 67
End: 2022-04-18
Payer: MEDICARE

## 2022-04-18 NOTE — TELEPHONE ENCOUNTER
----- Message from Merari Myers Patient Care Assistant sent at 4/18/2022  4:12 PM CDT -----  Type:  Patient Returning Call    Who Called: pt  Who Left Message for Patient: office  Does the patient know what this is regarding?: case of celulitis over the weekend.  Would the patient rather a call back or a response via [facility]?  call  Best Call Back Number: [phone]   Additional Information:

## 2022-04-18 NOTE — TELEPHONE ENCOUNTER
----- Message from Rose Mary Steven sent at 4/18/2022  9:33 AM CDT -----  Contact: patient  [phone]  Type:  Same Day Appointment Request    Caller is requesting a same day appointment.  Caller declined first available appointment listed below.    Name of Caller: patient   When is the first available appointment? 4-26-22  Symptoms: painful rash  Best Call Back Number: [phone]  Additional Information: none

## 2022-04-18 NOTE — TELEPHONE ENCOUNTER
----- Message from Chelsie Castañeda sent at 4/18/2022  7:08 AM CDT -----  Type:  Needs Medical Advice    Who Called: self  Reason:Developed  a case of celulitis over the weekend. Ca you call in a antibiotic?  Would the patient rather a call back or a response [facility]? call  Best Call Back Number: [phone]  Additional Information: none

## 2022-04-22 ENCOUNTER — HOSPITAL ENCOUNTER (OUTPATIENT)
Dept: WOUND CARE | Facility: HOSPITAL | Age: 67
Discharge: HOME | End: 2022-04-22
Attending: SURGERY
Payer: MEDICARE

## 2022-04-22 VITALS
DIASTOLIC BLOOD PRESSURE: 70 MMHG | SYSTOLIC BLOOD PRESSURE: 148 MMHG | HEART RATE: 83 BPM | HEIGHT: 69 IN | BODY MASS INDEX: 41.18 KG/M2 | TEMPERATURE: 97 F | WEIGHT: 278 LBS

## 2022-04-22 DIAGNOSIS — I83.009 VENOUS STASIS ULCER, UNSPECIFIED SITE, UNSPECIFIED ULCER STAGE, UNSPECIFIED WHETHER VARICOSE VEINS PRESENT: ICD-10-CM

## 2022-04-22 DIAGNOSIS — I87.2 CHRONIC VENOUS STASIS DERMATITIS OF BOTH LOWER EXTREMITIES: ICD-10-CM

## 2022-04-22 DIAGNOSIS — L97.909 VENOUS STASIS ULCER, UNSPECIFIED SITE, UNSPECIFIED ULCER STAGE, UNSPECIFIED WHETHER VARICOSE VEINS PRESENT: ICD-10-CM

## 2022-04-22 DIAGNOSIS — I87.333 CHRONIC VENOUS HYPERTENSION (IDIOPATHIC) WITH ULCER AND INFLAMMATION OF BILATERAL LOWER EXTREMITY (CODE): Primary | ICD-10-CM

## 2022-04-22 PROCEDURE — 11042 DBRDMT SUBQ TIS 1ST 20SQCM/<: CPT

## 2022-04-22 PROCEDURE — 27201912 HC WOUND CARE DEBRIDEMENT SUPPLIES

## 2022-04-22 NOTE — PROGRESS NOTES
Subjective:       Patient ID: Andrew Beltran is a 66 y.o. male.    Chief Complaint: Venous Stasis and Non-healing Wound    4/22/22 Patient was seen in clinic for initial visit due to LLE cellulitis and ulcers.  Reported that blisters started about month ago, then opened up.  Was started on Bactrim for cellulitis 5 days ago.  Stated that erythema and edema improved significantly since.  Persisted BLE dark erythema, mild edema.  Ulcers LLE were debrided.  Right bunion plantar aspect with small open area with hypergranulation tissue and periwound maceration.  Silver Nitrate used on hypergranulation.  Tolerated procedures and compression well.    Review of Systems   All other systems reviewed and are negative.        Objective:      Temp:  [97 °F (36.1 °C)]   Pulse:  [83]   BP: (148)/(70)   Physical Exam       Wound 04/22/22 0800 Left lower;lateral Leg (Active)   04/22/22 0800    Pre-existing:    Primary Wound Type:    Side: Left   Orientation: lower;lateral   Location: Leg   Wound Number:    Ankle-Brachial Index:    Pulses:    Removal Indication and Assessment:    Wound Outcome:    (Retired) Wound Type:    (Retired) Wound Length (cm):    (Retired) Wound Width (cm):    (Retired) Depth (cm):    Wound Description (Comments):    Removal Indications:    Wound Image   04/22/22 1031   Dressing Appearance Dry 04/22/22 1031   Drainage Amount Scant 04/22/22 1031   Drainage Characteristics/Odor Serous 04/22/22 1031   Appearance Red;Slough 04/22/22 1031   Tissue loss description Full thickness 04/22/22 1031   Red (%), Wound Tissue Color 25 % 04/22/22 1031   Yellow (%), Wound Tissue Color 75 % 04/22/22 1031   Periwound Area Intact;Dry;Edematous;Redness 04/22/22 1031   Wound Edges Defined;Open;Irregular 04/22/22 1031   Wound Length (cm) 1.5 cm 04/22/22 1031   Wound Width (cm) 2.5 cm 04/22/22 1031   Wound Depth (cm) 0.2 cm 04/22/22 1031   Wound Volume (cm^3) 0.75 cm^3 04/22/22 1031   Wound Surface Area (cm^2) 3.75 cm^2  04/22/22 1031   Care Cleansed with:;Soap and water;Sterile normal saline;Debrided 04/22/22 1031   Dressing Applied;Silver;Calcium alginate 04/22/22 1031   Compression Two layer compression 04/22/22 1031   Dressing Change Due 04/29/22 04/22/22 1031            Wound 04/22/22 0800 Left lower;posterior Calf (Active)   04/22/22 0800    Pre-existing:    Primary Wound Type:    Side: Left   Orientation: lower;posterior   Location: Calf   Wound Number:    Ankle-Brachial Index:    Pulses:    Removal Indication and Assessment:    Wound Outcome:    (Retired) Wound Type:    (Retired) Wound Length (cm):    (Retired) Wound Width (cm):    (Retired) Depth (cm):    Wound Description (Comments):    Removal Indications:    Wound Image   04/22/22 1031   Dressing Appearance Moist drainage 04/22/22 1031   Drainage Amount Small 04/22/22 1031   Drainage Characteristics/Odor Serous 04/22/22 1031   Appearance Slough 04/22/22 1031   Tissue loss description Full thickness 04/22/22 1031   Yellow (%), Wound Tissue Color 100 % 04/22/22 1031   Periwound Area Edematous;Dry;Redness;Satellite lesion 04/22/22 1031   Wound Edges Defined;Open;Irregular 04/22/22 1031   Wound Length (cm) 1.7 cm 04/22/22 1031   Wound Width (cm) 1 cm 04/22/22 1031   Wound Depth (cm) 0.2 cm 04/22/22 1031   Wound Volume (cm^3) 0.34 cm^3 04/22/22 1031   Wound Surface Area (cm^2) 1.7 cm^2 04/22/22 1031   Care Cleansed with:;Soap and water;Sterile normal saline;Debrided 04/22/22 1031   Dressing Applied;Calcium alginate;Silver 04/22/22 1031   Compression Two layer compression 04/22/22 1031   Dressing Change Due 04/29/22 04/22/22 1031         Assessment:         ICD-10-CM ICD-9-CM   1. Chronic venous hypertension (idiopathic) with ulcer and inflammation of bilateral lower extremity (CODE)  I87.333 459.33   2. Venous stasis ulcer, unspecified site, unspecified ulcer stage, unspecified whether varicose veins present  I83.009 454.0    J75.906    3. Chronic venous stasis dermatitis of  both lower extremities  I87.2 454.1         Plan:   Tissue pathology and/or culture taken:  [] Yes [x] No   Sharp debridement performed:   [x] Yes [] No   Labs ordered this visit:   [] Yes [x] No   Imaging ordered this visit:   [x] Yes [] No           Orders Placed This Encounter   Procedures    US Lower Extremity Arteries Bilateral     Standing Status:   Future     Standing Expiration Date:   4/22/2023    US Lower Extremity Veins Bilateral Insuf     Standing Status:   Future     Standing Expiration Date:   4/22/2023     Order Specific Question:   May the Radiologist modify the order per protocol to meet the clinical needs of the patient?     Answer:   Yes     Order Specific Question:   Release to patient     Answer:   Immediate    Change dressing     LLE wounds:    Cleanse wound with:NS  Lidocaine:prn  Silver nitrate:prn  Periwound care: AF cream to toes; Sween ointment to leg  Primary dressing: Aquacel Ag  Edema control: K2 or Profore 4 layers  Frequency: weekly  Follow-up: in 1 week with Dr Jett    Other Orders: Double Tubigrip size F to RLE  Venous and arterial u/s to BLE    PT wound care     Standing Status:   Standing     Number of Occurrences:   1        Follow up in about 1 week (around 4/29/2022) for Dr Jett.

## 2022-04-29 ENCOUNTER — HOSPITAL ENCOUNTER (OUTPATIENT)
Dept: WOUND CARE | Facility: HOSPITAL | Age: 67
Discharge: HOME | End: 2022-04-29
Attending: SURGERY
Payer: MEDICARE

## 2022-04-29 VITALS
DIASTOLIC BLOOD PRESSURE: 66 MMHG | WEIGHT: 278 LBS | BODY MASS INDEX: 41.18 KG/M2 | SYSTOLIC BLOOD PRESSURE: 142 MMHG | HEART RATE: 94 BPM | TEMPERATURE: 98 F | HEIGHT: 69 IN

## 2022-04-29 DIAGNOSIS — L97.909 VENOUS STASIS ULCER, UNSPECIFIED SITE, UNSPECIFIED ULCER STAGE, UNSPECIFIED WHETHER VARICOSE VEINS PRESENT: ICD-10-CM

## 2022-04-29 DIAGNOSIS — I87.2 CHRONIC VENOUS STASIS DERMATITIS OF BOTH LOWER EXTREMITIES: ICD-10-CM

## 2022-04-29 DIAGNOSIS — I87.333 CHRONIC VENOUS HYPERTENSION (IDIOPATHIC) WITH ULCER AND INFLAMMATION OF BILATERAL LOWER EXTREMITY (CODE): Primary | ICD-10-CM

## 2022-04-29 DIAGNOSIS — I83.009 VENOUS STASIS ULCER, UNSPECIFIED SITE, UNSPECIFIED ULCER STAGE, UNSPECIFIED WHETHER VARICOSE VEINS PRESENT: ICD-10-CM

## 2022-04-29 PROCEDURE — 29581 APPL MULTLAYER CMPRN SYS LEG: CPT

## 2022-04-29 NOTE — PROGRESS NOTES
Subjective:       Patient ID: Andrew Beltran is a 66 y.o. male.    Chief Complaint: Non-healing Wound Follow Up and Venous Ulcer    4/29/30 Patient was seen in clinic for evaluation and treatment of LLE ulcers.  Decreased edema of LLE appreciated.  Patient tolerated compression well, did verbalize discomfort at posterior wound when propping leg on pillows.  MD re-assessed patient.  Rx for ABT given. Tolerated 4 layers of compression application well.    Review of Systems   Constitutional: Negative.    HENT: Negative.    Eyes: Negative.    Respiratory: Negative.    Cardiovascular: Negative.    Gastrointestinal: Negative.    Genitourinary: Negative.    Musculoskeletal: Negative.    Neurological: Negative.    Psychiatric/Behavioral: Negative.          Objective:      Temp:  [98.2 °F (36.8 °C)]   Pulse:  [94]   BP: (142)/(66)   Physical Exam  Constitutional:       Appearance: He is well-developed.   HENT:      Head: Normocephalic.   Eyes:      Conjunctiva/sclera: Conjunctivae normal.      Pupils: Pupils are equal, round, and reactive to light.   Cardiovascular:      Rate and Rhythm: Normal rate and regular rhythm.      Heart sounds: Normal heart sounds.   Pulmonary:      Effort: Pulmonary effort is normal.      Breath sounds: Normal breath sounds.   Abdominal:      General: Bowel sounds are normal.      Palpations: Abdomen is soft.   Musculoskeletal:         General: Normal range of motion.      Cervical back: Normal range of motion and neck supple.   Skin:     General: Skin is warm and dry.   Neurological:      Mental Status: He is alert and oriented to person, place, and time.      Deep Tendon Reflexes: Reflexes are normal and symmetric.            Wound 04/22/22 0800 Left lower;lateral Leg (Active)   04/22/22 0800    Pre-existing:    Primary Wound Type:    Side: Left   Orientation: lower;lateral   Location: Leg   Wound Number:    Ankle-Brachial Index:    Pulses:    Removal Indication and Assessment:    Wound  Outcome:    (Retired) Wound Type:    (Retired) Wound Length (cm):    (Retired) Wound Width (cm):    (Retired) Depth (cm):    Wound Description (Comments):    Removal Indications:    Wound Image   04/29/22 1236   Dressing Appearance Intact;Clean 04/29/22 1236   Drainage Amount None 04/29/22 1236   Tissue loss description Full thickness 04/29/22 1236   Black (%), Wound Tissue Color 100 % 04/29/22 1236   Periwound Area Intact;Dry 04/29/22 1236   Wound Edges Rolled/closed;Irregular;Defined 04/29/22 1236   Wound Length (cm) 10.5 cm 04/29/22 1236   Wound Width (cm) 2 cm 04/29/22 1236   Wound Depth (cm) 0.1 cm 04/29/22 1236   Wound Volume (cm^3) 2.1 cm^3 04/29/22 1236   Wound Surface Area (cm^2) 21 cm^2 04/29/22 1236   Care Cleansed with:;Soap and water;Sterile normal saline 04/29/22 1236   Dressing Applied;Foam;Other (comment) 04/29/22 1236   Periwound Care Moisture barrier applied 04/29/22 1236   Compression Four layer compression 04/29/22 1236   Dressing Change Due 05/04/22 04/29/22 1236            Wound 04/22/22 0800 Left lower;posterior Calf (Active)   04/22/22 0800    Pre-existing:    Primary Wound Type:    Side: Left   Orientation: lower;posterior   Location: Calf   Wound Number:    Ankle-Brachial Index:    Pulses:    Removal Indication and Assessment:    Wound Outcome:    (Retired) Wound Type:    (Retired) Wound Length (cm):    (Retired) Wound Width (cm):    (Retired) Depth (cm):    Wound Description (Comments):    Removal Indications:    Wound Image    04/29/22 1236   Dressing Appearance Intact;Clean 04/29/22 1236   Drainage Amount Small 04/29/22 1236   Drainage Characteristics/Odor Serosanguineous 04/29/22 1236   Appearance Pink 04/29/22 1236   Tissue loss description Full thickness 04/29/22 1236   Red (%), Wound Tissue Color 100 % 04/29/22 1236   Wound Edges Open 04/29/22 1236   Wound Length (cm) 1.5 cm 04/29/22 1236   Wound Width (cm) 0.7 cm 04/29/22 1236   Wound Depth (cm) 0.1 cm 04/29/22 1236   Wound Volume  (cm^3) 0.105 cm^3 04/29/22 1236   Wound Surface Area (cm^2) 1.05 cm^2 04/29/22 1236   Care Cleansed with:;Soap and water;Sterile normal saline 04/29/22 1236   Dressing Applied;Foam;Other (comment) 04/29/22 1236   Periwound Care Moisturizer applied 04/29/22 1236   Compression Four layer compression 04/29/22 1236   Dressing Change Due 05/04/22 04/29/22 1236         Assessment:         ICD-10-CM ICD-9-CM   1. Chronic venous hypertension (idiopathic) with ulcer and inflammation of bilateral lower extremity (CODE)  I87.333 459.33   2. Venous stasis ulcer, unspecified site, unspecified ulcer stage, unspecified whether varicose veins present  I83.009 454.0    L97.909    3. Chronic venous stasis dermatitis of both lower extremities  I87.2 454.1         Plan:   Tissue pathology and/or culture taken:  [] Yes [x] No   Sharp debridement performed:   [] Yes [x] No   Labs ordered this visit:   [] Yes [x] No   Imaging ordered this visit:   [] Yes [x] No           Orders Placed This Encounter   Procedures    Change dressing     LLE wounds:     Cleanse wound with:NS   Lidocaine:prn   Silver nitrate:prn   Periwound care: AF cream to toes; Sween ointment to leg   Primary dressing: Bacitracin ointment + foam  Edema control: Profore 3 or 4 layers   Frequency: weekly   Follow-up: in 2 week with Dr Jett +nurse visit 5/4/22      Other Orders: Double Tubigrip size F to RLE   Rx for Bactrim DS PO bid #30 given    Venous and arterial u/s to BLE scheduled for 5/13/22        Follow up in about 2 weeks (around 5/13/2022) for Dr Jett.            Time spent 20 minutes.

## 2022-05-04 ENCOUNTER — HOSPITAL ENCOUNTER (OUTPATIENT)
Dept: WOUND CARE | Facility: HOSPITAL | Age: 67
Discharge: HOME | End: 2022-05-04
Attending: PREVENTIVE MEDICINE
Payer: MEDICARE

## 2022-05-04 DIAGNOSIS — I87.2 CHRONIC VENOUS STASIS DERMATITIS OF BOTH LOWER EXTREMITIES: ICD-10-CM

## 2022-05-04 DIAGNOSIS — T14.8XXA OPEN WOUND: Primary | ICD-10-CM

## 2022-05-04 PROCEDURE — 29581 APPL MULTLAYER CMPRN SYS LEG: CPT

## 2022-05-04 NOTE — PROGRESS NOTES
Subjective:       Patient ID: Andrew Beltran is a 66 y.o. male.    Chief Complaint: Venous Stasis (Ulcer Lt. Lower Medial Leg with Edema)    5/4/2022: Patient seen in clinic for Nurse Visit. Dressings to Left Lower Leg changed and Compression Wrap done. Double TubiGrip compression done  on Right Lower Leg, tolerated it well. No changes or increased pain stated.     Review of Systems      Objective:         Physical Exam       Wound 04/22/22 0800 Left lower;posterior Calf (Active)   04/22/22 0800    Pre-existing:    Primary Wound Type:    Side: Left   Orientation: lower;posterior   Location: Calf   Wound Number:    Ankle-Brachial Index:    Pulses:    Removal Indication and Assessment:    Wound Outcome:    (Retired) Wound Type:    (Retired) Wound Length (cm):    (Retired) Wound Width (cm):    (Retired) Depth (cm):    Wound Description (Comments):    Removal Indications:    Dressing Appearance Intact;Moist drainage 05/04/22 0900   Drainage Amount Scant 05/04/22 0900   Drainage Characteristics/Odor Serosanguineous 05/04/22 0900   Appearance Pink;Red;Intact 05/04/22 0900   Tissue loss description Partial thickness 05/04/22 0900   Red (%), Wound Tissue Color 100 % 05/04/22 0900   Periwound Area Intact;Dry 05/04/22 0900   Wound Edges Irregular 05/04/22 0900   Wound Length (cm) 1.5 cm 05/04/22 0900   Wound Width (cm) 2 cm 05/04/22 0900   Wound Depth (cm) 0.1 cm 05/04/22 0900   Wound Volume (cm^3) 0.3 cm^3 05/04/22 0900   Wound Surface Area (cm^2) 3 cm^2 05/04/22 0900   Care Cleansed with:;Soap and water;Sterile normal saline 05/04/22 0900   Dressing Compression wrap;Foam 05/04/22 0900   Periwound Care Moisturizer applied 05/04/22 0900   Compression Four layer compression 05/04/22 0900   Dressing Change Due 05/11/22 05/04/22 0900         Assessment:         ICD-10-CM ICD-9-CM   1. Open wound  T14.8XXA 879.8   2. Chronic venous stasis dermatitis of both lower extremities  I87.2 454.1         Plan:   Tissue pathology  and/or culture taken:  [] Yes [x] No   Sharp debridement performed:   [] Yes [x] No   Labs ordered this visit:   [] Yes [x] No   Imaging ordered this visit:   [] Yes [x] No           Orders Placed This Encounter   Procedures    Change dressing     LLE wounds:     Cleanse wound with:NS   Lidocaine:prn   Silver nitrate:prn   Periwound care: AF cream to toes; Sween ointment to leg   Primary dressing: Bacitracin ointment + foam to Left posterior Leg  Edema control: Profore 3 or 4 layers to Left Leg  Frequency: weekly   Follow-up: in 2 week with Dr Jett +nurse visit 5/13/2022      Other Orders: Double Tubigrip size F to RLE   Rx for Bactrim DS PO bid #30 given     Venous and arterial u/s to BLE scheduled for 5/13/22    Change dressing     Comments    LLE wounds:     Cleanse wound with:NS   Lidocaine:prn   Silver nitrate:prn   Periwound care: AF cream to toes; Sween ointment to leg   Primary dressing: Bacitracin ointment + foam to Left Leg  Edema control: Profore 3 or 4 layers  to left leg  Frequency: weekly   Follow-up: in 2 week with Dr Jett +nurse visit 5/13/2022      Other Orders: Double Tubigrip size F to RLE   Rx for Bactrim DS PO bid #30 given     Venous and arterial u/s to BLE scheduled for 5/13/22        Follow up in about 9 days (around 5/13/2022) for Dr. Jett.

## 2022-05-13 ENCOUNTER — HOSPITAL ENCOUNTER (OUTPATIENT)
Dept: WOUND CARE | Facility: HOSPITAL | Age: 67
Discharge: HOME | End: 2022-05-13
Attending: PREVENTIVE MEDICINE
Payer: MEDICARE

## 2022-05-13 ENCOUNTER — HOSPITAL ENCOUNTER (OUTPATIENT)
Dept: RADIOLOGY | Facility: HOSPITAL | Age: 67
Discharge: HOME | End: 2022-05-13
Attending: PREVENTIVE MEDICINE
Payer: MEDICARE

## 2022-05-13 VITALS
HEIGHT: 69 IN | HEART RATE: 81 BPM | BODY MASS INDEX: 41.18 KG/M2 | TEMPERATURE: 98 F | DIASTOLIC BLOOD PRESSURE: 72 MMHG | SYSTOLIC BLOOD PRESSURE: 154 MMHG | WEIGHT: 278 LBS

## 2022-05-13 DIAGNOSIS — I87.2 CHRONIC VENOUS STASIS DERMATITIS OF BOTH LOWER EXTREMITIES: ICD-10-CM

## 2022-05-13 DIAGNOSIS — L97.909 VENOUS STASIS ULCER, UNSPECIFIED SITE, UNSPECIFIED ULCER STAGE, UNSPECIFIED WHETHER VARICOSE VEINS PRESENT: ICD-10-CM

## 2022-05-13 DIAGNOSIS — I87.333 CHRONIC VENOUS HYPERTENSION (IDIOPATHIC) WITH ULCER AND INFLAMMATION OF BILATERAL LOWER EXTREMITY (CODE): Primary | ICD-10-CM

## 2022-05-13 DIAGNOSIS — I83.009 VENOUS STASIS ULCER, UNSPECIFIED SITE, UNSPECIFIED ULCER STAGE, UNSPECIFIED WHETHER VARICOSE VEINS PRESENT: ICD-10-CM

## 2022-05-13 PROCEDURE — 93925 LOWER EXTREMITY STUDY: CPT | Mod: TC

## 2022-05-13 PROCEDURE — 29581 APPL MULTLAYER CMPRN SYS LEG: CPT

## 2022-05-13 PROCEDURE — 93970 EXTREMITY STUDY: CPT | Mod: TC

## 2022-05-13 PROCEDURE — 93970 EXTREMITY STUDY: CPT | Mod: 26,,, | Performed by: STUDENT IN AN ORGANIZED HEALTH CARE EDUCATION/TRAINING PROGRAM

## 2022-05-13 PROCEDURE — 93970 US LOWER EXTREMITY VEINS BILATERAL INSUFFICIENCY: ICD-10-PCS | Mod: 26,,, | Performed by: STUDENT IN AN ORGANIZED HEALTH CARE EDUCATION/TRAINING PROGRAM

## 2022-05-13 PROCEDURE — 93925 US LOWER EXTREMITY ARTERIES BILATERAL: ICD-10-PCS | Mod: 26,,, | Performed by: STUDENT IN AN ORGANIZED HEALTH CARE EDUCATION/TRAINING PROGRAM

## 2022-05-13 PROCEDURE — 93925 LOWER EXTREMITY STUDY: CPT | Mod: 26,,, | Performed by: STUDENT IN AN ORGANIZED HEALTH CARE EDUCATION/TRAINING PROGRAM

## 2022-05-13 NOTE — PROGRESS NOTES
Subjective:       Patient ID: Andrew Beltran is a 66 y.o. male.    Chief Complaint: Venous Stasis (Left lower leg)    5/13/22: F/U with Dr. Jett. Ultrasounds done this AM. Aquacel AG, small ABD, and Co-flex with calamine to LLE toes to knee. Tubigrip F to RLE toes to knee.    Review of Systems   All other systems reviewed and are negative.        Objective:        Physical Exam       Wound 04/22/22 0800 Left lower;posterior Calf (Active)   04/22/22 0800    Pre-existing:    Primary Wound Type:    Side: Left   Orientation: lower;posterior   Location: Calf   Wound Number:    Ankle-Brachial Index:    Pulses:    Removal Indication and Assessment:    Wound Outcome:    (Retired) Wound Type:    (Retired) Wound Length (cm):    (Retired) Wound Width (cm):    (Retired) Depth (cm):    Wound Description (Comments):    Removal Indications:    Wound Image     05/13/22 1200   Dressing Appearance Moist drainage 05/13/22 1200   Drainage Amount Small 05/13/22 1200   Drainage Characteristics/Odor Serous 05/13/22 1200   Appearance Red;Maroon;Moist 05/13/22 1200   Tissue loss description Partial thickness 05/13/22 1200   Red (%), Wound Tissue Color 100 % 05/13/22 1200   Periwound Area Blistered;Hemosiderin Staining 05/13/22 1200   Wound Edges Undefined 05/13/22 1200   Care Cleansed with:;Soap and water 05/13/22 1200   Dressing Applied;Calcium alginate;Silver;Absorptive Pad;Compression wrap 05/13/22 1200   Periwound Care Dry periwound area maintained 05/13/22 1200   Compression Two layer compression 05/13/22 1200   Dressing Change Due 05/23/22 05/13/22 1200         Assessment:         ICD-10-CM ICD-9-CM   1. Chronic venous hypertension (idiopathic) with ulcer and inflammation of bilateral lower extremity (CODE)  I87.333 459.33   2. Chronic venous stasis dermatitis of both lower extremities  I87.2 454.1   3. Venous stasis ulcer, unspecified site, unspecified ulcer stage, unspecified whether varicose veins present  I83.009 454.0     L97.909          Plan:   Tissue pathology and/or culture taken:  [] Yes [x] No   Sharp debridement performed:   [] Yes [x] No   Labs ordered this visit:   [] Yes [x] No   Imaging ordered this visit:   [] Yes [x] No           Orders Placed This Encounter   Procedures    Change dressing     LLE wounds:     Cleanse wound with:NS   Lidocaine:prn   Silver nitrate:prn   Periwound care: Moisturize BLE  Primary dressing: Aquacel AG x 2, small ABD x 2 ( front and back)  Edema control: Co-flex with calamine LLEE toes to knee  Frequency: weekly   Follow-up: Dr. Jett 5/23/22      Other Orders: Tubigrip size F to RLE   Rx for Bactrim DS PO bid #30 given     Venous and arterial u/s to BLE scheduled for 5/13/22        Follow up in about 10 days (around 5/23/2022).

## 2022-05-16 ENCOUNTER — PATIENT MESSAGE (OUTPATIENT)
Dept: FAMILY MEDICINE | Facility: CLINIC | Age: 67
End: 2022-05-16
Payer: MEDICARE

## 2022-05-17 ENCOUNTER — PATIENT MESSAGE (OUTPATIENT)
Dept: FAMILY MEDICINE | Facility: CLINIC | Age: 67
End: 2022-05-17
Payer: MEDICARE

## 2022-05-23 ENCOUNTER — HOSPITAL ENCOUNTER (OUTPATIENT)
Dept: WOUND CARE | Facility: HOSPITAL | Age: 67
Discharge: HOME | End: 2022-05-23
Attending: PREVENTIVE MEDICINE
Payer: MEDICARE

## 2022-05-23 VITALS
BODY MASS INDEX: 41.18 KG/M2 | TEMPERATURE: 98 F | HEIGHT: 69 IN | WEIGHT: 278 LBS | DIASTOLIC BLOOD PRESSURE: 85 MMHG | HEART RATE: 91 BPM | SYSTOLIC BLOOD PRESSURE: 163 MMHG

## 2022-05-23 DIAGNOSIS — E66.01 CLASS 3 SEVERE OBESITY DUE TO EXCESS CALORIES WITH SERIOUS COMORBIDITY AND BODY MASS INDEX (BMI) OF 40.0 TO 44.9 IN ADULT: ICD-10-CM

## 2022-05-23 DIAGNOSIS — I83.009 VENOUS STASIS ULCER: ICD-10-CM

## 2022-05-23 DIAGNOSIS — E11.42 TYPE 2 DIABETES MELLITUS WITH DIABETIC POLYNEUROPATHY, WITH LONG-TERM CURRENT USE OF INSULIN: ICD-10-CM

## 2022-05-23 DIAGNOSIS — T14.8XXA OPEN WOUND: ICD-10-CM

## 2022-05-23 DIAGNOSIS — I87.2 VENOUS INSUFFICIENCY OF BOTH LOWER EXTREMITIES: ICD-10-CM

## 2022-05-23 DIAGNOSIS — L97.909 VENOUS STASIS ULCER: ICD-10-CM

## 2022-05-23 DIAGNOSIS — I83.009 VENOUS STASIS ULCER, UNSPECIFIED SITE, UNSPECIFIED ULCER STAGE, UNSPECIFIED WHETHER VARICOSE VEINS PRESENT: Primary | ICD-10-CM

## 2022-05-23 DIAGNOSIS — Z79.4 TYPE 2 DIABETES MELLITUS WITH DIABETIC POLYNEUROPATHY, WITH LONG-TERM CURRENT USE OF INSULIN: ICD-10-CM

## 2022-05-23 DIAGNOSIS — L97.909 VENOUS STASIS ULCER, UNSPECIFIED SITE, UNSPECIFIED ULCER STAGE, UNSPECIFIED WHETHER VARICOSE VEINS PRESENT: Primary | ICD-10-CM

## 2022-05-23 PROCEDURE — 29581 APPL MULTLAYER CMPRN SYS LEG: CPT

## 2022-05-23 NOTE — PROGRESS NOTES
Subjective:       Patient ID: Andrew Beltran is a 66 y.o. male.    Chief Complaint: Wound Care  5/13/22: F/U with Dr. Jett. Ultrasounds done this AM. Aquacel AG, small ABD, and Co-flex with calamine to LLE toes to knee. Tubigrip F to RLE toes to knee.  5/23/2022:  Patient to wound care center with no new problems or complaints. Pt was seen by Dr. Ceballos (covering for Dr. Jett), RLE swelling noted and educated on the importance of BLE elevation. Compression stocking rx given to patient by Dr. Ceballos.     Review of Systems    All systems were reviewed and are negative, except that mentioned in the HPI.    Objective:        Physical Exam  Constitutional:       Appearance: He is well-developed.   HENT:      Head: Normocephalic and atraumatic.   Eyes:      Pupils: Pupils are equal, round, and reactive to light.   Cardiovascular:      Rate and Rhythm: Normal rate.   Pulmonary:      Effort: Pulmonary effort is normal. No respiratory distress.      Breath sounds: No stridor.   Abdominal:      General: There is no distension.   Musculoskeletal:      Cervical back: Normal range of motion.   Skin:     Comments: See Synopsis for wound details   Neurological:      Mental Status: He is alert and oriented to person, place, and time.            Wound 04/22/22 0800 Left lower;posterior Calf (Active)   04/22/22 0800    Pre-existing:    Primary Wound Type:    Side: Left   Orientation: lower;posterior   Location: Calf   Wound Number:    Ankle-Brachial Index:    Pulses:    Removal Indication and Assessment:    Wound Outcome:    (Retired) Wound Type:    (Retired) Wound Length (cm):    (Retired) Wound Width (cm):    (Retired) Depth (cm):    Wound Description (Comments):    Removal Indications:    Wound Image   05/23/22 1000   Dressing Appearance Moist drainage;Intact 05/23/22 1000   Drainage Amount Scant 05/23/22 1000   Drainage Characteristics/Odor Serosanguineous 05/23/22 1000   Appearance Intact;Pink;Red 05/23/22 1000    Red (%), Wound Tissue Color 100 % 05/23/22 1000   Periwound Area Satellite lesion;Intact 05/23/22 1000   Wound Edges Irregular 05/23/22 1000   Wound Length (cm) 1 cm 05/23/22 1000   Wound Width (cm) 0.6 cm 05/23/22 1000   Wound Depth (cm) 0.2 cm 05/23/22 1000   Wound Volume (cm^3) 0.12 cm^3 05/23/22 1000   Wound Surface Area (cm^2) 0.6 cm^2 05/23/22 1000   Care Cleansed with:;Soap and water;Sterile normal saline 05/23/22 1000   Dressing Applied;Changed;Calcium alginate;Silver;Absorptive Pad;Compression wrap 05/23/22 1000   Periwound Care Absorptive dressing applied;Dry periwound area maintained 05/23/22 1000   Compression Two layer compression 05/23/22 1000   Dressing Change Due 05/30/22 05/23/22 1000         Assessment:         ICD-10-CM ICD-9-CM   1. Venous stasis ulcer, unspecified site, unspecified ulcer stage, unspecified whether varicose veins present  I83.009 454.0    L97.909    2. Venous insufficiency of both lower extremities  I87.2 459.81   3. Venous stasis ulcer  I83.009 454.0    L97.909    4. Open wound  T14.8XXA 879.8         Plan:   Tissue pathology and/or culture taken:  [] Yes [x] No   Sharp debridement performed:   [] Yes [x] No   Labs ordered this visit:   [] Yes [x] No   Imaging ordered this visit:   [] Yes [x] No           Orders Placed This Encounter   Procedures    COMPRESSION STOCKINGS     Bilateral knee high     Order Specific Question:   Pressure amount:     Answer:   20-30 mmHg    Change dressing     LLE wounds:     Cleanse wound with:NS   Lidocaine:prn   Silver nitrate:prn   Periwound care: Moisturize BLE   Primary dressing: Aquacel AG,  small ABD   Edema control: Co-flex with calamine LLEE toes to knee   Frequency: weekly   Follow-up: Dr. Jett 5/30/22       Other Orders: Tubigrip size F x2 to RLE      Ambulation encouraged.  Elevate legs while at rest.  Compression stockings/socks.  Continue home meds and control BG.  All questions answered.  Follow up in about 1 week (around  5/30/2022).

## 2022-05-24 ENCOUNTER — PATIENT OUTREACH (OUTPATIENT)
Dept: ADMINISTRATIVE | Facility: HOSPITAL | Age: 67
End: 2022-05-24
Payer: MEDICARE

## 2022-05-24 ENCOUNTER — PATIENT MESSAGE (OUTPATIENT)
Dept: ADMINISTRATIVE | Facility: HOSPITAL | Age: 67
End: 2022-05-24
Payer: MEDICARE

## 2022-05-24 NOTE — PROGRESS NOTES
2022 Care Everywhere updates requested and reviewed.  Immunizations reconciled. Media reports reviewed.  Duplicate HM overrides and  orders removed.  Overdue HM topic chart audit and/or requested.  Overdue lab testing linked to upcoming lab appointments if applies. Patient outreach regarding Health Maintenance- left VM /portal message sent    Health Maintenance Due   Topic Date Due    Shingles Vaccine (1 of 2) 2017    Pneumococcal Vaccines (Age 65+) (3 - PPSV23 or PCV20) 2021    Hemoglobin A1c  10/12/2021    COVID-19 Vaccine (4 - Booster for Pfizer series) 2022    Lipid Panel  2022    Foot Exam  2022    Eye Exam  2022    Diabetes Urine Screening  2022

## 2022-05-30 ENCOUNTER — HOSPITAL ENCOUNTER (OUTPATIENT)
Dept: WOUND CARE | Facility: HOSPITAL | Age: 67
Discharge: HOME | End: 2022-05-30
Attending: PREVENTIVE MEDICINE
Payer: MEDICARE

## 2022-05-30 VITALS
HEIGHT: 69 IN | HEART RATE: 81 BPM | TEMPERATURE: 98 F | DIASTOLIC BLOOD PRESSURE: 86 MMHG | SYSTOLIC BLOOD PRESSURE: 183 MMHG | BODY MASS INDEX: 41.18 KG/M2 | WEIGHT: 278 LBS

## 2022-05-30 DIAGNOSIS — I87.333 CHRONIC VENOUS HYPERTENSION (IDIOPATHIC) WITH ULCER AND INFLAMMATION OF BILATERAL LOWER EXTREMITY (CODE): Primary | ICD-10-CM

## 2022-05-30 DIAGNOSIS — I83.009 VENOUS STASIS ULCER, UNSPECIFIED SITE, UNSPECIFIED ULCER STAGE, UNSPECIFIED WHETHER VARICOSE VEINS PRESENT: ICD-10-CM

## 2022-05-30 DIAGNOSIS — T14.8XXA OPEN WOUND: ICD-10-CM

## 2022-05-30 DIAGNOSIS — I87.2 VENOUS INSUFFICIENCY OF BOTH LOWER EXTREMITIES: ICD-10-CM

## 2022-05-30 DIAGNOSIS — L97.909 VENOUS STASIS ULCER, UNSPECIFIED SITE, UNSPECIFIED ULCER STAGE, UNSPECIFIED WHETHER VARICOSE VEINS PRESENT: ICD-10-CM

## 2022-05-30 PROCEDURE — 29581 APPL MULTLAYER CMPRN SYS LEG: CPT

## 2022-05-30 NOTE — PROGRESS NOTES
Subjective:       Patient ID: Andrew Beltran is a 66 y.o. male.    Chief Complaint: Wound Care    Patient to wound care with no new problems or compliants, wounds progressing well.  Measured for compression stocking size.     Review of Systems   All other systems reviewed and are negative.        Objective:        Physical Exam       Wound 04/22/22 0800 Left lower;lateral Leg (Active)   04/22/22 0800    Pre-existing:    Primary Wound Type:    Side: Left   Orientation: lower;lateral   Location: Leg   Wound Number:    Ankle-Brachial Index:    Pulses:    Removal Indication and Assessment:    Wound Outcome:    (Retired) Wound Type:    (Retired) Wound Length (cm):    (Retired) Wound Width (cm):    (Retired) Depth (cm):    Wound Description (Comments):    Removal Indications:    Wound Image   05/30/22 1000   Dressing Appearance Intact 05/30/22 1000   Drainage Amount None 05/30/22 1000   Care Cleansed with:;Soap and water 05/30/22 1000   Dressing Applied;Changed;Compression wrap 05/30/22 1000   Periwound Care Moisturizer applied 05/30/22 1000   Compression Two layer compression 05/30/22 1000   Dressing Change Due 06/06/22 05/30/22 1000            Wound 04/22/22 0800 Left lower;posterior Calf (Active)   04/22/22 0800    Pre-existing:    Primary Wound Type:    Side: Left   Orientation: lower;posterior   Location: Calf   Wound Number:    Ankle-Brachial Index:    Pulses:    Removal Indication and Assessment:    Wound Outcome:    (Retired) Wound Type:    (Retired) Wound Length (cm):    (Retired) Wound Width (cm):    (Retired) Depth (cm):    Wound Description (Comments):    Removal Indications:    Wound Image   05/30/22 1000   Dressing Appearance Intact;Moist drainage 05/30/22 1000   Drainage Amount Scant 05/30/22 1000   Drainage Characteristics/Odor Serosanguineous 05/30/22 1000   Appearance Intact;Pink 05/30/22 1000   Red (%), Wound Tissue Color 100 % 05/30/22 1000   Periwound Area Intact;Satellite lesion 05/30/22 1000    Wound Edges Irregular 05/30/22 1000   Wound Length (cm) 0.4 cm 05/30/22 1000   Wound Width (cm) 0.5 cm 05/30/22 1000   Wound Depth (cm) 0.1 cm 05/30/22 1000   Wound Volume (cm^3) 0.02 cm^3 05/30/22 1000   Wound Surface Area (cm^2) 0.2 cm^2 05/30/22 1000   Care Cleansed with:;Soap and water;Sterile normal saline 05/30/22 1000   Dressing Applied;Changed;Calcium alginate;Silver;Compression wrap 05/30/22 1000   Periwound Care Absorptive dressing applied 05/30/22 1000   Compression Two layer compression 05/30/22 1000   Dressing Change Due 07/04/22 05/30/22 1000         Assessment:         ICD-10-CM ICD-9-CM   1. Chronic venous hypertension (idiopathic) with ulcer and inflammation of bilateral lower extremity (CODE)  I87.333 459.33   2. Venous stasis ulcer, unspecified site, unspecified ulcer stage, unspecified whether varicose veins present  I83.009 454.0    L97.909    3. Venous insufficiency of both lower extremities  I87.2 459.81   4. Open wound  T14.8XXA 879.8         Plan:   Tissue pathology and/or culture taken:  [] Yes [x] No   Sharp debridement performed:   [] Yes [x] No   Labs ordered this visit:   [] Yes [x] No   Imaging ordered this visit:   [] Yes [x] No           Orders Placed This Encounter   Procedures    Change dressing     LLE wounds:     Cleanse wound with:NS   Lidocaine:prn   Silver nitrate:prn   Periwound care: Moisturize BLE   Primary dressing: Aquacel AG,  small ABD   Edema control: Co-flex with calamine LLEE toes to knee   Frequency: weekly   Follow-up: Dr. Jett 06/04/22       Other Orders: Tubigrip size F x2 to RLE        Follow up in about 1 week (around 6/6/2022).

## 2022-05-31 ENCOUNTER — PATIENT MESSAGE (OUTPATIENT)
Dept: ADMINISTRATIVE | Facility: HOSPITAL | Age: 67
End: 2022-05-31
Payer: MEDICARE

## 2022-06-06 ENCOUNTER — HOSPITAL ENCOUNTER (OUTPATIENT)
Dept: WOUND CARE | Facility: HOSPITAL | Age: 67
Discharge: HOME | End: 2022-06-06
Attending: PREVENTIVE MEDICINE
Payer: MEDICARE

## 2022-06-06 VITALS
TEMPERATURE: 98 F | DIASTOLIC BLOOD PRESSURE: 85 MMHG | BODY MASS INDEX: 41.18 KG/M2 | HEIGHT: 69 IN | HEART RATE: 85 BPM | SYSTOLIC BLOOD PRESSURE: 186 MMHG | WEIGHT: 278 LBS

## 2022-06-06 DIAGNOSIS — L97.909 VENOUS STASIS ULCER, UNSPECIFIED SITE, UNSPECIFIED ULCER STAGE, UNSPECIFIED WHETHER VARICOSE VEINS PRESENT: ICD-10-CM

## 2022-06-06 DIAGNOSIS — I83.009 VENOUS STASIS ULCER, UNSPECIFIED SITE, UNSPECIFIED ULCER STAGE, UNSPECIFIED WHETHER VARICOSE VEINS PRESENT: ICD-10-CM

## 2022-06-06 DIAGNOSIS — I87.333 CHRONIC VENOUS HYPERTENSION (IDIOPATHIC) WITH ULCER AND INFLAMMATION OF BILATERAL LOWER EXTREMITY (CODE): Primary | ICD-10-CM

## 2022-06-06 PROCEDURE — 99213 OFFICE O/P EST LOW 20 MIN: CPT

## 2022-06-06 NOTE — PROGRESS NOTES
"   Subjective:       Patient ID: Andrew Beltran is a 66 y.o. male.    Chief Complaint: Non-healing Wound Follow Up     F/U l for LLE wounds.  Left lateral lower leg wound is resolved.  Left posterior leg wound is progressing well.  Patient brought in new 20-30 compression stockings from home.  He states, "I sure would like to be able to take a shower without a bag on my leg."    Review of Systems   All other systems reviewed and are negative.        Objective:        Physical Exam       Wound 04/22/22 0800 Left lower;posterior Calf (Active)   04/22/22 0800    Pre-existing:    Primary Wound Type:    Side: Left   Orientation: lower;posterior   Location: Calf   Wound Number:    Ankle-Brachial Index:    Pulses:    Removal Indication and Assessment:    Wound Outcome:    (Retired) Wound Type:    (Retired) Wound Length (cm):    (Retired) Wound Width (cm):    (Retired) Depth (cm):    Wound Description (Comments):    Removal Indications:    Wound Image   06/06/22 1000   Dressing Appearance Intact;Moist drainage 06/06/22 1000   Drainage Amount Scant 06/06/22 1000   Drainage Characteristics/Odor Serosanguineous 06/06/22 1000   Appearance Pink 06/06/22 1000   Tissue loss description Partial thickness 06/06/22 1000   Red (%), Wound Tissue Color 100 % 06/06/22 1000   Periwound Area Edematous;Hemosiderin Staining 06/06/22 1000   Wound Edges Open 06/06/22 1000   Wound Length (cm) 0.1 cm 06/06/22 1000   Wound Width (cm) 0.1 cm 06/06/22 1000   Wound Depth (cm) 0.1 cm 06/06/22 1000   Wound Volume (cm^3) 0.001 cm^3 06/06/22 1000   Wound Surface Area (cm^2) 0.01 cm^2 06/06/22 1000   Care Cleansed with:;Sterile normal saline 06/06/22 1000   Dressing Applied;Island/border;Calcium alginate;Silver 06/06/22 1000   Periwound Care Skin barrier film applied 06/06/22 1000   Compression Compression Stocking (20-30 mmHg) 06/06/22 1000   Dressing Change Due 06/09/22 06/06/22 1000       [REMOVED]      Wound 04/22/22 0800 Left lower;lateral Leg " (Removed)   04/22/22 0800    Pre-existing:    Primary Wound Type:    Side: Left   Orientation: lower;lateral   Location: Leg   Wound Number:    Ankle-Brachial Index:    Pulses:    Removal Indication and Assessment: closed/resurfaced   Wound Outcome: Healed   (Retired) Wound Type:    (Retired) Wound Length (cm):    (Retired) Wound Width (cm):    (Retired) Depth (cm):    Wound Description (Comments):    Removal Indications:    Removed 06/06/22 1053   Wound Image   06/06/22 1000   Appearance Closed/resurfaced 06/06/22 1000   Wound Edges Rolled/closed 06/06/22 1000         Assessment:         ICD-10-CM ICD-9-CM   1. Chronic venous hypertension (idiopathic) with ulcer and inflammation of bilateral lower extremity (CODE)  I87.333 459.33   2. Venous stasis ulcer, unspecified site, unspecified ulcer stage, unspecified whether varicose veins present  I83.009 454.0    L97.909          Plan:   Tissue pathology and/or culture taken:  [] Yes [x] No   Sharp debridement performed:   [] Yes [x] No   Labs ordered this visit:   [] Yes [x] No   Imaging ordered this visit:   [] Yes [x] No      Patient instructed on compression stocking use and skin care.       Trial of compression stockings        Orders Placed This Encounter   Procedures    Change dressing     LLE wound  Cleanse wound with:NS   Periwound care: Cavilon, Moisturize BLE   Primary dressing: Aquacel AG border dressing  Edema control: BLE Compression stockings, elevate LEs as often as possible.  Frequency: weekly   Follow-up: Dr. Jett 06/13/22        Follow up in about 1 week (around 6/13/2022).

## 2022-06-13 ENCOUNTER — HOSPITAL ENCOUNTER (OUTPATIENT)
Dept: WOUND CARE | Facility: HOSPITAL | Age: 67
Discharge: HOME | End: 2022-06-13
Attending: PREVENTIVE MEDICINE
Payer: MEDICARE

## 2022-06-13 VITALS
TEMPERATURE: 98 F | HEIGHT: 69 IN | WEIGHT: 278 LBS | SYSTOLIC BLOOD PRESSURE: 168 MMHG | DIASTOLIC BLOOD PRESSURE: 76 MMHG | HEART RATE: 80 BPM | BODY MASS INDEX: 41.18 KG/M2

## 2022-06-13 DIAGNOSIS — I83.009 VENOUS STASIS ULCER, UNSPECIFIED SITE, UNSPECIFIED ULCER STAGE, UNSPECIFIED WHETHER VARICOSE VEINS PRESENT: ICD-10-CM

## 2022-06-13 DIAGNOSIS — I87.2 VENOUS INSUFFICIENCY OF BOTH LOWER EXTREMITIES: ICD-10-CM

## 2022-06-13 DIAGNOSIS — L97.909 VENOUS STASIS ULCER, UNSPECIFIED SITE, UNSPECIFIED ULCER STAGE, UNSPECIFIED WHETHER VARICOSE VEINS PRESENT: ICD-10-CM

## 2022-06-13 DIAGNOSIS — I87.333 CHRONIC VENOUS HYPERTENSION (IDIOPATHIC) WITH ULCER AND INFLAMMATION OF BILATERAL LOWER EXTREMITY (CODE): Primary | ICD-10-CM

## 2022-06-13 PROCEDURE — 29581 APPL MULTLAYER CMPRN SYS LEG: CPT

## 2022-06-13 NOTE — PROGRESS NOTES
[facility]    Subjective:       Patient ID: Andrew Beltran is a 66 y.o. male.    Chief Complaint: Wound Care    Follow up appt. Patient has small open areas noted. Dr Jett assessed patient and changed orders back to coflex compression for this week. Plan of care instructed to patient who voiced understanding. F/U in 1 week.    Review of Systems   All other systems reviewed and are negative.        Objective:        Physical Exam       Wound 04/22/22 0800 Left lower;posterior Calf (Active)   04/22/22 0800    Pre-existing:    Primary Wound Type:    Side: Left   Orientation: lower;posterior   Location: Calf   Wound Number:    Ankle-Brachial Index:    Pulses:    Removal Indication and Assessment:    Wound Outcome:    (Retired) Wound Type:    (Retired) Wound Length (cm):    (Retired) Wound Width (cm):    (Retired) Depth (cm):    Wound Description (Comments):    Removal Indications:    Dressing Appearance Intact;Moist drainage 06/13/22 1200   Drainage Amount Scant 06/13/22 1200   Drainage Characteristics/Odor Serosanguineous 06/13/22 1200   Appearance Pink 06/13/22 1200   Tissue loss description Partial thickness 06/13/22 1200   Red (%), Wound Tissue Color 100 % 06/13/22 1200   Periwound Area Edematous 06/13/22 1200   Wound Edges Open 06/13/22 1200   Wound Length (cm) 0.1 cm 06/13/22 1200   Wound Width (cm) 0.1 cm 06/13/22 1200   Wound Depth (cm) 0.1 cm 06/13/22 1200   Wound Volume (cm^3) 0.001 cm^3 06/13/22 1200   Wound Surface Area (cm^2) 0.01 cm^2 06/13/22 1200   Care Cleansed with:;Sterile normal saline 06/13/22 1200   Dressing Applied;Calcium alginate;Silver;Compression wrap 06/13/22 1200   Compression Two layer compression 06/13/22 1200   Dressing Change Due 06/20/22 06/13/22 1200         Assessment:         ICD-10-CM ICD-9-CM   1. Chronic venous hypertension (idiopathic) with ulcer and inflammation of bilateral lower extremity (CODE)  I87.333 459.33   2.  Venous stasis ulcer, unspecified site, unspecified ulcer stage, unspecified whether varicose veins present  I83.009 454.0    L97.909    3. Venous insufficiency of both lower extremities  I87.2 459.81         Plan:   Tissue pathology and/or culture taken:  [] Yes [x] No   Sharp debridement performed:   [] Yes [x] No   Labs ordered this visit:   [] Yes [x] No   Imaging ordered this visit:   [] Yes [x] No           Orders Placed This Encounter   Procedures    Change dressing     LLE wound   Cleanse wound with:NS   Periwound care: Cavilon, Moisturize BLE   Primary dressing: Aquacel AG border dressing   Edema control: Coflex, elevate LEs as often as possible.   Frequency: weekly   Follow-up: Dr. Jett 06/20/22        Follow up in about 1 week (around 6/20/2022).

## 2022-06-14 RX ORDER — TAMSULOSIN HYDROCHLORIDE 0.4 MG/1
0.4 CAPSULE ORAL NIGHTLY
Qty: 90 CAPSULE | Refills: 0 | Status: CANCELLED | OUTPATIENT
Start: 2022-06-14

## 2022-06-14 NOTE — TELEPHONE ENCOUNTER
Care Due:                  Date            Visit Type   Department     Provider  --------------------------------------------------------------------------------                                EP -                              PRIMARY      [facility]  Last Visit: 03-      CARE ([facility])   [facility] Seymour Schwab  Next Visit: None Scheduled  None         None Found                                                            Last  Test          Frequency    Reason                     Performed    Due Date  --------------------------------------------------------------------------------    CBC.........  12 months..  allopurinoL..............  06- 06-    CMP.........  12 months..  allopurinoL, furosemide,   04- 03-                             insulin, losartan........    HBA1C.......  6 months...  dulaglutide, insulin.....  07- 01-    Uric Acid...  12 months..  allopurinoL..............  12- 12-    [facility] Embedded Care Gaps. Reference number: [id number]. 6/14/2022   8:32:33 AM CDT

## 2022-06-14 NOTE — TELEPHONE ENCOUNTER
----- Message from Hao Butts sent at 6/14/2022  8:42 AM CDT -----  Contact: pt.  .Type:  Needs Medical Advice    Who Called: pt  Would the patient rather a call back or a response via [facility]? Call back  Best Call Back Number: [phone]  Additional Information: Pt. Would like to establish care with [facility].  Formal pt. Of Dr. Schwab need an appt for Aug.

## 2022-06-15 ENCOUNTER — TELEPHONE (OUTPATIENT)
Dept: FAMILY MEDICINE | Facility: CLINIC | Age: 67
End: 2022-06-15
Payer: MEDICARE

## 2022-06-15 NOTE — TELEPHONE ENCOUNTER
----- Message from Harvey Lora sent at 6/15/2022 10:16 AM CDT -----  Contact: Pt  Type:  RX Refill Request    Who Called:   Refill or New Rx:refill  RX Name and Strength: tamsulosin (FLOMAX) 0.4 mg Cap  How is the patient currently taking it? (ex. 1XDay):1  Is this a 30 day or 90 day RX:90  [facility] Pharm[facility] with phone number[facility]   Local or Mail Order:local  Ordering Provider:eryn  Would the patient rather a call back or a response via [facility]? Call   Best Call Back Number:[phone]  Additional Information:   Former pt of Dr Brock  Pt stated pharmacy needs approval from new provider

## 2022-06-16 ENCOUNTER — PATIENT MESSAGE (OUTPATIENT)
Dept: FAMILY MEDICINE | Facility: CLINIC | Age: 67
End: 2022-06-16
Payer: MEDICARE

## 2022-06-16 RX ORDER — TAMSULOSIN HYDROCHLORIDE 0.4 MG/1
0.4 CAPSULE ORAL NIGHTLY
Qty: 90 CAPSULE | Refills: 0 | Status: CANCELLED | OUTPATIENT
Start: 2022-06-14

## 2022-06-16 RX ORDER — TAMSULOSIN HYDROCHLORIDE 0.4 MG/1
0.4 CAPSULE ORAL NIGHTLY
Qty: 90 CAPSULE | Refills: 0 | Status: CANCELLED | OUTPATIENT
Start: 2022-06-16

## 2022-06-16 NOTE — TELEPHONE ENCOUNTER
No new care gaps identified.  [facility] Embedded Care Gaps. Reference number: [id number]. 6/16/2022   3:35:34 PM CDT

## 2022-06-16 NOTE — TELEPHONE ENCOUNTER
No new care gaps identified.  [facility] Embedded Care Gaps. Reference number: [id number]. 6/16/2022   10:04:38 AM [email or web address]

## 2022-06-17 ENCOUNTER — PATIENT OUTREACH (OUTPATIENT)
Dept: ADMINISTRATIVE | Facility: HOSPITAL | Age: 67
End: 2022-06-17
Payer: MEDICARE

## 2022-06-17 RX ORDER — TAMSULOSIN HYDROCHLORIDE 0.4 MG/1
0.4 CAPSULE ORAL NIGHTLY
Qty: 90 CAPSULE | Refills: 0 | Status: SHIPPED | OUTPATIENT
Start: 2022-06-17 | End: 2022-07-15

## 2022-06-17 RX ORDER — TAMSULOSIN HYDROCHLORIDE 0.4 MG/1
0.4 CAPSULE ORAL NIGHTLY
Qty: 90 CAPSULE | Refills: 0 | Status: SHIPPED | OUTPATIENT
Start: 2022-06-17 | End: 2022-12-12 | Stop reason: SDUPTHER

## 2022-06-17 NOTE — TELEPHONE ENCOUNTER
Refill Routing Note   Medication(s) are not appropriate for processing by [facility] for the following reason(s):      - Required indication for medication not on problem list (Prostate cancer)    [facility] action(s):  Defer Medication-related problems identified: Drug-disease interaction        Medication reconciliation completed: No     Appointments  past 12m or future 3m with PCP    Date Provider   Last Visit   3/21/2022 Nato Schwab Jr., MD   Next Visit   Visit date not found Nato Schwab Jr., MD   ED visits in past 90 days: 0        Note composed:9:26 AM 06/17/2022

## 2022-06-17 NOTE — TELEPHONE ENCOUNTER
No new care gaps identified.  [facility] Embedded Care Gaps. Reference number: [id number]. 6/16/2022   10:28:06 PM CDT

## 2022-06-17 NOTE — TELEPHONE ENCOUNTER
No new care gaps identified.  [facility] Embedded Care Gaps. Reference number: [id number]. 6/17/2022   9:59:42 AM CASSYT

## 2022-06-20 ENCOUNTER — HOSPITAL ENCOUNTER (OUTPATIENT)
Dept: WOUND CARE | Facility: HOSPITAL | Age: 67
Discharge: HOME | End: 2022-06-20
Attending: PREVENTIVE MEDICINE
Payer: MEDICARE

## 2022-06-20 VITALS
BODY MASS INDEX: 41.18 KG/M2 | TEMPERATURE: 98 F | WEIGHT: 278 LBS | SYSTOLIC BLOOD PRESSURE: 163 MMHG | DIASTOLIC BLOOD PRESSURE: 80 MMHG | HEIGHT: 69 IN | HEART RATE: 91 BPM

## 2022-06-20 DIAGNOSIS — Q84.5 ENLARGED AND HYPERTROPHIC NAILS: ICD-10-CM

## 2022-06-20 DIAGNOSIS — I87.333 CHRONIC VENOUS HYPERTENSION (IDIOPATHIC) WITH ULCER AND INFLAMMATION OF BILATERAL LOWER EXTREMITY (CODE): Primary | ICD-10-CM

## 2022-06-20 DIAGNOSIS — I87.2 VENOUS INSUFFICIENCY OF BOTH LOWER EXTREMITIES: ICD-10-CM

## 2022-06-20 DIAGNOSIS — I87.2 CHRONIC VENOUS STASIS DERMATITIS OF BOTH LOWER EXTREMITIES: ICD-10-CM

## 2022-06-20 PROCEDURE — 11719 TRIM NAIL(S) ANY NUMBER: CPT

## 2022-06-20 PROCEDURE — 99213 OFFICE O/P EST LOW 20 MIN: CPT | Mod: 25

## 2022-06-20 PROCEDURE — 11721 DEBRIDE NAIL 6 OR MORE: CPT

## 2022-06-20 NOTE — PROGRESS NOTES
[facility]    Subjective:       Patient ID: Andrew Beltran is a 66 y.o. male.    Chief Complaint: Venous Ulcer (Left Lower Leg )    Pt presents for follow up for his VSU. Discussed wound has improved. No acute complaints.    Review of Systems   All other systems reviewed and are negative.        Objective:        Physical Exam       Wound 04/22/22 0800 Left lower;posterior Calf (Active)   04/22/22 0800    Pre-existing:    Primary Wound Type:    Side: Left   Orientation: lower;posterior   Location: Calf   Wound Number:    Ankle-Brachial Index:    Pulses:    Removal Indication and Assessment:    Wound Outcome:    (Retired) Wound Type:    (Retired) Wound Length (cm):    (Retired) Wound Width (cm):    (Retired) Depth (cm):    Wound Description (Comments):    Removal Indications:    Wound Image    06/20/22 1200   Dressing Appearance Intact;Dried drainage 06/20/22 1200   Drainage Amount None 06/20/22 1200   Appearance Pink;Red 06/20/22 1200   Tissue loss description Partial thickness 06/20/22 1200   Red (%), Wound Tissue Color 100 % 06/20/22 1200   Periwound Area Edematous 06/20/22 1200   Wound Edges Open 06/20/22 1200   Wound Length (cm) 0.1 cm 06/20/22 1200   Wound Width (cm) 0.1 cm 06/20/22 1200   Wound Depth (cm) 0.1 cm 06/20/22 1200   Wound Volume (cm^3) 0.001 cm^3 06/20/22 1200   Wound Surface Area (cm^2) 0.01 cm^2 06/20/22 1200   Care Cleansed with:;Soap and water 06/20/22 1200   Dressing Change Due 06/27/22 06/20/22 1200   With patient's verbal consent, nails were aggressively reduced and debrided x 10 to their soft tissue attachment mechanically with nippers. Patient reports relief following the procedure. No anesthesia or hemostasis required.       Assessment:         ICD-10-CM ICD-9-CM   1. Chronic venous hypertension (idiopathic) with ulcer and inflammation of bilateral lower extremity (CODE)  I87.333 459.33   2. Enlarged and hypertrophic nails  Q84.5 757.5    3. Venous insufficiency of both lower extremities  I87.2 459.81   4. Chronic venous stasis dermatitis of both lower extremities  I87.2 454.1            Plan:   Tissue pathology and/or culture taken:  [] Yes [x] No   Sharp debridement performed:   [] Yes [x] No   Labs ordered this visit:   [] Yes [x] No   Imaging ordered this visit:   [] Yes [x] No     Instructed to use his Compression Stockings which patient brought to clinic from home and placed on left leg today .    Orders Placed This Encounter   Procedures    Change dressing     LLE wound   Cleanse wound with:NS   Periwound care: Cavilon, Moisturize BLE   with Sween Cream   Primary dressing:  Compression Stockings brought from Home and placed on Patient.  Frequency: Every Day, Remove at Night. Elevate Legs Frequently.    Follow-up: Dr. Jett 06/27/2022.        Follow up in about 1 week (around 6/27/2022) for Dr. Jett.

## 2022-06-21 ENCOUNTER — PATIENT MESSAGE (OUTPATIENT)
Dept: FAMILY MEDICINE | Facility: CLINIC | Age: 67
End: 2022-06-21
Payer: MEDICARE

## 2022-06-21 NOTE — TELEPHONE ENCOUNTER
No new care gaps identified.  [facility] Embedded Care Gaps. Reference number: [id number]. 6/21/2022   2:47:00 PM CDT

## 2022-06-27 ENCOUNTER — HOSPITAL ENCOUNTER (OUTPATIENT)
Dept: WOUND CARE | Facility: HOSPITAL | Age: 67
Discharge: HOME | End: 2022-06-27
Attending: PREVENTIVE MEDICINE
Payer: MEDICARE

## 2022-06-27 VITALS
HEIGHT: 69 IN | DIASTOLIC BLOOD PRESSURE: 72 MMHG | TEMPERATURE: 98 F | BODY MASS INDEX: 41.18 KG/M2 | HEART RATE: 86 BPM | SYSTOLIC BLOOD PRESSURE: 146 MMHG | WEIGHT: 278 LBS

## 2022-06-27 DIAGNOSIS — I87.2 VENOUS INSUFFICIENCY OF BOTH LOWER EXTREMITIES: ICD-10-CM

## 2022-06-27 DIAGNOSIS — I87.333 CHRONIC VENOUS HYPERTENSION (IDIOPATHIC) WITH ULCER AND INFLAMMATION OF BILATERAL LOWER EXTREMITY (CODE): Primary | ICD-10-CM

## 2022-06-27 PROCEDURE — 99212 OFFICE O/P EST SF 10 MIN: CPT

## 2022-06-27 NOTE — PROGRESS NOTES
[facility]    Subjective:       Patient ID: Andrew Beltran is a 67 y.o. male.    Chief Complaint: Wound Care and Venous Stasis    Pt presents for follow up regarding is LLE venous ulcers. Edema controlled with compression. No open wounds.    Review of Systems   All other systems reviewed and are negative.        Objective:        Physical Exam       Wound 04/22/22 0800 Left lower;posterior Calf (Active)   04/22/22 0800    Pre-existing:    Primary Wound Type:    Side: Left   Orientation: lower;posterior   Location: Calf   Wound Number:    Ankle-Brachial Index:    Pulses:    Removal Indication and Assessment:    Wound Outcome:    (Retired) Wound Type:    (Retired) Wound Length (cm):    (Retired) Wound Width (cm):    (Retired) Depth (cm):    Wound Description (Comments):    Removal Indications:    Wound Image   06/27/22 0900   Dressing Appearance Intact;Dry;Clean 06/27/22 0900   Drainage Amount None 06/27/22 0900   Appearance Pink;Red 06/27/22 0900   Tissue loss description Partial thickness 06/27/22 0900   Red (%), Wound Tissue Color 100 % 06/27/22 0900   Periwound Area Edematous 06/27/22 0900   Wound Edges Rolled/closed 06/27/22 0900   Wound Length (cm) 0 cm 06/27/22 0900   Wound Width (cm) 0 cm 06/27/22 0900   Wound Depth (cm) 0 cm 06/27/22 0900   Wound Volume (cm^3) 0 cm^3 06/27/22 0900   Wound Surface Area (cm^2) 0 cm^2 06/27/22 0900   Care Cleansed with:;Sterile normal saline 06/27/22 0900   Dressing Other (comment) 06/27/22 0900   Periwound Care Moisturizer applied 06/27/22 0900   Dressing Change Due 06/28/22 06/27/22 0900         Assessment:         ICD-10-CM ICD-9-CM   1. Chronic venous hypertension (idiopathic) with ulcer and inflammation of bilateral lower extremity (CODE)  I87.333 459.33   2. Venous insufficiency of both lower extremities  I87.2 459.81         Plan:   Tissue pathology and/or culture taken:  [] Yes [x] No   Sharp debridement performed:    [] Yes [x] No   Labs ordered this visit:   [] Yes [x] No   Imaging ordered this visit:   [] Yes [x] No     Patient to wear compression stockings daily. Put on first thing in the morning.      Orders Placed This Encounter   Procedures    Change dressing     Healed Wound Instructions:Your wound is healed, it is extremely fragile at this stage; protect it from friction, wash it gently and pat dry.  Continue with compression stockings 20-30mmHg to RLE and compression stockings 15-20mmHg to LLE applied in the morning after moisturizing and replaced daily.  If the wound should re-open, please call [phone] for further instructions.        Follow up if symptoms worsen or fail to improve, for Dr Jett.

## 2022-07-15 ENCOUNTER — OFFICE VISIT (OUTPATIENT)
Dept: FAMILY MEDICINE | Facility: CLINIC | Age: 67
End: 2022-07-15
Payer: MEDICARE

## 2022-07-15 VITALS
OXYGEN SATURATION: 96 % | WEIGHT: 276.88 LBS | HEART RATE: 86 BPM | HEIGHT: 69 IN | TEMPERATURE: 98 F | BODY MASS INDEX: 41.01 KG/M2 | DIASTOLIC BLOOD PRESSURE: 72 MMHG | SYSTOLIC BLOOD PRESSURE: 134 MMHG

## 2022-07-15 DIAGNOSIS — E78.2 MIXED HYPERLIPIDEMIA: ICD-10-CM

## 2022-07-15 DIAGNOSIS — M10.9 GOUT, UNSPECIFIED CAUSE, UNSPECIFIED CHRONICITY, UNSPECIFIED SITE: ICD-10-CM

## 2022-07-15 DIAGNOSIS — Z12.5 SCREENING FOR MALIGNANT NEOPLASM OF PROSTATE: ICD-10-CM

## 2022-07-15 DIAGNOSIS — E66.01 MORBID OBESITY WITH BMI OF 40.0-44.9, ADULT: ICD-10-CM

## 2022-07-15 DIAGNOSIS — Z00.00 ENCOUNTER FOR MEDICAL EXAMINATION TO ESTABLISH CARE: Primary | ICD-10-CM

## 2022-07-15 DIAGNOSIS — I10 ESSENTIAL HYPERTENSION: ICD-10-CM

## 2022-07-15 DIAGNOSIS — E11.42 TYPE 2 DIABETES MELLITUS WITH DIABETIC POLYNEUROPATHY, WITH LONG-TERM CURRENT USE OF INSULIN: ICD-10-CM

## 2022-07-15 DIAGNOSIS — Z12.5 PROSTATE CANCER SCREENING: ICD-10-CM

## 2022-07-15 DIAGNOSIS — Z79.4 TYPE 2 DIABETES MELLITUS WITH DIABETIC POLYNEUROPATHY, WITH LONG-TERM CURRENT USE OF INSULIN: ICD-10-CM

## 2022-07-15 PROCEDURE — 1157F ADVNC CARE PLAN IN RCRD: CPT | Mod: CPTII,S$GLB,, | Performed by: FAMILY MEDICINE

## 2022-07-15 PROCEDURE — 1160F RVW MEDS BY RX/DR IN RCRD: CPT | Mod: CPTII,S$GLB,, | Performed by: FAMILY MEDICINE

## 2022-07-15 PROCEDURE — 99499 RISK ADDL DX/OHS AUDIT: ICD-10-PCS | Mod: S$GLB,,, | Performed by: FAMILY MEDICINE

## 2022-07-15 PROCEDURE — G0009 ADMIN PNEUMOCOCCAL VACCINE: HCPCS | Mod: S$GLB,,, | Performed by: FAMILY MEDICINE

## 2022-07-15 PROCEDURE — 3008F PR BODY MASS INDEX (BMI) DOCUMENTED: ICD-10-PCS | Mod: CPTII,S$GLB,, | Performed by: FAMILY MEDICINE

## 2022-07-15 PROCEDURE — 1101F PT FALLS ASSESS-DOCD LE1/YR: CPT | Mod: CPTII,S$GLB,, | Performed by: FAMILY MEDICINE

## 2022-07-15 PROCEDURE — 99397 PER PM REEVAL EST PAT 65+ YR: CPT | Mod: 25,GZ,S$GLB, | Performed by: FAMILY MEDICINE

## 2022-07-15 PROCEDURE — G0009 PNEUMOCOCCAL CONJUGATE VACCINE 20-VALENT: ICD-10-PCS | Mod: S$GLB,,, | Performed by: FAMILY MEDICINE

## 2022-07-15 PROCEDURE — 99999 PR PBB SHADOW E&M-EST. PATIENT-LVL V: ICD-10-PCS | Mod: PBBFAC,,, | Performed by: FAMILY MEDICINE

## 2022-07-15 PROCEDURE — 1126F AMNT PAIN NOTED NONE PRSNT: CPT | Mod: CPTII,S$GLB,, | Performed by: FAMILY MEDICINE

## 2022-07-15 PROCEDURE — 3078F DIAST BP <80 MM HG: CPT | Mod: CPTII,S$GLB,, | Performed by: FAMILY MEDICINE

## 2022-07-15 PROCEDURE — 1126F PR PAIN SEVERITY QUANTIFIED, NO PAIN PRESENT: ICD-10-PCS | Mod: CPTII,S$GLB,, | Performed by: FAMILY MEDICINE

## 2022-07-15 PROCEDURE — 3288F PR FALLS RISK ASSESSMENT DOCUMENTED: ICD-10-PCS | Mod: CPTII,S$GLB,, | Performed by: FAMILY MEDICINE

## 2022-07-15 PROCEDURE — 1157F PR ADVANCE CARE PLAN OR EQUIV PRESENT IN MEDICAL RECORD: ICD-10-PCS | Mod: CPTII,S$GLB,, | Performed by: FAMILY MEDICINE

## 2022-07-15 PROCEDURE — 3075F PR MOST RECENT SYSTOLIC BLOOD PRESS GE 130-139MM HG: ICD-10-PCS | Mod: CPTII,S$GLB,, | Performed by: FAMILY MEDICINE

## 2022-07-15 PROCEDURE — 99499 UNLISTED E&M SERVICE: CPT | Mod: S$GLB,,, | Performed by: FAMILY MEDICINE

## 2022-07-15 PROCEDURE — 4010F ACE/ARB THERAPY RXD/TAKEN: CPT | Mod: CPTII,S$GLB,, | Performed by: FAMILY MEDICINE

## 2022-07-15 PROCEDURE — 3288F FALL RISK ASSESSMENT DOCD: CPT | Mod: CPTII,S$GLB,, | Performed by: FAMILY MEDICINE

## 2022-07-15 PROCEDURE — 90677 PNEUMOCOCCAL CONJUGATE VACCINE 20-VALENT: ICD-10-PCS | Mod: S$GLB,,, | Performed by: FAMILY MEDICINE

## 2022-07-15 PROCEDURE — 4010F PR ACE/ARB THEARPY RXD/TAKEN: ICD-10-PCS | Mod: CPTII,S$GLB,, | Performed by: FAMILY MEDICINE

## 2022-07-15 PROCEDURE — 99999 PR PBB SHADOW E&M-EST. PATIENT-LVL V: CPT | Mod: PBBFAC,,, | Performed by: FAMILY MEDICINE

## 2022-07-15 PROCEDURE — 1160F PR REVIEW ALL MEDS BY PRESCRIBER/CLIN PHARMACIST DOCUMENTED: ICD-10-PCS | Mod: CPTII,S$GLB,, | Performed by: FAMILY MEDICINE

## 2022-07-15 PROCEDURE — 90677 PCV20 VACCINE IM: CPT | Mod: S$GLB,,, | Performed by: FAMILY MEDICINE

## 2022-07-15 PROCEDURE — 3008F BODY MASS INDEX DOCD: CPT | Mod: CPTII,S$GLB,, | Performed by: FAMILY MEDICINE

## 2022-07-15 PROCEDURE — 1159F MED LIST DOCD IN RCRD: CPT | Mod: CPTII,S$GLB,, | Performed by: FAMILY MEDICINE

## 2022-07-15 PROCEDURE — 1101F PR PT FALLS ASSESS DOC 0-1 FALLS W/OUT INJ PAST YR: ICD-10-PCS | Mod: CPTII,S$GLB,, | Performed by: FAMILY MEDICINE

## 2022-07-15 PROCEDURE — 3075F SYST BP GE 130 - 139MM HG: CPT | Mod: CPTII,S$GLB,, | Performed by: FAMILY MEDICINE

## 2022-07-15 PROCEDURE — 3078F PR MOST RECENT DIASTOLIC BLOOD PRESSURE < 80 MM HG: ICD-10-PCS | Mod: CPTII,S$GLB,, | Performed by: FAMILY MEDICINE

## 2022-07-15 PROCEDURE — 99397 PR PREVENTIVE VISIT,EST,65 & OVER: ICD-10-PCS | Mod: 25,GZ,S$GLB, | Performed by: FAMILY MEDICINE

## 2022-07-15 PROCEDURE — 1159F PR MEDICATION LIST DOCUMENTED IN MEDICAL RECORD: ICD-10-PCS | Mod: CPTII,S$GLB,, | Performed by: FAMILY MEDICINE

## 2022-07-15 NOTE — PROGRESS NOTES
(Portions of this note were dictated using voice recognition software and may contain dictation related errors in spelling/grammar/syntax not found on text review)    CC:   Chief Complaint   Patient presents with    Establish Care       HPI: 67 y.o. male new to me, establishing care ; prior medical chart reviewed    Following with  wound care for chronic venous insufficiency with venous stasis ulcers bilateral lower extremities. Discharged few weeks ago, doing better, wearing compression stockings. Planning to see Dr. Murrieta in Podiatry as well. Had normal BRISA.     Diabetes type 2 uncontrolled.  On Humulin 70/30, 60 units b.i.d..  Was started by prior PCP on Trulicity 0.75 mg weekly but is not taking.  He is interested in starting this if possible.  He believes he was on metformin years ago but does not recall why he was stopped on this.  He was diagnosed with diabetes over 20 years ago, states that he has been on 70 30 since that time.  Went to Diabetes Education 1 time many years ago but nothing recently.  Does not routinely check his blood sugars.    Eye exam 7/19 upcoming    gout, prophylaxed with allopurinol, no recent issues    Dyslipidemia on Crestor 40 mg daily    Hypertension on losartan 100 mg daily, carvedilol 12.5 mg b.i.d.  , Aldactone 25 mg daily, Lasix 20 mg daily.  Follows with Cardiology    History of recurrent TCC bladder, follows with Urology., had recurrent resection and mitomycin-C installation.  Last cystoscopy was 03/29/2022 noting scar tissue but no malignancy.  Urine cytology was negative for malignant cells. Had bladder neck stenosis. Takes flomax.     Not much exercise because his hips hurt.  Was looking into potential gym membership with the pool at his insurance should cover this now.  Trying to reform his diet      Past Medical History:   Diagnosis Date    BPH (benign prostatic hyperplasia)     Cancer     bladder    Diabetes     type 2    Gout     High cholesterol      Hypertension     Sciatica 2016    had injection 3 weeks ago    Urinary tract infection        Past Surgical History:   Procedure Laterality Date    APPENDECTOMY  1968    BALLOON DILATION OF URETER Left 9/18/2019    Procedure: DILATION, URETER, USING BALLOON;  Surgeon: Monty Cee MD;  Location: [facility];  Service: Urology;  Laterality: Left;    BARBOTAGE OF BLADDER N/A 8/14/2019    Procedure: BARBOTAGE, BLADDER;  Surgeon: Monty Cee MD;  Location: [facility];  Service: Urology;  Laterality: N/A;    BARBOTAGE OF URETER Bilateral 8/14/2019    Procedure: BARBOTAGE, URETER;  Surgeon: Monty Cee MD;  Location: [facility] OR;  Service: Urology;  Laterality: Bilateral;    BARBOTAGE OF URETER Left 9/18/2019    Procedure: BARBOTAGE, URETER;  Surgeon: Monty Cee MD;  Location: [facility];  Service: Urology;  Laterality: Left;  left renal/ ureteral washing.    BLADDER SURGERY      COLONOSCOPY N/A 7/10/2018    Procedure: COLONOSCOPY;  Surgeon: Azalea Sanchez MD;  Location: [facility];  Service: Endoscopy;  Laterality: N/A;  [phone]    CYSTOSCOPY      CYSTOSCOPY W/ RETROGRADES Bilateral 8/14/2019    Procedure: CYSTOSCOPY, WITH RETROGRADE PYELOGRAM;  Surgeon: Monty Cee MD;  Location: [facility];  Service: Urology;  Laterality: Bilateral;    CYSTOSCOPY W/ RETROGRADES Left 9/18/2019    Procedure: CYSTOSCOPY, WITH RETROGRADE PYELOGRAM;  Surgeon: Monty Cee MD;  Location: [facility];  Service: Urology;  Laterality: Left;  cystourethroscopy with left retrograde pyelogram     CYSTOSCOPY W/ RETROGRADES Bilateral 3/11/2020    Procedure: CYSTOSCOPY, WITH RETROGRADE PYELOGRAM Ureteral renal washings and bladder washings;  Surgeon: Monty Cee MD;  Location: [facility];  Service: Urology;  Laterality: Bilateral;    CYSTOSCOPY WITH URETEROSCOPY, RETROGRADE PYELOGRAPHY, AND INSERTION OF STENT Bilateral 6/22/2020    Procedure: CYSTOSCOPY, WITH  RETROGRADE PYELOGRAM AND URETERAL STENT INSERTION;  Surgeon: Monty Cee MD;  Location: [facility] OR;  Service: Urology;  Laterality: Bilateral;    CYSTOSCOPY WITH URETHRAL DILATION N/A 6/10/2021    Procedure: CYSTOSCOPY, WITH URETHRAL DILATION;  Surgeon: Monty Cee MD;  Location: [facility] OR;  Service: Urology;  Laterality: N/A;    HAND SURGERY Right 12/08/2016    trigger finger    INSTILLATION OF URINARY BLADDER N/A 3/11/2020    Procedure: INSTILLATION, BLADDER - mitomyin - c;  Surgeon: Monty Cee MD;  Location: [facility] OR;  Service: Urology;  Laterality: N/A;    INSTILLATION OF URINARY BLADDER  6/22/2020    Procedure: INSTILLATION, BLADDER;  Surgeon: Monty Cee MD;  Location: [facility] OR;  Service: Urology;;  mitomycin 40mg    LASER ABLATION Left 9/18/2019    Procedure: ABLATION, USING LASER;  Surgeon: Monty Cee MD;  Location: [facility] OR;  Service: Urology;  Laterality: Left;  laser ablation of lower pole calyceal transitional cell lesion    PYELOSCOPY Left 9/18/2019    Procedure: PYELOSCOPY;  Surgeon: Monty Cee MD;  Location: [facility] OR;  Service: Urology;  Laterality: Left;  Left ureteral pyeloscopy    TONSILLECTOMY  1960    TRANSURETHRAL RESECTION OF PROSTATE N/A 9/18/2019    Procedure: TURP (TRANSURETHRAL RESECTION OF PROSTATE);  Surgeon: Monty Cee MD;  Location: [facility] OR;  Service: Urology;  Laterality: N/A;  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    TRANSURETHRAL RESECTION OF PROSTATE USING BIPOLAR CAUTERY N/A 3/11/2020    Procedure: TURP, USING BIPOLAR CAUTERY;  Surgeon: Monty Cee MD;  Location: [facility] OR;  Service: Urology;  Laterality: N/A;    TRANSURETHRAL SURGICAL REMOVAL OF STRICTURE OF BLADDER NECK  8/14/2019    Procedure: EXCISION, CONTRACTURE, BLADDER NECK, TRANSURETHRAL;  Surgeon: Monty Cee MD;  Location: [facility] OR;  Service: Urology;;    TRANSURETHRAL SURGICAL REMOVAL OF STRICTURE OF BLADDER NECK N/A 6/10/2021     "Procedure: EXCISION, CONTRACTURE, BLADDER NECK, TRANSURETHRAL;  Surgeon: Monty Cee MD;  Location: [facility] OR;  Service: Urology;  Laterality: N/A;  using laser    URETEROSCOPY Left 9/18/2019    Procedure: URETEROSCOPY;  Surgeon: Monty Cee MD;  Location: [facility] OR;  Service: Urology;  Laterality: Left;    URETEROSCOPY Right 6/22/2020    Procedure: URETEROSCOPY;  Surgeon: Monty Cee MD;  Location: [facility] OR;  Service: Urology;  Laterality: Right;       Family History   Problem Relation Age of Onset    Diabetes Mother     Prostate cancer Neg Hx     Kidney disease Neg Hx        Social History     Tobacco Use    Smoking status: Former Smoker     Types: Pipe    Smokeless tobacco: Never Used    Tobacco comment: quit 40 yrs ago   Substance Use Topics    Alcohol use: Yes     Alcohol/week: 0.0 standard drinks     Comment: rarely    Drug use: No       Lab Results   Component Value Date    WBC 9.47 06/07/2021    HGB 17.5 06/07/2021    HCT 54.1 (H) 06/07/2021    MCV 87 06/07/2021     06/07/2021    CHOL 164 04/01/2021    TRIG 78 04/01/2021    HDL 51 04/01/2021    ALT 24 04/01/2021    AST 24 04/01/2021    BILITOT 0.6 04/01/2021    ALKPHOS 75 04/01/2021     06/07/2021    K 4.4 06/07/2021    CL 99 06/07/2021    CREATININE 0.80 06/07/2021    ESTGFRAFRICA >60.0 06/07/2021    EGFRNONAA >60.0 06/07/2021    CALCIUM 10.4 06/07/2021    ALBUMIN 4.3 04/01/2021    BUN 22 (H) 06/07/2021    CO2 35 (H) 06/07/2021    PSADIAG 1.6 05/09/2017    HGBA1C 9.3 (H) 07/12/2021    MICALBCREAT 942.1 (H) 07/12/2021    LDLCALC 97.4 04/01/2021     (H) 06/07/2021             Vital signs reviewed  Vitals:    07/15/22 0855   BP: 134/72   BP Location: Right arm   Patient Position: Sitting   BP Method: Medium (Manual)   Pulse: 86   Temp: 97.9 °F (36.6 °C)   TempSrc: Oral   SpO2: 96%   Weight: 125.6 kg (276 lb 14.4 oz)   Height: 5' 9" (1.753 m)       Wt Readings from Last 4 Encounters:   06/27/22 126.1 kg (278 lb) "   06/20/22 126.1 kg (278 lb)   06/13/22 126.1 kg (278 lb)   06/06/22 126.1 kg (278 lb)       PE:   APPEARANCE: Well nourished, well developed, in no acute distress.    HEAD: Normocephalic, atraumatic.  EYES: PERRL. EOMI.   Conjunctivae noninjected.  EARS: TM's intact. Light reflex normal. No retraction or perforation  NOSE: Mucosa pink. Airway clear.  MOUTH & THROAT: No tonsillar enlargement. No pharyngeal erythema or exudate.   NECK: Supple with no cervical lymphadenopathy.  No carotid bruits.  No thyromegaly  CHEST: Good inspiratory effort. Lungs clear to auscultation with no wheezes or crackles.  CARDIOVASCULAR: Normal S1, S2. No rubs, murmurs, or gallops.  ABDOMEN: Bowel sounds normal. Not distended. Soft. No tenderness or masses. No organomegaly.  EXTREMITIES:  Compression stockings in place, mild edema bilaterally.    IMPRESSION  1. Type 2 diabetes mellitus with diabetic polyneuropathy, with long-term current use of insulin    2. Essential hypertension    3. Mixed hyperlipidemia    4. Morbid obesity with BMI of 40.0-44.9, adult    5. Prostate cancer screening    6. Screening for malignant neoplasm of prostate    7. Gout, unspecified cause, unspecified chronicity, unspecified site            PLAN  Orders Placed This Encounter   Procedures    (In Office Administered) Pneumococcal Conjugate Vaccine (20 Valent) (IM)    CBC Auto Differential    Comprehensive Metabolic Panel    Lipid Panel    TSH    Hemoglobin A1C    Microalbumin/Creatinine Ratio, Urine    PSA, Screening    Uric Acid    Ambulatory referral/consult to Diabetes Education     Hypertension control    Dyslipidemia:  Continue statin    Venous stasis, continue with wound care if needed, podiatry office appointment coming up.  Continues with compression stockings.  Encourage exercise, can look into gym with the pool for pool exercises  Insurance may cover this and he will look into this    Diabetes, has been uncontrolled.  Not routinely checking  his sugars.  Has been on 70 30 insulin for many years now, does not recall why he got off metformin.  Was prescribed Trulicity, interested in starting this, as questions about this which were answered.  I do recommend he start Trulicity 0.75 mg weekly.  Will also place a consult in the diabetes education and nutrition.  Would advise on starting more regularly blood sugar monitoring given his insulin use but he can discuss this further with Diabetes Education    Gout stable.  Continue allopurinol    Bladder cancer history:  No recent signs of recurrence.  Follow up with Urology as directed    Labs above    Will plan on seeing back in 3 months                 SCREENINGS      Immunizations:   COVID x3, encourage booster  Tdap 2017  Zostavax 2017  Prevnar 13:2015  Pneumovax 23:2016  Prevnar 20 today    Age/demographic appropriate health maintenance:  Colonoscopy 2018:  2 mm cecal polyp, otherwise normal, tubular adenoma, 5 year recall  PSA:  2017, repeated with labs

## 2022-07-19 LAB
LEFT EYE DM RETINOPATHY: POSITIVE
RIGHT EYE DM RETINOPATHY: POSITIVE

## 2022-07-22 ENCOUNTER — PATIENT OUTREACH (OUTPATIENT)
Dept: ADMINISTRATIVE | Facility: HOSPITAL | Age: 67
End: 2022-07-22
Payer: MEDICARE

## 2022-07-25 ENCOUNTER — OFFICE VISIT (OUTPATIENT)
Dept: PODIATRY | Facility: CLINIC | Age: 67
End: 2022-07-25
Payer: MEDICARE

## 2022-07-25 VITALS
BODY MASS INDEX: 40.88 KG/M2 | HEART RATE: 89 BPM | HEIGHT: 69 IN | SYSTOLIC BLOOD PRESSURE: 162 MMHG | WEIGHT: 276 LBS | DIASTOLIC BLOOD PRESSURE: 92 MMHG

## 2022-07-25 DIAGNOSIS — E11.42 TYPE 2 DIABETES MELLITUS WITH DIABETIC POLYNEUROPATHY, WITH LONG-TERM CURRENT USE OF INSULIN: Primary | ICD-10-CM

## 2022-07-25 DIAGNOSIS — I87.2 VENOUS INSUFFICIENCY OF BOTH LOWER EXTREMITIES: ICD-10-CM

## 2022-07-25 DIAGNOSIS — E11.51 TYPE 2 DIABETES MELLITUS WITH PERIPHERAL VASCULAR DISEASE: ICD-10-CM

## 2022-07-25 DIAGNOSIS — Z79.4 TYPE 2 DIABETES MELLITUS WITH DIABETIC POLYNEUROPATHY, WITH LONG-TERM CURRENT USE OF INSULIN: Primary | ICD-10-CM

## 2022-07-25 PROCEDURE — 1126F AMNT PAIN NOTED NONE PRSNT: CPT | Mod: CPTII,S$GLB,, | Performed by: PODIATRIST

## 2022-07-25 PROCEDURE — 4010F ACE/ARB THERAPY RXD/TAKEN: CPT | Mod: CPTII,S$GLB,, | Performed by: PODIATRIST

## 2022-07-25 PROCEDURE — 3077F PR MOST RECENT SYSTOLIC BLOOD PRESSURE >= 140 MM HG: ICD-10-PCS | Mod: CPTII,S$GLB,, | Performed by: PODIATRIST

## 2022-07-25 PROCEDURE — 1160F PR REVIEW ALL MEDS BY PRESCRIBER/CLIN PHARMACIST DOCUMENTED: ICD-10-PCS | Mod: CPTII,S$GLB,, | Performed by: PODIATRIST

## 2022-07-25 PROCEDURE — 3077F SYST BP >= 140 MM HG: CPT | Mod: CPTII,S$GLB,, | Performed by: PODIATRIST

## 2022-07-25 PROCEDURE — 1157F ADVNC CARE PLAN IN RCRD: CPT | Mod: CPTII,S$GLB,, | Performed by: PODIATRIST

## 2022-07-25 PROCEDURE — 99499 RISK ADDL DX/OHS AUDIT: ICD-10-PCS | Mod: S$GLB,,, | Performed by: PODIATRIST

## 2022-07-25 PROCEDURE — 3080F DIAST BP >= 90 MM HG: CPT | Mod: CPTII,S$GLB,, | Performed by: PODIATRIST

## 2022-07-25 PROCEDURE — 1101F PR PT FALLS ASSESS DOC 0-1 FALLS W/OUT INJ PAST YR: ICD-10-PCS | Mod: CPTII,S$GLB,, | Performed by: PODIATRIST

## 2022-07-25 PROCEDURE — 99499 UNLISTED E&M SERVICE: CPT | Mod: S$GLB,,, | Performed by: PODIATRIST

## 2022-07-25 PROCEDURE — 3080F PR MOST RECENT DIASTOLIC BLOOD PRESSURE >= 90 MM HG: ICD-10-PCS | Mod: CPTII,S$GLB,, | Performed by: PODIATRIST

## 2022-07-25 PROCEDURE — 3066F NEPHROPATHY DOC TX: CPT | Mod: CPTII,S$GLB,, | Performed by: PODIATRIST

## 2022-07-25 PROCEDURE — 99213 PR OFFICE/OUTPT VISIT, EST, LEVL III, 20-29 MIN: ICD-10-PCS | Mod: S$GLB,,, | Performed by: PODIATRIST

## 2022-07-25 PROCEDURE — 3008F PR BODY MASS INDEX (BMI) DOCUMENTED: ICD-10-PCS | Mod: CPTII,S$GLB,, | Performed by: PODIATRIST

## 2022-07-25 PROCEDURE — 99999 PR PBB SHADOW E&M-EST. PATIENT-LVL IV: CPT | Mod: PBBFAC,,, | Performed by: PODIATRIST

## 2022-07-25 PROCEDURE — 3288F PR FALLS RISK ASSESSMENT DOCUMENTED: ICD-10-PCS | Mod: CPTII,S$GLB,, | Performed by: PODIATRIST

## 2022-07-25 PROCEDURE — 4010F PR ACE/ARB THEARPY RXD/TAKEN: ICD-10-PCS | Mod: CPTII,S$GLB,, | Performed by: PODIATRIST

## 2022-07-25 PROCEDURE — 1126F PR PAIN SEVERITY QUANTIFIED, NO PAIN PRESENT: ICD-10-PCS | Mod: CPTII,S$GLB,, | Performed by: PODIATRIST

## 2022-07-25 PROCEDURE — 3052F PR MOST RECENT HEMOGLOBIN A1C LEVEL 8.0 - < 9.0%: ICD-10-PCS | Mod: CPTII,S$GLB,, | Performed by: PODIATRIST

## 2022-07-25 PROCEDURE — 99213 OFFICE O/P EST LOW 20 MIN: CPT | Mod: S$GLB,,, | Performed by: PODIATRIST

## 2022-07-25 PROCEDURE — 3062F PR POS MACROALBUMINURIA RESULT DOCUMENTED/REVIEW: ICD-10-PCS | Mod: CPTII,S$GLB,, | Performed by: PODIATRIST

## 2022-07-25 PROCEDURE — 3052F HG A1C>EQUAL 8.0%<EQUAL 9.0%: CPT | Mod: CPTII,S$GLB,, | Performed by: PODIATRIST

## 2022-07-25 PROCEDURE — 1101F PT FALLS ASSESS-DOCD LE1/YR: CPT | Mod: CPTII,S$GLB,, | Performed by: PODIATRIST

## 2022-07-25 PROCEDURE — 1160F RVW MEDS BY RX/DR IN RCRD: CPT | Mod: CPTII,S$GLB,, | Performed by: PODIATRIST

## 2022-07-25 PROCEDURE — 3062F POS MACROALBUMINURIA REV: CPT | Mod: CPTII,S$GLB,, | Performed by: PODIATRIST

## 2022-07-25 PROCEDURE — 1157F PR ADVANCE CARE PLAN OR EQUIV PRESENT IN MEDICAL RECORD: ICD-10-PCS | Mod: CPTII,S$GLB,, | Performed by: PODIATRIST

## 2022-07-25 PROCEDURE — 3008F BODY MASS INDEX DOCD: CPT | Mod: CPTII,S$GLB,, | Performed by: PODIATRIST

## 2022-07-25 PROCEDURE — 1159F PR MEDICATION LIST DOCUMENTED IN MEDICAL RECORD: ICD-10-PCS | Mod: CPTII,S$GLB,, | Performed by: PODIATRIST

## 2022-07-25 PROCEDURE — 3066F PR DOCUMENTATION OF TREATMENT FOR NEPHROPATHY: ICD-10-PCS | Mod: CPTII,S$GLB,, | Performed by: PODIATRIST

## 2022-07-25 PROCEDURE — 1159F MED LIST DOCD IN RCRD: CPT | Mod: CPTII,S$GLB,, | Performed by: PODIATRIST

## 2022-07-25 PROCEDURE — 99999 PR PBB SHADOW E&M-EST. PATIENT-LVL IV: ICD-10-PCS | Mod: PBBFAC,,, | Performed by: PODIATRIST

## 2022-07-25 PROCEDURE — 3288F FALL RISK ASSESSMENT DOCD: CPT | Mod: CPTII,S$GLB,, | Performed by: PODIATRIST

## 2022-07-25 NOTE — PROGRESS NOTES
Subjective:      Patient ID: Andrew Beltran is a 67 y.o. male.    Chief Complaint: Diabetic Foot Exam and Nail Care (Routine nail care referred by Dr. Jett)    Andrew is a 67 y.o. male who presents to the clinic upon referral from Dr. Jett  for evaluation and treatment of diabetic feet. Andrew has a past medical history of BPH (benign prostatic hyperplasia), Cancer, Diabetes, Diabetes mellitus, type 2, Gout, High cholesterol, Hypertension, Sciatica (2016), and Urinary tract infection. Patient relates no major problem with feet.   History of previous venous stasis ulcerations.    PCP: Lon Brock MD    Date Last Seen by PCP:   07/15/2022    Current shoe gear: Slip-on shoes    Hemoglobin A1C   Date Value Ref Range Status   07/20/2022 8.4 (H) 4.0 - 5.6 % Final     Comment:     ADA Screening Guidelines:  5.7-6.4%  Consistent with prediabetes  >or=6.5%  Consistent with diabetes    High levels of fetal hemoglobin interfere with the HbA1C  assay. Heterozygous hemoglobin variants (HbS, HgC, etc)do  not significantly interfere with this assay.   However, presence of multiple variants may affect accuracy.     07/12/2021 9.3 (H) 4.0 - 5.6 % Final     Comment:     ADA Screening Guidelines:  5.7-6.4%  Consistent with prediabetes  >or=6.5%  Consistent with diabetes    High levels of fetal hemoglobin interfere with the HbA1C  assay. Heterozygous hemoglobin variants (HbS, HgC, etc)do  not significantly interfere with this assay.   However, presence of multiple variants may affect accuracy.     04/01/2021 9.6 (H) 4.0 - 5.6 % Final     Comment:     ADA Screening Guidelines:  5.7-6.4%  Consistent with prediabetes  >or=6.5%  Consistent with diabetes    High levels of fetal hemoglobin interfere with the HbA1C  assay. Heterozygous hemoglobin variants (HbS, HgC, etc)do  not significantly interfere with this assay.   However, presence of multiple variants may affect accuracy.       Vitals:    07/25/22 1555   BP: (!) 162/92  "  Pulse: 89   Weight: 125.2 kg (276 lb)   Height: 5' 9" (1.753 m)   PainSc: 0-No pain      Past Medical History:   Diagnosis Date    BPH (benign prostatic hyperplasia)     Cancer     bladder    Diabetes     type 2    Diabetes mellitus, type 2     Gout     High cholesterol     Hypertension     Sciatica 2016    had injection 3 weeks ago    Urinary tract infection        Past Surgical History:   Procedure Laterality Date    APPENDECTOMY  1968    BALLOON DILATION OF URETER Left 9/18/2019    Procedure: DILATION, URETER, USING BALLOON;  Surgeon: Monty Cee MD;  Location: [facility];  Service: Urology;  Laterality: Left;    BARBOTAGE OF BLADDER N/A 8/14/2019    Procedure: BARBOTAGE, BLADDER;  Surgeon: Monty Cee MD;  Location: [facility] OR;  Service: Urology;  Laterality: N/A;    BARBOTAGE OF URETER Bilateral 8/14/2019    Procedure: BARBOTAGE, URETER;  Surgeon: Monty Cee MD;  Location: [facility];  Service: Urology;  Laterality: Bilateral;    BARBOTAGE OF URETER Left 9/18/2019    Procedure: BARBOTAGE, URETER;  Surgeon: Monty Cee MD;  Location: [facility];  Service: Urology;  Laterality: Left;  left renal/ ureteral washing.    BLADDER SURGERY      COLONOSCOPY N/A 7/10/2018    Procedure: COLONOSCOPY;  Surgeon: Azalea Sanchez MD;  Location: [facility];  Service: Endoscopy;  Laterality: N/A;  Schedule at [facility], faxed to [employer] [phone]    CYSTOSCOPY      CYSTOSCOPY W/ RETROGRADES Bilateral 8/14/2019    Procedure: CYSTOSCOPY, WITH RETROGRADE PYELOGRAM;  Surgeon: Monty Cee MD;  Location: [facility];  Service: Urology;  Laterality: Bilateral;    CYSTOSCOPY W/ RETROGRADES Left 9/18/2019    Procedure: CYSTOSCOPY, WITH RETROGRADE PYELOGRAM;  Surgeon: Monty Cee MD;  Location: [facility];  Service: Urology;  Laterality: Left;  cystourethroscopy with left retrograde pyelogram     CYSTOSCOPY W/ RETROGRADES Bilateral 3/11/2020    Procedure: CYSTOSCOPY, WITH " RETROGRADE PYELOGRAM Ureteral renal washings and bladder washings;  Surgeon: Monty Cee MD;  Location: [facility] OR;  Service: Urology;  Laterality: Bilateral;    CYSTOSCOPY WITH URETEROSCOPY, RETROGRADE PYELOGRAPHY, AND INSERTION OF STENT Bilateral 6/22/2020    Procedure: CYSTOSCOPY, WITH RETROGRADE PYELOGRAM AND URETERAL STENT INSERTION;  Surgeon: Monty Cee MD;  Location: [facility] OR;  Service: Urology;  Laterality: Bilateral;    CYSTOSCOPY WITH URETHRAL DILATION N/A 6/10/2021    Procedure: CYSTOSCOPY, WITH URETHRAL DILATION;  Surgeon: Monty Cee MD;  Location: [facility] OR;  Service: Urology;  Laterality: N/A;    HAND SURGERY Right 12/08/2016    trigger finger    INSTILLATION OF URINARY BLADDER N/A 3/11/2020    Procedure: INSTILLATION, BLADDER - mitomyin - c;  Surgeon: Monty Cee MD;  Location: [facility] OR;  Service: Urology;  Laterality: N/A;    INSTILLATION OF URINARY BLADDER  6/22/2020    Procedure: INSTILLATION, BLADDER;  Surgeon: Monty Cee MD;  Location: [facility] OR;  Service: Urology;;  mitomycin 40mg    LASER ABLATION Left 9/18/2019    Procedure: ABLATION, USING LASER;  Surgeon: Monty Cee MD;  Location: [facility] OR;  Service: Urology;  Laterality: Left;  laser ablation of lower pole calyceal transitional cell lesion    PYELOSCOPY Left 9/18/2019    Procedure: PYELOSCOPY;  Surgeon: Monty Cee MD;  Location: [facility] OR;  Service: Urology;  Laterality: Left;  Left ureteral pyeloscopy    TONSILLECTOMY  1960    TRANSURETHRAL RESECTION OF PROSTATE N/A 9/18/2019    Procedure: TURP (TRANSURETHRAL RESECTION OF PROSTATE);  Surgeon: Monty Cee MD;  Location: [facility] OR;  Service: Urology;  Laterality: N/A;  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    TRANSURETHRAL RESECTION OF PROSTATE USING BIPOLAR CAUTERY N/A 3/11/2020    Procedure: TURP, USING BIPOLAR CAUTERY;  Surgeon: Monty Cee MD;  Location: [facility];  Service: Urology;  Laterality: N/A;     TRANSURETHRAL SURGICAL REMOVAL OF STRICTURE OF BLADDER NECK  8/14/2019    Procedure: EXCISION, CONTRACTURE, BLADDER NECK, TRANSURETHRAL;  Surgeon: Monty Cee MD;  Location: [facility] OR;  Service: Urology;;    TRANSURETHRAL SURGICAL REMOVAL OF STRICTURE OF BLADDER NECK N/A 6/10/2021    Procedure: EXCISION, CONTRACTURE, BLADDER NECK, TRANSURETHRAL;  Surgeon: Monty Cee MD;  Location: [facility] OR;  Service: Urology;  Laterality: N/A;  using laser    URETEROSCOPY Left 9/18/2019    Procedure: URETEROSCOPY;  Surgeon: Monty Cee MD;  Location: [facility] OR;  Service: Urology;  Laterality: Left;    URETEROSCOPY Right 6/22/2020    Procedure: URETEROSCOPY;  Surgeon: Monty Cee MD;  Location: [facility];  Service: Urology;  Laterality: Right;       Family History   Problem Relation Age of Onset    Diabetes Mother     Prostate cancer Neg Hx     Kidney disease Neg Hx        Social History     Socioeconomic History    Marital status:    Tobacco Use    Smoking status: Former Smoker     Types: Pipe    Smokeless tobacco: Never Used    Tobacco comment: quit 40 yrs ago   Substance and Sexual Activity    Alcohol use: Yes     Alcohol/week: 0.0 standard drinks     Comment: rarely    Drug use: No    Sexual activity: Yes     Partners: Female     Social Determinants of Health     Financial Resource Strain: Low Risk     Difficulty of Paying Living Expenses: Not hard at all   Food Insecurity: No Food Insecurity    Worried About Running Out of Food in the Last Year: Never true    Ran Out of Food in the Last Year: Never true   Transportation Needs: No Transportation Needs    Lack of Transportation (Medical): No    Lack of Transportation (Non-Medical): No   Physical Activity: Inactive    Days of Exercise per Week: 0 days    Minutes of Exercise per Session: 10 min   Stress: No Stress Concern Present    Feeling of Stress : Not at all   Social Connections: Unknown    Frequency of Communication with Friends and  Family: More than three times a week    Frequency of Social Gatherings with Friends and Family: Twice a week    Active Member of Clubs or Organizations: Yes    Attends Club or Organization Meetings: More than 4 times per year    Marital Status:    Housing Stability: Low Risk     Unable to Pay for Housing in the Last Year: No    Number of Places Lived in the Last Year: 2    Unstable Housing in the Last Year: No       Current Outpatient Medications   Medication Sig Dispense Refill    allopurinoL (ZYLOPRIM) 300 MG tablet Take 1 tablet (300 mg total) by mouth once daily. 90 tablet 4    aspirin (ECOTRIN) 81 MG EC tablet Take 81 mg by mouth once daily.      blood sugar diagnostic Strp by Misc.(Non-Drug; Combo Route) route.      carvediloL (COREG) 12.5 MG tablet Take 1 tablet (12.5 mg total) by mouth 2 (two) times daily with meals. 180 tablet 3    dulaglutide (TRULICITY) 0.75 mg/0.5 mL pen injector Inject 0.75 mg into the skin every 7 days. (Patient taking differently: Inject 0.75 mg into the skin every 7 days. Hasn't started yet) 12 pen 3    furosemide (LASIX) 20 MG tablet Take 1 tablet (20 mg total) by mouth once daily. 90 tablet 3    ibuprofen (ADVIL,MOTRIN) 400 MG tablet Take 400 mg by mouth every 6 (six) hours as needed for Other.      insulin NPH-insulin regular, 70/30, (HUMULIN 70/30 U-100 INSULIN) 100 unit/mL (70-30) injection Inject 60 Units into the skin 2 (two) times daily. 100 mL 0    insulin syringe-needle U-100 1 mL 31 gauge x 5/16 Syrg USE AS DIRECTED TWICE A  each 4    lancets (PRODIGY TWIST TOP LANCET) 28 gauge Misc 1 lancet by Misc.(Non-Drug; Combo Route) route 2 (two) times daily. 180 each 4    losartan (COZAAR) 100 MG tablet Take 1 tablet (100 mg total) by mouth once daily. 90 tablet 4    MAGNESIUM ORAL Take 400 mg by mouth once daily at 6am. 800 mg everyday      mupirocin (BACTROBAN) 2 % ointment Apply locally to affected area every other day 22 g 2    rosuvastatin  (CRESTOR) 40 MG Tab TAKE 1 TABLET BY MOUTH EVERY EVENING 90 tablet 3    tamsulosin (FLOMAX) 0.4 mg Cap Take 1 capsule (0.4 mg total) by mouth every evening. 90 capsule 0    mupirocin (BACTROBAN) 2 % ointment Apply to affected area 3 (three) times daily. 22 g 0    mupirocin (BACTROBAN) 2 % ointment Apply to affected area every other day 22 g 2    spironolactone (ALDACTONE) 25 MG tablet Take 1 tablet (25 mg total) by mouth once daily. 30 tablet 11     No current facility-administered medications for this visit.     Facility-Administered Medications Ordered in Other Visits   Medication Dose Route Frequency Provider Last Rate Last Admin    lidocaine HCl 2% urojet   Mucous Membrane Once Monty Cee MD        mitoMYcin (MUTAMYCIN) 40 mg in sodium chloride 0.9% infusion  40 mg Intravesical Once Monty Cee MD           Review of patient's allergies indicates:  No Known Allergies        Review of Systems   Constitutional: Negative for chills, fever and malaise/fatigue.   HENT: Negative for congestion and hearing loss.    Cardiovascular: Negative for chest pain, claudication and leg swelling.   Respiratory: Negative for cough and shortness of breath.    Skin: Positive for color change, nail changes and poor wound healing.   Musculoskeletal: Negative for back pain, joint pain, muscle cramps and muscle weakness.   Gastrointestinal: Negative for nausea and vomiting.   Neurological: Positive for numbness. Negative for paresthesias and weakness.   Psychiatric/Behavioral: Negative for altered mental status.           Objective:      Physical Exam  Constitutional:       General: He is not in acute distress.     Appearance: He is obese. He is not ill-appearing.   Cardiovascular:      Pulses:           Dorsalis pedis pulses are 2+ on the right side and 2+ on the left side.        Posterior tibial pulses are 1+ on the right side and 1+ on the left side.      Comments:   Moderate edema to lower extremity bilateral with  localized erythematous patches to the legs bilateral.  Skin temp is warm to foot bilateral.  No rubor on dependency bilateral foot.  Musculoskeletal:      Comments: No pain with ROM or MMT bilateral lower extremity.      No significant digital deformity appreciated bilateral foot.   Feet:      Right foot:      Protective Sensation: 10 sites tested. 7 sites sensed.      Skin integrity: Dry skin present.      Toenail Condition: Right toenails are abnormally thick.      Left foot:      Protective Sensation: 10 sites tested. 7 sites sensed.      Skin integrity: Dry skin present.      Toenail Condition: Left toenails are abnormally thick.   Skin:     General: Skin is warm.      Capillary Refill: Capillary refill takes less than 2 seconds.      Findings: No ecchymosis or erythema.      Nails: There is no clubbing.      Comments:   Nails 1-5 bilateral foot are mildly elongated, thickened and slightly discolored.  Skin is dry to foot bilateral.  No open wounds or macerations to lower extremity bilateral.   Neurological:      Mental Status: He is alert.               Assessment:       Encounter Diagnoses   Name Primary?    Type 2 diabetes mellitus with diabetic polyneuropathy, with long-term current use of insulin Yes    Type 2 diabetes mellitus with peripheral vascular disease     Venous insufficiency of both lower extremities          Plan:       Andrew was seen today for diabetic foot exam and nail care.    Diagnoses and all orders for this visit:    Type 2 diabetes mellitus with diabetic polyneuropathy, with long-term current use of insulin    Type 2 diabetes mellitus with peripheral vascular disease    Venous insufficiency of both lower extremities      I counseled the patient on his conditions, their implications and medical management.    Shoe inspection. Diabetic Foot Education. Patient reminded of the importance of good nutrition and blood sugar control to help prevent podiatric complications of diabetes.  Patient instructed on proper foot hygeine. We discussed wearing proper shoe gear, daily foot inspections, never walking without protective shoe gear, never putting sharp instruments to feet.    With the patient's verbal consent a sterile nail Nipper was used to trim nails 1-5 bilateral foot.  He tolerated procedure well without apparent complication.  He last received routine foot care per Dr. Soto a month ago and therefore this is not a billable service today. Patient relates relief following the procedure. He will continue to monitor the areas daily, inspect his feet, wear protective shoe gear when ambulatory, moisturizer to maintain skin integrity.      Comprehensive annual diabetic foot exam completed today.  Patient is found to be intermediate risk for diabetic foot complication.      Patient requested follow-up provider in [place] and therefore will see Dr. Patricia within 3 months.    A portion of this note was generated by voice recognition software and may contain spelling and grammar errors.

## 2022-07-27 ENCOUNTER — PATIENT MESSAGE (OUTPATIENT)
Dept: FAMILY MEDICINE | Facility: CLINIC | Age: 67
End: 2022-07-27
Payer: MEDICARE

## 2022-07-28 ENCOUNTER — PATIENT MESSAGE (OUTPATIENT)
Dept: FAMILY MEDICINE | Facility: CLINIC | Age: 67
End: 2022-07-28
Payer: MEDICARE

## 2022-08-01 RX ORDER — SPIRONOLACTONE 25 MG/1
25 TABLET ORAL DAILY
Qty: 30 TABLET | Refills: 11 | Status: SHIPPED | OUTPATIENT
Start: 2022-08-01 | End: 2023-10-02 | Stop reason: SDUPTHER

## 2022-08-11 ENCOUNTER — PATIENT OUTREACH (OUTPATIENT)
Dept: ADMINISTRATIVE | Facility: HOSPITAL | Age: 67
End: 2022-08-11
Payer: MEDICARE

## 2022-08-18 ENCOUNTER — PES CALL (OUTPATIENT)
Dept: ADMINISTRATIVE | Facility: CLINIC | Age: 67
End: 2022-08-18
Payer: MEDICARE

## 2022-08-18 ENCOUNTER — PATIENT OUTREACH (OUTPATIENT)
Dept: ADMINISTRATIVE | Facility: HOSPITAL | Age: 67
End: 2022-08-18
Payer: MEDICARE

## 2022-08-18 NOTE — PROGRESS NOTES
Non-compliant GAP report chart review - Chart review completed for the following HM test if overdue  (Mammogram, Colonoscopy, Cervical Cancer Screening,  Diabetic lab testing, and/or Dilated EYE EXAM)  08/18/2022    Care Everywhere and Media reports - updates requested and reviewed.      A1c done on 07/20/22      Health Maintenance Due   Topic Date Due    Shingles Vaccine (1 of 2) 06/29/2017    COVID-19 Vaccine (4 - Booster for Pfizer series) 02/22/2022

## 2022-09-14 ENCOUNTER — CLINICAL SUPPORT (OUTPATIENT)
Dept: DIABETES | Facility: CLINIC | Age: 67
End: 2022-09-14
Payer: MEDICARE

## 2022-09-14 DIAGNOSIS — Z79.4 TYPE 2 DIABETES MELLITUS WITH DIABETIC POLYNEUROPATHY, WITH LONG-TERM CURRENT USE OF INSULIN: ICD-10-CM

## 2022-09-14 DIAGNOSIS — E11.42 TYPE 2 DIABETES MELLITUS WITH DIABETIC POLYNEUROPATHY, WITH LONG-TERM CURRENT USE OF INSULIN: ICD-10-CM

## 2022-09-14 PROCEDURE — G0108 DIAB MANAGE TRN  PER INDIV: HCPCS | Mod: S$GLB,,, | Performed by: DIETITIAN, REGISTERED

## 2022-09-14 PROCEDURE — 99999 PR PBB SHADOW E&M-EST. PATIENT-LVL I: CPT | Mod: PBBFAC,,, | Performed by: DIETITIAN, REGISTERED

## 2022-09-14 PROCEDURE — 99999 PR PBB SHADOW E&M-EST. PATIENT-LVL I: ICD-10-PCS | Mod: PBBFAC,,, | Performed by: DIETITIAN, REGISTERED

## 2022-09-14 PROCEDURE — G0108 PR DIAB MANAGE TRN  PER INDIV: ICD-10-PCS | Mod: S$GLB,,, | Performed by: DIETITIAN, REGISTERED

## 2022-09-14 NOTE — PROGRESS NOTES
"    Preventive Care at the 11 - 14 Year Visit    Growth Percentiles & Measurements   Weight: 179 lbs 2 oz / 81.3 kg (actual weight) / >99 %ile based on CDC (Girls, 2-20 Years) weight-for-age data based on Weight recorded on 8/5/2019.  Length: 5' 3.15\" / 160.4 cm 97 %ile based on CDC (Girls, 2-20 Years) Stature-for-age data based on Stature recorded on 8/5/2019.   BMI: Body mass index is 31.58 kg/m . >99 %ile based on CDC (Girls, 2-20 Years) BMI-for-age based on body measurements available as of 8/5/2019.     Next Visit    Continue to see your health care provider every year for preventive care.    Nutrition    It s very important to eat breakfast. This will help you make it through the morning.    Sit down with your family for a meal on a regular basis.    Eat healthy meals and snacks, including fruits and vegetables. Avoid salty and sugary snack foods.    Be sure to eat foods that are high in calcium and iron.    Avoid or limit caffeine (often found in soda pop).    Sleeping    Your body needs about 9 hours of sleep each night.    Keep screens (TV, computer, and video) out of the bedroom / sleeping area.  They can lead to poor sleep habits and increased obesity.    Health    Limit TV, computer and video time to one to two hours per day.    Set a goal to be physically fit.  Do some form of exercise every day.  It can be an active sport like skating, running, swimming, team sports, etc.    Try to get 30 to 60 minutes of exercise at least three times a week.    Make healthy choices: don t smoke or drink alcohol; don t use drugs.    In your teen years, you can expect . . .    To develop or strengthen hobbies.    To build strong friendships.    To be more responsible for yourself and your actions.    To be more independent.    To use words that best express your thoughts and feelings.    To develop self-confidence and a sense of self.    To see big differences in how you and your friends grow and develop.    To have " Diabetes Care Specialist Progress Note  Author: Carmen Garcia RD, CDE  Date: 9/14/2022    Program Intake  Reason for Diabetes Program Visit:: Initial Diabetes Assessment  Current diabetes risk level:: moderate  In the last 12 months, have you:: none    Lab Results   Component Value Date    HGBA1C 8.4 (H) 07/20/2022       Clinical    Problem Review  Reviewed Problem List with Patient: yes  Active comorbidities affecting diabetes self-care.: yes  Comorbidities: Hypertension, Neuropathy  Reviewed health maintenance: yes    Clinical Assessment  Current Diabetes Treatment: Injectable, Insulin  Have you ever experienced hypoglycemia (low blood sugar)?: yes  In the last month, how often have you experienced low blood sugar?:  (Has been a very long time since he's had a low BG)  Have you ever experienced hyperglycemia (high blood sugar)?: yes  Are you able to tell when your blood sugar is high?: No (comment)  Have you ever been hospitalized because your blood sugar was high?: no    Medication Information  How do you obtain your medications?: Patient drives  How many days a week do you miss your medications?: Never  Do you sometimes have difficulty refilling your medications?: No  Medication adherence impacting ability to self-manage diabetes?: No    Labs  Do you have regular lab work to monitor your medications?: Yes  Type of Regular Lab Work: A1c    Nutritional Status  Diet: Regular (3 meals daily; may have a snack; drinks water, G zero, coffee w/ splenda, milk w/ cereal)  Change in appetite?: No  Dentation:: Intact  Recent Changes in Weight: No Recent Weight Change  Current nutritional status an area of need that is impacting patient's ability to self-manage diabetes?: No    Additional Social History    Support  Does anyone support you with your diabetes care?: no  Who takes you to your medical appointments?: self  Does the current support meet the patient's needs?: Yes  Is Support an area impacting ability to self-manage  body odor from perspiration (sweating).  Use underarm deodorant each day.    To have some acne, sometimes or all the time.  (Talk with your doctor or nurse about this.)    Girls will usually begin puberty about two years before boys.  o Girls will develop breasts and pubic hair. They will also start their menstrual periods.  o Boys will develop a larger penis and testicles, as well as pubic hair. Their voices will change, and they ll start to have  wet dreams.     Sexuality    It is normal to have sexual feelings.    Find a supportive person who can answer questions about puberty, sexual development, sex, abstinence (choosing not to have sex), sexually transmitted diseases (STDs) and birth control.    Think about how you can say no to sex.    Safety    Accidents are the greatest threat to your health and life.    Always wear a seat belt in the car.    Practice a fire escape plan at home.  Check smoke detector batteries twice a year.    Keep electric items (like blow dryers, razors, curling irons, etc.) away from water.    Wear a helmet and other protective gear when bike riding, skating, skateboarding, etc.    Use sunscreen to reduce your risk of skin cancer.    Learn first aid and CPR (cardiopulmonary resuscitation).    Avoid dangerous behaviors and situations.  For example, never get in a car if the  has been drinking or using drugs.    Avoid peers who try to pressure you into risky activities.    Learn skills to manage stress, anger and conflict.    Do not use or carry any kind of weapon.    Find a supportive person (teacher, parent, health provider, counselor) whom you can talk to when you feel sad, angry, lonely or like hurting yourself.    Find help if you are being abused physically or sexually, or if you fear being hurt by others.    As a teenager, you will be given more responsibility for your health and health care decisions.  While your parent or guardian still has an important role, you will likely  diabetes?: No    Access to Mass Media & Technology  Does the patient have access to any of the following devices or technologies?: Smart phone  Media or technology needs impacting ability to self-manage diabetes?: No    Cognitive/Behavioral Health  Alert and Oriented: Yes  Difficulty Thinking: No  Requires Prompting: No  Requires assistance for routine expression?: No  Cognitive or behavioral barriers impacting ability to self-manage diabetes?: No    Culture/Sikh  Culture or Hoahaoism beliefs that may impact ability to access healthcare: No    Communication  Language preference: English  Hearing Problems: No  Vision Problems: No  Communication needs impacting ability to self-manage diabetes?: No    Health Literacy  Preferred Learning Method: Face to Face  How often do you need to have someone help you read instructions, pamphlets, or written material from your doctor or pharmacy?: Never  Health literacy needs impacting ability to self-manage diabetes?: No      Diabetes Self-Management Skills Assessment    Diabetes Disease Process/Treatment Options  Patient/caregiver able to state what happens when someone has diabetes.: somewhat  Patient/caregiver knows what type of diabetes they have.: yes  Diabetes Type : Type II  Patient/caregiver able to identify at least three signs and symptoms of diabetes.: no  Patient able to identify at least three risk factors for diabetes.: no  Diabetes Disease Process/Treatment Options: Skills Assessment Completed: Yes  Assessment indicates:: Instruction Needed  Area of need?: Yes    Nutrition/Healthy Eating  Challenges to healthy eating:: eating out, going to parties  Method of carbohydrate measurement:: no method (Eating less)  Patient can identify foods that impact blood sugar.: yes  Patient-identified foods:: sweets, starches (bread, pasta, rice, cereal)  Nutrition/Healthy Eating Skills Assessment Completed:: Yes  Assessment indicates:: Instruction Needed  Area of need?:  Yes    Physical Activity/Exercise  Patient's daily activity level:: lightly active  Patient formally exercises outside of work.: yes  Exercise Type: other (see comments) (Some hip pain - gardening, yard work)  Patient can identify forms of physical activity.: yes  Stated forms of physical activity:: any movement performed by muscles that uses energy, housework, yardwork, swimming  Patient can identify reasons why exercise/physical activity is important in diabetes management.: no  Physical Activity/Exercise Skills Assessment Completed: : Yes  Assessment indicates:: Instruction Needed  Area of need?: Yes    Medications  Patient is able to describe current diabetes management routine.: yes  Diabetes management routine:: insulin, injectable medications  Patient is able to identify current diabetes medications, dosages, and appropriate timing of medications.: yes  Patient understands the purpose of the medications taken for diabetes.: no  Patient reports problems or concerns with current medication regimen.: no  Medication Skills Assessment Completed:: Yes  Assessment indicates:: Instruction Needed  Area of need?: Yes    Home Blood Glucose Monitoring  Patient states that blood sugar is checked at home daily.: yes  Monitoring Method:: home glucometer  How often do you check your blood sugar?: Occasionally  Home Blood Glucose Monitoring Skills Assessment Completed: : Yes  Assessment indicates:: Instruction Needed  Area of need?: Yes    Acute Complications  Patient is able to identify types of acute complications: No  Acute Complications Skills Assessment Completed: : Yes  Assessment indicates:: Instruction Needed  Area of need?: Yes    Chronic Complications  Patient can identify major chronic complications of diabetes.: no  Patient is taking statin?: Yes  Chronic Complications Skills Assessment Completed: : Yes  Assessment indicates:: Instruction Needed  Area of need?: Yes    Psychosocial/Coping  Patient can identify ways  start spending some time alone with your health care provider as you get older.  Some teen health issues are actually considered confidential, and are protected by law.  Your health care team will discuss this and what it means with you.  Our goal is for you to become comfortable and confident caring for your own health.  ==============================================================   of coping with chronic disease.: no (see comments)  Psychosocial/Coping Skills Assessment Completed: : Yes  Assessment indicates:: Instruction Needed  Area of need?: Yes    Assessment Summary and Plan    Based on today's diabetes care assessment, the following areas of need were identified:      Social 9/14/2022   Support No   Access to Mass Media/Tech No   Cognitive/Behavioral Health No   Culture/Christian No   Communication No   Health Literacy No        Clinical 9/14/2022   Medication Adherence No   Nutritional Status No        Diabetes Self-Management Skills 9/14/2022   Diabetes Disease Process/Treatment Options Yes - reviewed diabetes disease process and treatment options   Nutrition/Healthy Eating Yes - reviewed CHO counting, label reading and addt'l resources to assist w/ CHO counting; plate method reviewed; alternatives to sugary beverages discussed   Physical Activity/Exercise Yes  - reviewed goals and benefits   Medication Yes - reviewed MOA of medication and regimen; scheduled patient with ENDO provider for further review of current DM medications and med mgt   Home Blood Glucose Monitoring Yes - reviewed SMBG schedule and BG goals; patient inquired about the Ricardo - provided details on the use of the Ricardo - not sure he wants to use it. Will think about it. Advised PCP will need to send Rx to [facility] along with [facility] if decides he wants to try it.   Acute Complications Yes - reviewed s/s and treatment of hypo/hyperglycemia   Chronic Complications Yes - reviewed standards of care   Psychosocial/Coping Yes - reviewed ways of coping with stress        Today's interventions were provided through individual discussion, instruction, and written materials were provided.      Patient verbalized understanding of instruction and written materials.  Pt was able to return back demonstration of instructions today. Patient understood key points, needs reinforcement and further instruction.     Diabetes  Self-Management Care Plan:    Today's Diabetes Self-Management Care Plan was developed with Andrew's input. Andrew has agreed to work toward the following goal(s) to improve his/her overall diabetes control.      Care Plan: Diabetes Management   Updates made since 8/15/2022 12:00 AM        Problem: Physical Activity and Exercise         Long-Range Goal: Patient to work on increasing PA to 150 min/wk    Start Date: 9/14/2022   Expected End Date: 3/14/2023   Priority: High   Barriers: Physical Limitations        Task: Discussed role of physical activity on reducing insulin resistance and improvement in overall glycemic control. Completed 9/14/2022         Follow Up Plan     Follow up in about 6 months (around 3/14/2023).    Today's care plan and follow up schedule was discussed with patient.  Andrew verbalized understanding of the care plan, goals, and agrees to follow up plan.        The patient was encouraged to communicate with his/her health care provider/physician and care team regarding his/her condition(s) and treatment.  I provided the patient with my contact information today and encouraged to contact me via phone or [facility] Patient Portal as needed.     Length of Visit   Total Time: 60 Minutes

## 2022-09-16 NOTE — TELEPHONE ENCOUNTER
No new care gaps identified.  [facility] Embedded Care Gaps. Reference number: [id number]. 9/16/2022   3:54:57 PM CDT

## 2022-09-16 NOTE — TELEPHONE ENCOUNTER
No new care gaps identified.  [facility] Embedded Care Gaps. Reference number: [id number]. 9/16/2022   3:51:50 PM CDT

## 2022-10-20 ENCOUNTER — OFFICE VISIT (OUTPATIENT)
Dept: FAMILY MEDICINE | Facility: CLINIC | Age: 67
End: 2022-10-20
Payer: MEDICARE

## 2022-10-20 VITALS
WEIGHT: 274.25 LBS | BODY MASS INDEX: 40.62 KG/M2 | OXYGEN SATURATION: 95 % | HEART RATE: 92 BPM | SYSTOLIC BLOOD PRESSURE: 132 MMHG | DIASTOLIC BLOOD PRESSURE: 72 MMHG | HEIGHT: 69 IN | TEMPERATURE: 98 F

## 2022-10-20 DIAGNOSIS — E78.2 MIXED HYPERLIPIDEMIA: ICD-10-CM

## 2022-10-20 DIAGNOSIS — I10 ESSENTIAL HYPERTENSION: ICD-10-CM

## 2022-10-20 DIAGNOSIS — M70.62 TROCHANTERIC BURSITIS OF BOTH HIPS: ICD-10-CM

## 2022-10-20 DIAGNOSIS — M70.61 TROCHANTERIC BURSITIS OF BOTH HIPS: ICD-10-CM

## 2022-10-20 DIAGNOSIS — Z79.4 TYPE 2 DIABETES MELLITUS WITH DIABETIC POLYNEUROPATHY, WITH LONG-TERM CURRENT USE OF INSULIN: Primary | ICD-10-CM

## 2022-10-20 DIAGNOSIS — E11.42 TYPE 2 DIABETES MELLITUS WITH DIABETIC POLYNEUROPATHY, WITH LONG-TERM CURRENT USE OF INSULIN: Primary | ICD-10-CM

## 2022-10-20 PROCEDURE — 90694 FLU VACCINE - QUADRIVALENT - ADJUVANTED: ICD-10-PCS | Mod: S$GLB,,, | Performed by: FAMILY MEDICINE

## 2022-10-20 PROCEDURE — 3062F PR POS MACROALBUMINURIA RESULT DOCUMENTED/REVIEW: ICD-10-PCS | Mod: CPTII,S$GLB,, | Performed by: FAMILY MEDICINE

## 2022-10-20 PROCEDURE — G0008 FLU VACCINE - QUADRIVALENT - ADJUVANTED: ICD-10-PCS | Mod: S$GLB,,, | Performed by: FAMILY MEDICINE

## 2022-10-20 PROCEDURE — 90694 VACC AIIV4 NO PRSRV 0.5ML IM: CPT | Mod: S$GLB,,, | Performed by: FAMILY MEDICINE

## 2022-10-20 PROCEDURE — 3078F DIAST BP <80 MM HG: CPT | Mod: CPTII,S$GLB,, | Performed by: FAMILY MEDICINE

## 2022-10-20 PROCEDURE — 99999 PR PBB SHADOW E&M-EST. PATIENT-LVL V: CPT | Mod: PBBFAC,,, | Performed by: FAMILY MEDICINE

## 2022-10-20 PROCEDURE — 1159F PR MEDICATION LIST DOCUMENTED IN MEDICAL RECORD: ICD-10-PCS | Mod: CPTII,S$GLB,, | Performed by: FAMILY MEDICINE

## 2022-10-20 PROCEDURE — 1101F PR PT FALLS ASSESS DOC 0-1 FALLS W/OUT INJ PAST YR: ICD-10-PCS | Mod: CPTII,S$GLB,, | Performed by: FAMILY MEDICINE

## 2022-10-20 PROCEDURE — 3051F PR MOST RECENT HEMOGLOBIN A1C LEVEL 7.0 - < 8.0%: ICD-10-PCS | Mod: CPTII,S$GLB,, | Performed by: FAMILY MEDICINE

## 2022-10-20 PROCEDURE — 3075F SYST BP GE 130 - 139MM HG: CPT | Mod: CPTII,S$GLB,, | Performed by: FAMILY MEDICINE

## 2022-10-20 PROCEDURE — 1125F AMNT PAIN NOTED PAIN PRSNT: CPT | Mod: CPTII,S$GLB,, | Performed by: FAMILY MEDICINE

## 2022-10-20 PROCEDURE — 4010F ACE/ARB THERAPY RXD/TAKEN: CPT | Mod: CPTII,S$GLB,, | Performed by: FAMILY MEDICINE

## 2022-10-20 PROCEDURE — 3051F HG A1C>EQUAL 7.0%<8.0%: CPT | Mod: CPTII,S$GLB,, | Performed by: FAMILY MEDICINE

## 2022-10-20 PROCEDURE — 4010F PR ACE/ARB THEARPY RXD/TAKEN: ICD-10-PCS | Mod: CPTII,S$GLB,, | Performed by: FAMILY MEDICINE

## 2022-10-20 PROCEDURE — 1160F RVW MEDS BY RX/DR IN RCRD: CPT | Mod: CPTII,S$GLB,, | Performed by: FAMILY MEDICINE

## 2022-10-20 PROCEDURE — 3066F PR DOCUMENTATION OF TREATMENT FOR NEPHROPATHY: ICD-10-PCS | Mod: CPTII,S$GLB,, | Performed by: FAMILY MEDICINE

## 2022-10-20 PROCEDURE — 1159F MED LIST DOCD IN RCRD: CPT | Mod: CPTII,S$GLB,, | Performed by: FAMILY MEDICINE

## 2022-10-20 PROCEDURE — 3066F NEPHROPATHY DOC TX: CPT | Mod: CPTII,S$GLB,, | Performed by: FAMILY MEDICINE

## 2022-10-20 PROCEDURE — 1101F PT FALLS ASSESS-DOCD LE1/YR: CPT | Mod: CPTII,S$GLB,, | Performed by: FAMILY MEDICINE

## 2022-10-20 PROCEDURE — G0008 ADMIN INFLUENZA VIRUS VAC: HCPCS | Mod: S$GLB,,, | Performed by: FAMILY MEDICINE

## 2022-10-20 PROCEDURE — 3075F PR MOST RECENT SYSTOLIC BLOOD PRESS GE 130-139MM HG: ICD-10-PCS | Mod: CPTII,S$GLB,, | Performed by: FAMILY MEDICINE

## 2022-10-20 PROCEDURE — 1157F ADVNC CARE PLAN IN RCRD: CPT | Mod: CPTII,S$GLB,, | Performed by: FAMILY MEDICINE

## 2022-10-20 PROCEDURE — 1160F PR REVIEW ALL MEDS BY PRESCRIBER/CLIN PHARMACIST DOCUMENTED: ICD-10-PCS | Mod: CPTII,S$GLB,, | Performed by: FAMILY MEDICINE

## 2022-10-20 PROCEDURE — 3078F PR MOST RECENT DIASTOLIC BLOOD PRESSURE < 80 MM HG: ICD-10-PCS | Mod: CPTII,S$GLB,, | Performed by: FAMILY MEDICINE

## 2022-10-20 PROCEDURE — 99214 OFFICE O/P EST MOD 30 MIN: CPT | Mod: 25,S$GLB,, | Performed by: FAMILY MEDICINE

## 2022-10-20 PROCEDURE — 3062F POS MACROALBUMINURIA REV: CPT | Mod: CPTII,S$GLB,, | Performed by: FAMILY MEDICINE

## 2022-10-20 PROCEDURE — 3288F FALL RISK ASSESSMENT DOCD: CPT | Mod: CPTII,S$GLB,, | Performed by: FAMILY MEDICINE

## 2022-10-20 PROCEDURE — 99214 PR OFFICE/OUTPT VISIT, EST, LEVL IV, 30-39 MIN: ICD-10-PCS | Mod: 25,S$GLB,, | Performed by: FAMILY MEDICINE

## 2022-10-20 PROCEDURE — 1157F PR ADVANCE CARE PLAN OR EQUIV PRESENT IN MEDICAL RECORD: ICD-10-PCS | Mod: CPTII,S$GLB,, | Performed by: FAMILY MEDICINE

## 2022-10-20 PROCEDURE — 1125F PR PAIN SEVERITY QUANTIFIED, PAIN PRESENT: ICD-10-PCS | Mod: CPTII,S$GLB,, | Performed by: FAMILY MEDICINE

## 2022-10-20 PROCEDURE — 99999 PR PBB SHADOW E&M-EST. PATIENT-LVL V: ICD-10-PCS | Mod: PBBFAC,,, | Performed by: FAMILY MEDICINE

## 2022-10-20 PROCEDURE — 3288F PR FALLS RISK ASSESSMENT DOCUMENTED: ICD-10-PCS | Mod: CPTII,S$GLB,, | Performed by: FAMILY MEDICINE

## 2022-10-20 RX ORDER — DULAGLUTIDE 1.5 MG/.5ML
1.5 INJECTION, SOLUTION SUBCUTANEOUS
Qty: 12 PEN | Refills: 11 | Status: SHIPPED | OUTPATIENT
Start: 2022-10-20 | End: 2023-05-05 | Stop reason: DRUGHIGH

## 2022-10-20 RX ORDER — DICLOFENAC SODIUM 10 MG/G
2 GEL TOPICAL 3 TIMES DAILY PRN
Qty: 100 G | Refills: 4 | Status: SHIPPED | OUTPATIENT
Start: 2022-10-20 | End: 2023-03-03

## 2022-10-20 NOTE — PROGRESS NOTES
(Portions of this note were dictated using voice recognition software and may contain dictation related errors in spelling/grammar/syntax not found on text review)    CC:   Chief Complaint   Patient presents with    Follow-up         HPI: 67 y.o. male establish care visit was in July of 2022.  Here for medical follow-up.    Following with  wound care for chronic venous insufficiency with venous stasis ulcers bilateral lower extremities.  . Had normal BRISA.  Follows with Podiatry, Dr. Murrieta    Diabetes type 2 uncontrolled.  On Humulin 70/30, 60 units b.i.d..  At last visit started Trulicity 0.75 mg weekly He believes he was on metformin years ago but does not recall why he was stopped on this.  He was diagnosed with diabetes over 20 years ago, states that he has been on 70 30 since that time.  Followed up with diabetes education on 09/14/2022.  A1c is improving slightly down to 7.7 from 8.4 noted last time.    Eye exam up-to-date 07/19/2022.    gout, prophylaxed with allopurinol, no recent issues    Dyslipidemia on Crestor 40 mg daily    Hypertension on losartan 100 mg daily, carvedilol 12.5 mg b.i.d.  , Aldactone 25 mg daily, Lasix 20 mg daily.  Follows with Cardiology    History of recurrent TCC bladder, follows with Urology., had recurrent resection and mitomycin-C installation.  Last cystoscopy was 03/29/2022 noting scar tissue but no malignancy.  Urine cytology was negative for malignant cells. Had bladder neck stenosis. Takes flomax.     Not exercising much because of hip pain.  Feels pain bilateral hip lateral aspect, sometimes radiates bit down the lateral aspect of the hip but not past the knee.  No pain into the back.  No paresthesias or pain below the knee.  Had an x-ray done of the hips in 2021 that showed no significant degenerative disease of the hip joint.      Past Medical History:   Diagnosis Date    BPH (benign prostatic hyperplasia)     Cancer     bladder    Diabetes     type 2    Diabetes  mellitus, type 2     Gout     High cholesterol     Hypertension     Sciatica 2016    had injection 3 weeks ago    Urinary tract infection        Past Surgical History:   Procedure Laterality Date    APPENDECTOMY  1968    BALLOON DILATION OF URETER Left 9/18/2019    Procedure: DILATION, URETER, USING BALLOON;  Surgeon: Monty Cee MD;  Location: [facility];  Service: Urology;  Laterality: Left;    BARBOTAGE OF BLADDER N/A 8/14/2019    Procedure: BARBOTAGE, BLADDER;  Surgeon: Monty Cee MD;  Location: [facility] OR;  Service: Urology;  Laterality: N/A;    BARBOTAGE OF URETER Bilateral 8/14/2019    Procedure: BARBOTAGE, URETER;  Surgeon: Monty Cee MD;  Location: [facility] OR;  Service: Urology;  Laterality: Bilateral;    BARBOTAGE OF URETER Left 9/18/2019    Procedure: BARBOTAGE, URETER;  Surgeon: Monty Cee MD;  Location: [facility];  Service: Urology;  Laterality: Left;  left renal/ ureteral washing.    BLADDER SURGERY      COLONOSCOPY N/A 7/10/2018    Procedure: COLONOSCOPY;  Surgeon: Azalea Sanchez MD;  Location: [facility] ENDO;  Service: Endoscopy;  Laterality: N/A;  Schedule at [facility], faxed to [facility] [phone]    CYSTOSCOPY      CYSTOSCOPY W/ RETROGRADES Bilateral 8/14/2019    Procedure: CYSTOSCOPY, WITH RETROGRADE PYELOGRAM;  Surgeon: Monty Cee MD;  Location: [facility];  Service: Urology;  Laterality: Bilateral;    CYSTOSCOPY W/ RETROGRADES Left 9/18/2019    Procedure: CYSTOSCOPY, WITH RETROGRADE PYELOGRAM;  Surgeon: Monty Cee MD;  Location: [facility];  Service: Urology;  Laterality: Left;  cystourethroscopy with left retrograde pyelogram     CYSTOSCOPY W/ RETROGRADES Bilateral 3/11/2020    Procedure: CYSTOSCOPY, WITH RETROGRADE PYELOGRAM Ureteral renal washings and bladder washings;  Surgeon: Monty Cee MD;  Location: [facility];  Service: Urology;  Laterality: Bilateral;    CYSTOSCOPY WITH URETEROSCOPY, RETROGRADE PYELOGRAPHY, AND INSERTION OF STENT Bilateral  6/22/2020    Procedure: CYSTOSCOPY, WITH RETROGRADE PYELOGRAM AND URETERAL STENT INSERTION;  Surgeon: Monty Cee MD;  Location: [facility] OR;  Service: Urology;  Laterality: Bilateral;    CYSTOSCOPY WITH URETHRAL DILATION N/A 6/10/2021    Procedure: CYSTOSCOPY, WITH URETHRAL DILATION;  Surgeon: Monty Cee MD;  Location: [facility] OR;  Service: Urology;  Laterality: N/A;    HAND SURGERY Right 12/08/2016    trigger finger    INSTILLATION OF URINARY BLADDER N/A 3/11/2020    Procedure: INSTILLATION, BLADDER - mitomyin - c;  Surgeon: Monty Cee MD;  Location: [facility] OR;  Service: Urology;  Laterality: N/A;    INSTILLATION OF URINARY BLADDER  6/22/2020    Procedure: INSTILLATION, BLADDER;  Surgeon: Monty Cee MD;  Location: [facility] OR;  Service: Urology;;  mitomycin 40mg    LASER ABLATION Left 9/18/2019    Procedure: ABLATION, USING LASER;  Surgeon: Monty Cee MD;  Location: [facility] OR;  Service: Urology;  Laterality: Left;  laser ablation of lower pole calyceal transitional cell lesion    PYELOSCOPY Left 9/18/2019    Procedure: PYELOSCOPY;  Surgeon: Monty Cee MD;  Location: [facility] OR;  Service: Urology;  Laterality: Left;  Left ureteral pyeloscopy    TONSILLECTOMY  1960    TRANSURETHRAL RESECTION OF PROSTATE N/A 9/18/2019    Procedure: TURP (TRANSURETHRAL RESECTION OF PROSTATE);  Surgeon: Monty Cee MD;  Location: [facility];  Service: Urology;  Laterality: N/A;  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    TRANSURETHRAL RESECTION OF PROSTATE USING BIPOLAR CAUTERY N/A 3/11/2020    Procedure: TURP, USING BIPOLAR CAUTERY;  Surgeon: Monty Cee MD;  Location: [facility] OR;  Service: Urology;  Laterality: N/A;    TRANSURETHRAL SURGICAL REMOVAL OF STRICTURE OF BLADDER NECK  8/14/2019    Procedure: EXCISION, CONTRACTURE, BLADDER NECK, TRANSURETHRAL;  Surgeon: Monty Cee MD;  Location: [facility] OR;  Service: Urology;;    TRANSURETHRAL SURGICAL REMOVAL OF STRICTURE OF BLADDER  "NECK N/A 6/10/2021    Procedure: EXCISION, CONTRACTURE, BLADDER NECK, TRANSURETHRAL;  Surgeon: Monty Cee MD;  Location: [facility] OR;  Service: Urology;  Laterality: N/A;  using laser    URETEROSCOPY Left 9/18/2019    Procedure: URETEROSCOPY;  Surgeon: Monty Cee MD;  Location: [facility] OR;  Service: Urology;  Laterality: Left;    URETEROSCOPY Right 6/22/2020    Procedure: URETEROSCOPY;  Surgeon: Monty Cee MD;  Location: [facility] OR;  Service: Urology;  Laterality: Right;       Family History   Problem Relation Age of Onset    Diabetes Mother     Prostate cancer Neg Hx     Kidney disease Neg Hx        Social History     Tobacco Use    Smoking status: Former     Types: Pipe    Smokeless tobacco: Never    Tobacco comments:     quit 40 yrs ago   Substance Use Topics    Alcohol use: Yes     Alcohol/week: 0.0 standard drinks     Comment: rarely    Drug use: No       Lab Results   Component Value Date    WBC 9.31 07/20/2022    HGB 16.5 07/20/2022    HCT 51.5 07/20/2022    MCV 87 07/20/2022     07/20/2022    CHOL 142 07/20/2022    TRIG 98 07/20/2022    HDL 50 07/20/2022    ALT 22 07/20/2022    AST 22 07/20/2022    BILITOT 0.7 07/20/2022    ALKPHOS 71 07/20/2022     07/20/2022    K 4.5 07/20/2022    CL 98 07/20/2022    CREATININE 1.16 07/20/2022    ESTGFRAFRICA >60.0 07/20/2022    EGFRNONAA >60.0 07/20/2022    CALCIUM 10.5 07/20/2022    ALBUMIN 4.5 07/20/2022    BUN 35 (H) 07/20/2022    CO2 31 (H) 07/20/2022    TSH 2.490 07/20/2022    PSA 0.21 07/20/2022    PSADIAG 1.6 05/09/2017    HGBA1C 7.7 (H) 10/19/2022    MICALBCREAT 594.3 (H) 07/20/2022    LDLCALC 72.4 07/20/2022     (H) 07/20/2022             Vital signs reviewed  Vitals:    10/20/22 1016   BP: 132/72   BP Location: Right arm   Patient Position: Sitting   BP Method: Large (Manual)   Pulse: 92   Temp: 97.6 °F (36.4 °C)   SpO2: 95%   Weight: 124.4 kg (274 lb 4 oz)   Height: 5' 9" (1.753 m)         Wt Readings from Last 4 Encounters: "   10/20/22 124.4 kg (274 lb 4 oz)   07/25/22 125.2 kg (276 lb)   07/15/22 125.6 kg (276 lb 14.4 oz)   06/27/22 126.1 kg (278 lb)       PE:   APPEARANCE: Well nourished, well developed, in no acute distress.    HEAD: Normocephalic, atraumatic.  EYES: PERRL. EOMI.   Conjunctivae noninjected.  EARS: TM's intact. Light reflex normal. No retraction or perforation  NOSE: Mucosa pink. Airway clear.  MOUTH & THROAT: No tonsillar enlargement. No pharyngeal erythema or exudate.   NECK: Supple with no cervical lymphadenopathy.  No carotid bruits.  No thyromegaly  CHEST: Good inspiratory effort. Lungs clear to auscultation with no wheezes or crackles.  CARDIOVASCULAR: Normal S1, S2. No rubs, murmurs, or gallops.  ABDOMEN: Bowel sounds normal. Not distended. Soft. No tenderness or masses. No organomegaly.  EXTREMITIES:  Compression stockings in place, no edema.  Bilateral hip exam:  Normal range of motion internal external rotation bilaterally.  Negative ANN test.  5/5 hip flexion, extension, abduction, adduction, knee flexion, knee extension against resistance.  No exacerbation of hip pains with these maneuvers bilaterally.  Directed tenderness over lateral aspect of the greater trochanter bilaterally.  No posterior trochanteric tenderness to palpation.    IMPRESSION  1. Type 2 diabetes mellitus with diabetic polyneuropathy, with long-term current use of insulin    2. Essential hypertension    3. Mixed hyperlipidemia    4. Trochanteric bursitis of both hips              PLAN  Orders Placed This Encounter   Procedures    Influenza (FLUAD) - Quadrivalent (Adjuvanted) *Preferred* (65+) (PF)    Comprehensive Metabolic Panel    Lipid Panel    Hemoglobin A1C       Hypertension control    Dyslipidemia:  Continue statin    Venous stasis, continue with wound care if needed, podiatry office appointment coming up.  Continues with compression stockings.  Encourage exercise, can look into gym with the pool for pool exercises  Insurance  may cover this and he will look into this    Diabetes,   Has been on 70 30 insulin for many years now, does not recall why he got off metformin.  On Trulicity 0.75 mg weekly, can increase to 1.5 mg weekly for 4 weeks, subsequently can consider going up to 3 mg after 4 weeks.  Further attention to lifestyle modification including healthy diet, regular exercise     Gout stable.  Continue allopurinol    Bladder cancer history:  No recent signs of recurrence.  Follow up with Urology as directed    Trochanteric bursitis:  Discussed ice application, Voltaren gel topically, IT band stretches, printed.  If significant worsening could consider directed corticosteroid injection but would avoid right now given his diabetes until we noticed better controlled.      Labs above in 3 months with visit to follow         Medications Ordered This Encounter   Medications    diclofenac sodium (VOLTAREN) 1 % Gel     Sig: Apply 2 g topically 3 (three) times daily as needed (pain).     Dispense:  100 g     Refill:  4    dulaglutide (TRULICITY) 1.5 mg/0.5 mL pen injector     Sig: Inject 1.5 mg into the skin every 7 days.     Dispense:  12 pen     Refill:  11               SCREENINGS      Immunizations:      COVID x3, encourage booster  Flu  Tdap 2017  Zostavax 2017, can get updated shingles vaccine at pharmacy  Prevnar 13:2015  Pneumovax 23:2016  Prevnar 20 07/15/2022    Age/demographic appropriate health maintenance:  Colonoscopy 2018:  2 mm cecal polyp, otherwise normal, tubular adenoma, 5 year recall  PSA:  July 2022

## 2022-10-20 NOTE — PATIENT INSTRUCTIONS
Trochanteric Bursitis Rehabilitation Exercises  You can begin stretching the muscles that run along the outside of your hip using the first 3 exercises. You can do the strengthening exercises when the sharp pain lessens.   Stretching exercises   Gluteal stretch: Lying on your back with both knees bent, rest the ankle of one leg over the knee of your other leg. Grasp the thigh of the bottom leg and pull that knee toward your chest. You will feel a stretch along the buttocks and possibly along the outside of your hip on the top leg. Hold this for 15 to 30 seconds. Repeat 3 times.   Iliotibial band stretch: Standing: Cross one leg in front of the other leg and bend down and touch your toes. You can move your hands across the floor toward the front leg and you will feel more stretch on the outside of your thigh on the other side. Hold this position for 15 to 30 seconds. Return to the starting position. Repeat 3 times. Reverse the positions of your legs and repeat.   Iliotibial band stretch: Side-leaning: Stand sideways near a wall. Place one hand on the wall for support. Cross the leg farthest from the wall over the other leg, keeping the foot closest to the wall flat on the floor. Lean your hips into the wall. Hold the stretch for 15 seconds, repeat 3 times, and then switch legs and repeat the exercise another 3 times.   Strengthening exercises   Straight leg raise: Lie on your back with your legs straight out in front of you. Bend the knee on your uninjured side and place the foot flat on the floor. Tighten the thigh muscle of the other leg and lift it about 8 inches off the floor, keeping the thigh muscle tight throughout. Slowly lower your leg back down to the floor. Do 3 sets of 10.   Prone hip extension: Lie on your stomach with your legs straight out behind you. Tighten the buttocks and thigh muscles of your injured leg and lift it off the floor about 8 inches. Keep your knee straight. Hold for 5 seconds. Then  lower your leg and relax. Do 3 sets of 10.   Side-lying leg lift: Lying on your uninjured side, tighten the front thigh muscles on your top leg and lift that leg 8 to 10 inches away from the other leg. Keep the leg straight and lower slowly. Do 3 sets of 10.   Wall squat with a ball: Stand with your back, shoulders, and head against a wall and look straight ahead. Keep your shoulders relaxed and your feet 2 feet away from the wall and a shoulder's width apart. Place a soccer or basketball-sized ball behind your back. Keeping your back upright, slowly squat down to a 45-degree angle. Your thighs will not yet be parallel to the floor. Hold this position for 10 seconds and then slowly slide back up the wall. Repeat 10 times. Build up to 3 sets of 10.   Written by Jen Rodriguez MS, PT, and Sofia Szymanski PT, [facility], for [employer].   Published by [facility].  © 2009 [facility] and/or its affiliates. All Rights Reserved.

## 2022-10-24 DIAGNOSIS — M10.9 GOUT OF FOOT, UNSPECIFIED CAUSE, UNSPECIFIED CHRONICITY, UNSPECIFIED LATERALITY: ICD-10-CM

## 2022-10-26 RX ORDER — ALLOPURINOL 300 MG/1
300 TABLET ORAL DAILY
Qty: 90 TABLET | Refills: 4 | Status: SHIPPED | OUTPATIENT
Start: 2022-10-26 | End: 2023-11-27 | Stop reason: SDUPTHER

## 2022-10-28 ENCOUNTER — OFFICE VISIT (OUTPATIENT)
Dept: PODIATRY | Facility: CLINIC | Age: 67
End: 2022-10-28
Payer: MEDICARE

## 2022-10-28 VITALS
HEIGHT: 69 IN | BODY MASS INDEX: 40.5 KG/M2 | SYSTOLIC BLOOD PRESSURE: 148 MMHG | DIASTOLIC BLOOD PRESSURE: 81 MMHG | HEART RATE: 90 BPM

## 2022-10-28 DIAGNOSIS — B35.1 ONYCHOMYCOSIS: ICD-10-CM

## 2022-10-28 DIAGNOSIS — I87.2 VENOUS INSUFFICIENCY OF BOTH LOWER EXTREMITIES: ICD-10-CM

## 2022-10-28 DIAGNOSIS — E11.51 TYPE 2 DIABETES MELLITUS WITH PERIPHERAL VASCULAR DISEASE: Primary | ICD-10-CM

## 2022-10-28 PROCEDURE — 3066F PR DOCUMENTATION OF TREATMENT FOR NEPHROPATHY: ICD-10-PCS | Mod: CPTII,S$GLB,, | Performed by: PODIATRIST

## 2022-10-28 PROCEDURE — 1160F RVW MEDS BY RX/DR IN RCRD: CPT | Mod: CPTII,S$GLB,, | Performed by: PODIATRIST

## 2022-10-28 PROCEDURE — 4010F PR ACE/ARB THEARPY RXD/TAKEN: ICD-10-PCS | Mod: CPTII,S$GLB,, | Performed by: PODIATRIST

## 2022-10-28 PROCEDURE — 99499 UNLISTED E&M SERVICE: CPT | Mod: S$GLB,,, | Performed by: PODIATRIST

## 2022-10-28 PROCEDURE — 1159F MED LIST DOCD IN RCRD: CPT | Mod: CPTII,S$GLB,, | Performed by: PODIATRIST

## 2022-10-28 PROCEDURE — 4010F ACE/ARB THERAPY RXD/TAKEN: CPT | Mod: CPTII,S$GLB,, | Performed by: PODIATRIST

## 2022-10-28 PROCEDURE — 3079F PR MOST RECENT DIASTOLIC BLOOD PRESSURE 80-89 MM HG: ICD-10-PCS | Mod: CPTII,S$GLB,, | Performed by: PODIATRIST

## 2022-10-28 PROCEDURE — 1126F PR PAIN SEVERITY QUANTIFIED, NO PAIN PRESENT: ICD-10-PCS | Mod: CPTII,S$GLB,, | Performed by: PODIATRIST

## 2022-10-28 PROCEDURE — 3051F PR MOST RECENT HEMOGLOBIN A1C LEVEL 7.0 - < 8.0%: ICD-10-PCS | Mod: CPTII,S$GLB,, | Performed by: PODIATRIST

## 2022-10-28 PROCEDURE — 11721 DEBRIDE NAIL 6 OR MORE: CPT | Mod: Q9,S$GLB,, | Performed by: PODIATRIST

## 2022-10-28 PROCEDURE — 11721 ROUTINE FOOT CARE: ICD-10-PCS | Mod: Q9,S$GLB,, | Performed by: PODIATRIST

## 2022-10-28 PROCEDURE — 3062F PR POS MACROALBUMINURIA RESULT DOCUMENTED/REVIEW: ICD-10-PCS | Mod: CPTII,S$GLB,, | Performed by: PODIATRIST

## 2022-10-28 PROCEDURE — 1159F PR MEDICATION LIST DOCUMENTED IN MEDICAL RECORD: ICD-10-PCS | Mod: CPTII,S$GLB,, | Performed by: PODIATRIST

## 2022-10-28 PROCEDURE — 3079F DIAST BP 80-89 MM HG: CPT | Mod: CPTII,S$GLB,, | Performed by: PODIATRIST

## 2022-10-28 PROCEDURE — 99999 PR PBB SHADOW E&M-EST. PATIENT-LVL III: CPT | Mod: PBBFAC,,, | Performed by: PODIATRIST

## 2022-10-28 PROCEDURE — 1126F AMNT PAIN NOTED NONE PRSNT: CPT | Mod: CPTII,S$GLB,, | Performed by: PODIATRIST

## 2022-10-28 PROCEDURE — 3062F POS MACROALBUMINURIA REV: CPT | Mod: CPTII,S$GLB,, | Performed by: PODIATRIST

## 2022-10-28 PROCEDURE — 3066F NEPHROPATHY DOC TX: CPT | Mod: CPTII,S$GLB,, | Performed by: PODIATRIST

## 2022-10-28 PROCEDURE — 1157F ADVNC CARE PLAN IN RCRD: CPT | Mod: CPTII,S$GLB,, | Performed by: PODIATRIST

## 2022-10-28 PROCEDURE — 3077F PR MOST RECENT SYSTOLIC BLOOD PRESSURE >= 140 MM HG: ICD-10-PCS | Mod: CPTII,S$GLB,, | Performed by: PODIATRIST

## 2022-10-28 PROCEDURE — 1160F PR REVIEW ALL MEDS BY PRESCRIBER/CLIN PHARMACIST DOCUMENTED: ICD-10-PCS | Mod: CPTII,S$GLB,, | Performed by: PODIATRIST

## 2022-10-28 PROCEDURE — 1157F PR ADVANCE CARE PLAN OR EQUIV PRESENT IN MEDICAL RECORD: ICD-10-PCS | Mod: CPTII,S$GLB,, | Performed by: PODIATRIST

## 2022-10-28 PROCEDURE — 99499 NO LOS: ICD-10-PCS | Mod: S$GLB,,, | Performed by: PODIATRIST

## 2022-10-28 PROCEDURE — 3077F SYST BP >= 140 MM HG: CPT | Mod: CPTII,S$GLB,, | Performed by: PODIATRIST

## 2022-10-28 PROCEDURE — 3051F HG A1C>EQUAL 7.0%<8.0%: CPT | Mod: CPTII,S$GLB,, | Performed by: PODIATRIST

## 2022-10-28 PROCEDURE — 99999 PR PBB SHADOW E&M-EST. PATIENT-LVL III: ICD-10-PCS | Mod: PBBFAC,,, | Performed by: PODIATRIST

## 2022-10-28 NOTE — PROCEDURES
"Routine Foot Care    Date/Time: 10/28/2022 1:15 PM  Performed by: Patrick Murrieta DPM  Authorized by: Patrick Murrieta DPM     Time out: Immediately prior to procedure a "time out" was called to verify the correct patient, procedure, equipment, support staff and site/side marked as required.    Consent Done?:  Yes (Verbal)  Hyperkeratotic Skin Lesions?: No      Nail Care Type:  Debride  Location(s): All  (Left 1st Toe, Left 3rd Toe, Left 2nd Toe, Left 4th Toe, Left 5th Toe, Right 1st Toe, Right 2nd Toe, Right 3rd Toe, Right 4th Toe and Right 5th Toe)  Patient tolerance:  Patient tolerated the procedure well with no immediate complications     Used sterile nail nipper.      "

## 2022-10-28 NOTE — PROGRESS NOTES
Subjective:      Patient ID: Andrew Beltran is a 67 y.o. male.    Chief Complaint: Diabetes Mellitus (Lon Brock MD     10/20/2022) and Nail Care    Andrew is a 67 y.o. male who presents to the clinic upon referral from Dr. Jett  for evaluation and treatment of diabetic feet. Andrew has a past medical history of BPH (benign prostatic hyperplasia), Cancer, Diabetes, Diabetes mellitus, type 2, Gout, High cholesterol, Hypertension, Sciatica (2016), and Urinary tract infection. Patient relates no major problem with feet.   History of previous venous stasis ulcerations.    10/28/2022:  Returns for routine foot care.  No new concerns.  States that he did receive a pedicure in between visits.    PCP: Lon Brock MD    Date Last Seen by PCP:   07/15/2022    Current shoe gear: Slip-on shoes    Hemoglobin A1C   Date Value Ref Range Status   10/19/2022 7.7 (H) 4.0 - 5.6 % Final     Comment:     ADA Screening Guidelines:  5.7-6.4%  Consistent with prediabetes  >or=6.5%  Consistent with diabetes    High levels of fetal hemoglobin interfere with the HbA1C  assay. Heterozygous hemoglobin variants (HbS, HgC, etc)do  not significantly interfere with this assay.   However, presence of multiple variants may affect accuracy.     07/20/2022 8.4 (H) 4.0 - 5.6 % Final     Comment:     ADA Screening Guidelines:  5.7-6.4%  Consistent with prediabetes  >or=6.5%  Consistent with diabetes    High levels of fetal hemoglobin interfere with the HbA1C  assay. Heterozygous hemoglobin variants (HbS, HgC, etc)do  not significantly interfere with this assay.   However, presence of multiple variants may affect accuracy.     07/12/2021 9.3 (H) 4.0 - 5.6 % Final     Comment:     ADA Screening Guidelines:  5.7-6.4%  Consistent with prediabetes  >or=6.5%  Consistent with diabetes    High levels of fetal hemoglobin interfere with the HbA1C  assay. Heterozygous hemoglobin variants (HbS, HgC, etc)do  not significantly interfere with this  "assay.   However, presence of multiple variants may affect accuracy.       Vitals:    10/28/22 1318   BP: (!) 148/81   Pulse: 90   Height: 5' 9" (1.753 m)   PainSc: 0-No pain   PainLoc: Foot      Past Medical History:   Diagnosis Date    BPH (benign prostatic hyperplasia)     Cancer     bladder    Diabetes     type 2    Diabetes mellitus, type 2     Gout     High cholesterol     Hypertension     Sciatica 2016    had injection 3 weeks ago    Urinary tract infection        Past Surgical History:   Procedure Laterality Date    APPENDECTOMY  1968    BALLOON DILATION OF URETER Left 9/18/2019    Procedure: DILATION, URETER, USING BALLOON;  Surgeon: Monty Cee MD;  Location: [facility];  Service: Urology;  Laterality: Left;    BARBOTAGE OF BLADDER N/A 8/14/2019    Procedure: BARBOTAGE, BLADDER;  Surgeon: Monty Cee MD;  Location: [facility] OR;  Service: Urology;  Laterality: N/A;    BARBOTAGE OF URETER Bilateral 8/14/2019    Procedure: BARBOTAGE, URETER;  Surgeon: Monty Cee MD;  Location: [facility] OR;  Service: Urology;  Laterality: Bilateral;    BARBOTAGE OF URETER Left 9/18/2019    Procedure: BARBOTAGE, URETER;  Surgeon: Monty Cee MD;  Location: [facility] OR;  Service: Urology;  Laterality: Left;  left renal/ ureteral washing.    BLADDER SURGERY      COLONOSCOPY N/A 7/10/2018    Procedure: COLONOSCOPY;  Surgeon: Azalea Sanchez MD;  Location: [facility];  Service: Endoscopy;  Laterality: N/A;  Schedule at [facility], faxed to [employer] [phone]    CYSTOSCOPY      CYSTOSCOPY W/ RETROGRADES Bilateral 8/14/2019    Procedure: CYSTOSCOPY, WITH RETROGRADE PYELOGRAM;  Surgeon: Monty Cee MD;  Location: [facility] OR;  Service: Urology;  Laterality: Bilateral;    CYSTOSCOPY W/ RETROGRADES Left 9/18/2019    Procedure: CYSTOSCOPY, WITH RETROGRADE PYELOGRAM;  Surgeon: Monty Cee MD;  Location: [facility] OR;  Service: Urology;  Laterality: Left;  cystourethroscopy with left retrograde pyelogram     " CYSTOSCOPY W/ RETROGRADES Bilateral 3/11/2020    Procedure: CYSTOSCOPY, WITH RETROGRADE PYELOGRAM Ureteral renal washings and bladder washings;  Surgeon: oMnty Cee MD;  Location: [facility];  Service: Urology;  Laterality: Bilateral;    CYSTOSCOPY WITH URETEROSCOPY, RETROGRADE PYELOGRAPHY, AND INSERTION OF STENT Bilateral 6/22/2020    Procedure: CYSTOSCOPY, WITH RETROGRADE PYELOGRAM AND URETERAL STENT INSERTION;  Surgeon: Monty Cee MD;  Location: [facility] OR;  Service: Urology;  Laterality: Bilateral;    CYSTOSCOPY WITH URETHRAL DILATION N/A 6/10/2021    Procedure: CYSTOSCOPY, WITH URETHRAL DILATION;  Surgeon: Monty Cee MD;  Location: [facility] OR;  Service: Urology;  Laterality: N/A;    HAND SURGERY Right 12/08/2016    trigger finger    INSTILLATION OF URINARY BLADDER N/A 3/11/2020    Procedure: INSTILLATION, BLADDER - mitomyin - c;  Surgeon: Monty Cee MD;  Location: [facility] OR;  Service: Urology;  Laterality: N/A;    INSTILLATION OF URINARY BLADDER  6/22/2020    Procedure: INSTILLATION, BLADDER;  Surgeon: Monty Cee MD;  Location: [facility] OR;  Service: Urology;;  mitomycin 40mg    LASER ABLATION Left 9/18/2019    Procedure: ABLATION, USING LASER;  Surgeon: Monty Cee MD;  Location: [facility] OR;  Service: Urology;  Laterality: Left;  laser ablation of lower pole calyceal transitional cell lesion    PYELOSCOPY Left 9/18/2019    Procedure: PYELOSCOPY;  Surgeon: Monty Cee MD;  Location: [facility];  Service: Urology;  Laterality: Left;  Left ureteral pyeloscopy    TONSILLECTOMY  1960    TRANSURETHRAL RESECTION OF PROSTATE N/A 9/18/2019    Procedure: TURP (TRANSURETHRAL RESECTION OF PROSTATE);  Surgeon: Monty Cee MD;  Location: [facility];  Service: Urology;  Laterality: N/A;  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 OR;  Service: Urology;;    TRANSURETHRAL SURGICAL REMOVAL OF STRICTURE OF BLADDER NECK N/A 6/10/2021    Procedure: EXCISION, CONTRACTURE, BLADDER NECK, TRANSURETHRAL;  Surgeon: Monty Cee MD;  Location: [facility];  Service: Urology;  Laterality: N/A;  using laser    URETEROSCOPY Left 9/18/2019    Procedure: URETEROSCOPY;  Surgeon: Monty Cee MD;  Location: [facility] OR;  Service: Urology;  Laterality: Left;    URETEROSCOPY Right 6/22/2020    Procedure: URETEROSCOPY;  Surgeon: Monty Cee MD;  Location: [facility];  Service: Urology;  Laterality: Right;       Family History   Problem Relation Age of Onset    Diabetes Mother     Prostate cancer Neg Hx     Kidney disease Neg Hx        Social History     Socioeconomic History    Marital status:    Tobacco Use    Smoking status: Former     Types: Pipe    Smokeless tobacco: Never    Tobacco comments:     quit 40 yrs ago   Substance and Sexual Activity    Alcohol use: Yes     Alcohol/week: 0.0 standard drinks     Comment: rarely    Drug use: No    Sexual activity: Yes     Partners: Female     Social Determinants of Health     Financial Resource Strain: Low Risk     Difficulty of Paying Living Expenses: Not hard at all   Food Insecurity: No Food Insecurity    Worried About Running Out of Food in the Last Year: Never true    Ran Out of Food in the Last Year: Never true   Transportation Needs: No Transportation Needs    Lack of Transportation (Medical): No    Lack of Transportation (Non-Medical): No   Physical Activity: Inactive    Days of Exercise per Week: 0 days    Minutes of Exercise per Session: 10 min   Stress: No Stress Concern Present    Feeling of Stress : Not at all   Social Connections: Unknown    Frequency  of Communication with Friends and Family: More than three times a week    Frequency of Social Gatherings with Friends and Family: Twice a week    Active Member of Clubs or Organizations: Yes    Attends Club or Organization Meetings: More than 4 times per year    Marital Status:    Housing Stability: Low Risk     Unable to Pay for Housing in the Last Year: No    Number of Places Lived in the Last Year: 2    Unstable Housing in the Last Year: No       Current Outpatient Medications   Medication Sig Dispense Refill    allopurinoL (ZYLOPRIM) 300 MG tablet Take 1 tablet (300 mg total) by mouth once daily. 90 tablet 4    aspirin (ECOTRIN) 81 MG EC tablet Take 81 mg by mouth once daily.      blood sugar diagnostic Strp by Misc.(Non-Drug; Combo Route) route.      carvediloL (COREG) 12.5 MG tablet Take 1 tablet (12.5 mg total) by mouth 2 (two) times daily with meals. 180 tablet 3    diclofenac sodium (VOLTAREN) 1 % Gel Apply 2 g topically 3 (three) times daily as needed (pain). 100 g 4    dulaglutide (TRULICITY) 1.5 mg/0.5 mL pen injector Inject 1.5 mg into the skin every 7 days. 12 pen 11    furosemide (LASIX) 20 MG tablet Take 1 tablet (20 mg total) by mouth once daily. 90 tablet 3    ibuprofen (ADVIL,MOTRIN) 400 MG tablet Take 400 mg by mouth every 6 (six) hours as needed for Other.      insulin NPH-insulin regular, 70/30, (HUMULIN 70/30 U-100 INSULIN) 100 unit/mL (70-30) injection Inject 60 Units into the skin 2 (two) times daily. 100 mL 11    insulin syringe-needle U-100 1 mL 31 gauge x 5/16 Syrg USE AS DIRECTED TWICE A  each 4    lancets (PRODIGY TWIST TOP LANCET) 28 gauge Misc 1 lancet by Misc.(Non-Drug; Combo Route) route 2 (two) times daily. 180 each 4    losartan (COZAAR) 100 MG tablet Take 1 tablet (100 mg total) by mouth once daily. 90 tablet 4    MAGNESIUM ORAL Take 400 mg by mouth once daily at 6am. 800 mg everyday      rosuvastatin (CRESTOR) 40 MG Tab TAKE 1 TABLET BY MOUTH EVERY EVENING 90  tablet 3    spironolactone (ALDACTONE) 25 MG tablet Take 1 tablet (25 mg total) by mouth once daily. 30 tablet 11    tamsulosin (FLOMAX) 0.4 mg Cap Take 1 capsule (0.4 mg total) by mouth every evening. 90 capsule 0    mupirocin (BACTROBAN) 2 % ointment Apply locally to affected area every other day 22 g 2     No current facility-administered medications for this visit.     Facility-Administered Medications Ordered in Other Visits   Medication Dose Route Frequency Provider Last Rate Last Admin    lidocaine HCl 2% urojet   Mucous Membrane Once Monty Cee MD        mitoMYcin (MUTAMYCIN) 40 mg in sodium chloride 0.9% infusion  40 mg Intravesical Once Monty Cee MD           Review of patient's allergies indicates:  No Known Allergies        Review of Systems   Constitutional: Negative for chills, fever and malaise/fatigue.   HENT:  Negative for congestion and hearing loss.    Cardiovascular:  Negative for chest pain, claudication and leg swelling.   Respiratory:  Negative for cough and shortness of breath.    Skin:  Positive for color change, nail changes and poor wound healing.   Musculoskeletal:  Negative for back pain, joint pain, muscle cramps and muscle weakness.   Gastrointestinal:  Negative for nausea and vomiting.   Neurological:  Positive for numbness. Negative for paresthesias and weakness.   Psychiatric/Behavioral:  Negative for altered mental status.          Objective:      Physical Exam  Constitutional:       General: He is not in acute distress.     Appearance: He is obese. He is not ill-appearing.   Cardiovascular:      Pulses:           Dorsalis pedis pulses are 2+ on the right side and 2+ on the left side.        Posterior tibial pulses are 1+ on the right side and 1+ on the left side.      Comments:   Moderate edema to lower extremity bilateral with localized erythematous patches to the legs bilateral.  Skin temp is warm to foot bilateral.  No rubor on dependency bilateral  foot.  Musculoskeletal:      Comments: No pain with ROM or MMT bilateral lower extremity.      No significant digital deformity appreciated bilateral foot.   Feet:      Right foot:      Protective Sensation: 10 sites tested.  7 sites sensed.      Skin integrity: Dry skin present.      Toenail Condition: Right toenails are abnormally thick.      Left foot:      Protective Sensation: 10 sites tested.  7 sites sensed.      Skin integrity: Dry skin present.      Toenail Condition: Left toenails are abnormally thick.   Skin:     General: Skin is warm.      Capillary Refill: Capillary refill takes less than 2 seconds.      Findings: No ecchymosis or erythema.      Nails: There is no clubbing.      Comments:   Nails 1-5 bilateral foot are mildly elongated, thickened and slightly discolored.  Skin is dry to foot bilateral.  No open wounds or macerations to lower extremity bilateral.   Neurological:      Mental Status: He is alert.             Assessment:       Encounter Diagnoses   Name Primary?    Type 2 diabetes mellitus with peripheral vascular disease Yes    Venous insufficiency of both lower extremities     Onychomycosis          Plan:       Andrew was seen today for diabetes mellitus and nail care.    Diagnoses and all orders for this visit:    Type 2 diabetes mellitus with peripheral vascular disease  -     Routine Foot Care    Venous insufficiency of both lower extremities  -     Routine Foot Care    Onychomycosis  -     Routine Foot Care    I counseled the patient on his conditions, their implications and medical management.    Shoe inspection. Diabetic Foot Education. Patient reminded of the importance of good nutrition and blood sugar control to help prevent podiatric complications of diabetes. Patient instructed on proper foot hygeine. We discussed wearing proper shoe gear, daily foot inspections, never walking without protective shoe gear, never putting sharp instruments to feet.    Routine foot care per  attached note.    Comprehensive annual diabetic foot exam completed today.  Patient is found to be intermediate risk for diabetic foot complication.    RTC prn as discussed.    A portion of this note was generated by voice recognition software and may contain spelling and grammar errors.

## 2022-11-21 ENCOUNTER — PATIENT MESSAGE (OUTPATIENT)
Dept: FAMILY MEDICINE | Facility: CLINIC | Age: 67
End: 2022-11-21
Payer: MEDICARE

## 2022-11-21 ENCOUNTER — TELEPHONE (OUTPATIENT)
Dept: FAMILY MEDICINE | Facility: CLINIC | Age: 67
End: 2022-11-21
Payer: MEDICARE

## 2022-11-21 DIAGNOSIS — I10 ESSENTIAL HYPERTENSION: ICD-10-CM

## 2022-11-21 RX ORDER — LOSARTAN POTASSIUM 100 MG/1
100 TABLET ORAL DAILY
Qty: 90 TABLET | Refills: 4 | Status: SHIPPED | OUTPATIENT
Start: 2022-11-21 | End: 2024-03-04 | Stop reason: SDUPTHER

## 2022-11-21 RX ORDER — LOSARTAN POTASSIUM 100 MG/1
100 TABLET ORAL DAILY
Qty: 90 TABLET | Refills: 4 | Status: CANCELLED | OUTPATIENT
Start: 2022-11-17 | End: 2023-11-17

## 2022-11-21 NOTE — TELEPHONE ENCOUNTER
----- Message from Coby Muro sent at 11/21/2022  3:18 PM CST -----  Needs advice from nurse:      Who Called:pt  Regarding:needs refill on losartan (COZAAR) 100 MG tablet  Take 1 tablet (100 mg total) by mouth once daily      Would the patient rather a call back or VIA [facility]?  Best Call Back number:[phone]  Additional Info:[facility] (Ph: [phone])

## 2022-11-21 NOTE — TELEPHONE ENCOUNTER
No new care gaps identified.  [facility] Embedded Care Gaps. Reference number: [id number]. 11/21/2022   4:21:29 PM CST

## 2022-11-21 NOTE — TELEPHONE ENCOUNTER
No new care gaps identified.  [facility] Embedded Care Gaps. Reference number: [id number]. 11/21/2022   3:14:51 PM CST

## 2022-11-25 ENCOUNTER — LAB VISIT (OUTPATIENT)
Dept: LAB | Facility: HOSPITAL | Age: 67
End: 2022-11-25
Attending: UROLOGY
Payer: MEDICARE

## 2022-11-25 ENCOUNTER — PROCEDURE VISIT (OUTPATIENT)
Dept: UROLOGY | Facility: CLINIC | Age: 67
End: 2022-11-25
Payer: MEDICARE

## 2022-11-25 VITALS
BODY MASS INDEX: 40.43 KG/M2 | SYSTOLIC BLOOD PRESSURE: 100 MMHG | HEIGHT: 69 IN | OXYGEN SATURATION: 93 % | HEART RATE: 109 BPM | WEIGHT: 273 LBS | DIASTOLIC BLOOD PRESSURE: 60 MMHG

## 2022-11-25 DIAGNOSIS — R34 DECREASED URINATION: ICD-10-CM

## 2022-11-25 DIAGNOSIS — N45.1 EPIDIDYMITIS, LEFT: ICD-10-CM

## 2022-11-25 DIAGNOSIS — E86.0 DEHYDRATION: ICD-10-CM

## 2022-11-25 DIAGNOSIS — N50.812 PAIN IN LEFT TESTICLE: ICD-10-CM

## 2022-11-25 DIAGNOSIS — Z85.51 HISTORY OF BLADDER CANCER: ICD-10-CM

## 2022-11-25 DIAGNOSIS — Z85.51 HISTORY OF BLADDER CANCER: Primary | ICD-10-CM

## 2022-11-25 PROCEDURE — 87186 SC STD MICRODIL/AGAR DIL: CPT | Performed by: UROLOGY

## 2022-11-25 PROCEDURE — 52000 CYSTOURETHROSCOPY: CPT | Mod: S$GLB,,, | Performed by: UROLOGY

## 2022-11-25 PROCEDURE — 87088 URINE BACTERIA CULTURE: CPT | Performed by: UROLOGY

## 2022-11-25 PROCEDURE — 87077 CULTURE AEROBIC IDENTIFY: CPT | Performed by: UROLOGY

## 2022-11-25 PROCEDURE — 52000 CYSTOSCOPY: ICD-10-PCS | Mod: S$GLB,,, | Performed by: UROLOGY

## 2022-11-25 PROCEDURE — 87086 URINE CULTURE/COLONY COUNT: CPT | Performed by: UROLOGY

## 2022-11-25 RX ORDER — CIPROFLOXACIN 500 MG/1
500 TABLET ORAL 2 TIMES DAILY
Qty: 28 TABLET | Refills: 0 | Status: SHIPPED | OUTPATIENT
Start: 2022-11-25 | End: 2022-12-09

## 2022-11-25 RX ORDER — KETOROLAC TROMETHAMINE 10 MG/1
10 TABLET, FILM COATED ORAL EVERY 6 HOURS
Qty: 20 TABLET | Refills: 1 | Status: SHIPPED | OUTPATIENT
Start: 2022-11-25 | End: 2022-11-30

## 2022-11-25 NOTE — PATIENT INSTRUCTIONS
Cipro 500 mg twice daily for 2 weeks  Motrin or Aleve for inflammation and testicle pain  Follow-up in 3 weeks for re-evaluation  Urine for culture  Can take Toradol for pain if this works better than Motrin or Aleve.  Increase water intake to at least 416 oz bottles of water daily.  Monitor urine output and notify me if does not improve.

## 2022-11-25 NOTE — PROCEDURES
Bladder Catheterization    Date/Time: 11/25/2022 9:30 AM  Location procedure was performed: [facility]  Performed by: Monty Cee MD  Authorized by: Monty Cee MD   Assisting provider: Monty Cee MD  Pre-operative diagnosis: Decreased urine, possible urinary retention  Post-operative diagnosis: Same  Consent Done: Not Needed  Indications: urinary retention and urine specimen collection  Local anesthesia used: yes    Anesthesia:  Local anesthesia used: yes  Local Anesthetic: topical anesthetic (Lidocaine jelly 1 cc)  Anesthetic total: 1 mL    Patient sedated: no  Preparation: Patient was prepped and draped in the usual sterile fashion.  Description of findings: 16 Albanian Phan catheter passed into bladder without difficulty.  Minimal urine output less than 1 oz.   Catheter insertion: non-indwelling  Catheter type: Phan  Catheter size: 16 Fr  Complicated insertion: no  Altered anatomy: no  Bladder irrigation: no  Number of attempts: 1  Urine volume: 15 ml  Urine characteristics: yellow, clear and dark  Technical procedures used: None  Significant surgical tasks conducted by the assistant(s): None  Complications: No  Estimated blood loss (mL): 0  Specimens: Yes (Urine for culture)  Implants: No  Patient tolerance: Patient tolerated the procedure well with no immediate complications

## 2022-11-25 NOTE — PROCEDURES
Cystoscopy    Date/Time: 11/25/2022 9:30 AM  Performed by: Monty Cee MD  Authorized by: Monty Cee MD     Consent Done?:  Yes (Written)  Timeout: prior to procedure the correct patient, procedure, and site was verified    Prep: patient was prepped and draped in usual sterile fashion    Local anesthesia used?: Yes    Anesthesia:  Intraurethral instillation  Local anesthetic:  Lidocaine 2% topical gel  Anesthetic total (ml):  5  Indications comment:  History of bladder cancer, decreased urination  Position:  Supine  Anesthesia:  Intraurethral instillation  Patient sedated?: No    Preparation: Patient was prepped and draped in usual sterile fashion    Scope type:  Flexible cystoscope  Stent inserted: No    Stent removed: No    External exam normal: No (Swollen, tender left epididymis consistent with epididymitis)    Circumcised: Yes    Urethral Meatus Normal: Yes    Meatal stenosis: No    Hypospadius: No    Digital exam performed: No    Urethra normal: Yes    Prostate normal: Yes    Bladder neck normal: Yes    Bladder normal: Yes     patient tolerated the procedure well with no immediate complications  Comments:      Patient had some scars from prior resections.  His right ureteral orifice was wide open, his left ureteral orifice was appropriate.  Catheterized urine prior to cystoscopy was only enough volume for urine culture.

## 2022-11-28 ENCOUNTER — PATIENT MESSAGE (OUTPATIENT)
Dept: UROLOGY | Facility: CLINIC | Age: 67
End: 2022-11-28
Payer: MEDICARE

## 2022-11-28 LAB — BACTERIA UR CULT: ABNORMAL

## 2022-12-05 DIAGNOSIS — R60.0 BILATERAL LOWER EXTREMITY EDEMA: ICD-10-CM

## 2022-12-05 DIAGNOSIS — I10 ESSENTIAL HYPERTENSION: ICD-10-CM

## 2022-12-05 RX ORDER — CARVEDILOL 12.5 MG/1
12.5 TABLET ORAL 2 TIMES DAILY WITH MEALS
Qty: 180 TABLET | Refills: 3 | Status: SHIPPED | OUTPATIENT
Start: 2022-12-05 | End: 2024-01-26 | Stop reason: SDUPTHER

## 2022-12-12 RX ORDER — TAMSULOSIN HYDROCHLORIDE 0.4 MG/1
0.4 CAPSULE ORAL NIGHTLY
Qty: 90 CAPSULE | Refills: 3 | Status: SHIPPED | OUTPATIENT
Start: 2022-12-12 | End: 2023-12-26 | Stop reason: SDUPTHER

## 2022-12-12 NOTE — TELEPHONE ENCOUNTER
No new care gaps identified.  [facility] Embedded Care Gaps. Reference number: [id number]. 12/12/2022   9:42:29 AM CST

## 2022-12-19 ENCOUNTER — PATIENT MESSAGE (OUTPATIENT)
Dept: UROLOGY | Facility: CLINIC | Age: 67
End: 2022-12-19
Payer: MEDICARE

## 2022-12-20 ENCOUNTER — PATIENT MESSAGE (OUTPATIENT)
Dept: UROLOGY | Facility: CLINIC | Age: 67
End: 2022-12-20
Payer: MEDICARE

## 2022-12-21 DIAGNOSIS — N45.1 EPIDIDYMITIS: Primary | ICD-10-CM

## 2022-12-21 RX ORDER — DICLOFENAC SODIUM 75 MG/1
75 TABLET, DELAYED RELEASE ORAL 2 TIMES DAILY
Qty: 20 TABLET | Refills: 0 | Status: SHIPPED | OUTPATIENT
Start: 2022-12-21 | End: 2022-12-31

## 2022-12-21 RX ORDER — DOXYCYCLINE 100 MG/1
100 CAPSULE ORAL EVERY 12 HOURS
Qty: 20 CAPSULE | Refills: 0 | Status: SHIPPED | OUTPATIENT
Start: 2022-12-21 | End: 2022-12-31

## 2022-12-22 ENCOUNTER — PATIENT MESSAGE (OUTPATIENT)
Dept: UROLOGY | Facility: CLINIC | Age: 67
End: 2022-12-22
Payer: MEDICARE

## 2022-12-23 ENCOUNTER — OFFICE VISIT (OUTPATIENT)
Dept: UROLOGY | Facility: CLINIC | Age: 67
End: 2022-12-23
Payer: MEDICARE

## 2022-12-23 VITALS
HEIGHT: 69 IN | WEIGHT: 270.38 LBS | HEART RATE: 84 BPM | BODY MASS INDEX: 40.05 KG/M2 | DIASTOLIC BLOOD PRESSURE: 66 MMHG | SYSTOLIC BLOOD PRESSURE: 130 MMHG

## 2022-12-23 DIAGNOSIS — N43.3 HYDROCELE, BILATERAL: ICD-10-CM

## 2022-12-23 DIAGNOSIS — N41.0 ACUTE PROSTATITIS: ICD-10-CM

## 2022-12-23 DIAGNOSIS — N45.1 EPIDIDYMITIS, LEFT: Primary | ICD-10-CM

## 2022-12-23 DIAGNOSIS — K59.04 CHRONIC IDIOPATHIC CONSTIPATION: ICD-10-CM

## 2022-12-23 DIAGNOSIS — Z85.51 HISTORY OF BLADDER CANCER: ICD-10-CM

## 2022-12-23 PROCEDURE — 99215 OFFICE O/P EST HI 40 MIN: CPT | Mod: S$GLB,,, | Performed by: UROLOGY

## 2022-12-23 PROCEDURE — 99215 PR OFFICE/OUTPT VISIT, EST, LEVL V, 40-54 MIN: ICD-10-PCS | Mod: S$GLB,,, | Performed by: UROLOGY

## 2022-12-23 PROCEDURE — 87077 CULTURE AEROBIC IDENTIFY: CPT | Performed by: UROLOGY

## 2022-12-23 PROCEDURE — 1101F PT FALLS ASSESS-DOCD LE1/YR: CPT | Mod: CPTII,S$GLB,, | Performed by: UROLOGY

## 2022-12-23 PROCEDURE — 4010F ACE/ARB THERAPY RXD/TAKEN: CPT | Mod: CPTII,S$GLB,, | Performed by: UROLOGY

## 2022-12-23 PROCEDURE — 99999 PR PBB SHADOW E&M-EST. PATIENT-LVL IV: CPT | Mod: PBBFAC,,, | Performed by: UROLOGY

## 2022-12-23 PROCEDURE — 1159F MED LIST DOCD IN RCRD: CPT | Mod: CPTII,S$GLB,, | Performed by: UROLOGY

## 2022-12-23 PROCEDURE — 1160F PR REVIEW ALL MEDS BY PRESCRIBER/CLIN PHARMACIST DOCUMENTED: ICD-10-PCS | Mod: CPTII,S$GLB,, | Performed by: UROLOGY

## 2022-12-23 PROCEDURE — 1157F PR ADVANCE CARE PLAN OR EQUIV PRESENT IN MEDICAL RECORD: ICD-10-PCS | Mod: CPTII,S$GLB,, | Performed by: UROLOGY

## 2022-12-23 PROCEDURE — 1126F AMNT PAIN NOTED NONE PRSNT: CPT | Mod: CPTII,S$GLB,, | Performed by: UROLOGY

## 2022-12-23 PROCEDURE — 3078F PR MOST RECENT DIASTOLIC BLOOD PRESSURE < 80 MM HG: ICD-10-PCS | Mod: CPTII,S$GLB,, | Performed by: UROLOGY

## 2022-12-23 PROCEDURE — 3066F PR DOCUMENTATION OF TREATMENT FOR NEPHROPATHY: ICD-10-PCS | Mod: CPTII,S$GLB,, | Performed by: UROLOGY

## 2022-12-23 PROCEDURE — 87088 URINE BACTERIA CULTURE: CPT | Performed by: UROLOGY

## 2022-12-23 PROCEDURE — 87186 SC STD MICRODIL/AGAR DIL: CPT | Performed by: UROLOGY

## 2022-12-23 PROCEDURE — 3051F HG A1C>EQUAL 7.0%<8.0%: CPT | Mod: CPTII,S$GLB,, | Performed by: UROLOGY

## 2022-12-23 PROCEDURE — 1101F PR PT FALLS ASSESS DOC 0-1 FALLS W/OUT INJ PAST YR: ICD-10-PCS | Mod: CPTII,S$GLB,, | Performed by: UROLOGY

## 2022-12-23 PROCEDURE — 3075F SYST BP GE 130 - 139MM HG: CPT | Mod: CPTII,S$GLB,, | Performed by: UROLOGY

## 2022-12-23 PROCEDURE — 1126F PR PAIN SEVERITY QUANTIFIED, NO PAIN PRESENT: ICD-10-PCS | Mod: CPTII,S$GLB,, | Performed by: UROLOGY

## 2022-12-23 PROCEDURE — 3062F POS MACROALBUMINURIA REV: CPT | Mod: CPTII,S$GLB,, | Performed by: UROLOGY

## 2022-12-23 PROCEDURE — 3078F DIAST BP <80 MM HG: CPT | Mod: CPTII,S$GLB,, | Performed by: UROLOGY

## 2022-12-23 PROCEDURE — 4010F PR ACE/ARB THEARPY RXD/TAKEN: ICD-10-PCS | Mod: CPTII,S$GLB,, | Performed by: UROLOGY

## 2022-12-23 PROCEDURE — 3008F BODY MASS INDEX DOCD: CPT | Mod: CPTII,S$GLB,, | Performed by: UROLOGY

## 2022-12-23 PROCEDURE — 87086 URINE CULTURE/COLONY COUNT: CPT | Performed by: UROLOGY

## 2022-12-23 PROCEDURE — 3288F PR FALLS RISK ASSESSMENT DOCUMENTED: ICD-10-PCS | Mod: CPTII,S$GLB,, | Performed by: UROLOGY

## 2022-12-23 PROCEDURE — 3062F PR POS MACROALBUMINURIA RESULT DOCUMENTED/REVIEW: ICD-10-PCS | Mod: CPTII,S$GLB,, | Performed by: UROLOGY

## 2022-12-23 PROCEDURE — 3075F PR MOST RECENT SYSTOLIC BLOOD PRESS GE 130-139MM HG: ICD-10-PCS | Mod: CPTII,S$GLB,, | Performed by: UROLOGY

## 2022-12-23 PROCEDURE — 99999 PR PBB SHADOW E&M-EST. PATIENT-LVL IV: ICD-10-PCS | Mod: PBBFAC,,, | Performed by: UROLOGY

## 2022-12-23 PROCEDURE — 3051F PR MOST RECENT HEMOGLOBIN A1C LEVEL 7.0 - < 8.0%: ICD-10-PCS | Mod: CPTII,S$GLB,, | Performed by: UROLOGY

## 2022-12-23 PROCEDURE — 1159F PR MEDICATION LIST DOCUMENTED IN MEDICAL RECORD: ICD-10-PCS | Mod: CPTII,S$GLB,, | Performed by: UROLOGY

## 2022-12-23 PROCEDURE — 1160F RVW MEDS BY RX/DR IN RCRD: CPT | Mod: CPTII,S$GLB,, | Performed by: UROLOGY

## 2022-12-23 PROCEDURE — 3288F FALL RISK ASSESSMENT DOCD: CPT | Mod: CPTII,S$GLB,, | Performed by: UROLOGY

## 2022-12-23 PROCEDURE — 3066F NEPHROPATHY DOC TX: CPT | Mod: CPTII,S$GLB,, | Performed by: UROLOGY

## 2022-12-23 PROCEDURE — 3008F PR BODY MASS INDEX (BMI) DOCUMENTED: ICD-10-PCS | Mod: CPTII,S$GLB,, | Performed by: UROLOGY

## 2022-12-23 PROCEDURE — 1157F ADVNC CARE PLAN IN RCRD: CPT | Mod: CPTII,S$GLB,, | Performed by: UROLOGY

## 2022-12-23 NOTE — PATIENT INSTRUCTIONS
Patient to initiate MiraLax 17 g daily and as stool becomes more regular increase interval between doses.  Repeat urine culture today  Continue doxycycline.  Will change medication as necessary based on culture.  Follow-up in 6 weeks or sooner if no improvement

## 2022-12-23 NOTE — PROGRESS NOTES
Subjective:       Patient ID: Andrew Beltran is a 67 y.o. male.    Chief Complaint: Follow-up    66 yo WM with Left epididymitis, and apparent prostatitis.  The patient has had weak urinary stream difficulty urinating.  Was originally placed on ciprofloxacin for 2 weeks however he did not resolve and appeared to get worse.  He underwent ultrasound which revealed complex hydroceles bilaterally which occur with epididymitis.  The patient is mostly having discomfort on the left side although he has a small hydrocele on the right as well.  Was changed to doxycycline and Voltaren approximately 2 days ago and has made some significant improvement already will continue these medications while we await his urine culture.    Follow-up  Pertinent negatives include no abdominal pain, arthralgias, chest pain, chills, congestion, coughing, diaphoresis, fatigue, fever, headaches, myalgias, nausea, rash, vomiting or weakness. Nothing aggravates the symptoms. He has tried nothing for the symptoms. The treatment provided significant relief.   Review of Systems   Constitutional:  Negative for activity change, appetite change, chills, diaphoresis, fatigue, fever and unexpected weight change.   HENT:  Negative for congestion, hearing loss, sinus pressure and trouble swallowing.    Eyes:  Negative for photophobia, pain, discharge and visual disturbance.   Respiratory:  Negative for apnea, cough and shortness of breath.    Cardiovascular:  Negative for chest pain, palpitations and leg swelling.   Gastrointestinal:  Negative for abdominal distention, abdominal pain, anal bleeding, blood in stool, constipation, diarrhea, nausea, rectal pain and vomiting.   Endocrine: Negative for cold intolerance, heat intolerance, polydipsia, polyphagia and polyuria.   Genitourinary:  Negative for decreased urine volume, difficulty urinating, dysuria, enuresis, flank pain, frequency, genital sores, hematuria, penile discharge, penile pain, penile  swelling, scrotal swelling, testicular pain and urgency.   Musculoskeletal:  Negative for arthralgias, back pain and myalgias.   Skin:  Negative for color change, pallor, rash and wound.   Allergic/Immunologic: Negative for environmental allergies, food allergies and immunocompromised state.   Neurological:  Negative for dizziness, seizures, weakness and headaches.   Hematological:  Negative for adenopathy. Does not bruise/bleed easily.   Psychiatric/Behavioral: Negative.       Objective:      Physical Exam  Vitals and nursing note reviewed.   Constitutional:       General: He is not in acute distress.     Appearance: Normal appearance. He is well-developed. He is obese. He is not ill-appearing, toxic-appearing or diaphoretic.   HENT:      Head: Normocephalic.      Right Ear: External ear normal.      Left Ear: External ear normal.      Nose: Nose normal.      Mouth/Throat:      Pharynx: No oropharyngeal exudate or posterior oropharyngeal erythema.   Eyes:      Conjunctiva/sclera: Conjunctivae normal.      Pupils: Pupils are equal, round, and reactive to light.   Cardiovascular:      Rate and Rhythm: Normal rate and regular rhythm.      Heart sounds: Normal heart sounds.   Pulmonary:      Effort: Pulmonary effort is normal.      Breath sounds: Normal breath sounds.   Abdominal:      General: Bowel sounds are normal.      Palpations: Abdomen is soft.      Tenderness: There is no right CVA tenderness or left CVA tenderness.   Genitourinary:     Penis: Normal.           Comments: Bilateral hydroceles.  Left testicle firm and tender.  Right testicle is tender but not as enlarged as left.  Patient also with symptoms of prostatitis.  Musculoskeletal:         General: Normal range of motion.      Cervical back: Normal range of motion and neck supple.   Skin:     General: Skin is warm and dry.   Neurological:      Mental Status: He is alert and oriented to person, place, and time.      Deep Tendon Reflexes: Reflexes are  normal and symmetric.   Psychiatric:         Behavior: Behavior normal.         Thought Content: Thought content normal.         Judgment: Judgment normal.       Assessment:       1. Epididymitis, left    2. Hydrocele, bilateral    3. Acute prostatitis    4. History of bladder cancer    5. Chronic idiopathic constipation        Plan:       Patient Instructions   Patient to initiate MiraLax 17 g daily and as stool becomes more regular increase interval between doses.  Repeat urine culture today  Continue doxycycline.  Will change medication as necessary based on culture.  Follow-up in 6 weeks or sooner if no improvement

## 2022-12-26 LAB — BACTERIA UR CULT: ABNORMAL

## 2022-12-27 RX ORDER — SULFAMETHOXAZOLE AND TRIMETHOPRIM 800; 160 MG/1; MG/1
1 TABLET ORAL 2 TIMES DAILY
Qty: 28 TABLET | Refills: 1 | Status: SHIPPED | OUTPATIENT
Start: 2022-12-27 | End: 2023-01-10

## 2022-12-28 ENCOUNTER — PATIENT MESSAGE (OUTPATIENT)
Dept: UROLOGY | Facility: CLINIC | Age: 67
End: 2022-12-28
Payer: MEDICARE

## 2023-01-10 ENCOUNTER — PATIENT MESSAGE (OUTPATIENT)
Dept: UROLOGY | Facility: CLINIC | Age: 68
End: 2023-01-10
Payer: MEDICARE

## 2023-01-11 ENCOUNTER — PATIENT MESSAGE (OUTPATIENT)
Dept: UROLOGY | Facility: CLINIC | Age: 68
End: 2023-01-11
Payer: MEDICARE

## 2023-01-11 RX ORDER — DOXYCYCLINE 100 MG/1
100 CAPSULE ORAL EVERY 12 HOURS
Qty: 28 CAPSULE | Refills: 1 | Status: SHIPPED | OUTPATIENT
Start: 2023-01-11 | End: 2023-01-25

## 2023-01-24 LAB
LEFT EYE DM RETINOPATHY: POSITIVE
RIGHT EYE DM RETINOPATHY: POSITIVE

## 2023-01-26 ENCOUNTER — OFFICE VISIT (OUTPATIENT)
Dept: FAMILY MEDICINE | Facility: CLINIC | Age: 68
End: 2023-01-26
Payer: MEDICARE

## 2023-01-26 VITALS
HEART RATE: 89 BPM | DIASTOLIC BLOOD PRESSURE: 66 MMHG | OXYGEN SATURATION: 98 % | HEIGHT: 69 IN | WEIGHT: 264.75 LBS | TEMPERATURE: 98 F | SYSTOLIC BLOOD PRESSURE: 124 MMHG | BODY MASS INDEX: 39.21 KG/M2

## 2023-01-26 DIAGNOSIS — E11.42 TYPE 2 DIABETES MELLITUS WITH DIABETIC POLYNEUROPATHY, WITH LONG-TERM CURRENT USE OF INSULIN: Primary | ICD-10-CM

## 2023-01-26 DIAGNOSIS — I83.009 VENOUS STASIS ULCER, UNSPECIFIED SITE, UNSPECIFIED ULCER STAGE, UNSPECIFIED WHETHER VARICOSE VEINS PRESENT: ICD-10-CM

## 2023-01-26 DIAGNOSIS — C67.9 MALIGNANT NEOPLASM OF URINARY BLADDER, UNSPECIFIED SITE: ICD-10-CM

## 2023-01-26 DIAGNOSIS — L97.909 VENOUS STASIS ULCER, UNSPECIFIED SITE, UNSPECIFIED ULCER STAGE, UNSPECIFIED WHETHER VARICOSE VEINS PRESENT: ICD-10-CM

## 2023-01-26 DIAGNOSIS — E66.01 SEVERE OBESITY (BMI 35.0-35.9 WITH COMORBIDITY): ICD-10-CM

## 2023-01-26 DIAGNOSIS — Z79.4 TYPE 2 DIABETES MELLITUS WITH DIABETIC POLYNEUROPATHY, WITH LONG-TERM CURRENT USE OF INSULIN: Primary | ICD-10-CM

## 2023-01-26 DIAGNOSIS — I10 ESSENTIAL HYPERTENSION: ICD-10-CM

## 2023-01-26 DIAGNOSIS — E78.2 MIXED HYPERLIPIDEMIA: ICD-10-CM

## 2023-01-26 PROCEDURE — 3078F DIAST BP <80 MM HG: CPT | Mod: CPTII,S$GLB,, | Performed by: FAMILY MEDICINE

## 2023-01-26 PROCEDURE — 3074F PR MOST RECENT SYSTOLIC BLOOD PRESSURE < 130 MM HG: ICD-10-PCS | Mod: CPTII,S$GLB,, | Performed by: FAMILY MEDICINE

## 2023-01-26 PROCEDURE — 3288F PR FALLS RISK ASSESSMENT DOCUMENTED: ICD-10-PCS | Mod: CPTII,S$GLB,, | Performed by: FAMILY MEDICINE

## 2023-01-26 PROCEDURE — 3062F PR POS MACROALBUMINURIA RESULT DOCUMENTED/REVIEW: ICD-10-PCS | Mod: CPTII,S$GLB,, | Performed by: FAMILY MEDICINE

## 2023-01-26 PROCEDURE — 99999 PR PBB SHADOW E&M-EST. PATIENT-LVL III: CPT | Mod: PBBFAC,,, | Performed by: FAMILY MEDICINE

## 2023-01-26 PROCEDURE — 1160F PR REVIEW ALL MEDS BY PRESCRIBER/CLIN PHARMACIST DOCUMENTED: ICD-10-PCS | Mod: CPTII,S$GLB,, | Performed by: FAMILY MEDICINE

## 2023-01-26 PROCEDURE — 1101F PT FALLS ASSESS-DOCD LE1/YR: CPT | Mod: CPTII,S$GLB,, | Performed by: FAMILY MEDICINE

## 2023-01-26 PROCEDURE — 3044F PR MOST RECENT HEMOGLOBIN A1C LEVEL <7.0%: ICD-10-PCS | Mod: CPTII,S$GLB,, | Performed by: FAMILY MEDICINE

## 2023-01-26 PROCEDURE — 99499 RISK ADDL DX/OHS AUDIT: ICD-10-PCS | Mod: S$GLB,,, | Performed by: FAMILY MEDICINE

## 2023-01-26 PROCEDURE — 1160F RVW MEDS BY RX/DR IN RCRD: CPT | Mod: CPTII,S$GLB,, | Performed by: FAMILY MEDICINE

## 2023-01-26 PROCEDURE — 99999 PR PBB SHADOW E&M-EST. PATIENT-LVL III: ICD-10-PCS | Mod: PBBFAC,,, | Performed by: FAMILY MEDICINE

## 2023-01-26 PROCEDURE — 3074F SYST BP LT 130 MM HG: CPT | Mod: CPTII,S$GLB,, | Performed by: FAMILY MEDICINE

## 2023-01-26 PROCEDURE — 1126F PR PAIN SEVERITY QUANTIFIED, NO PAIN PRESENT: ICD-10-PCS | Mod: CPTII,S$GLB,, | Performed by: FAMILY MEDICINE

## 2023-01-26 PROCEDURE — 1159F PR MEDICATION LIST DOCUMENTED IN MEDICAL RECORD: ICD-10-PCS | Mod: CPTII,S$GLB,, | Performed by: FAMILY MEDICINE

## 2023-01-26 PROCEDURE — 99214 PR OFFICE/OUTPT VISIT, EST, LEVL IV, 30-39 MIN: ICD-10-PCS | Mod: S$GLB,,, | Performed by: FAMILY MEDICINE

## 2023-01-26 PROCEDURE — 3008F PR BODY MASS INDEX (BMI) DOCUMENTED: ICD-10-PCS | Mod: CPTII,S$GLB,, | Performed by: FAMILY MEDICINE

## 2023-01-26 PROCEDURE — 1101F PR PT FALLS ASSESS DOC 0-1 FALLS W/OUT INJ PAST YR: ICD-10-PCS | Mod: CPTII,S$GLB,, | Performed by: FAMILY MEDICINE

## 2023-01-26 PROCEDURE — 3066F NEPHROPATHY DOC TX: CPT | Mod: CPTII,S$GLB,, | Performed by: FAMILY MEDICINE

## 2023-01-26 PROCEDURE — 3078F PR MOST RECENT DIASTOLIC BLOOD PRESSURE < 80 MM HG: ICD-10-PCS | Mod: CPTII,S$GLB,, | Performed by: FAMILY MEDICINE

## 2023-01-26 PROCEDURE — 1157F PR ADVANCE CARE PLAN OR EQUIV PRESENT IN MEDICAL RECORD: ICD-10-PCS | Mod: CPTII,S$GLB,, | Performed by: FAMILY MEDICINE

## 2023-01-26 PROCEDURE — 99214 OFFICE O/P EST MOD 30 MIN: CPT | Mod: S$GLB,,, | Performed by: FAMILY MEDICINE

## 2023-01-26 PROCEDURE — 3288F FALL RISK ASSESSMENT DOCD: CPT | Mod: CPTII,S$GLB,, | Performed by: FAMILY MEDICINE

## 2023-01-26 PROCEDURE — 3066F PR DOCUMENTATION OF TREATMENT FOR NEPHROPATHY: ICD-10-PCS | Mod: CPTII,S$GLB,, | Performed by: FAMILY MEDICINE

## 2023-01-26 PROCEDURE — 99499 UNLISTED E&M SERVICE: CPT | Mod: S$GLB,,, | Performed by: FAMILY MEDICINE

## 2023-01-26 PROCEDURE — 3062F POS MACROALBUMINURIA REV: CPT | Mod: CPTII,S$GLB,, | Performed by: FAMILY MEDICINE

## 2023-01-26 PROCEDURE — 1126F AMNT PAIN NOTED NONE PRSNT: CPT | Mod: CPTII,S$GLB,, | Performed by: FAMILY MEDICINE

## 2023-01-26 PROCEDURE — 1159F MED LIST DOCD IN RCRD: CPT | Mod: CPTII,S$GLB,, | Performed by: FAMILY MEDICINE

## 2023-01-26 PROCEDURE — 3044F HG A1C LEVEL LT 7.0%: CPT | Mod: CPTII,S$GLB,, | Performed by: FAMILY MEDICINE

## 2023-01-26 PROCEDURE — 1157F ADVNC CARE PLAN IN RCRD: CPT | Mod: CPTII,S$GLB,, | Performed by: FAMILY MEDICINE

## 2023-01-26 PROCEDURE — 3008F BODY MASS INDEX DOCD: CPT | Mod: CPTII,S$GLB,, | Performed by: FAMILY MEDICINE

## 2023-01-26 NOTE — PATIENT INSTRUCTIONS
Decrease insulin to 55/55  Check sugars as below  If going higher than goal, then we'll increase trulicity to 3 mg    Recheck labs in 3 months.    Www.Diabetes.org (american diabetes association website)    SAMPLE BLOOD SUGAR CHART  Goal blood sugar when checked in the morning before meals: less than 130-140  Goal sugar when checked at least 2 hours after a meal: less than 180               DATE  Before breakfast Before lunch Before dinner Before bedtime                                                                                                                                                  CARBOHYDRATE GOALS: around 3-4 servings per meal (1 serving is 15 grams of total carbohydrates)

## 2023-01-26 NOTE — PROGRESS NOTES
(Portions of this note were dictated using voice recognition software and may contain dictation related errors in spelling/grammar/syntax not found on text review)    CC:   Chief Complaint   Patient presents with    Follow-up         HPI: 67 y.o. male   Here for medical follow-up.    Following with  wound care for chronic venous insufficiency with venous stasis ulcers bilateral lower extremities.  . Had normal BRISA.  Follows with Podiatry, Dr. Murrieta    Diabetes type 2 uncontrolled.  On Humulin 70/30, 60 units b.i.d..    He was diagnosed with diabetes over 20 years ago, states that he has been on 70 30 since that time.  On Trulicity, titrated up to 1.5 mg weekly last visit.  Followed up with diabetes education on 09/14/2022.  A1c is improving as below.  Couple of hypoglycemic episodes when he took his insulin and did not really eat but otherwise does not have much of a problem with this.  has lost a little bit of weight.    Eye exam up-to-date 07/19/2022.    gout, prophylaxed with allopurinol, no recent issues    Dyslipidemia on Crestor 40 mg daily    Hypertension on losartan 100 mg daily, carvedilol 12.5 mg b.i.d.  , Aldactone 25 mg daily, Lasix 20 mg daily.  Follows with Cardiology    History of recurrent TCC bladder, follows with Urology., had recurrent resection and mitomycin-C installation.  Last cystoscopy was 03/29/2022 noting scar tissue but no malignancy.  Urine cytology was negative for malignant cells. Had bladder neck stenosis. Takes flomax.  Saw Urology on 12/23 4 epididymitis left side and apparent prostatitis           Past Medical History:   Diagnosis Date    BPH (benign prostatic hyperplasia)     Cancer     bladder    Diabetes     type 2    Diabetes mellitus, type 2     Gout     High cholesterol     Hypertension     Sciatica 2016    had injection 3 weeks ago    Urinary tract infection        Past Surgical History:   Procedure Laterality Date    APPENDECTOMY  1968    BALLOON DILATION OF URETER Left  9/18/2019    Procedure: DILATION, URETER, USING BALLOON;  Surgeon: Monty Cee MD;  Location: [facility] OR;  Service: Urology;  Laterality: Left;    BARBOTAGE OF BLADDER N/A 8/14/2019    Procedure: BARBOTAGE, BLADDER;  Surgeon: Monty Cee MD;  Location: [facility] OR;  Service: Urology;  Laterality: N/A;    BARBOTAGE OF URETER Bilateral 8/14/2019    Procedure: BARBOTAGE, URETER;  Surgeon: Monty Cee MD;  Location: [facility] OR;  Service: Urology;  Laterality: Bilateral;    BARBOTAGE OF URETER Left 9/18/2019    Procedure: BARBOTAGE, URETER;  Surgeon: Monty Cee MD;  Location: [facility] OR;  Service: Urology;  Laterality: Left;  left renal/ ureteral washing.    BLADDER SURGERY      COLONOSCOPY N/A 7/10/2018    Procedure: COLONOSCOPY;  Surgeon: Azalea Sanchez MD;  Location: [facility];  Service: Endoscopy;  Laterality: N/A;  [phone]    CYSTOSCOPY      CYSTOSCOPY W/ RETROGRADES Bilateral 8/14/2019    Procedure: CYSTOSCOPY, WITH RETROGRADE PYELOGRAM;  Surgeon: Monty Cee MD;  Location: [facility];  Service: Urology;  Laterality: Bilateral;    CYSTOSCOPY W/ RETROGRADES Left 9/18/2019    Procedure: CYSTOSCOPY, WITH RETROGRADE PYELOGRAM;  Surgeon: Monty Cee MD;  Location: [facility];  Service: Urology;  Laterality: Left;  cystourethroscopy with left retrograde pyelogram     CYSTOSCOPY W/ RETROGRADES Bilateral 3/11/2020    Procedure: CYSTOSCOPY, WITH RETROGRADE PYELOGRAM Ureteral renal washings and bladder washings;  Surgeon: Monty Cee MD;  Location: [facility] OR;  Service: Urology;  Laterality: Bilateral;    CYSTOSCOPY WITH URETEROSCOPY, RETROGRADE PYELOGRAPHY, AND INSERTION OF STENT Bilateral 6/22/2020    Procedure: CYSTOSCOPY, WITH RETROGRADE PYELOGRAM AND URETERAL STENT INSERTION;  Surgeon: Monty Cee MD;  Location: [facility] OR;  Service: Urology;  Laterality: Bilateral;    CYSTOSCOPY WITH URETHRAL DILATION N/A 6/10/2021    Procedure:  CYSTOSCOPY, WITH URETHRAL DILATION;  Surgeon: Monty Cee MD;  Location: [facility] OR;  Service: Urology;  Laterality: N/A;    HAND SURGERY Right 12/08/2016    trigger finger    INSTILLATION OF URINARY BLADDER N/A 3/11/2020    Procedure: INSTILLATION, BLADDER - mitomyin - c;  Surgeon: Monty Cee MD;  Location: [facility] OR;  Service: Urology;  Laterality: N/A;    INSTILLATION OF URINARY BLADDER  6/22/2020    Procedure: INSTILLATION, BLADDER;  Surgeon: Monty Cee MD;  Location: [facility] OR;  Service: Urology;;  mitomycin 40mg    LASER ABLATION Left 9/18/2019    Procedure: ABLATION, USING LASER;  Surgeon: Monty Cee MD;  Location: [facility] OR;  Service: Urology;  Laterality: Left;  laser ablation of lower pole calyceal transitional cell lesion    PYELOSCOPY Left 9/18/2019    Procedure: PYELOSCOPY;  Surgeon: Monty Cee MD;  Location: [facility] OR;  Service: Urology;  Laterality: Left;  Left ureteral pyeloscopy    TONSILLECTOMY  1960    TRANSURETHRAL RESECTION OF PROSTATE N/A 9/18/2019    Procedure: TURP (TRANSURETHRAL RESECTION OF PROSTATE);  Surgeon: Monty Cee MD;  Location: [facility] OR;  Service: Urology;  Laterality: N/A;  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    TRANSURETHRAL RESECTION OF PROSTATE USING BIPOLAR CAUTERY N/A 3/11/2020    Procedure: TURP, USING BIPOLAR CAUTERY;  Surgeon: Monty Cee MD;  Location: [facility] OR;  Service: Urology;  Laterality: N/A;    TRANSURETHRAL SURGICAL REMOVAL OF STRICTURE OF BLADDER NECK  8/14/2019    Procedure: EXCISION, CONTRACTURE, BLADDER NECK, TRANSURETHRAL;  Surgeon: Monty Cee MD;  Location: [facility] OR;  Service: Urology;;    TRANSURETHRAL SURGICAL REMOVAL OF STRICTURE OF BLADDER NECK N/A 6/10/2021    Procedure: EXCISION, CONTRACTURE, BLADDER NECK, TRANSURETHRAL;  Surgeon: Monty Cee MD;  Location: [facility] OR;  Service: Urology;  Laterality: N/A;  using laser    URETEROSCOPY Left 9/18/2019    Procedure: URETEROSCOPY;   "Surgeon: Monty Cee MD;  Location: [facility] OR;  Service: Urology;  Laterality: Left;    URETEROSCOPY Right 6/22/2020    Procedure: URETEROSCOPY;  Surgeon: Monty Cee MD;  Location: [facility] OR;  Service: Urology;  Laterality: Right;       Family History   Problem Relation Age of Onset    Diabetes Mother     Prostate cancer Neg Hx     Kidney disease Neg Hx        Social History     Tobacco Use    Smoking status: Former     Types: Pipe    Smokeless tobacco: Never    Tobacco comments:     quit 40 yrs ago   Substance Use Topics    Alcohol use: Yes     Alcohol/week: 0.0 standard drinks     Comment: rarely    Drug use: No       Lab Results   Component Value Date    WBC 10.23 01/19/2023    HGB 13.7 (L) 01/19/2023    HCT 44.1 01/19/2023    MCV 88 01/19/2023     01/19/2023    CHOL 124 01/19/2023    TRIG 67 01/19/2023    HDL 46 01/19/2023    ALT 30 01/19/2023    AST 23 01/19/2023    BILITOT 0.5 01/19/2023    ALKPHOS 78 01/19/2023     01/19/2023    K 4.7 01/19/2023     01/19/2023    CREATININE 1.16 01/19/2023    ESTGFRAFRICA >60.0 07/20/2022    EGFRNONAA >60.0 07/20/2022    CALCIUM 10.2 01/19/2023    ALBUMIN 4.4 01/19/2023    BUN 42 (H) 01/19/2023    CO2 28 01/19/2023    TSH 2.490 07/20/2022    PSA 0.27 01/19/2023    PSADIAG 1.6 05/09/2017    HGBA1C 6.7 (H) 01/19/2023    MICALBCREAT 430.8 (H) 01/19/2023    LDLCALC 64.6 01/19/2023    GLU 62 (L) 01/19/2023       Hemoglobin A1C (%)   Date Value   01/19/2023 6.7 (H)   10/19/2022 7.7 (H)   07/20/2022 8.4 (H)   07/12/2021 9.3 (H)   04/01/2021 9.6 (H)   12/29/2020 8.5 (H)   06/19/2020 8.8 (H)   08/14/2019 11.4 (H)   03/21/2019 9.8 (H)   05/02/2018 10.2 (H)           Vital signs reviewed  Vitals:    01/26/23 1015   BP: 124/66   Pulse: 89   Temp: 97.7 °F (36.5 °C)   SpO2: 98%   Weight: 120.1 kg (264 lb 12.4 oz)   Height: 5' 9" (1.753 m)           Wt Readings from Last 4 Encounters:   01/26/23 120.1 kg (264 lb 12.4 oz)   12/23/22 122.7 kg (270 lb 6.4 oz) "   11/25/22 123.8 kg (273 lb)   10/20/22 124.4 kg (274 lb 4 oz)       PE:   APPEARANCE: Well nourished, well developed, in no acute distress.    NECK: Supple with no cervical lymphadenopathy.  No carotid bruits.  No thyromegaly  CHEST: Good inspiratory effort. Lungs clear to auscultation with no wheezes or crackles.  CARDIOVASCULAR: Normal S1, S2. No rubs, murmurs, or gallops.  ABDOMEN: Bowel sounds normal. Not distended. Soft. No tenderness or masses. No organomegaly.       IMPRESSION  1. Type 2 diabetes mellitus with diabetic polyneuropathy, with long-term current use of insulin    2. Essential hypertension    3. Mixed hyperlipidemia    4. Severe obesity (BMI 35.0-35.9 with comorbidity)    5. Malignant neoplasm of urinary bladder, unspecified site    6. Venous stasis ulcer, unspecified site, unspecified ulcer stage, unspecified whether varicose veins present                PLAN       Hypertension controlled    Dyslipidemia:  Continue statin    Venous stasis, continue with wound care if needed, podiatry office appointment coming up.  Continues with compression stockings.      Diabetes,   Has been on 70 30 insulin for many years now.  Will decrease to 55/55. On Trulicity 1.5 mg weekly  Further attention to lifestyle modification including healthy diet, regular exercise .  If blood sugars going above goal (less than 140 fasting, less than 180 2 hour postprandial) can then increase Trulicity 3mg weekly.    Gout stable.  Continue allopurinol    Bladder cancer history:  No recent signs of recurrence.  Follow up with Urology as directed.  Epididymitis symptoms still present but improving overall.         Three-month labs below with visit to follow     Orders Placed This Encounter   Procedures    CBC Auto Differential    Comprehensive Metabolic Panel    Hemoglobin A1C    Lipid Panel               SCREENINGS      Immunizations:      COVID x3, encourage booster  Flu up-to-date  Tdap 2017  Zostavax 2017, can get updated  shingles vaccine at pharmacy  Prevnar 13:2015  Pneumovax 23:2016  Prevnar 20 07/15/2022    Age/demographic appropriate health maintenance:  Colonoscopy 07/10/2018:  2 mm cecal polyp, otherwise normal, tubular adenoma, 5 year recall  PSA:  July 2022

## 2023-01-30 ENCOUNTER — PATIENT MESSAGE (OUTPATIENT)
Dept: UROLOGY | Facility: CLINIC | Age: 68
End: 2023-01-30
Payer: MEDICARE

## 2023-02-03 ENCOUNTER — OFFICE VISIT (OUTPATIENT)
Dept: UROLOGY | Facility: CLINIC | Age: 68
End: 2023-02-03
Payer: MEDICARE

## 2023-02-03 VITALS
SYSTOLIC BLOOD PRESSURE: 142 MMHG | HEIGHT: 69 IN | DIASTOLIC BLOOD PRESSURE: 78 MMHG | WEIGHT: 269.38 LBS | BODY MASS INDEX: 39.9 KG/M2 | HEART RATE: 87 BPM

## 2023-02-03 DIAGNOSIS — N45.3 ORCHITIS, EPIDIDYMITIS, AND EPIDIDYMO-ORCHITIS: ICD-10-CM

## 2023-02-03 DIAGNOSIS — N45.2 ORCHITIS, EPIDIDYMITIS, AND EPIDIDYMO-ORCHITIS: ICD-10-CM

## 2023-02-03 DIAGNOSIS — N43.3 BILATERAL HYDROCELE: Primary | ICD-10-CM

## 2023-02-03 DIAGNOSIS — N45.1 ORCHITIS, EPIDIDYMITIS, AND EPIDIDYMO-ORCHITIS: ICD-10-CM

## 2023-02-03 PROCEDURE — 4010F ACE/ARB THERAPY RXD/TAKEN: CPT | Mod: CPTII,S$GLB,, | Performed by: UROLOGY

## 2023-02-03 PROCEDURE — 3008F BODY MASS INDEX DOCD: CPT | Mod: CPTII,S$GLB,, | Performed by: UROLOGY

## 2023-02-03 PROCEDURE — 3066F PR DOCUMENTATION OF TREATMENT FOR NEPHROPATHY: ICD-10-PCS | Mod: CPTII,S$GLB,, | Performed by: UROLOGY

## 2023-02-03 PROCEDURE — 3008F PR BODY MASS INDEX (BMI) DOCUMENTED: ICD-10-PCS | Mod: CPTII,S$GLB,, | Performed by: UROLOGY

## 2023-02-03 PROCEDURE — 1160F PR REVIEW ALL MEDS BY PRESCRIBER/CLIN PHARMACIST DOCUMENTED: ICD-10-PCS | Mod: CPTII,S$GLB,, | Performed by: UROLOGY

## 2023-02-03 PROCEDURE — 3044F PR MOST RECENT HEMOGLOBIN A1C LEVEL <7.0%: ICD-10-PCS | Mod: CPTII,S$GLB,, | Performed by: UROLOGY

## 2023-02-03 PROCEDURE — 3078F PR MOST RECENT DIASTOLIC BLOOD PRESSURE < 80 MM HG: ICD-10-PCS | Mod: CPTII,S$GLB,, | Performed by: UROLOGY

## 2023-02-03 PROCEDURE — 99999 PR PBB SHADOW E&M-EST. PATIENT-LVL IV: CPT | Mod: PBBFAC,,, | Performed by: UROLOGY

## 2023-02-03 PROCEDURE — 1126F AMNT PAIN NOTED NONE PRSNT: CPT | Mod: CPTII,S$GLB,, | Performed by: UROLOGY

## 2023-02-03 PROCEDURE — 1101F PR PT FALLS ASSESS DOC 0-1 FALLS W/OUT INJ PAST YR: ICD-10-PCS | Mod: CPTII,S$GLB,, | Performed by: UROLOGY

## 2023-02-03 PROCEDURE — 1159F PR MEDICATION LIST DOCUMENTED IN MEDICAL RECORD: ICD-10-PCS | Mod: CPTII,S$GLB,, | Performed by: UROLOGY

## 2023-02-03 PROCEDURE — 1101F PT FALLS ASSESS-DOCD LE1/YR: CPT | Mod: CPTII,S$GLB,, | Performed by: UROLOGY

## 2023-02-03 PROCEDURE — 1160F RVW MEDS BY RX/DR IN RCRD: CPT | Mod: CPTII,S$GLB,, | Performed by: UROLOGY

## 2023-02-03 PROCEDURE — 4010F PR ACE/ARB THEARPY RXD/TAKEN: ICD-10-PCS | Mod: CPTII,S$GLB,, | Performed by: UROLOGY

## 2023-02-03 PROCEDURE — 3044F HG A1C LEVEL LT 7.0%: CPT | Mod: CPTII,S$GLB,, | Performed by: UROLOGY

## 2023-02-03 PROCEDURE — 3077F SYST BP >= 140 MM HG: CPT | Mod: CPTII,S$GLB,, | Performed by: UROLOGY

## 2023-02-03 PROCEDURE — 3288F FALL RISK ASSESSMENT DOCD: CPT | Mod: CPTII,S$GLB,, | Performed by: UROLOGY

## 2023-02-03 PROCEDURE — 1126F PR PAIN SEVERITY QUANTIFIED, NO PAIN PRESENT: ICD-10-PCS | Mod: CPTII,S$GLB,, | Performed by: UROLOGY

## 2023-02-03 PROCEDURE — 3078F DIAST BP <80 MM HG: CPT | Mod: CPTII,S$GLB,, | Performed by: UROLOGY

## 2023-02-03 PROCEDURE — 3062F POS MACROALBUMINURIA REV: CPT | Mod: CPTII,S$GLB,, | Performed by: UROLOGY

## 2023-02-03 PROCEDURE — 3066F NEPHROPATHY DOC TX: CPT | Mod: CPTII,S$GLB,, | Performed by: UROLOGY

## 2023-02-03 PROCEDURE — 3288F PR FALLS RISK ASSESSMENT DOCUMENTED: ICD-10-PCS | Mod: CPTII,S$GLB,, | Performed by: UROLOGY

## 2023-02-03 PROCEDURE — 99214 PR OFFICE/OUTPT VISIT, EST, LEVL IV, 30-39 MIN: ICD-10-PCS | Mod: S$GLB,,, | Performed by: UROLOGY

## 2023-02-03 PROCEDURE — 1157F ADVNC CARE PLAN IN RCRD: CPT | Mod: CPTII,S$GLB,, | Performed by: UROLOGY

## 2023-02-03 PROCEDURE — 99214 OFFICE O/P EST MOD 30 MIN: CPT | Mod: S$GLB,,, | Performed by: UROLOGY

## 2023-02-03 PROCEDURE — 3062F PR POS MACROALBUMINURIA RESULT DOCUMENTED/REVIEW: ICD-10-PCS | Mod: CPTII,S$GLB,, | Performed by: UROLOGY

## 2023-02-03 PROCEDURE — 99999 PR PBB SHADOW E&M-EST. PATIENT-LVL IV: ICD-10-PCS | Mod: PBBFAC,,, | Performed by: UROLOGY

## 2023-02-03 PROCEDURE — 3077F PR MOST RECENT SYSTOLIC BLOOD PRESSURE >= 140 MM HG: ICD-10-PCS | Mod: CPTII,S$GLB,, | Performed by: UROLOGY

## 2023-02-03 PROCEDURE — 1157F PR ADVANCE CARE PLAN OR EQUIV PRESENT IN MEDICAL RECORD: ICD-10-PCS | Mod: CPTII,S$GLB,, | Performed by: UROLOGY

## 2023-02-03 PROCEDURE — 1159F MED LIST DOCD IN RCRD: CPT | Mod: CPTII,S$GLB,, | Performed by: UROLOGY

## 2023-02-03 NOTE — PROGRESS NOTES
Subjective:       Patient ID: Andrew Beltran is a 67 y.o. male.    Chief Complaint: Follow-up    Mr. Beltran 67-year-old gentleman who underwent Transurethral resection of low-grade papillary bladder tumor in May of 2017.  The patient has been followed quarterly for 2 years and every 6 months over the last 3 years with no recurrence of tumors.  His cytology and fish tests have also returned without malignancy.  The patient has in the interim developed epididymal orchitis which started on the left side and now is more prevalent on the right side.  Ultrasound reveals bilateral complex hydroceles as well.  This started proximally November.  He was treated with a course of Cipro that a course of Bactrim and most recently a course of doxycycline.  He had the most improvement with 2 weeks of doxycycline these were placed on another 2 week regimen with 1 refill    Follow-up  This is a new (Epididymal orchitis-bilateral) problem. The current episode started more than 1 month ago. The problem occurs constantly. The problem has been gradually improving. Pertinent negatives include no abdominal pain, anorexia, arthralgias, change in bowel habit, chest pain, chills, congestion, coughing, diaphoresis, fatigue, fever, headaches, joint swelling, myalgias, nausea, neck pain, numbness, rash, sore throat, swollen glands, urinary symptoms, vertigo, visual change, vomiting or weakness. Nothing aggravates the symptoms. He has tried nothing for the symptoms. The treatment provided significant relief.   Review of Systems   Constitutional:  Negative for activity change, appetite change, chills, diaphoresis, fatigue, fever and unexpected weight change.   HENT:  Negative for congestion, hearing loss, sinus pressure, sore throat and trouble swallowing.    Eyes:  Negative for photophobia, pain, discharge and visual disturbance.   Respiratory:  Negative for apnea, cough and shortness of breath.    Cardiovascular:  Negative for chest  pain, palpitations and leg swelling.   Gastrointestinal:  Negative for abdominal distention, abdominal pain, anal bleeding, anorexia, blood in stool, change in bowel habit, constipation, diarrhea, nausea, rectal pain and vomiting.   Endocrine: Negative for cold intolerance, heat intolerance, polydipsia, polyphagia and polyuria.   Genitourinary:  Negative for decreased urine volume, difficulty urinating, dysuria, enuresis, flank pain, frequency, genital sores, hematuria, penile discharge, penile pain, penile swelling, scrotal swelling, testicular pain and urgency.   Musculoskeletal:  Negative for arthralgias, back pain, joint swelling, myalgias and neck pain.   Skin:  Negative for color change, pallor, rash and wound.   Allergic/Immunologic: Negative for environmental allergies, food allergies and immunocompromised state.   Neurological:  Negative for dizziness, vertigo, seizures, weakness, numbness and headaches.   Hematological:  Negative for adenopathy. Does not bruise/bleed easily.   Psychiatric/Behavioral: Negative.       Objective:      Physical Exam  Vitals and nursing note reviewed.   Constitutional:       Appearance: Normal appearance. He is well-developed.   HENT:      Head: Normocephalic.      Right Ear: External ear normal.      Left Ear: External ear normal.      Nose: Nose normal.      Mouth/Throat:      Pharynx: No oropharyngeal exudate or posterior oropharyngeal erythema.   Eyes:      General: No scleral icterus.        Right eye: No discharge.         Left eye: No discharge.      Extraocular Movements: Extraocular movements intact.      Conjunctiva/sclera: Conjunctivae normal.      Pupils: Pupils are equal, round, and reactive to light.   Cardiovascular:      Rate and Rhythm: Normal rate and regular rhythm.      Pulses: Normal pulses.      Heart sounds: Normal heart sounds.   Pulmonary:      Effort: Pulmonary effort is normal.      Breath sounds: Normal breath sounds.   Abdominal:      General: Bowel  sounds are normal.      Palpations: Abdomen is soft.      Tenderness: There is no right CVA tenderness or left CVA tenderness.   Genitourinary:     Penis: Normal.       Testes: Normal.   Musculoskeletal:         General: Normal range of motion.      Cervical back: Normal range of motion and neck supple.   Skin:     General: Skin is warm and dry.      Capillary Refill: Capillary refill takes less than 2 seconds.   Neurological:      Mental Status: He is alert and oriented to person, place, and time.      Deep Tendon Reflexes: Reflexes are normal and symmetric.   Psychiatric:         Behavior: Behavior normal.         Thought Content: Thought content normal.         Judgment: Judgment normal.       Assessment:       1. Bilateral hydrocele    2. Orchitis, epididymitis, and epididymo-orchitis        Plan:       Patient Instructions   Patient to finish course of doxycycline.  If still having symptoms when finishes 14 day course he is to restart a 2nd 14 day course.  This will give him a proximally 6 weeks of antibiotics that are appropriate for E coli.  In April for cystoscopy, urine collection for cytology and fish test.  If patient remains cancer free in tumor free in his bladder we will then move to yearly follow-ups.

## 2023-02-03 NOTE — PATIENT INSTRUCTIONS
Patient to finish course of doxycycline.  If still having symptoms when finishes 14 day course he is to restart a 2nd 14 day course.  This will give him a proximally 6 weeks of antibiotics that are appropriate for E coli.  In April for cystoscopy, urine collection for cytology and fish test.  If patient remains cancer free in tumor free in his bladder we will then move to yearly follow-ups.

## 2023-02-07 ENCOUNTER — PES CALL (OUTPATIENT)
Dept: ADMINISTRATIVE | Facility: CLINIC | Age: 68
End: 2023-02-07
Payer: MEDICARE

## 2023-02-09 ENCOUNTER — PATIENT OUTREACH (OUTPATIENT)
Dept: ADMINISTRATIVE | Facility: HOSPITAL | Age: 68
End: 2023-02-09
Payer: MEDICARE

## 2023-02-09 DIAGNOSIS — E78.2 MIXED HYPERLIPIDEMIA: ICD-10-CM

## 2023-02-09 RX ORDER — ROSUVASTATIN CALCIUM 40 MG/1
TABLET, COATED ORAL
Qty: 90 TABLET | Refills: 3 | Status: SHIPPED | OUTPATIENT
Start: 2023-02-09 | End: 2024-02-22 | Stop reason: SDUPTHER

## 2023-02-09 NOTE — PROGRESS NOTES
Care Everywhere updates requested and reviewed.  Immunizations reconciled. Media reports reviewed.  Duplicate HM overrides and  orders removed.  Overdue HM topic chart audit and/or requested.  Overdue lab testing linked to upcoming lab appointments if applies.    HM updated with external eye exam report.       Health Maintenance Due   Topic Date Due    Shingles Vaccine (1 of 2) 2017    COVID-19 Vaccine (4 - Booster for Pfizer series) 2021

## 2023-03-03 ENCOUNTER — OFFICE VISIT (OUTPATIENT)
Dept: ENDOCRINOLOGY | Facility: CLINIC | Age: 68
End: 2023-03-03
Payer: MEDICARE

## 2023-03-03 VITALS
HEART RATE: 95 BPM | RESPIRATION RATE: 18 BRPM | SYSTOLIC BLOOD PRESSURE: 146 MMHG | HEIGHT: 69 IN | DIASTOLIC BLOOD PRESSURE: 80 MMHG | WEIGHT: 270.94 LBS | BODY MASS INDEX: 40.13 KG/M2

## 2023-03-03 DIAGNOSIS — I10 ESSENTIAL HYPERTENSION: ICD-10-CM

## 2023-03-03 DIAGNOSIS — Z79.4 TYPE 2 DIABETES MELLITUS WITH DIABETIC POLYNEUROPATHY, WITH LONG-TERM CURRENT USE OF INSULIN: Primary | ICD-10-CM

## 2023-03-03 DIAGNOSIS — E11.42 TYPE 2 DIABETES MELLITUS WITH DIABETIC POLYNEUROPATHY, WITH LONG-TERM CURRENT USE OF INSULIN: Primary | ICD-10-CM

## 2023-03-03 DIAGNOSIS — E66.01 MORBID OBESITY WITH BMI OF 40.0-44.9, ADULT: ICD-10-CM

## 2023-03-03 PROCEDURE — 1159F PR MEDICATION LIST DOCUMENTED IN MEDICAL RECORD: ICD-10-PCS | Mod: CPTII,S$GLB,, | Performed by: INTERNAL MEDICINE

## 2023-03-03 PROCEDURE — 1101F PR PT FALLS ASSESS DOC 0-1 FALLS W/OUT INJ PAST YR: ICD-10-PCS | Mod: CPTII,S$GLB,, | Performed by: INTERNAL MEDICINE

## 2023-03-03 PROCEDURE — 1101F PT FALLS ASSESS-DOCD LE1/YR: CPT | Mod: CPTII,S$GLB,, | Performed by: INTERNAL MEDICINE

## 2023-03-03 PROCEDURE — 1126F PR PAIN SEVERITY QUANTIFIED, NO PAIN PRESENT: ICD-10-PCS | Mod: CPTII,S$GLB,, | Performed by: INTERNAL MEDICINE

## 2023-03-03 PROCEDURE — 1157F PR ADVANCE CARE PLAN OR EQUIV PRESENT IN MEDICAL RECORD: ICD-10-PCS | Mod: CPTII,S$GLB,, | Performed by: INTERNAL MEDICINE

## 2023-03-03 PROCEDURE — 3044F PR MOST RECENT HEMOGLOBIN A1C LEVEL <7.0%: ICD-10-PCS | Mod: CPTII,S$GLB,, | Performed by: INTERNAL MEDICINE

## 2023-03-03 PROCEDURE — 1160F PR REVIEW ALL MEDS BY PRESCRIBER/CLIN PHARMACIST DOCUMENTED: ICD-10-PCS | Mod: CPTII,S$GLB,, | Performed by: INTERNAL MEDICINE

## 2023-03-03 PROCEDURE — 99999 PR PBB SHADOW E&M-EST. PATIENT-LVL V: CPT | Mod: PBBFAC,,, | Performed by: INTERNAL MEDICINE

## 2023-03-03 PROCEDURE — 1157F ADVNC CARE PLAN IN RCRD: CPT | Mod: CPTII,S$GLB,, | Performed by: INTERNAL MEDICINE

## 2023-03-03 PROCEDURE — 3066F PR DOCUMENTATION OF TREATMENT FOR NEPHROPATHY: ICD-10-PCS | Mod: CPTII,S$GLB,, | Performed by: INTERNAL MEDICINE

## 2023-03-03 PROCEDURE — 3288F PR FALLS RISK ASSESSMENT DOCUMENTED: ICD-10-PCS | Mod: CPTII,S$GLB,, | Performed by: INTERNAL MEDICINE

## 2023-03-03 PROCEDURE — 3008F PR BODY MASS INDEX (BMI) DOCUMENTED: ICD-10-PCS | Mod: CPTII,S$GLB,, | Performed by: INTERNAL MEDICINE

## 2023-03-03 PROCEDURE — 4010F PR ACE/ARB THEARPY RXD/TAKEN: ICD-10-PCS | Mod: CPTII,S$GLB,, | Performed by: INTERNAL MEDICINE

## 2023-03-03 PROCEDURE — 3044F HG A1C LEVEL LT 7.0%: CPT | Mod: CPTII,S$GLB,, | Performed by: INTERNAL MEDICINE

## 2023-03-03 PROCEDURE — 3077F SYST BP >= 140 MM HG: CPT | Mod: CPTII,S$GLB,, | Performed by: INTERNAL MEDICINE

## 2023-03-03 PROCEDURE — 99214 OFFICE O/P EST MOD 30 MIN: CPT | Mod: S$GLB,,, | Performed by: INTERNAL MEDICINE

## 2023-03-03 PROCEDURE — 3008F BODY MASS INDEX DOCD: CPT | Mod: CPTII,S$GLB,, | Performed by: INTERNAL MEDICINE

## 2023-03-03 PROCEDURE — 3079F PR MOST RECENT DIASTOLIC BLOOD PRESSURE 80-89 MM HG: ICD-10-PCS | Mod: CPTII,S$GLB,, | Performed by: INTERNAL MEDICINE

## 2023-03-03 PROCEDURE — 99499 RISK ADDL DX/OHS AUDIT: ICD-10-PCS | Mod: S$GLB,,, | Performed by: INTERNAL MEDICINE

## 2023-03-03 PROCEDURE — 3062F PR POS MACROALBUMINURIA RESULT DOCUMENTED/REVIEW: ICD-10-PCS | Mod: CPTII,S$GLB,, | Performed by: INTERNAL MEDICINE

## 2023-03-03 PROCEDURE — 1126F AMNT PAIN NOTED NONE PRSNT: CPT | Mod: CPTII,S$GLB,, | Performed by: INTERNAL MEDICINE

## 2023-03-03 PROCEDURE — 4010F ACE/ARB THERAPY RXD/TAKEN: CPT | Mod: CPTII,S$GLB,, | Performed by: INTERNAL MEDICINE

## 2023-03-03 PROCEDURE — 99499 UNLISTED E&M SERVICE: CPT | Mod: S$GLB,,, | Performed by: INTERNAL MEDICINE

## 2023-03-03 PROCEDURE — 1160F RVW MEDS BY RX/DR IN RCRD: CPT | Mod: CPTII,S$GLB,, | Performed by: INTERNAL MEDICINE

## 2023-03-03 PROCEDURE — 3062F POS MACROALBUMINURIA REV: CPT | Mod: CPTII,S$GLB,, | Performed by: INTERNAL MEDICINE

## 2023-03-03 PROCEDURE — 3288F FALL RISK ASSESSMENT DOCD: CPT | Mod: CPTII,S$GLB,, | Performed by: INTERNAL MEDICINE

## 2023-03-03 PROCEDURE — 99214 PR OFFICE/OUTPT VISIT, EST, LEVL IV, 30-39 MIN: ICD-10-PCS | Mod: S$GLB,,, | Performed by: INTERNAL MEDICINE

## 2023-03-03 PROCEDURE — 99999 PR PBB SHADOW E&M-EST. PATIENT-LVL V: ICD-10-PCS | Mod: PBBFAC,,, | Performed by: INTERNAL MEDICINE

## 2023-03-03 PROCEDURE — 3077F PR MOST RECENT SYSTOLIC BLOOD PRESSURE >= 140 MM HG: ICD-10-PCS | Mod: CPTII,S$GLB,, | Performed by: INTERNAL MEDICINE

## 2023-03-03 PROCEDURE — 3079F DIAST BP 80-89 MM HG: CPT | Mod: CPTII,S$GLB,, | Performed by: INTERNAL MEDICINE

## 2023-03-03 PROCEDURE — 1159F MED LIST DOCD IN RCRD: CPT | Mod: CPTII,S$GLB,, | Performed by: INTERNAL MEDICINE

## 2023-03-03 PROCEDURE — 3066F NEPHROPATHY DOC TX: CPT | Mod: CPTII,S$GLB,, | Performed by: INTERNAL MEDICINE

## 2023-03-03 NOTE — PROGRESS NOTES
Endocrine Clinic Note  Reason for Visit: Type 2 Diabetes    History of Present Illness:Andrew Beltran, a 67 y.o. male pmh bladder cancer, TCC of ureter and prostate, who presents today for evaluation and management of diabetes mellitus.     Diagnosis: Type 2 DM diagnosed around 25 years ago on routine bloodwork by his PCP. Previously tried metformin, glipizide. He started Trulicity and glycemic control has been improving from 8.4% to 7.7% to most  recent  A1c 6.7% in 1/2023. He retired last year.      Current Diabetes Medications:    NPH-R 70/30 55 units twice daily  Trulicity 1.5 mg weekly    Glucose Monitoring:   Checks glucose every 3-4 days. He did not bring his meter. Ranging 130 -210 on recall    Hypoglycemia: He had symptoms of hypoglycemia after starting Trulicity.     Diet:   Eats 3 meals per day. Seen by nutritionist in 9/2022  Breakfast oatmeal or grits or cereal or belvita crackers or bagal with coffee  Lunch sandwich or   1- 1.5 servings rice/pasta/potatoes 3x per week   Fried food 1x per week  Red meat 1x per week  Fruits 3-4 x per week blueberries, occasional orange of banana  Vegetables 2x per week  Snacks/Desserts-sugar free freeze pop, rice cakes  Water and sugar free poweraide (zero), cranberry juice    Exercise:   Not regularly, limited by hip pain    Weight/BMI:  Wt Readings from Last 3 Encounters:   03/03/23 122.9 kg (270 lb 15.1 oz)   02/03/23 122.2 kg (269 lb 6.4 oz)   01/26/23 120.1 kg (264 lb 12.4 oz)     Body mass index is 40.01 kg/m².    Diabetes Complications:   Microvascular complications:   --Retinopathy: Moderate NPDR OU Last eye exam was 1/2023 . Dr. Chavez  --Nephropathy: Cr 1.16 and urine ulises/cr 430 in 1/2023  --Peripheral neuropathy: Podiatry 10/22       Macrovascular disease: No known history of CAD, CVA, PAD. ABIs wnl    CV Risk Factor Modification:  The patient is a former smoker.   The patient is on ASA   The patient is  Rosuvastatin. Last LDL 64.6, HDL 46, Trg 67 on  1/19/2023  The patient has a  history of hypertension Losartan, Lasix, Carvedilol, Spironolactone  Sees cardiologist    Chronic venous insufficiency, hx cellulitis    Review of Systems * positive in bold and remainder negative  Constitutional: fever, chills, fatigue, night sweats  HEENT: vision changes, nasal congestion, runny nose, sore throat, hearing loss  CVS:  chest pain, palpitations, leg swelling, claudication  Lung: dyspnea, cough, wheezing, pleuritic chest pain, snoring  GI:  abdominal pain, nausea, vomiting, diarrhea, constipation, acid reflux  Neuro: headache, tremor, paresthesias, unilateral weakness, dizziness, syncope  MSK: muscle aches, muscle weakness, joint pain or stiffness, joint swelling  :  on doxy for epididymitis change in urine frequency, dysuria, hematuria, incontinence  Derm: dry skin, rash, hair loss, brittle nails  Allergy/Immunology/Heme:   hay fever, hives, frequent infections, easy bruising/bleeding  Psych:  mental fogginess, depression, anxiety, irritability, sleep difficulty  Endo:   heat or cold intolerance, polydipsia, polyuria, unexpected weight change    Past Medical History:   Diagnosis Date    BPH (benign prostatic hyperplasia)     Cancer     bladder    Diabetes     type 2    Diabetes mellitus, type 2     Gout     High cholesterol     Hypertension     Sciatica 2016    had injection 3 weeks ago    Urinary tract infection        Past Surgical History:   Procedure Laterality Date    APPENDECTOMY  1968    BALLOON DILATION OF URETER Left 9/18/2019    Procedure: DILATION, URETER, USING BALLOON;  Surgeon: Monty Cee MD;  Location: [facility];  Service: Urology;  Laterality: Left;    BARBOTAGE OF BLADDER N/A 8/14/2019    Procedure: BARBOTAGE, BLADDER;  Surgeon: Monty Cee MD;  Location: [facility] OR;  Service: Urology;  Laterality: N/A;    BARBOTAGE OF URETER Bilateral 8/14/2019    Procedure: BARBOTAGE, URETER;  Surgeon: Monty Cee MD;  Location: [facility];  Service: Urology;   Laterality: Bilateral;    BARBOTAGE OF URETER Left 9/18/2019    Procedure: BARBOTAGE, URETER;  Surgeon: Monty Cee MD;  Location: [facility];  Service: Urology;  Laterality: Left;  left renal/ ureteral washing.    BLADDER SURGERY      COLONOSCOPY N/A 7/10/2018    Procedure: COLONOSCOPY;  Surgeon: Azalea Sanchez MD;  Location: [facility] ENDO;  Service: Endoscopy;  Laterality: N/A;  Schedule at [facility], faxed to [employer] [phone]    CYSTOSCOPY      CYSTOSCOPY W/ RETROGRADES Bilateral 8/14/2019    Procedure: CYSTOSCOPY, WITH RETROGRADE PYELOGRAM;  Surgeon: Monty Cee MD;  Location: [facility] OR;  Service: Urology;  Laterality: Bilateral;    CYSTOSCOPY W/ RETROGRADES Left 9/18/2019    Procedure: CYSTOSCOPY, WITH RETROGRADE PYELOGRAM;  Surgeon: Monty Cee MD;  Location: [facility] OR;  Service: Urology;  Laterality: Left;  cystourethroscopy with left retrograde pyelogram     CYSTOSCOPY W/ RETROGRADES Bilateral 3/11/2020    Procedure: CYSTOSCOPY, WITH RETROGRADE PYELOGRAM Ureteral renal washings and bladder washings;  Surgeon: Monty Cee MD;  Location: [facility];  Service: Urology;  Laterality: Bilateral;    CYSTOSCOPY WITH URETEROSCOPY, RETROGRADE PYELOGRAPHY, AND INSERTION OF STENT Bilateral 6/22/2020    Procedure: CYSTOSCOPY, WITH RETROGRADE PYELOGRAM AND URETERAL STENT INSERTION;  Surgeon: Monty Cee MD;  Location: [facility];  Service: Urology;  Laterality: Bilateral;    CYSTOSCOPY WITH URETHRAL DILATION N/A 6/10/2021    Procedure: CYSTOSCOPY, WITH URETHRAL DILATION;  Surgeon: Monty Cee MD;  Location: [facility];  Service: Urology;  Laterality: N/A;    HAND SURGERY Right 12/08/2016    trigger finger    INSTILLATION OF URINARY BLADDER N/A 3/11/2020    Procedure: INSTILLATION, BLADDER - mitomyin - c;  Surgeon: Monty Cee MD;  Location: [facility];  Service: Urology;  Laterality: N/A;    INSTILLATION OF URINARY BLADDER  6/22/2020    Procedure: INSTILLATION, BLADDER;  Surgeon:  Monty Cee MD;  Location: [facility];  Service: Urology;;  mitomycin 40mg    LASER ABLATION Left 9/18/2019    Procedure: ABLATION, USING LASER;  Surgeon: Monty Cee MD;  Location: [facility];  Service: Urology;  Laterality: Left;  laser ablation of lower pole calyceal transitional cell lesion    PYELOSCOPY Left 9/18/2019    Procedure: PYELOSCOPY;  Surgeon: Monty Cee MD;  Location: [facility];  Service: Urology;  Laterality: Left;  Left ureteral pyeloscopy    TONSILLECTOMY  1960    TRANSURETHRAL RESECTION OF PROSTATE N/A 9/18/2019    Procedure: TURP (TRANSURETHRAL RESECTION OF PROSTATE);  Surgeon: Monty Cee MD;  Location: [facility];  Service: Urology;  Laterality: N/A;  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    TRANSURETHRAL RESECTION OF PROSTATE USING BIPOLAR CAUTERY N/A 3/11/2020    Procedure: TURP, USING BIPOLAR CAUTERY;  Surgeon: Monty Cee MD;  Location: [facility];  Service: Urology;  Laterality: N/A;    TRANSURETHRAL SURGICAL REMOVAL OF STRICTURE OF BLADDER NECK  8/14/2019    Procedure: EXCISION, CONTRACTURE, BLADDER NECK, TRANSURETHRAL;  Surgeon: Monty Cee MD;  Location: [facility];  Service: Urology;;    TRANSURETHRAL SURGICAL REMOVAL OF STRICTURE OF BLADDER NECK N/A 6/10/2021    Procedure: EXCISION, CONTRACTURE, BLADDER NECK, TRANSURETHRAL;  Surgeon: Monty Cee MD;  Location: [facility];  Service: Urology;  Laterality: N/A;  using laser    URETEROSCOPY Left 9/18/2019    Procedure: URETEROSCOPY;  Surgeon: Monty Cee MD;  Location: [facility];  Service: Urology;  Laterality: Left;    URETEROSCOPY Right 6/22/2020    Procedure: URETEROSCOPY;  Surgeon: Monty Cee MD;  Location: [facility];  Service: Urology;  Laterality: Right;         Family History   Problem Relation Age of Onset    Diabetes Mother     Prostate cancer Neg Hx     Kidney disease Neg Hx        Social History     Socioeconomic History    Marital status:    Tobacco Use     Smoking status: Former     Types: Pipe    Smokeless tobacco: Never    Tobacco comments:     quit 40 yrs ago   Substance and Sexual Activity    Alcohol use: Yes     Alcohol/week: 0.0 standard drinks     Comment: rarely    Drug use: No    Sexual activity: Yes     Partners: Female     Social Determinants of Health     Financial Resource Strain: Low Risk     Difficulty of Paying Living Expenses: Not hard at all   Food Insecurity: No Food Insecurity    Worried About Running Out of Food in the Last Year: Never true    Ran Out of Food in the Last Year: Never true   Transportation Needs: No Transportation Needs    Lack of Transportation (Medical): No    Lack of Transportation (Non-Medical): No   Physical Activity: Inactive    Days of Exercise per Week: 0 days    Minutes of Exercise per Session: 10 min   Stress: No Stress Concern Present    Feeling of Stress : Not at all   Social Connections: Unknown    Frequency of Communication with Friends and Family: More than three times a week    Frequency of Social Gatherings with Friends and Family: Twice a week    Active Member of Clubs or Organizations: Yes    Attends Club or Organization Meetings: More than 4 times per year    Marital Status:    Housing Stability: Low Risk     Unable to Pay for Housing in the Last Year: No    Number of Places Lived in the Last Year: 2    Unstable Housing in the Last Year: No       Current Outpatient Medications on File Prior to Visit   Medication Sig Dispense Refill    allopurinoL (ZYLOPRIM) 300 MG tablet Take 1 tablet (300 mg total) by mouth once daily. 90 tablet 4    aspirin (ECOTRIN) 81 MG EC tablet Take 81 mg by mouth once daily.      blood sugar diagnostic Strp by Misc.(Non-Drug; Combo Route) route.      carvediloL (COREG) 12.5 MG tablet Take 1 tablet (12.5 mg total) by mouth 2 (two) times daily with meals. 180 tablet 3    doxycycline (VIBRAMYCIN) 100 MG Cap Take 1 capsule (100 mg total) by mouth every 12 (twelve) hours. for 14 days 28  "capsule 1    dulaglutide (TRULICITY) 1.5 mg/0.5 mL pen injector Inject 1.5 mg into the skin every 7 days. 12 pen 11    furosemide (LASIX) 20 MG tablet Take 1 tablet (20 mg total) by mouth once daily. 90 tablet 3    ibuprofen (ADVIL,MOTRIN) 400 MG tablet Take 400 mg by mouth every 6 (six) hours as needed for Other.      insulin NPH-insulin regular, 70/30, (HUMULIN 70/30 U-100 INSULIN) 100 unit/mL (70-30) injection Inject 60 Units into the skin 2 (two) times daily. 100 mL 11    insulin syringe-needle U-100 1 mL 31 gauge x 5/16 Syrg USE AS DIRECTED TWICE A  each 4    lancets (PRODIGY TWIST TOP LANCET) 28 gauge Misc 1 lancet by Misc.(Non-Drug; Combo Route) route 2 (two) times daily. 180 each 4    losartan (COZAAR) 100 MG tablet Take 1 tablet (100 mg total) by mouth once daily. 90 tablet 4    MAGNESIUM ORAL Take 400 mg by mouth once daily at 6am. 800 mg everyday      rosuvastatin (CRESTOR) 40 MG Tab TAKE 1 TABLET BY MOUTH EVERY EVENING 90 tablet 3    spironolactone (ALDACTONE) 25 MG tablet Take 1 tablet (25 mg total) by mouth once daily. 30 tablet 11    tamsulosin (FLOMAX) 0.4 mg Cap Take 1 capsule (0.4 mg total) by mouth every evening. 90 capsule 3    diclofenac sodium (VOLTAREN) 1 % Gel Apply 2 g topically 3 (three) times daily as needed (pain). 100 g 4     Current Facility-Administered Medications on File Prior to Visit   Medication Dose Route Frequency Provider Last Rate Last Admin    lidocaine HCl 2% urojet   Mucous Membrane Once Monty Cee MD        mitoMYcin (MUTAMYCIN) 40 mg in sodium chloride 0.9% infusion  40 mg Intravesical Once Monty Cee MD           Review of patient's allergies indicates:  No Known Allergies    Physical Exam:   BP (!) 146/80 (BP Location: Left arm, Patient Position: Sitting, BP Method: Large (Automatic))   Pulse 95   Resp 18   Ht 5' 9" (1.753 m)   Wt 122.9 kg (270 lb 15.1 oz)   BMI 40.01 kg/m²   General: Well developed, well-nourished in NAD  Head: Normocephalic, " atraumatic, no obvious deformity  Eyes: PERRL,  Normal conjunctivae  Neck: Thyroid normal size. No discrete nodules. No cervical adenopathy appreciated  CVS: RRR no murmurs, rubs, or gallops.   Chest: Clear to auscultation bilaterally. No wheezes, rales or rhonchi  Abdomen: Soft, nontender, nondistended. Normal bowel sounds  Extremities: Compression stockings on bilaterally  Neuro: Strength symmetric bilaterally. No tremor of outstretched hands. Biceps reflexes wnl.   Skin: No rashes or lesions. N   Psych: Normal mood. Normal affect.      Labs  Hemoglobin A1C   Date Value Ref Range Status   01/19/2023 6.7 (H) 4.0 - 5.6 % Final     Comment:     ADA Screening Guidelines:  5.7-6.4%  Consistent with prediabetes  >or=6.5%  Consistent with diabetes    High levels of fetal hemoglobin interfere with the HbA1C  assay. Heterozygous hemoglobin variants (HbS, HgC, etc)do  not significantly interfere with this assay.   However, presence of multiple variants may affect accuracy.     10/19/2022 7.7 (H) 4.0 - 5.6 % Final     Comment:     ADA Screening Guidelines:  5.7-6.4%  Consistent with prediabetes  >or=6.5%  Consistent with diabetes    High levels of fetal hemoglobin interfere with the HbA1C  assay. Heterozygous hemoglobin variants (HbS, HgC, etc)do  not significantly interfere with this assay.   However, presence of multiple variants may affect accuracy.     07/20/2022 8.4 (H) 4.0 - 5.6 % Final     Comment:     ADA Screening Guidelines:  5.7-6.4%  Consistent with prediabetes  >or=6.5%  Consistent with diabetes    High levels of fetal hemoglobin interfere with the HbA1C  assay. Heterozygous hemoglobin variants (HbS, HgC, etc)do  not significantly interfere with this assay.   However, presence of multiple variants may affect accuracy.           Assessment/Plan:   Andrew Beltran, a 67 y.o. male  pmh bladder cancer, TCC of ureter and prostate, who presents today for evaluation and management of diabetes mellitus.     Type 2 DM  with NPDR and Microalbuminuria   A1c 6.7% is within target but on SMBG glucose is variable up to 210s.  On CMP his glucose was 62 and appears to have hypoglycemic unawareness.  He would benefit from CGM as we titrate insulin and increase physical activity. Discussed microavscular complications of diabetes and we will target an A1c of less than 7% given history of nonproliferative diabetic retinopathy, we will gradually normalize glucose with GLP 1 agonist.  A1c has not proving at a steady rate.  Discussed benefits of GLP 1 agonist for weight loss and cardio protection and plan to taper insulin as tolerated.  Given history of bladder cancer and urinary frequency, he would not be a good candidate for an SGLT2 inhibitor.  -Plan to increase Trulicity and reduce NPH/R after labs in April  -Will need to monitor eye exam given moderate NPDR  -Increase physical activity as tolerated   -Reviewed nonstarchy vegetables and low glycemic fruits  -Obtain Dexcom G7 CGM    Hypertension with microalbuminuria  Blood pressure today above goal of <130/80. Last Cr 1.16 and urine ulsies/cr 430 in 1/2023  The patient has a  history of hypertension Losartan, Lasix, Carvedilol, Spironolactone.  Follow by cardiology.  He is not a good candidate for an SGLT2 inhibitor.  Titrate coreg and spironolactone as tolerated     Hyperlipidemia  The patient is on statin for primary prevention. Last LDL 64.6, HDL 46, Trg 67 on 1/19/2023  Continue rosuvastatin    Obesity class 3  Last TSH wnl   -discussed lifestyle modifications  Exercise limited by hip bursitis.  May benefit from PT for aquatic therapy. He does not have access to pool and would favor supervision before initiating exercise regimen with stationary bike in weight    Health maintenance:   The patient is aware of need for regular eye exams.   Foot exam was performed by Podiatry  The patient should remain up to date on the following vaccinations: influenza, pneumococcal, HBV.        Return to  Clinic: 2 months

## 2023-03-03 NOTE — PATIENT INSTRUCTIONS
After labs in 4/2023, we will plan to increase Trulicity and reduce insulin doses    [email or web address]      Frequently Asked Questions:      What are my goals for my sugars?   --Pre-meal (including fasting, before meals):  mg/dl     --2 hours after eating: <180 mg/dl, ideally 100-150 mg/dl     --Bedtime: 100-140 mg/dl. Please take a snack if your sugar is less than 100 mg/dl prior to bed.      ---It is very important for you to bring a log of your sugars and/or glucometer to every visit in our clinic           What do I do if I have a low sugar (hypoglycemia)?   Symptoms: shakiness; nervousness or anxiety; sweating , chills and clamminess; irritability or impatience; confusion; rapid/fast heartbeat; lightheadedness or dizziness; hunger and nausea; sleepiness; blurred vision; tingling or numbness in the lips or tongue; headaches; weakness or fatigue; lack or coordination; nightmares or crying out during sleep; seizures; unconsciousness      Plan of action:  1) If your sugar is less than 70 mg/dl, please eat a 15 gram carb snack. Examples include:   --3 Glucose tablets or 4 Dextrose tablets   --4 ounces of fruit juice   ---5-6 ounces (about 1/2 can) of regular soda such as Coke or Pepsi   --7-8 gummy or regular Life Savers   --1 tablespoon of sugar, honey, or cornsyrup  --8 ounces of nonfat or 1% milk     2) Rest for 15 minutes and then re-check your sugar. If it is still low (less than 70 mg/dl), please repeat step #1. Repeat as necessary.      3) Once blood glucose returns to normal, eat a small snack (15 grams of carbs) if your next planned meal or snack is more than 1-2 hours away.     Emergency situations:   If you are unconscious, family members should administer 1 mg of glucagon in the muscle to bring the sugar up and call 911 immediately. Since glucagon may cause you to vomit, you should be placed on your side when the injection is  given.      If you have a glucagon kit, please be sure to check the date to make sure it is not . If it is , please let us know.      Contact us:   Please notify us if you experience more than 2 episodes of low sugar (less than 70) or if you ever have to use glucagon or call 911     Preventative Measures:   Please make sure you have an up to date glucagon kit (check the expiration date) and that you are wearing a medical alert tag consistently.      What do I do if I have high sugars (hyperglycemia)?   --Be sure to drink plenty of water.         What if I'm going to be driving for long distances?   --Always carry a blood glucose meter and appropriate snacks, including a quick-acting source of sugar (such as juice, nondiet soda, hard candy, or 4 glucose tablets) as well as snacks with complex carbohydrate, fat, and protein (e.g., cheese crackers) in your car     ---Never begin an extended drive with low normal blood glucose (e.g., 70-90 mg/dL) without prophylactic carbohydrate consumption to avoid a fall in blood sugar during the drive;      --Stop the vehicle as soon any of the symptoms of low blood glucose are experienced and measure and treat the blood glucose level; and      --Do not resume driving until blood glucose and cognition (clear thinking) have recovered.      How do I store my insulin and diabetes supplies?   --Insulin: Be sure to keep your insulin refrigerated. It is fine to carry these supplies with you during the day unrefrigerated as long as you keep them in a cool environment.      Although manufacturers recommend storing your insulin in the refrigerator, injecting cold insulin can sometimes make the injection more painful. To avoid this, you may try storing the bottle you are using at room temperature. Insulin kept at room temperature will last for approximately 1 month. Just remember to store extra insulin (extra vials or pens) in the fridge.      It is important to know that  "extremes of temperature can affect how well your insulin will work. Be sure to keep your insulin out of extreme heat (for example, avoid storing insulin in direct sunlight) and never freeze insulin (also avoid storing next to a frozen ice pack). Always be sure to check the expiration date on your insulin and never use it if it's beyond the expiration date.     --Glucometer strips: Avoid exposing your strips to extremes of temperatures. Also close the cannister on your test strips to keep out moisture and debris.      --Glucometer: Never expose this to extremes of temperatures as above.          Where should I inject my insulin?   --The place on your body where you inject insulin affects your blood glucose level.   Insulin enters the blood at different speeds when injected at different sites.     -- Insulin shots work fastest when given in the abdomen. Insulin arrives in the blood a little more slowly from the upper arms and even more slowly from the thighs and buttocks.     --Some general tips depending on where you choose to inject:        --Stomach: Stay at least 2 inches away from the bellybutton or any scars       --Thigh: Inject at least 4 inches above or about one hand's width above the knee and                     at least 4 inches down from the top of the leg. The best area on the leg is the                   top and outer area of the thigh. Do not inject insulin into your inner thigh                           because of the number of blood vessels and nerves in this area.       --Arm: Inject into fatty area in the back of the arm between the shoulder and elbow       --Buttock: Inject in the hip or "wallet area" and not into lower buttock area     -- Injecting insulin in the same general area (for example, your abdomen) will give you the best results from your insulin. This is because the insulin will reach the blood with about the same speed with each insulin shot.      --Don't inject the insulin in exactly " the same place each time, but move around the same area. For example, keep injections at least an inch (or two finger widths) apart.      --Each mealtime injection of insulin should be given in the same general area for best results. For example, giving your before-breakfast insulin injection in the abdomen and your before-supper insulin injection in the leg each day give more similar blood glucose results.      --Don't inject into scar tissue or areas with broken vessels or varicose veins.     --If you inject insulin near the same place each time, hard lumps or extra fatty deposits may develop. Both of these problems make the insulin action less reliable.     --When injecting with an insulin pen, inject straight in and be sure to hold the pen in place for a few seconds after the insulin is delivered to ensure that no insulin leaks out.        How to I dispose of my insulin needles?   --We will give you a disposal container for your sharps. Otherwise, consider using a heavy duty plastic contained (ex: laundry detergent container) as an alternative.      --Your area may have rules for getting rid of medical waste such as used syringes. Ask your refuse company or city or county waste authority what method meets their rules. The [facility] has more information about safe needle disposal in your area. When traveling, bring your used syringes home. Pack them in a heavy-duty dillon, such as a hard plastic pencil box, for transport.          Do you have any recommendations in terms of my diet?    We recommend a diet low in saturated fat, high in fiber, and carbohydrate-moderated.  In other words, please work on eating less total calories, less fried and fatty foods, more non-starchy vegetables, whole fruits and lean meats, fewer total carbohydrates (starches), and more high fiber carbohydrates (brown or wild rice rather than white rice, whole grain bread rather than white bread, sweet potatoes rather than white potatoes, whole  "grain rather than sugary cereals, higher fiber rather than white pasta). This will help control your weight and improve blood sugar levels.     A few apps for your phone that can help with tracking calorie and carbohydrate intake: Gerry, [place]        How much should I be exercising and are there any precautions I should take?   We recommend 20-30 minutes most days of the week with a goal of at least 150 minutes per week.      If you tracking your steps with a pedometer or other fitness device, aim for a goal of 10,000 steps per day (2000 steps =1 mile).      What type of screening do I need every year to make sure I don't have any complications from my diabetes?   Eyes: It is very important for you to have a dilated eye exam with an ophthalmologist at minimum every 1-2 years to look for complications of diabetes in the eyes.      Kidneys: You will need at minimum blood work and urine test once per year to screen for kidney damage related to diabetes     Feet: Daily foot checks are very important. Please let us know if you are experiencing numbness/tingling in the hands/feet.      Vaccines: You should remain up to date on the following vaccinations: influenza, pneumococcal, hepatitis B; please discuss this with your primary care physician      For more information and resources, please visit the [facility] Patient Guide to Diabetes:   [email or web address]     Insulin Safety Tips:    1.  If you have 3 or more blood sugar readings less than 70 mg/dl or over 300 mg/dl, please call my office (see phone numbers below).    2.  If your blood sugar is <100 mg/dl at bedtime, please eat a small snack before going to bed (i.e. 1/2 apple with tablespoon of peanut butter).    3.  If you are NOT EATING a meal for any reason (i.e. Vomiting, no appetite, etc), do not take your nutritional insulin dose (i.e. Novolog, Humalog, or Apidra).  You may take this insulin according to the "NOT EATING" " column on your scale.  Continue your long-acting insulin, but consider decreasing your dose by 20% if you have a tendency to experience low blood sugars when fasting.    4.  Hypoglycemia is a blood sugar less than 70 mg/dl.  Symptoms of hypoglycemia include: shakes, tremors, sweating, confusion, odd behavior, unresponsiveness, and seizures.  If your sugar is less than 70 mg/dl, eat a 15 gram carb snack (i.e. Apple juice, orange juice, omero crackers) or take a glucose tablet.  If your sugar is still less than 70 mg/dl after 15 minutes, eat another 15 gram snack and consider eating a more substantial meal.    5.  Family members should administer glucagon 1 mg (in muscle) if diabetic patient is unresponsive and call 911 immediately.

## 2023-03-15 ENCOUNTER — PATIENT MESSAGE (OUTPATIENT)
Dept: FAMILY MEDICINE | Facility: CLINIC | Age: 68
End: 2023-03-15
Payer: MEDICARE

## 2023-03-15 DIAGNOSIS — M70.62 TROCHANTERIC BURSITIS OF BOTH HIPS: Primary | ICD-10-CM

## 2023-03-15 DIAGNOSIS — M70.61 TROCHANTERIC BURSITIS OF BOTH HIPS: Primary | ICD-10-CM

## 2023-03-17 NOTE — TELEPHONE ENCOUNTER
Orders Placed This Encounter   Procedures    Ambulatory referral/consult to Physical/Occupational Therapy     Standing Status:   Future     Standing Expiration Date:   4/17/2024     Referral Priority:   Routine     Referral Type:   Physical Medicine     Referral Reason:   Specialty Services Required     Referred to Provider:   [facility]     Requested Specialty:   Physical Therapy     Number of Visits Requested:   1     Encounter Diagnosis   Name Primary?    Trochanteric bursitis of both hips Yes

## 2023-03-17 NOTE — TELEPHONE ENCOUNTER
Patient would like to move forward with PT order for his hips to be sent to [facility].

## 2023-03-27 ENCOUNTER — PATIENT MESSAGE (OUTPATIENT)
Dept: FAMILY MEDICINE | Facility: CLINIC | Age: 68
End: 2023-03-27
Payer: MEDICARE

## 2023-03-27 DIAGNOSIS — I10 ESSENTIAL HYPERTENSION: ICD-10-CM

## 2023-03-27 RX ORDER — FUROSEMIDE 20 MG/1
20 TABLET ORAL DAILY
Qty: 90 TABLET | Refills: 3 | Status: SHIPPED | OUTPATIENT
Start: 2023-03-27 | End: 2024-05-09

## 2023-03-27 NOTE — TELEPHONE ENCOUNTER
No new care gaps identified.  [facility] Embedded Care Gaps. Reference number: [id number]. 3/27/2023   12:45:35 PM CDT

## 2023-04-14 ENCOUNTER — PATIENT OUTREACH (OUTPATIENT)
Dept: ADMINISTRATIVE | Facility: HOSPITAL | Age: 68
End: 2023-04-14
Payer: MEDICARE

## 2023-04-14 ENCOUNTER — PATIENT MESSAGE (OUTPATIENT)
Dept: ADMINISTRATIVE | Facility: HOSPITAL | Age: 68
End: 2023-04-14
Payer: MEDICARE

## 2023-04-14 NOTE — PROGRESS NOTES
Care Everywhere updates requested and reviewed.  Immunizations reconciled. Media reports reviewed.  Duplicate HM overrides and  orders removed.  Overdue HM topic chart audit and/or requested.  Overdue lab testing linked to upcoming lab appointments if applies.    Health Maintenance Due   Topic Date Due    Shingles Vaccine (1 of 2) 2017    COVID-19 Vaccine (4 - Booster for Pfizer series) 2021    Colorectal Cancer Screening  07/10/2023

## 2023-04-15 NOTE — TELEPHONE ENCOUNTER
No new care gaps identified.  [facility] Embedded Care Gaps. Reference number: [id number]. 4/15/2023   9:10:11 AM CDT

## 2023-04-17 RX ORDER — SYRINGE,SAFETY WITH NEEDLE,1ML 25GX1"
SYRINGE (EA) MISCELLANEOUS
Qty: 200 EACH | Refills: 4 | Status: SHIPPED | OUTPATIENT
Start: 2023-04-17

## 2023-04-28 ENCOUNTER — OFFICE VISIT (OUTPATIENT)
Dept: FAMILY MEDICINE | Facility: CLINIC | Age: 68
End: 2023-04-28
Payer: MEDICARE

## 2023-04-28 VITALS
OXYGEN SATURATION: 98 % | BODY MASS INDEX: 39.87 KG/M2 | WEIGHT: 269.19 LBS | TEMPERATURE: 98 F | SYSTOLIC BLOOD PRESSURE: 130 MMHG | DIASTOLIC BLOOD PRESSURE: 66 MMHG | HEART RATE: 94 BPM | HEIGHT: 69 IN

## 2023-04-28 DIAGNOSIS — Z79.4 TYPE 2 DIABETES MELLITUS WITH DIABETIC POLYNEUROPATHY, WITH LONG-TERM CURRENT USE OF INSULIN: ICD-10-CM

## 2023-04-28 DIAGNOSIS — E78.2 MIXED HYPERLIPIDEMIA: ICD-10-CM

## 2023-04-28 DIAGNOSIS — I10 ESSENTIAL HYPERTENSION: Primary | ICD-10-CM

## 2023-04-28 DIAGNOSIS — E11.42 TYPE 2 DIABETES MELLITUS WITH DIABETIC POLYNEUROPATHY, WITH LONG-TERM CURRENT USE OF INSULIN: ICD-10-CM

## 2023-04-28 DIAGNOSIS — E66.01 SEVERE OBESITY (BMI 35.0-35.9 WITH COMORBIDITY): ICD-10-CM

## 2023-04-28 DIAGNOSIS — M70.62 TROCHANTERIC BURSITIS OF BOTH HIPS: ICD-10-CM

## 2023-04-28 DIAGNOSIS — M70.61 TROCHANTERIC BURSITIS OF BOTH HIPS: ICD-10-CM

## 2023-04-28 PROCEDURE — 99999 PR PBB SHADOW E&M-EST. PATIENT-LVL IV: ICD-10-PCS | Mod: PBBFAC,,, | Performed by: FAMILY MEDICINE

## 2023-04-28 PROCEDURE — 1101F PT FALLS ASSESS-DOCD LE1/YR: CPT | Mod: CPTII,S$GLB,, | Performed by: FAMILY MEDICINE

## 2023-04-28 PROCEDURE — 1126F PR PAIN SEVERITY QUANTIFIED, NO PAIN PRESENT: ICD-10-PCS | Mod: CPTII,S$GLB,, | Performed by: FAMILY MEDICINE

## 2023-04-28 PROCEDURE — 3008F PR BODY MASS INDEX (BMI) DOCUMENTED: ICD-10-PCS | Mod: CPTII,S$GLB,, | Performed by: FAMILY MEDICINE

## 2023-04-28 PROCEDURE — 1159F MED LIST DOCD IN RCRD: CPT | Mod: CPTII,S$GLB,, | Performed by: FAMILY MEDICINE

## 2023-04-28 PROCEDURE — 99214 PR OFFICE/OUTPT VISIT, EST, LEVL IV, 30-39 MIN: ICD-10-PCS | Mod: S$GLB,,, | Performed by: FAMILY MEDICINE

## 2023-04-28 PROCEDURE — 3066F PR DOCUMENTATION OF TREATMENT FOR NEPHROPATHY: ICD-10-PCS | Mod: CPTII,S$GLB,, | Performed by: FAMILY MEDICINE

## 2023-04-28 PROCEDURE — 3078F PR MOST RECENT DIASTOLIC BLOOD PRESSURE < 80 MM HG: ICD-10-PCS | Mod: CPTII,S$GLB,, | Performed by: FAMILY MEDICINE

## 2023-04-28 PROCEDURE — 3044F PR MOST RECENT HEMOGLOBIN A1C LEVEL <7.0%: ICD-10-PCS | Mod: CPTII,S$GLB,, | Performed by: FAMILY MEDICINE

## 2023-04-28 PROCEDURE — 99999 PR PBB SHADOW E&M-EST. PATIENT-LVL IV: CPT | Mod: PBBFAC,,, | Performed by: FAMILY MEDICINE

## 2023-04-28 PROCEDURE — 1157F ADVNC CARE PLAN IN RCRD: CPT | Mod: CPTII,S$GLB,, | Performed by: FAMILY MEDICINE

## 2023-04-28 PROCEDURE — 3288F PR FALLS RISK ASSESSMENT DOCUMENTED: ICD-10-PCS | Mod: CPTII,S$GLB,, | Performed by: FAMILY MEDICINE

## 2023-04-28 PROCEDURE — 4010F PR ACE/ARB THEARPY RXD/TAKEN: ICD-10-PCS | Mod: CPTII,S$GLB,, | Performed by: FAMILY MEDICINE

## 2023-04-28 PROCEDURE — 4010F ACE/ARB THERAPY RXD/TAKEN: CPT | Mod: CPTII,S$GLB,, | Performed by: FAMILY MEDICINE

## 2023-04-28 PROCEDURE — 1157F PR ADVANCE CARE PLAN OR EQUIV PRESENT IN MEDICAL RECORD: ICD-10-PCS | Mod: CPTII,S$GLB,, | Performed by: FAMILY MEDICINE

## 2023-04-28 PROCEDURE — 3044F HG A1C LEVEL LT 7.0%: CPT | Mod: CPTII,S$GLB,, | Performed by: FAMILY MEDICINE

## 2023-04-28 PROCEDURE — 3062F PR POS MACROALBUMINURIA RESULT DOCUMENTED/REVIEW: ICD-10-PCS | Mod: CPTII,S$GLB,, | Performed by: FAMILY MEDICINE

## 2023-04-28 PROCEDURE — 99214 OFFICE O/P EST MOD 30 MIN: CPT | Mod: S$GLB,,, | Performed by: FAMILY MEDICINE

## 2023-04-28 PROCEDURE — 1159F PR MEDICATION LIST DOCUMENTED IN MEDICAL RECORD: ICD-10-PCS | Mod: CPTII,S$GLB,, | Performed by: FAMILY MEDICINE

## 2023-04-28 PROCEDURE — 1126F AMNT PAIN NOTED NONE PRSNT: CPT | Mod: CPTII,S$GLB,, | Performed by: FAMILY MEDICINE

## 2023-04-28 PROCEDURE — 3075F PR MOST RECENT SYSTOLIC BLOOD PRESS GE 130-139MM HG: ICD-10-PCS | Mod: CPTII,S$GLB,, | Performed by: FAMILY MEDICINE

## 2023-04-28 PROCEDURE — 3066F NEPHROPATHY DOC TX: CPT | Mod: CPTII,S$GLB,, | Performed by: FAMILY MEDICINE

## 2023-04-28 PROCEDURE — 3078F DIAST BP <80 MM HG: CPT | Mod: CPTII,S$GLB,, | Performed by: FAMILY MEDICINE

## 2023-04-28 PROCEDURE — 3288F FALL RISK ASSESSMENT DOCD: CPT | Mod: CPTII,S$GLB,, | Performed by: FAMILY MEDICINE

## 2023-04-28 PROCEDURE — 3062F POS MACROALBUMINURIA REV: CPT | Mod: CPTII,S$GLB,, | Performed by: FAMILY MEDICINE

## 2023-04-28 PROCEDURE — 3075F SYST BP GE 130 - 139MM HG: CPT | Mod: CPTII,S$GLB,, | Performed by: FAMILY MEDICINE

## 2023-04-28 PROCEDURE — 1101F PR PT FALLS ASSESS DOC 0-1 FALLS W/OUT INJ PAST YR: ICD-10-PCS | Mod: CPTII,S$GLB,, | Performed by: FAMILY MEDICINE

## 2023-04-28 PROCEDURE — 3008F BODY MASS INDEX DOCD: CPT | Mod: CPTII,S$GLB,, | Performed by: FAMILY MEDICINE

## 2023-04-28 NOTE — PATIENT INSTRUCTIONS
Covid booster: updated omicron booster effective Sept 2022:  [facility] users can check availability and schedule their vaccinations via [facility]. If you don't have a [facility] account, you can sign up at [email or web address], call [phone] or visit [email or web address] for available locations and times       Look into getting the Shingles vaccine (SHINGRIX) at your local pharmacy (2 part series, done once at/after age 50)

## 2023-04-28 NOTE — PROGRESS NOTES
(Portions of this note were dictated using voice recognition software and may contain dictation related errors in spelling/grammar/syntax not found on text review)    CC:   Chief Complaint   Patient presents with    Follow-up         HPI: 67 y.o. male   Here for medical follow-up.    Following with  wound care for chronic venous insufficiency with venous stasis ulcers bilateral lower extremities.  . Had normal BRISA.  Follows with Podiatry, Dr. Murrieta    Diabetes type 2 uncontrolled.  Since has established with endocrinology, 03/03/2023  On Humulin 70/30, 55 units b.i.d..    He was diagnosed with diabetes over 20 years ago, states that he has been on 70 30 since that time.  On Trulicity, titrated up to 1.5 mg weekly .  Concerned about possible hypoglycemia and endocrine plan was to reduce insulin dosage and up titrate Trulicity     Eye exam up-to-date 07/19/2022.    gout, prophylaxed with allopurinol, no recent issues    Dyslipidemia on Crestor 40 mg daily    Hypertension on losartan 100 mg daily, carvedilol 12.5 mg b.i.d.  , Aldactone 25 mg daily, Lasix 20 mg daily.  Follows with Cardiology    History of recurrent TCC bladder, follows with Urology., had recurrent resection and mitomycin-C installation.  Last cystoscopy was 03/29/2022 noting scar tissue but no malignancy.  Urine cytology was negative for malignant cells. Had bladder neck stenosis. Takes flomax.      Was ordered PT for hip bursitis , does seem to be helping        Past Medical History:   Diagnosis Date    BPH (benign prostatic hyperplasia)     Cancer     bladder    Diabetes     type 2    Diabetes mellitus, type 2     Gout     High cholesterol     Hypertension     Sciatica 2016    had injection 3 weeks ago    Urinary tract infection        Past Surgical History:   Procedure Laterality Date    APPENDECTOMY  1968    BALLOON DILATION OF URETER Left 9/18/2019    Procedure: DILATION, URETER, USING BALLOON;  Surgeon: Monty Cee MD;  Location: [facility] OR;   Service: Urology;  Laterality: Left;    BARBOTAGE OF BLADDER N/A 8/14/2019    Procedure: BARBOTAGE, BLADDER;  Surgeon: Monty Cee MD;  Location: [facility];  Service: Urology;  Laterality: N/A;    BARBOTAGE OF URETER Bilateral 8/14/2019    Procedure: BARBOTAGE, URETER;  Surgeon: Monty Cee MD;  Location: [facility] OR;  Service: Urology;  Laterality: Bilateral;    BARBOTAGE OF URETER Left 9/18/2019    Procedure: BARBOTAGE, URETER;  Surgeon: Monty Cee MD;  Location: [facility];  Service: Urology;  Laterality: Left;  left renal/ ureteral washing.    BLADDER SURGERY      COLONOSCOPY N/A 7/10/2018    Procedure: COLONOSCOPY;  Surgeon: Azalea Sanchez MD;  Location: [facility];  Service: Endoscopy;  Laterality: N/A;  [phone]    CYSTOSCOPY      CYSTOSCOPY W/ RETROGRADES Bilateral 8/14/2019    Procedure: CYSTOSCOPY, WITH RETROGRADE PYELOGRAM;  Surgeon: Monty Cee MD;  Location: [facility];  Service: Urology;  Laterality: Bilateral;    CYSTOSCOPY W/ RETROGRADES Left 9/18/2019    Procedure: CYSTOSCOPY, WITH RETROGRADE PYELOGRAM;  Surgeon: Monty Cee MD;  Location: [facility];  Service: Urology;  Laterality: Left;  cystourethroscopy with left retrograde pyelogram     CYSTOSCOPY W/ RETROGRADES Bilateral 3/11/2020    Procedure: CYSTOSCOPY, WITH RETROGRADE PYELOGRAM Ureteral renal washings and bladder washings;  Surgeon: Monty Cee MD;  Location: [facility];  Service: Urology;  Laterality: Bilateral;    CYSTOSCOPY WITH URETEROSCOPY, RETROGRADE PYELOGRAPHY, AND INSERTION OF STENT Bilateral 6/22/2020    Procedure: CYSTOSCOPY, WITH RETROGRADE PYELOGRAM AND URETERAL STENT INSERTION;  Surgeon: Monty Cee MD;  Location: [facility];  Service: Urology;  Laterality: Bilateral;    CYSTOSCOPY WITH URETHRAL DILATION N/A 6/10/2021    Procedure: CYSTOSCOPY, WITH URETHRAL DILATION;  Surgeon: Monty Cee MD;  Location: [facility];  Service: Urology;   Laterality: N/A;    HAND SURGERY Right 12/08/2016    trigger finger    INSTILLATION OF URINARY BLADDER N/A 3/11/2020    Procedure: INSTILLATION, BLADDER - mitomyin - c;  Surgeon: Monty Cee MD;  [facility];  Service: Urology;  Laterality: N/A;    INSTILLATION OF URINARY BLADDER  6/22/2020    Procedure: INSTILLATION, BLADDER;  Surgeon: Monty Cee MD;  Location: [facility];  Service: Urology;;  mitomycin 40mg    LASER ABLATION Left 9/18/2019    Procedure: ABLATION, USING LASER;  Surgeon: Monty Cee MD;  Location: [facility];  Service: Urology;  Laterality: Left;  laser ablation of lower pole calyceal transitional cell lesion    PYELOSCOPY Left 9/18/2019    Procedure: PYELOSCOPY;  Surgeon: Monty Cee MD;  Location: [facility];  Service: Urology;  Laterality: Left;  Left ureteral pyeloscopy    TONSILLECTOMY  1960    TRANSURETHRAL RESECTION OF PROSTATE N/A 9/18/2019    Procedure: TURP (TRANSURETHRAL RESECTION OF PROSTATE);  Surgeon: Monty Cee MD;  Location: [facility];  Service: Urology;  Laterality: N/A;  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    TRANSURETHRAL RESECTION OF PROSTATE USING BIPOLAR CAUTERY N/A 3/11/2020    Procedure: TURP, USING BIPOLAR CAUTERY;  Surgeon: Monty Cee MD;  Location: [facility];  Service: Urology;  Laterality: N/A;    TRANSURETHRAL SURGICAL REMOVAL OF STRICTURE OF BLADDER NECK  8/14/2019    Procedure: EXCISION, CONTRACTURE, BLADDER NECK, TRANSURETHRAL;  Surgeon: Monty Cee MD;  [facility]: [facility];  Service: Urology;;    TRANSURETHRAL SURGICAL REMOVAL OF STRICTURE OF BLADDER NECK N/A 6/10/2021    Procedure: EXCISION, CONTRACTURE, BLADDER NECK, TRANSURETHRAL;  Surgeon: Monty Cee MD;  [facility]: [facility];  Service: Urology;  Laterality: N/A;  using laser    URETEROSCOPY Left 9/18/2019    Procedure: URETEROSCOPY;  Surgeon: Monty Cee MD;  [facility]: [facility];  Service: Urology;  Laterality: Left;    URETEROSCOPY Right  "6/22/2020    Procedure: URETEROSCOPY;  Surgeon: Monty Cee MD;  Location: [facility] OR;  Service: Urology;  Laterality: Right;       Family History   Problem Relation Age of Onset    Diabetes Mother     Prostate cancer Neg Hx     Kidney disease Neg Hx        Social History     Tobacco Use    Smoking status: Former     Types: Pipe    Smokeless tobacco: Never    Tobacco comments:     quit 40 yrs ago   Substance Use Topics    Alcohol use: Yes     Alcohol/week: 0.0 standard drinks     Comment: rarely    Drug use: No       Lab Results   Component Value Date    WBC 9.64 04/21/2023    HGB 13.8 (L) 04/21/2023    HCT 44.0 04/21/2023    MCV 89 04/21/2023     04/21/2023    CHOL 123 04/21/2023    TRIG 85 04/21/2023    HDL 44 04/21/2023    ALT 29 04/21/2023    AST 26 04/21/2023    BILITOT 0.5 04/21/2023    ALKPHOS 70 04/21/2023     04/21/2023    K 5.0 04/21/2023     04/21/2023    CREATININE 0.97 04/21/2023    ESTGFRAFRICA >60.0 07/20/2022    EGFRNONAA >60.0 07/20/2022    CALCIUM 9.8 04/21/2023    ALBUMIN 4.2 04/21/2023    BUN 40 (H) 04/21/2023    CO2 27 04/21/2023    TSH 2.490 07/20/2022    PSA 0.27 01/19/2023    PSADIAG 1.6 05/09/2017    HGBA1C 6.3 (H) 04/21/2023    MICALBCREAT 430.8 (H) 01/19/2023    LDLCALC 62.0 (L) 04/21/2023     (H) 04/21/2023       Hemoglobin A1C (%)   Date Value   04/21/2023 6.3 (H)   01/19/2023 6.7 (H)   10/19/2022 7.7 (H)   07/20/2022 8.4 (H)   07/12/2021 9.3 (H)   04/01/2021 9.6 (H)   12/29/2020 8.5 (H)   06/19/2020 8.8 (H)   08/14/2019 11.4 (H)   03/21/2019 9.8 (H)           Vital signs reviewed  Vitals:    04/28/23 1000   BP: 130/66   BP Location: Left arm   Patient Position: Sitting   BP Method: Medium (Manual)   Pulse: 94   Temp: 97.8 °F (36.6 °C)   TempSrc: Oral   SpO2: 98%   Weight: 122.1 kg (269 lb 2.9 oz)   Height: 5' 9" (1.753 m)           Wt Readings from Last 4 Encounters:   04/28/23 122.1 kg (269 lb 2.9 oz)   03/03/23 122.9 kg (270 lb 15.1 oz)   02/03/23 122.2 kg " (269 lb 6.4 oz)   01/26/23 120.1 kg (264 lb 12.4 oz)       PE:   APPEARANCE: Well nourished, well developed, in no acute distress.    NECK: Supple with no cervical lymphadenopathy.  No carotid bruits.  No thyromegaly  CHEST: Good inspiratory effort. Lungs clear to auscultation with no wheezes or crackles.  CARDIOVASCULAR: Normal S1, S2. No rubs, murmurs, or gallops.  ABDOMEN: Bowel sounds normal. Not distended. Soft. No tenderness or masses. No organomegaly.       IMPRESSION  1. Essential hypertension    2. Trochanteric bursitis of both hips    3. Type 2 diabetes mellitus with diabetic polyneuropathy, with long-term current use of insulin    4. Mixed hyperlipidemia    5. Severe obesity (BMI 35.0-35.9 with comorbidity)                  PLAN       Hypertension controlled    Dyslipidemia:  Continue statin    Venous stasis, continue with wound care if needed, podiatry office appointment coming up.  Continues with compression stockings.      Diabetes,  per endocrinology.  A1c doing quite well, seeing endocrinology next week, likely will decrease insulin dosing per prior plan    Gout stable.  Continue allopurinol    Bladder cancer history:  No recent signs of recurrence.  Follow up with Urology as directed.      Six-month follow-up or sooner If need  No orders of the defined types were placed in this encounter.              SCREENINGS      Immunizations:      COVID x3, encourage booster  Flu up-to-date  Tdap 2017  Zostavax 2017, can get updated shingles vaccine at pharmacy  Prevnar 13:2015  Pneumovax 23:2016  Prevnar 20 07/15/2022    Age/demographic appropriate health maintenance:  Colonoscopy 07/10/2018:  2 mm cecal polyp, otherwise normal, tubular adenoma, 5 year recall  PSA:  July 2022

## 2023-05-05 ENCOUNTER — OFFICE VISIT (OUTPATIENT)
Dept: ENDOCRINOLOGY | Facility: CLINIC | Age: 68
End: 2023-05-05
Payer: MEDICARE

## 2023-05-05 VITALS
DIASTOLIC BLOOD PRESSURE: 72 MMHG | BODY MASS INDEX: 37.26 KG/M2 | HEIGHT: 70 IN | HEART RATE: 83 BPM | SYSTOLIC BLOOD PRESSURE: 123 MMHG | WEIGHT: 260.25 LBS

## 2023-05-05 DIAGNOSIS — E11.42 TYPE 2 DIABETES MELLITUS WITH DIABETIC POLYNEUROPATHY, WITH LONG-TERM CURRENT USE OF INSULIN: Primary | ICD-10-CM

## 2023-05-05 DIAGNOSIS — E78.2 MIXED HYPERLIPIDEMIA: ICD-10-CM

## 2023-05-05 DIAGNOSIS — I10 ESSENTIAL HYPERTENSION: ICD-10-CM

## 2023-05-05 DIAGNOSIS — Z79.4 TYPE 2 DIABETES MELLITUS WITH DIABETIC POLYNEUROPATHY, WITH LONG-TERM CURRENT USE OF INSULIN: Primary | ICD-10-CM

## 2023-05-05 PROCEDURE — 99214 PR OFFICE/OUTPT VISIT, EST, LEVL IV, 30-39 MIN: ICD-10-PCS | Mod: S$GLB,,, | Performed by: INTERNAL MEDICINE

## 2023-05-05 PROCEDURE — 3062F POS MACROALBUMINURIA REV: CPT | Mod: CPTII,S$GLB,, | Performed by: INTERNAL MEDICINE

## 2023-05-05 PROCEDURE — 3008F PR BODY MASS INDEX (BMI) DOCUMENTED: ICD-10-PCS | Mod: CPTII,S$GLB,, | Performed by: INTERNAL MEDICINE

## 2023-05-05 PROCEDURE — 1101F PT FALLS ASSESS-DOCD LE1/YR: CPT | Mod: CPTII,S$GLB,, | Performed by: INTERNAL MEDICINE

## 2023-05-05 PROCEDURE — 99499 UNLISTED E&M SERVICE: CPT | Mod: S$GLB,,, | Performed by: INTERNAL MEDICINE

## 2023-05-05 PROCEDURE — 3066F NEPHROPATHY DOC TX: CPT | Mod: CPTII,S$GLB,, | Performed by: INTERNAL MEDICINE

## 2023-05-05 PROCEDURE — 99499 RISK ADDL DX/OHS AUDIT: ICD-10-PCS | Mod: S$GLB,,, | Performed by: INTERNAL MEDICINE

## 2023-05-05 PROCEDURE — 99999 PR PBB SHADOW E&M-EST. PATIENT-LVL III: CPT | Mod: PBBFAC,,, | Performed by: INTERNAL MEDICINE

## 2023-05-05 PROCEDURE — 1126F PR PAIN SEVERITY QUANTIFIED, NO PAIN PRESENT: ICD-10-PCS | Mod: CPTII,S$GLB,, | Performed by: INTERNAL MEDICINE

## 2023-05-05 PROCEDURE — 3044F PR MOST RECENT HEMOGLOBIN A1C LEVEL <7.0%: ICD-10-PCS | Mod: CPTII,S$GLB,, | Performed by: INTERNAL MEDICINE

## 2023-05-05 PROCEDURE — 3074F SYST BP LT 130 MM HG: CPT | Mod: CPTII,S$GLB,, | Performed by: INTERNAL MEDICINE

## 2023-05-05 PROCEDURE — 3044F HG A1C LEVEL LT 7.0%: CPT | Mod: CPTII,S$GLB,, | Performed by: INTERNAL MEDICINE

## 2023-05-05 PROCEDURE — 99999 PR PBB SHADOW E&M-EST. PATIENT-LVL III: ICD-10-PCS | Mod: PBBFAC,,, | Performed by: INTERNAL MEDICINE

## 2023-05-05 PROCEDURE — 3288F FALL RISK ASSESSMENT DOCD: CPT | Mod: CPTII,S$GLB,, | Performed by: INTERNAL MEDICINE

## 2023-05-05 PROCEDURE — 3008F BODY MASS INDEX DOCD: CPT | Mod: CPTII,S$GLB,, | Performed by: INTERNAL MEDICINE

## 2023-05-05 PROCEDURE — 3074F PR MOST RECENT SYSTOLIC BLOOD PRESSURE < 130 MM HG: ICD-10-PCS | Mod: CPTII,S$GLB,, | Performed by: INTERNAL MEDICINE

## 2023-05-05 PROCEDURE — 1126F AMNT PAIN NOTED NONE PRSNT: CPT | Mod: CPTII,S$GLB,, | Performed by: INTERNAL MEDICINE

## 2023-05-05 PROCEDURE — 1159F MED LIST DOCD IN RCRD: CPT | Mod: CPTII,S$GLB,, | Performed by: INTERNAL MEDICINE

## 2023-05-05 PROCEDURE — 4010F PR ACE/ARB THEARPY RXD/TAKEN: ICD-10-PCS | Mod: CPTII,S$GLB,, | Performed by: INTERNAL MEDICINE

## 2023-05-05 PROCEDURE — 1157F ADVNC CARE PLAN IN RCRD: CPT | Mod: CPTII,S$GLB,, | Performed by: INTERNAL MEDICINE

## 2023-05-05 PROCEDURE — 1159F PR MEDICATION LIST DOCUMENTED IN MEDICAL RECORD: ICD-10-PCS | Mod: CPTII,S$GLB,, | Performed by: INTERNAL MEDICINE

## 2023-05-05 PROCEDURE — 3078F PR MOST RECENT DIASTOLIC BLOOD PRESSURE < 80 MM HG: ICD-10-PCS | Mod: CPTII,S$GLB,, | Performed by: INTERNAL MEDICINE

## 2023-05-05 PROCEDURE — 3062F PR POS MACROALBUMINURIA RESULT DOCUMENTED/REVIEW: ICD-10-PCS | Mod: CPTII,S$GLB,, | Performed by: INTERNAL MEDICINE

## 2023-05-05 PROCEDURE — 3078F DIAST BP <80 MM HG: CPT | Mod: CPTII,S$GLB,, | Performed by: INTERNAL MEDICINE

## 2023-05-05 PROCEDURE — 4010F ACE/ARB THERAPY RXD/TAKEN: CPT | Mod: CPTII,S$GLB,, | Performed by: INTERNAL MEDICINE

## 2023-05-05 PROCEDURE — 3288F PR FALLS RISK ASSESSMENT DOCUMENTED: ICD-10-PCS | Mod: CPTII,S$GLB,, | Performed by: INTERNAL MEDICINE

## 2023-05-05 PROCEDURE — 1157F PR ADVANCE CARE PLAN OR EQUIV PRESENT IN MEDICAL RECORD: ICD-10-PCS | Mod: CPTII,S$GLB,, | Performed by: INTERNAL MEDICINE

## 2023-05-05 PROCEDURE — 1101F PR PT FALLS ASSESS DOC 0-1 FALLS W/OUT INJ PAST YR: ICD-10-PCS | Mod: CPTII,S$GLB,, | Performed by: INTERNAL MEDICINE

## 2023-05-05 PROCEDURE — 99214 OFFICE O/P EST MOD 30 MIN: CPT | Mod: S$GLB,,, | Performed by: INTERNAL MEDICINE

## 2023-05-05 PROCEDURE — 3066F PR DOCUMENTATION OF TREATMENT FOR NEPHROPATHY: ICD-10-PCS | Mod: CPTII,S$GLB,, | Performed by: INTERNAL MEDICINE

## 2023-05-05 RX ORDER — DULAGLUTIDE 3 MG/.5ML
3 INJECTION, SOLUTION SUBCUTANEOUS
Qty: 12 PEN | Refills: 3 | Status: SHIPPED | OUTPATIENT
Start: 2023-05-05 | End: 2024-01-19

## 2023-05-05 NOTE — PROGRESS NOTES
Endocrine Clinic Note    Reason for Visit: Type 2 Diabetes    History of Present Illness:Andrew Beltran, a 67 y.o. male pmh bladder cancer, TCC of ureter and prostate, who presents today for evaluation and management of diabetes mellitus. Last visit 3/3/23    Diagnosis: Type 2 DM diagnosed around 25 years ago on routine bloodwork by his PCP. Previously tried metformin, glipizide. He started Trulicity and glycemic control has been improving from 8.4% to 7.7% to most  recent  A1c 6.7% in 1/2023 and improved to 6.3% in 4/2023.. He retired last year.      Interval history  He started outpatient PT 2-3 days per week for hip bursitis. He has also reduced carb intake. He is tolerating Trulicity. Glycemic control has improved. His last A1c was 6.3% on 4/21/23. He did not receive CGM.     Current Diabetes Medications:    NPH-R 70/30 55 units twice daily  Trulicity 1.5 mg weekly    Glucose Monitoring:   He did not bring meter. Reports fasting is ranging 130-150s.     Hypoglycemia: He had symptoms of hypoglycemia when he did PT and delayed eating.     Diet:  reviewed today with patient and no changes  Eats 3 meals per day. Seen by nutritionist in 9/2022  Breakfast oatmeal or grits or cereal or belvita crackers or bagel with coffee  Lunch sandwich or 1- 1.5 servings rice/pasta/potatoes decreased   Fried food 1x per week  Red meat 1x per week  Fruits 3-4 x per week blueberries,strawberries, occasional orange or banana  Vegetables 2x per week  Snacks/Desserts-sugar free freeze pop, rice cakes  Water and sugar free poweraide (zero), cranberry juice    Exercise:   Started PT 3x weekly     Weight/BMI:  Wt Readings from Last 3 Encounters:   05/05/23 118.1 kg (260 lb 4.1 oz)   04/28/23 122.1 kg (269 lb 2.9 oz)   03/03/23 122.9 kg (270 lb 15.1 oz)     Body mass index is 37.88 kg/m².    Diabetes Complications:   Microvascular complications:   --Retinopathy: Moderate NPDR OU Last eye exam was 1/2023 . Dr. Chavez  --Nephropathy: Cr  1.16 and urine ulises/cr 430 in 1/2023  --Peripheral neuropathy: Podiatry 10/22     Macrovascular disease: No known history of CAD, CVA, PAD. ABIs wnl    CV Risk Factor Modification:  The patient is a former smoker.   The patient is on ASA   The patient is  Rosuvastatin. Last LDL 64.6, HDL 46, Trg 67 on 1/19/2023  The patient has a  history of hypertension Losartan, Lasix, Carvedilol, Spironolactone  Sees cardiologist    Chronic venous insufficiency, hx cellulitis    Review of systems: (positive in bold)  GENERAL:  Fatigue, fevers, chills, night sweats  HEENT: vision changes, nasal congestion, sore throat, hearing loss  CARDIOVASCULAR:  chest pain, palpitations, leg swelling, claudication  RESPIRATORY: dyspnea, cough, wheezing, snoring  GASTROINTESTINAL: nausea, vomiting, Water and sugar free poweraide (zero), cranberry juice constipation, abdominal pain, acid reflux  ALLERGY/IMMUNOLOGY/HEME: hay fever, hives, frequent infections, easy bruising or bleeding  GENITOURINARY: change in urine frequency, dysuria, hematuria, urinary incontinence  MSK: joint pain, joint swelling, joint stiffness, muscle aches, muscle weakness  SKIN: Dry skin, rash, hair loss, brittle nails  NEUROLOGICAL: headaches, dizziness, fainting, paresthesias, tremor, unilateral weakness  PSYCHIATRIC: anxiety, depression, mental fogginess, sleep difficulty  ENDOCRINE: polyuria, polydipsia, temperature intolerance, irregular menses, weight gain, weight loss    Past Medical History:   Diagnosis Date    BPH (benign prostatic hyperplasia)     Cancer     bladder    Diabetes     type 2    Diabetes mellitus, type 2     Gout     High cholesterol     Hypertension     Sciatica 2016    had injection 3 weeks ago    Urinary tract infection        Past Surgical History:   Procedure Laterality Date    APPENDECTOMY  1968    BALLOON DILATION OF URETER Left 9/18/2019    Procedure: DILATION, URETER, USING BALLOON;  Surgeon: Monty Cee MD;  Location: [facility] OR;   Service: Urology;  Laterality: Left;    BARBOTAGE OF BLADDER N/A 8/14/2019    Procedure: BARBOTAGE, BLADDER;  Surgeon: Monty Cee MD;  Location: [facility];  Service: Urology;  Laterality: N/A;    BARBOTAGE OF URETER Bilateral 8/14/2019    Procedure: BARBOTAGE, URETER;  Surgeon: Monty Cee MD;  Location: [facility] OR;  Service: Urology;  Laterality: Bilateral;    BARBOTAGE OF URETER Left 9/18/2019    Procedure: BARBOTAGE, URETER;  Surgeon: Monty Cee MD;  Location: [facility];  Service: Urology;  Laterality: Left;  left renal/ ureteral washing.    BLADDER SURGERY      COLONOSCOPY N/A 7/10/2018    Procedure: COLONOSCOPY;  Surgeon: Azalea Sanchez MD;  Location: [facility];  Service: Endoscopy;  Laterality: N/A;  [facility] at [phone]    CYSTOSCOPY      CYSTOSCOPY W/ RETROGRADES Bilateral 8/14/2019    Procedure: CYSTOSCOPY, WITH RETROGRADE PYELOGRAM;  Surgeon: Monty Cee MD;  Location: [facility];  Service: Urology;  Laterality: Bilateral;    CYSTOSCOPY W/ RETROGRADES Left 9/18/2019    Procedure: CYSTOSCOPY, WITH RETROGRADE PYELOGRAM;  Surgeon: Monty Cee MD;  Location: [facility];  Service: Urology;  Laterality: Left;  cystourethroscopy with left retrograde pyelogram     CYSTOSCOPY W/ RETROGRADES Bilateral 3/11/2020    Procedure: CYSTOSCOPY, WITH RETROGRADE PYELOGRAM Ureteral renal washings and bladder washings;  Surgeon: Monty Cee MD;  Location: [facility];  Service: Urology;  Laterality: Bilateral;    CYSTOSCOPY WITH URETEROSCOPY, RETROGRADE PYELOGRAPHY, AND INSERTION OF STENT Bilateral 6/22/2020    Procedure: CYSTOSCOPY, WITH RETROGRADE PYELOGRAM AND URETERAL STENT INSERTION;  Surgeon: Monty Cee MD;  Location: [facility];  Service: Urology;  Laterality: Bilateral;    CYSTOSCOPY WITH URETHRAL DILATION N/A 6/10/2021    Procedure: CYSTOSCOPY, WITH URETHRAL DILATION;  Surgeon: Monty Cee MD;  Location: [facility];  Service: Urology;   Laterality: N/A;    HAND SURGERY Right 12/08/2016    trigger finger    INSTILLATION OF URINARY BLADDER N/A 3/11/2020    Procedure: INSTILLATION, BLADDER - mitomyin - c;  Surgeon: Monty Cee MD;  Location: [facility] OR;  Service: Urology;  Laterality: N/A;    INSTILLATION OF URINARY BLADDER  6/22/2020    Procedure: INSTILLATION, BLADDER;  Surgeon: Monty Cee MD;  Location: [facility] OR;  Service: Urology;;  mitomycin 40mg    LASER ABLATION Left 9/18/2019    Procedure: ABLATION, USING LASER;  Surgeon: Monty Cee MD;  Location: [facility] OR;  Service: Urology;  Laterality: Left;  laser ablation of lower pole calyceal transitional cell lesion    PYELOSCOPY Left 9/18/2019    Procedure: PYELOSCOPY;  Surgeon: Monty Cee MD;  Location: [facility] OR;  Service: Urology;  Laterality: Left;  Left ureteral pyeloscopy    TONSILLECTOMY  1960    TRANSURETHRAL RESECTION OF PROSTATE N/A 9/18/2019    Procedure: TURP (TRANSURETHRAL RESECTION OF PROSTATE);  Surgeon: Monty Cee MD;  Location: [facility];  Service: Urology;  Laterality: N/A;  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    TRANSURETHRAL RESECTION OF PROSTATE USING BIPOLAR CAUTERY N/A 3/11/2020    Procedure: TURP, USING BIPOLAR CAUTERY;  Surgeon: Monty Cee MD;  Location: [facility] OR;  Service: Urology;  Laterality: N/A;    TRANSURETHRAL SURGICAL REMOVAL OF STRICTURE OF BLADDER NECK  8/14/2019    Procedure: EXCISION, CONTRACTURE, BLADDER NECK, TRANSURETHRAL;  Surgeon: Monty Cee MD;  Location: [facility] OR;  Service: Urology;;    TRANSURETHRAL SURGICAL REMOVAL OF STRICTURE OF BLADDER NECK N/A 6/10/2021    Procedure: EXCISION, CONTRACTURE, BLADDER NECK, TRANSURETHRAL;  Surgeon: Monty Cee MD;  Location: [facility] OR;  Service: Urology;  Laterality: N/A;  using laser    URETEROSCOPY Left 9/18/2019    Procedure: URETEROSCOPY;  Surgeon: Monty Cee MD;  Location: [facility] OR;  Service: Urology;  Laterality: Left;    URETEROSCOPY Right  6/22/2020    Procedure: URETEROSCOPY;  Surgeon: Monty Cee MD;  Location: [facility];  Service: Urology;  Laterality: Right;         Family History   Problem Relation Age of Onset    Diabetes Mother     Prostate cancer Neg Hx     Kidney disease Neg Hx        Social History     Socioeconomic History    Marital status:    Tobacco Use    Smoking status: Former     Types: Pipe    Smokeless tobacco: Never    Tobacco comments:     quit 40 yrs ago   Substance and Sexual Activity    Alcohol use: Yes     Alcohol/week: 0.0 standard drinks     Comment: rarely    Drug use: No    Sexual activity: Yes     Partners: Female     Social Determinants of Health     Financial Resource Strain: Low Risk     Difficulty of Paying Living Expenses: Not hard at all   Food Insecurity: No Food Insecurity    Worried About Running Out of Food in the Last Year: Never true    Ran Out of Food in the Last Year: Never true   Transportation Needs: No Transportation Needs    Lack of Transportation (Medical): No    Lack of Transportation (Non-Medical): No   Physical Activity: Unknown    Days of Exercise per Week: 0 days   Stress: No Stress Concern Present    Feeling of Stress : Not at all   Social Connections: Unknown    Frequency of Communication with Friends and Family: More than three times a week    Frequency of Social Gatherings with Friends and Family: Twice a week    Active Member of Clubs or Organizations: Yes    Attends Club or Organization Meetings: More than 4 times per year    Marital Status:    Housing Stability: Low Risk     Unable to Pay for Housing in the Last Year: No    Number of Places Lived in the Last Year: 2    Unstable Housing in the Last Year: No       Current Outpatient Medications on File Prior to Visit   Medication Sig Dispense Refill    allopurinoL (ZYLOPRIM) 300 MG tablet Take 1 tablet (300 mg total) by mouth once daily. 90 tablet 4    aspirin (ECOTRIN) 81 MG EC tablet Take 81 mg by mouth once daily.       blood sugar diagnostic Strp by Misc.(Non-Drug; Combo Route) route.      carvediloL (COREG) 12.5 MG tablet Take 1 tablet (12.5 mg total) by mouth 2 (two) times daily with meals. 180 tablet 3    dulaglutide (TRULICITY) 1.5 mg/0.5 mL pen injector Inject 1.5 mg into the skin every 7 days. 12 pen 11    furosemide (LASIX) 20 MG tablet Take 1 tablet (20 mg total) by mouth once daily. 90 tablet 3    ibuprofen (ADVIL,MOTRIN) 400 MG tablet Take 400 mg by mouth every 6 (six) hours as needed for Other.      insulin NPH-insulin regular, 70/30, (HUMULIN 70/30 U-100 INSULIN) 100 unit/mL (70-30) injection Inject 60 Units into the skin 2 (two) times daily. 100 mL 11    insulin syringe-needle U-100 (BD INSULIN SYRINGE ULTRA-FINE) 1 mL 31 gauge x 5/16 Syrg USE AS DIRECTED TWICE A  each 4    lancets (PRODIGY TWIST TOP LANCET) 28 gauge Misc 1 lancet by Misc.(Non-Drug; Combo Route) route 2 (two) times daily. 180 each 4    losartan (COZAAR) 100 MG tablet Take 1 tablet (100 mg total) by mouth once daily. 90 tablet 4    MAGNESIUM ORAL Take 400 mg by mouth once daily at 6am. 800 mg everyday      rosuvastatin (CRESTOR) 40 MG Tab TAKE 1 TABLET BY MOUTH EVERY EVENING 90 tablet 3    spironolactone (ALDACTONE) 25 MG tablet Take 1 tablet (25 mg total) by mouth once daily. 30 tablet 11    tamsulosin (FLOMAX) 0.4 mg Cap Take 1 capsule (0.4 mg total) by mouth every evening. 90 capsule 3    [DISCONTINUED] blood-glucose meter,continuous (DEXCOM ) Misc Check glucose 4 times daily (Patient not taking: Reported on 4/28/2023) 1 each 0    [DISCONTINUED] blood-glucose sensor Jovita Change sensor every 10 days (Patient not taking: Reported on 4/28/2023) 3 each 11     Current Facility-Administered Medications on File Prior to Visit   Medication Dose Route Frequency Provider Last Rate Last Admin    lidocaine HCl 2% urojet   Mucous Membrane Once Monty Cee MD        mitoMYcin (MUTAMYCIN) 40 mg in sodium chloride 0.9% infusion  40 mg  "Intravesical Once Monty Cee MD           Review of patient's allergies indicates:  No Known Allergies    Physical Exam:   /72 (BP Location: Left arm, Patient Position: Sitting, BP Method: Large (Automatic))   Pulse 83   Ht 5' 9.5" (1.765 m)   Wt 118.1 kg (260 lb 4.1 oz)   BMI 37.88 kg/m²   General: Well developed, well-nourished in NAD    Labs  Hemoglobin A1C   Date Value Ref Range Status   04/21/2023 6.3 (H) 4.0 - 5.6 % Final     Comment:     ADA Screening Guidelines:  5.7-6.4%  Consistent with prediabetes  >or=6.5%  Consistent with diabetes    High levels of fetal hemoglobin interfere with the HbA1C  assay. Heterozygous hemoglobin variants (HbS, HgC, etc)do  not significantly interfere with this assay.   However, presence of multiple variants may affect accuracy.     01/19/2023 6.7 (H) 4.0 - 5.6 % Final     Comment:     ADA Screening Guidelines:  5.7-6.4%  Consistent with prediabetes  >or=6.5%  Consistent with diabetes    High levels of fetal hemoglobin interfere with the HbA1C  assay. Heterozygous hemoglobin variants (HbS, HgC, etc)do  not significantly interfere with this assay.   However, presence of multiple variants may affect accuracy.     10/19/2022 7.7 (H) 4.0 - 5.6 % Final     Comment:     ADA Screening Guidelines:  5.7-6.4%  Consistent with prediabetes  >or=6.5%  Consistent with diabetes    High levels of fetal hemoglobin interfere with the HbA1C  assay. Heterozygous hemoglobin variants (HbS, HgC, etc)do  not significantly interfere with this assay.   However, presence of multiple variants may affect accuracy.           Assessment/Plan:   Andrew BROOKS Ruby, a 67 y.o. male  pmh bladder cancer, TCC of ureter and prostate, who presents today for evaluation and management of diabetes mellitus.     Type 2 DM with NPDR   A1c 6.3% on 4/21/23 is within target.  OHe would benefit from CGM as we titrate insulin and increase physical activity. Discussed microavscular complications of diabetes and " we will target an A1c of less than 7% given history of nonproliferative diabetic retinopathy, we will gradually normalize glucose with GLP 1 agonist.  A1c has not proving at a steady rate.  Discussed benefits of GLP 1 agonist for weight loss and cardio protection and plan to taper insulin as tolerated.  Given history of bladder cancer and urinary frequency, he would not be a good candidate for an SGLT2 inhibitor.  -Increase Trulicity 3 mg weekly  -Reduce NPH/Reg 50 units twice daily  -Check glucose 2-3 times daily  -Will need to monitor eye exam given moderate NPDR  -Obtain Dexcom G7 CGM    Hypertension with microalbuminuria  Blood pressure today at goal of <130/80. Last Cr 0.97 in 4/2023 and urine ulises/cr 430 in 1/2023  Takes Losartan, Lasix, Carvedilol, Spironolactone.  Follow by cardiology.  He is not a good candidate for an SGLT2 inhibitor.     Hyperlipidemia  The patient is on statin for primary prevention. Last LDL 62, HDL 35, Trg 85 on 4/21/23 is within target though HDL is low  Continue rosuvastatin 40 mg daily    Obesity class 3  Last TSH wnl. Titrate Trulicity as tolerated. Weight decreasing    Health maintenance:   The patient is aware of need for regular eye exams.   Foot exam was performed by Podiatry  The patient should remain up to date on the following vaccinations: influenza, pneumococcal, HBV.        Return to Clinic: 3 months

## 2023-05-09 ENCOUNTER — PATIENT MESSAGE (OUTPATIENT)
Dept: FAMILY MEDICINE | Facility: CLINIC | Age: 68
End: 2023-05-09
Payer: MEDICARE

## 2023-05-10 ENCOUNTER — OFFICE VISIT (OUTPATIENT)
Dept: FAMILY MEDICINE | Facility: CLINIC | Age: 68
End: 2023-05-10
Attending: FAMILY MEDICINE
Payer: MEDICARE

## 2023-05-10 ENCOUNTER — HOSPITAL ENCOUNTER (EMERGENCY)
Facility: HOSPITAL | Age: 68
Discharge: HOME | End: 2023-05-10
Attending: EMERGENCY MEDICINE
Payer: MEDICARE

## 2023-05-10 VITALS
BODY MASS INDEX: 37.26 KG/M2 | TEMPERATURE: 98 F | WEIGHT: 256 LBS | RESPIRATION RATE: 16 BRPM | OXYGEN SATURATION: 98 % | DIASTOLIC BLOOD PRESSURE: 79 MMHG | HEART RATE: 91 BPM | SYSTOLIC BLOOD PRESSURE: 166 MMHG

## 2023-05-10 VITALS
BODY MASS INDEX: 36.7 KG/M2 | SYSTOLIC BLOOD PRESSURE: 118 MMHG | HEIGHT: 70 IN | OXYGEN SATURATION: 97 % | HEART RATE: 94 BPM | DIASTOLIC BLOOD PRESSURE: 66 MMHG | WEIGHT: 256.38 LBS

## 2023-05-10 DIAGNOSIS — N17.9 AKI (ACUTE KIDNEY INJURY): ICD-10-CM

## 2023-05-10 DIAGNOSIS — R19.7 DIARRHEA, UNSPECIFIED TYPE: ICD-10-CM

## 2023-05-10 DIAGNOSIS — E86.0 ACUTE DEHYDRATION: Primary | ICD-10-CM

## 2023-05-10 DIAGNOSIS — R19.7 DIARRHEA, UNSPECIFIED TYPE: Primary | ICD-10-CM

## 2023-05-10 LAB — POCT GLUCOSE: 112 MG/DL (ref 70–110)

## 2023-05-10 PROCEDURE — 99284 EMERGENCY DEPT VISIT MOD MDM: CPT | Mod: 25

## 2023-05-10 PROCEDURE — 3078F DIAST BP <80 MM HG: CPT | Mod: CPTII,S$GLB,, | Performed by: FAMILY MEDICINE

## 2023-05-10 PROCEDURE — 3288F PR FALLS RISK ASSESSMENT DOCUMENTED: ICD-10-PCS | Mod: CPTII,S$GLB,, | Performed by: FAMILY MEDICINE

## 2023-05-10 PROCEDURE — 82962 GLUCOSE BLOOD TEST: CPT

## 2023-05-10 PROCEDURE — 3288F FALL RISK ASSESSMENT DOCD: CPT | Mod: CPTII,S$GLB,, | Performed by: FAMILY MEDICINE

## 2023-05-10 PROCEDURE — 96360 HYDRATION IV INFUSION INIT: CPT

## 2023-05-10 PROCEDURE — 99215 OFFICE O/P EST HI 40 MIN: CPT | Mod: S$GLB,,, | Performed by: FAMILY MEDICINE

## 2023-05-10 PROCEDURE — 96361 HYDRATE IV INFUSION ADD-ON: CPT

## 2023-05-10 PROCEDURE — 25000003 PHARM REV CODE 250: Performed by: PHYSICIAN ASSISTANT

## 2023-05-10 PROCEDURE — 3062F PR POS MACROALBUMINURIA RESULT DOCUMENTED/REVIEW: ICD-10-PCS | Mod: CPTII,S$GLB,, | Performed by: FAMILY MEDICINE

## 2023-05-10 PROCEDURE — 3066F PR DOCUMENTATION OF TREATMENT FOR NEPHROPATHY: ICD-10-PCS | Mod: CPTII,S$GLB,, | Performed by: FAMILY MEDICINE

## 2023-05-10 PROCEDURE — 4010F ACE/ARB THERAPY RXD/TAKEN: CPT | Mod: CPTII,S$GLB,, | Performed by: FAMILY MEDICINE

## 2023-05-10 PROCEDURE — 99215 PR OFFICE/OUTPT VISIT, EST, LEVL V, 40-54 MIN: ICD-10-PCS | Mod: S$GLB,,, | Performed by: FAMILY MEDICINE

## 2023-05-10 PROCEDURE — 1157F PR ADVANCE CARE PLAN OR EQUIV PRESENT IN MEDICAL RECORD: ICD-10-PCS | Mod: CPTII,S$GLB,, | Performed by: FAMILY MEDICINE

## 2023-05-10 PROCEDURE — 3044F PR MOST RECENT HEMOGLOBIN A1C LEVEL <7.0%: ICD-10-PCS | Mod: CPTII,S$GLB,, | Performed by: FAMILY MEDICINE

## 2023-05-10 PROCEDURE — 3062F POS MACROALBUMINURIA REV: CPT | Mod: CPTII,S$GLB,, | Performed by: FAMILY MEDICINE

## 2023-05-10 PROCEDURE — 4010F PR ACE/ARB THEARPY RXD/TAKEN: ICD-10-PCS | Mod: CPTII,S$GLB,, | Performed by: FAMILY MEDICINE

## 2023-05-10 PROCEDURE — 99999 PR PBB SHADOW E&M-EST. PATIENT-LVL IV: ICD-10-PCS | Mod: PBBFAC,,, | Performed by: FAMILY MEDICINE

## 2023-05-10 PROCEDURE — 1126F PR PAIN SEVERITY QUANTIFIED, NO PAIN PRESENT: ICD-10-PCS | Mod: CPTII,S$GLB,, | Performed by: FAMILY MEDICINE

## 2023-05-10 PROCEDURE — 3078F PR MOST RECENT DIASTOLIC BLOOD PRESSURE < 80 MM HG: ICD-10-PCS | Mod: CPTII,S$GLB,, | Performed by: FAMILY MEDICINE

## 2023-05-10 PROCEDURE — 99999 PR PBB SHADOW E&M-EST. PATIENT-LVL IV: CPT | Mod: PBBFAC,,, | Performed by: FAMILY MEDICINE

## 2023-05-10 PROCEDURE — 3074F SYST BP LT 130 MM HG: CPT | Mod: CPTII,S$GLB,, | Performed by: FAMILY MEDICINE

## 2023-05-10 PROCEDURE — 1101F PR PT FALLS ASSESS DOC 0-1 FALLS W/OUT INJ PAST YR: ICD-10-PCS | Mod: CPTII,S$GLB,, | Performed by: FAMILY MEDICINE

## 2023-05-10 PROCEDURE — 25000003 PHARM REV CODE 250: Performed by: EMERGENCY MEDICINE

## 2023-05-10 PROCEDURE — 3008F BODY MASS INDEX DOCD: CPT | Mod: CPTII,S$GLB,, | Performed by: FAMILY MEDICINE

## 2023-05-10 PROCEDURE — 1101F PT FALLS ASSESS-DOCD LE1/YR: CPT | Mod: CPTII,S$GLB,, | Performed by: FAMILY MEDICINE

## 2023-05-10 PROCEDURE — 3008F PR BODY MASS INDEX (BMI) DOCUMENTED: ICD-10-PCS | Mod: CPTII,S$GLB,, | Performed by: FAMILY MEDICINE

## 2023-05-10 PROCEDURE — 3074F PR MOST RECENT SYSTOLIC BLOOD PRESSURE < 130 MM HG: ICD-10-PCS | Mod: CPTII,S$GLB,, | Performed by: FAMILY MEDICINE

## 2023-05-10 PROCEDURE — 3044F HG A1C LEVEL LT 7.0%: CPT | Mod: CPTII,S$GLB,, | Performed by: FAMILY MEDICINE

## 2023-05-10 PROCEDURE — 3066F NEPHROPATHY DOC TX: CPT | Mod: CPTII,S$GLB,, | Performed by: FAMILY MEDICINE

## 2023-05-10 PROCEDURE — 1126F AMNT PAIN NOTED NONE PRSNT: CPT | Mod: CPTII,S$GLB,, | Performed by: FAMILY MEDICINE

## 2023-05-10 PROCEDURE — 1157F ADVNC CARE PLAN IN RCRD: CPT | Mod: CPTII,S$GLB,, | Performed by: FAMILY MEDICINE

## 2023-05-10 RX ORDER — SODIUM CHLORIDE 9 MG/ML
1000 INJECTION, SOLUTION INTRAVENOUS
Status: COMPLETED | OUTPATIENT
Start: 2023-05-10 | End: 2023-05-10

## 2023-05-10 RX ORDER — HEPARIN 100 UNIT/ML
500 SYRINGE INTRAVENOUS
OUTPATIENT
Start: 2023-05-10

## 2023-05-10 RX ORDER — SODIUM CHLORIDE 0.9 % (FLUSH) 0.9 %
10 SYRINGE (ML) INJECTION
OUTPATIENT
Start: 2023-05-10

## 2023-05-10 RX ORDER — DIPHENOXYLATE HYDROCHLORIDE AND ATROPINE SULFATE 2.5; .025 MG/1; MG/1
1 TABLET ORAL 4 TIMES DAILY PRN
Qty: 15 TABLET | Refills: 0 | Status: SHIPPED | OUTPATIENT
Start: 2023-05-10 | End: 2023-05-20

## 2023-05-10 RX ADMIN — SODIUM CHLORIDE 1000 ML: 0.9 INJECTION, SOLUTION INTRAVENOUS at 12:05

## 2023-05-10 RX ADMIN — SODIUM CHLORIDE 1000 ML: 0.9 INJECTION, SOLUTION INTRAVENOUS at 10:05

## 2023-05-10 NOTE — ED NOTES
Pt in room 5 with his wife. Pt awake and alert with complaint of several days of diarrhea. Pt requesting IV fluids. Plan of care reviewed, call bell within reach.

## 2023-05-10 NOTE — PROGRESS NOTES
Subjective     Patient ID: Andrew Beltran is a 67 y.o. male.    Chief Complaint: Diarrhea    67 yr old pleasant male with controlled DM II, HTN, HLD, and other co morbidities presents today for evaluation of watery diarrhea x 1 week. No recent travel or sick contacts. He did take bactrim one week before onset of diarrhea for cellulitis. No fever or severe abdominal discomfort. Mild nausea but no vomiting. He is on insulin and trulicity for his diabetes. Details as follows -    Diarrhea   This is a new problem. The current episode started in the past 7 days. The problem occurs more than 10 times per day. The problem has been unchanged. The stool consistency is described as Watery. The patient states that diarrhea does not awaken him from sleep. Associated symptoms include abdominal pain. Pertinent negatives include no bloating, coughing, myalgias or vomiting. Nothing aggravates the symptoms. Risk factors include recent antibiotic use. He has tried anti-motility drug for the symptoms. The treatment provided no relief. There is no history of bowel resection, irritable bowel syndrome, malabsorption or short gut syndrome.   Review of Systems   Constitutional: Negative.  Negative for activity change, diaphoresis and unexpected weight change.   HENT: Negative.  Negative for nasal congestion, ear pain, mouth sores, rhinorrhea and voice change.    Eyes: Negative.  Negative for pain, discharge and visual disturbance.   Respiratory: Negative.  Negative for apnea, cough and wheezing.    Cardiovascular: Negative.  Negative for chest pain and palpitations.   Gastrointestinal:  Positive for abdominal pain and diarrhea. Negative for abdominal distention, anal bleeding, bloating and vomiting.   Endocrine: Negative.  Negative for cold intolerance and polyuria.   Genitourinary: Negative.  Negative for decreased urine volume, difficulty urinating, discharge, frequency and scrotal swelling.   Musculoskeletal: Negative.  Negative for  back pain, myalgias and neck stiffness.   Integumentary:  Negative for color change and rash. Negative.   Allergic/Immunologic: Negative.  Negative for environmental allergies.   Neurological:  Positive for weakness. Negative for dizziness, speech difficulty and light-headedness.   Hematological: Negative.    Psychiatric/Behavioral: Negative.  Negative for agitation, dysphoric mood and suicidal ideas. The patient is not nervous/anxious.         Past Medical History:   Diagnosis Date    BPH (benign prostatic hyperplasia)     Cancer     bladder    Diabetes     type 2    Diabetes mellitus, type 2     Gout     High cholesterol     Hypertension     Sciatica 2016    had injection 3 weeks ago    Urinary tract infection        Past Surgical History:   Procedure Laterality Date    APPENDECTOMY  1968    BALLOON DILATION OF URETER Left 9/18/2019    Procedure: DILATION, URETER, USING BALLOON;  Surgeon: Monty Cee MD;  Location: [facility];  Service: Urology;  Laterality: Left;    BARBOTAGE OF BLADDER N/A 8/14/2019    Procedure: BARBOTAGE, BLADDER;  Surgeon: Monty Cee MD;  Location: [facility] OR;  Service: Urology;  Laterality: N/A;    BARBOTAGE OF URETER Bilateral 8/14/2019    Procedure: BARBOTAGE, URETER;  Surgeon: Monty Cee MD;  Location: [facility];  Service: Urology;  Laterality: Bilateral;    BARBOTAGE OF URETER Left 9/18/2019    Procedure: BARBOTAGE, URETER;  Surgeon: Monty Cee MD;  Location: [facility] OR;  Service: Urology;  Laterality: Left;  left renal/ ureteral washing.    BLADDER SURGERY      COLONOSCOPY N/A 7/10/2018    Procedure: COLONOSCOPY;  Surgeon: Azalea Sanchez MD;  Location: [facility];  Service: Endoscopy;  Laterality: N/A;  Schedule at [facility], faxed to [facility] [phone]    CYSTOSCOPY      CYSTOSCOPY W/ RETROGRADES Bilateral 8/14/2019    Procedure: CYSTOSCOPY, WITH RETROGRADE PYELOGRAM;  Surgeon: Monty Cee MD;  Location: [facility];  Service: Urology;  Laterality:  Bilateral;    CYSTOSCOPY W/ RETROGRADES Left 9/18/2019    Procedure: CYSTOSCOPY, WITH RETROGRADE PYELOGRAM;  Surgeon: Monty Cee MD;  Location: [facility];  Service: Urology;  Laterality: Left;  cystourethroscopy with left retrograde pyelogram     CYSTOSCOPY W/ RETROGRADES Bilateral 3/11/2020    Procedure: CYSTOSCOPY, WITH RETROGRADE PYELOGRAM Ureteral renal washings and bladder washings;  Surgeon: Monty Cee MD;  Location: [facility];  Service: Urology;  Laterality: Bilateral;    CYSTOSCOPY WITH URETEROSCOPY, RETROGRADE PYELOGRAPHY, AND INSERTION OF STENT Bilateral 6/22/2020    Procedure: CYSTOSCOPY, WITH RETROGRADE PYELOGRAM AND URETERAL STENT INSERTION;  Surgeon: Monty Cee MD;  Location: [facility];  Service: Urology;  Laterality: Bilateral;    CYSTOSCOPY WITH URETHRAL DILATION N/A 6/10/2021    Procedure: CYSTOSCOPY, WITH URETHRAL DILATION;  Surgeon: Monty Cee MD;  Location: [facility];  Service: Urology;  Laterality: N/A;    HAND SURGERY Right 12/08/2016    trigger finger    INSTILLATION OF URINARY BLADDER N/A 3/11/2020    Procedure: INSTILLATION, BLADDER - mitomyin - c;  Surgeon: Monty Cee MD;  Location: [facility] OR;  Service: Urology;  Laterality: N/A;    INSTILLATION OF URINARY BLADDER  6/22/2020    Procedure: INSTILLATION, BLADDER;  Surgeon: Monty Cee MD;  Location: [facility];  Service: Urology;;  mitomycin 40mg    LASER ABLATION Left 9/18/2019    Procedure: ABLATION, USING LASER;  Surgeon: Monty Cee MD;  Location: [facility];  Service: Urology;  Laterality: Left;  laser ablation of lower pole calyceal transitional cell lesion    PYELOSCOPY Left 9/18/2019    Procedure: PYELOSCOPY;  Surgeon: Monty Cee MD;  Location: [facility];  Service: Urology;  Laterality: Left;  Left ureteral pyeloscopy    TONSILLECTOMY  1960    TRANSURETHRAL RESECTION OF PROSTATE N/A 9/18/2019    Procedure: TURP (TRANSURETHRAL RESECTION OF PROSTATE);  Surgeon: Monty Cee MD;  Location: [facility];   Service: Urology;  Laterality: N/A;  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    TRANSURETHRAL RESECTION OF PROSTATE USING BIPOLAR CAUTERY N/A 3/11/2020    Procedure: TURP, USING BIPOLAR CAUTERY;  Surgeon: Monty Cee MD;  Location: [facility];  Service: Urology;  Laterality: N/A;    TRANSURETHRAL SURGICAL REMOVAL OF STRICTURE OF BLADDER NECK  8/14/2019    Procedure: EXCISION, CONTRACTURE, BLADDER NECK, TRANSURETHRAL;  Surgeon: Monty Cee MD;  Location: [facility] OR;  Service: Urology;;    TRANSURETHRAL SURGICAL REMOVAL OF STRICTURE OF BLADDER NECK N/A 6/10/2021    Procedure: EXCISION, CONTRACTURE, BLADDER NECK, TRANSURETHRAL;  Surgeon: Monty Cee MD;  Location: [facility] OR;  Service: Urology;  Laterality: N/A;  using laser    URETEROSCOPY Left 9/18/2019    Procedure: URETEROSCOPY;  Surgeon: Monty Cee MD;  Location: [facility] OR;  Service: Urology;  Laterality: Left;    URETEROSCOPY Right 6/22/2020    Procedure: URETEROSCOPY;  Surgeon: Monty Cee MD;  Location: [facility];  Service: Urology;  Laterality: Right;       Family History   Problem Relation Age of Onset    Diabetes Mother     Prostate cancer Neg Hx     Kidney disease Neg Hx        Social History     Socioeconomic History    Marital status:    Tobacco Use    Smoking status: Former     Types: Pipe    Smokeless tobacco: Never    Tobacco comments:     quit 40 yrs ago   Substance and Sexual Activity    Alcohol use: Yes     Alcohol/week: 0.0 standard drinks     Comment: rarely    Drug use: No    Sexual activity: Yes     Partners: Female     Social Determinants of Health     Financial Resource Strain: Low Risk     Difficulty of Paying Living Expenses: Not hard at all   Food Insecurity: No Food Insecurity    Worried About Running Out of Food in the Last Year: Never true    Ran Out of Food in the Last Year: Never true   Transportation Needs: No Transportation Needs    Lack of Transportation (Medical): No    Lack of  Transportation (Non-Medical): No   Physical Activity: Insufficiently Active    Days of Exercise per Week: 2 days    Minutes of Exercise per Session: 60 min   Stress: No Stress Concern Present    Feeling of Stress : Not at all   Social Connections: Unknown    Frequency of Communication with Friends and Family: Three times a week    Frequency of Social Gatherings with Friends and Family: Twice a week    Active Member of Clubs or Organizations: Yes    Attends Club or Organization Meetings: More than 4 times per year    Marital Status:    Housing Stability: Low Risk     Unable to Pay for Housing in the Last Year: No    Number of Places Lived in the Last Year: 1    Unstable Housing in the Last Year: No       Current Outpatient Medications   Medication Sig Dispense Refill    allopurinoL (ZYLOPRIM) 300 MG tablet Take 1 tablet (300 mg total) by mouth once daily. 90 tablet 4    aspirin (ECOTRIN) 81 MG EC tablet Take 81 mg by mouth once daily.      blood sugar diagnostic Strp by Misc.(Non-Drug; Combo Route) route.      carvediloL (COREG) 12.5 MG tablet Take 1 tablet (12.5 mg total) by mouth 2 (two) times daily with meals. 180 tablet 3    dulaglutide (TRULICITY) 3 mg/0.5 mL pen injector Inject 3 mg into the skin every 7 days. 12 pen 3    furosemide (LASIX) 20 MG tablet Take 1 tablet (20 mg total) by mouth once daily. 90 tablet 3    ibuprofen (ADVIL,MOTRIN) 400 MG tablet Take 400 mg by mouth every 6 (six) hours as needed for Other.      insulin NPH-insulin regular, 70/30, (HUMULIN 70/30 U-100 INSULIN) 100 unit/mL (70-30) injection Inject 60 Units into the skin 2 (two) times daily. 100 mL 11    insulin syringe-needle U-100 (BD INSULIN SYRINGE ULTRA-FINE) 1 mL 31 gauge x 5/16 Syrg USE AS DIRECTED TWICE A  each 4    lancets (PRODIGY TWIST TOP LANCET) 28 gauge Misc 1 lancet by Misc.(Non-Drug; Combo Route) route 2 (two) times daily. 180 each 4    losartan (COZAAR) 100 MG tablet Take 1 tablet (100 mg total) by mouth  once daily. 90 tablet 4    MAGNESIUM ORAL Take 400 mg by mouth once daily at 6am. 800 mg everyday      rosuvastatin (CRESTOR) 40 MG Tab TAKE 1 TABLET BY MOUTH EVERY EVENING 90 tablet 3    spironolactone (ALDACTONE) 25 MG tablet Take 1 tablet (25 mg total) by mouth once daily. 30 tablet 11    tamsulosin (FLOMAX) 0.4 mg Cap Take 1 capsule (0.4 mg total) by mouth every evening. 90 capsule 3    diphenoxylate-atropine 2.5-0.025 mg (LOMOTIL) 2.5-0.025 mg per tablet Take 1 tablet by mouth 4 (four) times daily as needed for Diarrhea. 15 tablet 0     No current facility-administered medications for this visit.     Facility-Administered Medications Ordered in Other Visits   Medication Dose Route Frequency Provider Last Rate Last Admin    lidocaine HCl 2% urojet   Mucous Membrane Once Monty Cee MD        mitoMYcin (MUTAMYCIN) 40 mg in sodium chloride 0.9% infusion  40 mg Intravesical Once Monty Cee MD           Review of patient's allergies indicates:  No Known Allergies    Objective   Vitals:    05/10/23 0838   BP: 118/66   Pulse: 94       Physical Exam  Constitutional:       Appearance: He is well-developed.   HENT:      Head: Normocephalic and atraumatic.      Right Ear: External ear normal.      Left Ear: External ear normal.      Nose: Nose normal.      Mouth/Throat:      Mouth: Mucous membranes are dry.      Pharynx: No oropharyngeal exudate.   Eyes:      General: No scleral icterus.        Right eye: No discharge.         Left eye: No discharge.      Conjunctiva/sclera: Conjunctivae normal.      Pupils: Pupils are equal, round, and reactive to light.   Neck:      Thyroid: No thyromegaly.      Vascular: No JVD.      Trachea: No tracheal deviation.   Cardiovascular:      Rate and Rhythm: Normal rate and regular rhythm.      Heart sounds: Normal heart sounds. No murmur heard.    No friction rub. No gallop.   Pulmonary:      Effort: Pulmonary effort is normal. No respiratory distress.      Breath sounds:  Normal breath sounds. No stridor. No wheezing or rales.   Chest:      Chest wall: No tenderness.   Abdominal:      General: Bowel sounds are normal. There is no distension.      Palpations: Abdomen is soft. There is no mass.      Tenderness: There is no abdominal tenderness. There is no guarding or rebound.      Hernia: No hernia is present.   Musculoskeletal:         General: No tenderness. Normal range of motion.      Cervical back: Normal range of motion and neck supple.   Lymphadenopathy:      Cervical: No cervical adenopathy.   Skin:     General: Skin is warm and dry.      Coloration: Skin is not pale.      Findings: No erythema or rash.      Comments: Dry skin   Neurological:      Mental Status: He is alert and oriented to person, place, and time.      Cranial Nerves: No cranial nerve deficit.      Motor: No abnormal muscle tone.      Coordination: Coordination normal.      Deep Tendon Reflexes: Reflexes are normal and symmetric. Reflexes normal.   Psychiatric:         Behavior: Behavior normal.         Thought Content: Thought content normal.         Judgment: Judgment normal.          Assessment and Plan     Problem List Items Addressed This Visit       Diarrhea    Relevant Medications    diphenoxylate-atropine 2.5-0.025 mg (LOMOTIL) 2.5-0.025 mg per tablet    Other Relevant Orders    CLOSTRIDIUM DIFFICILE    CULTURE, STOOL    Stool Exam-Ova,Cysts,Parasites    CBC Auto Differential    Comprehensive Metabolic Panel    MAGNESIUM    PHOSPHORUS    Acute dehydration - Primary    Relevant Medications    diphenoxylate-atropine 2.5-0.025 mg (LOMOTIL) 2.5-0.025 mg per tablet    Other Relevant Orders    CLOSTRIDIUM DIFFICILE    CULTURE, STOOL    Stool Exam-Ova,Cysts,Parasites    CBC Auto Differential    Comprehensive Metabolic Panel    MAGNESIUM    PHOSPHORUS       Andrew was seen today for diarrhea.    Diagnoses and all orders for this visit:    Acute dehydration  -     diphenoxylate-atropine 2.5-0.025 mg (LOMOTIL)  2.5-0.025 mg per tablet; Take 1 tablet by mouth 4 (four) times daily as needed for Diarrhea.  -     CLOSTRIDIUM DIFFICILE; Future  -     CULTURE, STOOL; Future  -     Stool Exam-Ova,Cysts,Parasites; Future  -     CBC Auto Differential; Future  -     Comprehensive Metabolic Panel; Future  -     MAGNESIUM; Future  -     PHOSPHORUS; Future    Diarrhea, unspecified type  -     diphenoxylate-atropine 2.5-0.025 mg (LOMOTIL) 2.5-0.025 mg per tablet; Take 1 tablet by mouth 4 (four) times daily as needed for Diarrhea.  -     CLOSTRIDIUM DIFFICILE; Future  -     CULTURE, STOOL; Future  -     Stool Exam-Ova,Cysts,Parasites; Future  -     CBC Auto Differential; Future  -     Comprehensive Metabolic Panel; Future  -     MAGNESIUM; Future  -     PHOSPHORUS; Future    Other orders  -     sodium chloride 0.9% bolus 2,000 mL 2,000 mL  -     sodium chloride 0.9% flush 10 mL  -     heparin, porcine (PF) 100 unit/mL injection flush 500 Units      Acute dehydration with diarrhea  -non specific  -stool culture and c diff  -labs  -lomotil prn  -IV fluid NS 2000 cc once  -ER/UC precautions given    Spent adequate time in obtaining history and explaining differentials    40 minutes spent during this visit of which greater than 50% devoted to face-face counseling and coordination of care regarding diagnosis and management plan    No follow-ups on file.

## 2023-05-10 NOTE — ED NOTES
Pt unable to provide a stool sample. Pt given discharge and follow up instructions. Pt verbalized understanding. Pt ambulated out of the ER in stable condition with steady gait with his wife.

## 2023-05-10 NOTE — FIRST PROVIDER EVALUATION
Emergency Department TeleTriage Encounter Note      CHIEF COMPLAINT    Chief Complaint   Patient presents with    Diarrhea     Pt referred to ED for IV fluids, from primary MD after complains of diarrhea x 9-10 days pt states he completed a course of antibiotics recently, marginal generalized abd pain that is intermittent reported, pt states had labs completed just PTA        VITAL SIGNS   Initial Vitals [05/10/23 1024]   BP Pulse Resp Temp SpO2   (!) 145/78 99 18 98 °F (36.7 °C) 98 %      MAP       --            ALLERGIES    Review of patient's allergies indicates:  No Known Allergies    PROVIDER TRIAGE NOTE  Patient presents with complaint of intermittent diarrhea for 10 days. 10-12 episodes last night. No blood. No N/V. Mild abdominal pain. No fever. Recent antibiotic course. Had outpatient labs done PTA.      Phy:   Constitutional: well nourished, well developed, appearing stated age, NAD   HEENT: NCAT, symmetrical lids, No obvious facial deformity.  Normal phonation. Normal Conjunctiva   Neck: NAROM   Respiratory: Normal effort.  No obvious use of accessory muscles   Musculoskeletal: Moved upper extremities well   Neuro: Alert, answers questions appropriately    Psych: appropriate mood and affect      Initial orders will be placed and care will be transferred to an alternate provider when patient is roomed for a full evaluation. Any additional orders and the final disposition will be determined by that provider.        ORDERS  Labs Reviewed - No data to display    ED Orders (720h ago, onward)      None              Virtual Visit Note: The provider triage portion of this emergency department evaluation and documentation was performed via [employer], a HIPAA-compliant telemedicine application, in concert with a tele-presenter in the room. A face to face patient evaluation with one of my colleagues will occur once the patient is placed in an emergency department room.      DISCLAIMER: This note was prepared  with M*Modal voice recognition transcription software. Garbled syntax, mangled pronouns, and other bizarre constructions may be attributed to that software system.

## 2023-05-10 NOTE — ED PROVIDER NOTES
Encounter Date: 5/10/2023    SCRIBE #1 NOTE: I, Kerrie Aviles, am scribing for, and in the presence of,  Jaswant Campbell MD. I have scribed the following portions of the note - Other sections scribed: HPI,ROS, and Physical Exam.     History     Chief Complaint   Patient presents with    Diarrhea     Pt referred to ED for IV fluids, from primary MD after complains of diarrhea x 9-10 days pt states he completed a course of antibiotics recently, marginal generalized abd pain that is intermittent reported, pt states had labs completed just PTA      Andrew Beltran is a 67 y.o. male who has a past medical history of BPH (benign prostatic hyperplasia), Cancer, Diabetes, Diabetes mellitus, type 2, Gout, High cholesterol, Hypertension, Sciatica (2016), and Urinary tract infection presents to the ED for evaluation of diarrhea. Patient reports 9-10 days of intermittent watery  diarrhea without any blood. Patient states yesterday he spoke with Lon Brock MD concerning diarrhea and was instructed to visit ED for IV and lab test.  PTA patient denies eating or consuming any fluids. Patients states he completed celluilits medication recently and takes Magnesium pills.             The history is provided by the patient.   Review of patient's allergies indicates:  No Known Allergies  Past Medical History:   Diagnosis Date    BPH (benign prostatic hyperplasia)     Cancer     bladder    Diabetes     type 2    Diabetes mellitus, type 2     Gout     High cholesterol     Hypertension     Sciatica 2016    had injection 3 weeks ago    Urinary tract infection      Past Surgical History:   Procedure Laterality Date    APPENDECTOMY  1968    BALLOON DILATION OF URETER Left 9/18/2019    Procedure: DILATION, URETER, USING BALLOON;  Surgeon: Monty Cee MD;  Location: [facility] OR;  Service: Urology;  Laterality: Left;    BARBOTAGE OF BLADDER N/A 8/14/2019    Procedure: BARBOTAGE, BLADDER;  Surgeon: Monty Cee MD;  Location:  [facility] OR;  Service: Urology;  Laterality: N/A;    BARBOTAGE OF URETER Bilateral 8/14/2019    Procedure: BARBOTAGE, URETER;  Surgeon: Monty Cee MD;  Location: [facility] OR;  Service: Urology;  Laterality: Bilateral;    BARBOTAGE OF URETER Left 9/18/2019    Procedure: BARBOTAGE, URETER;  Surgeon: Monty Cee MD;  Location: [facility] OR;  Service: Urology;  Laterality: Left;  left renal/ ureteral washing.    BLADDER SURGERY      COLONOSCOPY N/A 7/10/2018    Procedure: COLONOSCOPY;  Surgeon: Azalea Sanchez MD;  Location: [facility] ENDO;  Service: Endoscopy;  Laterality: N/A;  Schedule at [facility], faxed to [employer]* [phone]    CYSTOSCOPY      CYSTOSCOPY W/ RETROGRADES Bilateral 8/14/2019    Procedure: CYSTOSCOPY, WITH RETROGRADE PYELOGRAM;  Surgeon: Monty Cee MD;  Location: [facility];  Service: Urology;  Laterality: Bilateral;    CYSTOSCOPY W/ RETROGRADES Left 9/18/2019    Procedure: CYSTOSCOPY, WITH RETROGRADE PYELOGRAM;  Surgeon: Monty Cee MD;  Location: [facility];  Service: Urology;  Laterality: Left;  cystourethroscopy with left retrograde pyelogram     CYSTOSCOPY W/ RETROGRADES Bilateral 3/11/2020    Procedure: CYSTOSCOPY, WITH RETROGRADE PYELOGRAM Ureteral renal washings and bladder washings;  Surgeon: Monty Cee MD;  Location: [facility];  Service: Urology;  Laterality: Bilateral;    CYSTOSCOPY WITH URETEROSCOPY, RETROGRADE PYELOGRAPHY, AND INSERTION OF STENT Bilateral 6/22/2020    Procedure: CYSTOSCOPY, WITH RETROGRADE PYELOGRAM AND URETERAL STENT INSERTION;  Surgeon: Monty Cee MD;  Location: [facility];  Service: Urology;  Laterality: Bilateral;    CYSTOSCOPY WITH URETHRAL DILATION N/A 6/10/2021    Procedure: CYSTOSCOPY, WITH URETHRAL DILATION;  Surgeon: Monty Cee MD;  Location: [facility];  Service: Urology;  Laterality: N/A;    HAND SURGERY Right 12/08/2016    trigger finger    INSTILLATION OF URINARY BLADDER N/A 3/11/2020    Procedure: INSTILLATION, BLADDER -  mitomyin - c;  Surgeon: Monty Cee MD;  Location: [facility] OR;  Service: Urology;  Laterality: N/A;    INSTILLATION OF URINARY BLADDER  6/22/2020    Procedure: INSTILLATION, BLADDER;  Surgeon: Monty Cee MD;  Location: [facility] OR;  Service: Urology;;  mitomycin 40mg    LASER ABLATION Left 9/18/2019    Procedure: ABLATION, USING LASER;  Surgeon: Monty Cee MD;  Location: [facility] OR;  Service: Urology;  Laterality: Left;  laser ablation of lower pole calyceal transitional cell lesion    PYELOSCOPY Left 9/18/2019    Procedure: PYELOSCOPY;  Surgeon: Monty Cee MD;  Location: [facility] OR;  Service: Urology;  Laterality: Left;  Left ureteral pyeloscopy    TONSILLECTOMY  1960    TRANSURETHRAL RESECTION OF PROSTATE N/A 9/18/2019    Procedure: TURP (TRANSURETHRAL RESECTION OF PROSTATE);  Surgeon: Monty Cee MD;  Location: [facility] OR;  Service: Urology;  Laterality: N/A;  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    TRANSURETHRAL RESECTION OF PROSTATE USING BIPOLAR CAUTERY N/A 3/11/2020    Procedure: TURP, USING BIPOLAR CAUTERY;  Surgeon: Monty Cee MD;  Location: [facility] OR;  Service: Urology;  Laterality: N/A;    TRANSURETHRAL SURGICAL REMOVAL OF STRICTURE OF BLADDER NECK  8/14/2019    Procedure: EXCISION, CONTRACTURE, BLADDER NECK, TRANSURETHRAL;  Surgeon: Monty Cee MD;  Location: [facility] OR;  Service: Urology;;    TRANSURETHRAL SURGICAL REMOVAL OF STRICTURE OF BLADDER NECK N/A 6/10/2021    Procedure: EXCISION, CONTRACTURE, BLADDER NECK, TRANSURETHRAL;  Surgeon: Monty Cee MD;  Location: [facility] OR;  Service: Urology;  Laterality: N/A;  using laser    URETEROSCOPY Left 9/18/2019    Procedure: URETEROSCOPY;  Surgeon: Monty Cee MD;  Location: [facility] OR;  Service: Urology;  Laterality: Left;    URETEROSCOPY Right 6/22/2020    Procedure: URETEROSCOPY;  Surgeon: Monty Cee MD;  Location: [facility] OR;  Service: Urology;  Laterality: Right;     Family History   Problem  Relation Age of Onset    Diabetes Mother     Prostate cancer Neg Hx     Kidney disease Neg Hx      Social History     Tobacco Use    Smoking status: Former     Types: Pipe    Smokeless tobacco: Never    Tobacco comments:     quit 40 yrs ago   Substance Use Topics    Alcohol use: Yes     Alcohol/week: 0.0 standard drinks     Comment: rarely    Drug use: No     Review of Systems   Constitutional:  Negative for chills and fever.   HENT:  Negative for sore throat.    Respiratory:  Negative for shortness of breath.    Cardiovascular:  Negative for chest pain.   Gastrointestinal:  Positive for abdominal pain (Mild) and diarrhea. Negative for nausea and vomiting.   Genitourinary:  Negative for dysuria and hematuria.   Musculoskeletal:  Negative for back pain.   Skin:  Negative for rash.   Neurological:  Negative for dizziness and headaches.     Physical Exam     Initial Vitals [05/10/23 1024]   BP Pulse Resp Temp SpO2   (!) 145/78 99 18 98 °F (36.7 °C) 98 %      MAP       --         Physical Exam    Nursing note and vitals reviewed.  Constitutional: No distress.   HENT:   Head: Atraumatic.   Mouth/Throat: Oropharynx is clear and moist and mucous membranes are normal.   Eyes: EOM are normal. Pupils are equal, round, and reactive to light.   Neck: Neck supple. No JVD present.   Normal range of motion.  Cardiovascular:  Normal rate, regular rhythm, normal heart sounds and intact distal pulses.     Exam reveals no gallop and no friction rub.       No murmur heard.  Abdominal: Abdomen is soft. Bowel sounds are normal.   Musculoskeletal:         General: Normal range of motion.      Cervical back: Normal range of motion and neck supple.     Lymphadenopathy:     He has no cervical adenopathy.   Neurological: He is alert and oriented to person, place, and time.   Skin: Skin is warm and dry.   Psychiatric: He has a normal mood and affect. Thought content normal.       ED Course   Procedures  Labs Reviewed   POCT GLUCOSE - Abnormal;  Notable for the following components:       Result Value    POCT Glucose 112 (*)     All other components within normal limits          Imaging Results    None          Medications   sodium chloride 0.9% bolus 1,000 mL 1,000 mL (0 mLs Intravenous Stopped 5/10/23 1209)   0.9%  NaCl infusion (0 mLs Intravenous Stopped 5/10/23 1332)     Medical Decision Making:   Initial Assessment:   A 67-year-old male presents to the ED for evaluation of diarrhea.   Differential Diagnosis:   DDx includes: Diarrhea- viral GE, bacterial GE, bacterial overgrowth, parasitic enteritis, malabsorption, dehydration, BROOKLYN, electrolyte abnormality.  ED Management:  Patient's recent lab work shows a significant elevation of his creatinine from 0.9-1.9.  He has received 2 L of IV fluid here in the emergency department.  I have explained the need for admission for further IV fluids to correct acute kidney injury.  However, patient adamantly refuses to stay in the hospital.  He also has not been able to produce any stool while in the ED. this will be obtained outpatient and he has instructions to bring it to the lab.  He has no vomiting and I have urged he increase his fluid intake.  He should follow up with his primary physician as soon as able for lab recheck.        Scribe Attestation:   Scribe #1: I performed the above scribed service and the documentation accurately describes the services I performed. I attest to the accuracy of the note.            I, Dr. Jaswant Garcia, personally performed the services described in this documentation. All medical record entries made by the scribe were at my direction and in my presence. I have reviewed the chart and agree that the record reflects my personal performance and is accurate and complete. Jaswant Garcia MD.  11:57 AM 05/11/2023         Clinical Impression:   Final diagnoses:  [R19.7] Diarrhea, unspecified type (Primary)  [N17.9] BROOKLYN (acute kidney injury)        ED Disposition Condition     Discharge Stable          ED Prescriptions    None       Follow-up Information       Follow up With Specialties Details Why Contact Info    Lon Brock MD Family Medicine Schedule an appointment as soon as possible for a visit   [address]  [address]  [facility] [address] [phone]               Jaswant Campbell MD  05/11/23 0470

## 2023-05-10 NOTE — ED NOTES
Pt ambulated to bathroom and voided urine but did not have a bowel movement. MD to bedside to discuss results of bloodwork. Pt refuses admit but will stay for another liter of fluid.

## 2023-05-11 ENCOUNTER — TELEPHONE (OUTPATIENT)
Dept: FAMILY MEDICINE | Facility: CLINIC | Age: 68
End: 2023-05-11
Payer: MEDICARE

## 2023-05-11 DIAGNOSIS — R19.7 DIARRHEA, UNSPECIFIED TYPE: Primary | ICD-10-CM

## 2023-05-11 NOTE — TELEPHONE ENCOUNTER
The patient dropped his stool sample off this morning, but I need you to please put the orders in again

## 2023-05-22 ENCOUNTER — PATIENT MESSAGE (OUTPATIENT)
Dept: UROLOGY | Facility: CLINIC | Age: 68
End: 2023-05-22
Payer: MEDICARE

## 2023-05-23 ENCOUNTER — PATIENT MESSAGE (OUTPATIENT)
Dept: FAMILY MEDICINE | Facility: CLINIC | Age: 68
End: 2023-05-23
Payer: MEDICARE

## 2023-05-25 ENCOUNTER — PATIENT MESSAGE (OUTPATIENT)
Dept: FAMILY MEDICINE | Facility: CLINIC | Age: 68
End: 2023-05-25
Payer: MEDICARE

## 2023-05-25 DIAGNOSIS — R19.7 DIARRHEA, UNSPECIFIED TYPE: Primary | ICD-10-CM

## 2023-05-25 NOTE — TELEPHONE ENCOUNTER
See note, lab ordered, should make sure he submits a liquid or semi liquid stool not fully formed so they can run the c diff test.     Orders Placed This Encounter   Procedures    CLOSTRIDIUM DIFFICILE    Giardia / Cryptosporidum, EIA    Pancreatic elastase, fecal

## 2023-05-27 ENCOUNTER — PATIENT MESSAGE (OUTPATIENT)
Dept: FAMILY MEDICINE | Facility: CLINIC | Age: 68
End: 2023-05-27
Payer: MEDICARE

## 2023-05-29 ENCOUNTER — PATIENT MESSAGE (OUTPATIENT)
Dept: FAMILY MEDICINE | Facility: CLINIC | Age: 68
End: 2023-05-29
Payer: MEDICARE

## 2023-05-29 DIAGNOSIS — A04.72 C. DIFFICILE COLITIS: Primary | ICD-10-CM

## 2023-05-29 RX ORDER — VANCOMYCIN HYDROCHLORIDE 125 MG/1
125 CAPSULE ORAL 4 TIMES DAILY
Qty: 40 CAPSULE | Refills: 0 | Status: SHIPPED | OUTPATIENT
Start: 2023-05-29 | End: 2023-06-08

## 2023-06-02 ENCOUNTER — PATIENT OUTREACH (OUTPATIENT)
Dept: ADMINISTRATIVE | Facility: HOSPITAL | Age: 68
End: 2023-06-02
Payer: MEDICARE

## 2023-06-02 NOTE — PROGRESS NOTES
Non-compliant GAP report chart review - Chart review completed for the following HM test if overdue  (Mammogram, Colonoscopy, Cervical Cancer Screening,  Diabetic lab testing, and/or Dilated EYE EXAM)      Care Everywhere and Media reports - updates requested and reviewed.          Health Maintenance Due   Topic Date Due    Shingles Vaccine (1 of 2) 06/29/2017    COVID-19 Vaccine (4 - Pfizer series) 12/17/2021    Colorectal Cancer Screening  07/10/2023

## 2023-07-26 ENCOUNTER — PROCEDURE VISIT (OUTPATIENT)
Dept: UROLOGY | Facility: CLINIC | Age: 68
End: 2023-07-26
Payer: MEDICARE

## 2023-07-26 VITALS — WEIGHT: 268.31 LBS | HEIGHT: 70 IN | BODY MASS INDEX: 38.41 KG/M2

## 2023-07-26 DIAGNOSIS — Z85.51 HISTORY OF BLADDER CANCER: Primary | ICD-10-CM

## 2023-07-26 PROCEDURE — 52000 CYSTOURETHROSCOPY: CPT | Mod: S$GLB,,, | Performed by: UROLOGY

## 2023-07-26 PROCEDURE — 52000 CYSTOSCOPY: ICD-10-PCS | Mod: S$GLB,,, | Performed by: UROLOGY

## 2023-07-26 RX ORDER — CIPROFLOXACIN 500 MG/1
500 TABLET ORAL 2 TIMES DAILY
Qty: 10 TABLET | Refills: 0 | Status: SHIPPED | OUTPATIENT
Start: 2023-07-26 | End: 2023-07-31

## 2023-07-26 NOTE — PROCEDURES
Cystoscopy    Date/Time: 7/26/2023 8:30 AM  Performed by: Monty Cee MD  Authorized by: Monty Cee MD     Consent Done?:  Yes (Written)  Timeout: prior to procedure the correct patient, procedure, and site was verified    Prep: patient was prepped and draped in usual sterile fashion    Local anesthesia used?: Yes    Anesthesia:  Intraurethral instillation  Local anesthetic:  Lidocaine 2% topical gel  Anesthetic total (ml):  10  Indications: history bladder cancer and urethral stricture    Position:  Supine  Anesthesia:  Intraurethral instillation  Patient sedated?: No    Preparation: Patient was prepped and draped in usual sterile fashion    Scope type:  Flexible cystoscope  Stent inserted: No    Stent removed: No    External exam normal: Yes    Digital exam performed: No    Urethra normal: No    Urethral Internal Findings:  Stricture (soft)  Prostate normal: No (TUR defect)    Bladder neck normal: Yes    Bladder normal: No (right lateral wall inflammatory appearring lesions)

## 2023-08-22 ENCOUNTER — TELEPHONE (OUTPATIENT)
Dept: ADMINISTRATIVE | Facility: CLINIC | Age: 68
End: 2023-08-22
Payer: MEDICARE

## 2023-08-23 ENCOUNTER — TELEPHONE (OUTPATIENT)
Dept: FAMILY MEDICINE | Facility: CLINIC | Age: 68
End: 2023-08-23
Payer: MEDICARE

## 2023-08-23 PROBLEM — R60.0 BILATERAL LOWER EXTREMITY EDEMA: Status: RESOLVED | Noted: 2021-06-18 | Resolved: 2023-08-23

## 2023-08-23 PROBLEM — N41.0 ACUTE PROSTATITIS: Status: RESOLVED | Noted: 2022-12-23 | Resolved: 2023-08-23

## 2023-08-23 PROBLEM — Z98.890 POST-OPERATIVE STATE: Status: RESOLVED | Noted: 2017-06-06 | Resolved: 2023-08-23

## 2023-08-23 PROBLEM — N50.812 PAIN IN LEFT TESTICLE: Status: RESOLVED | Noted: 2022-11-25 | Resolved: 2023-08-23

## 2023-08-23 PROBLEM — E86.0 DEHYDRATION: Status: RESOLVED | Noted: 2022-11-25 | Resolved: 2023-08-23

## 2023-08-23 PROBLEM — E78.5 HYPERLIPIDEMIA ASSOCIATED WITH TYPE 2 DIABETES MELLITUS: Status: ACTIVE | Noted: 2023-08-23

## 2023-08-23 PROBLEM — T14.8XXA OPEN WOUND: Status: RESOLVED | Noted: 2021-10-15 | Resolved: 2023-08-23

## 2023-08-23 PROBLEM — C67.9 MALIGNANT NEOPLASM OF URINARY BLADDER: Status: RESOLVED | Noted: 2020-03-11 | Resolved: 2023-08-23

## 2023-08-23 PROBLEM — E11.69 HYPERLIPIDEMIA ASSOCIATED WITH TYPE 2 DIABETES MELLITUS: Status: ACTIVE | Noted: 2023-08-23

## 2023-08-23 NOTE — TELEPHONE ENCOUNTER
----- Message from REILLY Philip sent at 8/23/2023  3:32 PM CDT -----  Regarding: Do you want to order labs?  Good afternoon Dr Brock. I will be seeing this patient tomorrow VIRTUALLY for medicare wellness visit. Im doing chart review and noticed BROOKLYN in beginning of May 2023 (GFR 30s) from dehydration/diarrhea.  He did go to ER for IVF per recommendation of office, but refused admission for further fluids/treatment of BROOKLYN. He was then treated with Vanc for Cdiff end of May. I am seeing that CMP was never rechecked after BROOKLYN.  Just wanted to bring this to your attention incase you wanted to recheck labs. Please have your office reach out to him to schedule if you decide to order. Thanks

## 2023-08-23 NOTE — PROGRESS NOTES
Andrew Beltran presented for a  Medicare AWV and comprehensive Health Risk Assessment today. The following components were reviewed and updated:    Medical history  Family History  Social history  Allergies and Current Medications  Health Risk Assessment  Health Maintenance  Care Team         ** See Completed Assessments for Annual Wellness Visit within the encounter summary.**         The following assessments were completed:  Living Situation  CAGE  Depression Screening  Timed Get Up and Go  Whisper Test  Cognitive Function Screening - declined memory testing   Nutrition Screening  ADL Screening  PAQ Screening      Review for Opioid Screening: Pt does not have Rx for Opioids      Review for Substance Use Disorders: Patient does not use substance        Current Outpatient Medications:     allopurinoL (ZYLOPRIM) 300 MG tablet, Take 1 tablet (300 mg total) by mouth once daily., Disp: 90 tablet, Rfl: 4    aspirin (ECOTRIN) 81 MG EC tablet, Take 81 mg by mouth once daily., Disp: , Rfl:     blood sugar diagnostic Strp, by Misc.(Non-Drug; Combo Route) route., Disp: , Rfl:     carvediloL (COREG) 12.5 MG tablet, Take 1 tablet (12.5 mg total) by mouth 2 (two) times daily with meals., Disp: 180 tablet, Rfl: 3    dulaglutide (TRULICITY) 3 mg/0.5 mL pen injector, Inject 3 mg into the skin every 7 days., Disp: 12 pen, Rfl: 3    furosemide (LASIX) 20 MG tablet, Take 1 tablet (20 mg total) by mouth once daily., Disp: 90 tablet, Rfl: 3    ibuprofen (ADVIL,MOTRIN) 400 MG tablet, Take 400 mg by mouth every 6 (six) hours as needed for Other., Disp: , Rfl:     insulin NPH-insulin regular, 70/30, (HUMULIN 70/30 U-100 INSULIN) 100 unit/mL (70-30) injection, Inject 60 Units into the skin 2 (two) times daily., Disp: 100 mL, Rfl: 11    insulin syringe-needle U-100 (BD INSULIN SYRINGE ULTRA-FINE) 1 mL 31 gauge x 5/16 Syrg, USE AS DIRECTED TWICE A DAY, Disp: 200 each, Rfl: 4    lancets (PRODIGY TWIST TOP LANCET) 28 gauge Misc, 1 lancet  "by Misc.(Non-Drug; Combo Route) route 2 (two) times daily., Disp: 180 each, Rfl: 4    losartan (COZAAR) 100 MG tablet, Take 1 tablet (100 mg total) by mouth once daily., Disp: 90 tablet, Rfl: 4    MAGNESIUM ORAL, Take 400 mg by mouth once daily at 6am. 800 mg everyday, Disp: , Rfl:     rosuvastatin (CRESTOR) 40 MG Tab, TAKE 1 TABLET BY MOUTH EVERY EVENING, Disp: 90 tablet, Rfl: 3    spironolactone (ALDACTONE) 25 MG tablet, Take 1 tablet (25 mg total) by mouth once daily., Disp: 30 tablet, Rfl: 11    tamsulosin (FLOMAX) 0.4 mg Cap, Take 1 capsule (0.4 mg total) by mouth every evening., Disp: 90 capsule, Rfl: 3       Vitals:    08/24/23 1459   Weight: 121.6 kg (268 lb)   Height: 5' 9.5" (1.765 m)   PainSc: 0-No pain      Physical Exam  Vitals and nursing note reviewed.   Constitutional:       General: He is not in acute distress.     Appearance: Normal appearance. He is not ill-appearing.   HENT:      Head: Normocephalic and atraumatic.   Eyes:      General: No scleral icterus.        Right eye: No discharge.         Left eye: No discharge.      Extraocular Movements: Extraocular movements intact.      Conjunctiva/sclera: Conjunctivae normal.      Comments: +glasses   Cardiovascular:      Rate and Rhythm: Normal rate.   Pulmonary:      Effort: Pulmonary effort is normal.   Musculoskeletal:      Cervical back: Normal range of motion.      Right lower leg: No edema.      Left lower leg: No edema.   Skin:     General: Skin is warm and dry.      Findings: No rash.   Neurological:      Mental Status: He is alert and oriented to person, place, and time.   Psychiatric:         Mood and Affect: Mood normal.         Behavior: Behavior normal. Behavior is cooperative.         Cognition and Memory: Cognition and memory normal.               Diagnoses and health risks identified today and associated recommendations/orders:    1. Encounter for preventive health examination  - Chart reviewed. Problem list updated. Discussed current " medical diagnosis, current medications, medical/surgical/family/social history; updated provider list; documented vital signs; identified any cognitive impairment; and updated risk factor list. Addressed any outstanding health maintenance. Provided patient with personalized health advice. Continue to follow up with PCP and any specialists.   - message sent to PCP for follow up for BROOKLYN    2. Type 2 diabetes mellitus with diabetic polyneuropathy, with long-term current use of insulin  Chronic; stable on current treatment plan; follow up with PCP  - taking insulin NPH 70/30 and  trulicity    3. Chronic venous hypertension (idiopathic) with ulcer and inflammation of bilateral lower extremity (CODE)  Chronic; stable on current treatment plan; follow up with PCP  - follow up with cardiology and podiatry/wound care  - wearing CHANDLER stockings     4. Severe obesity (BMI 35.0-39.9) with comorbidity  - Recommendation for healthy diet and increasing exercise as tolerated with goal of 150min/week . Recommend weight loss      5. Hypertension associated with diabetes  Chronic; stable on current treatment plan; follow up with PCP  - taking coreg, lasix, losartan and spironolactone     6. Hyperlipidemia associated with type 2 diabetes mellitus  Chronic; stable on current treatment plan; follow up with PCP  - taking statin     7. Chronic venous stasis dermatitis of both lower extremities  Chronic; stable on current treatment plan; follow up with PCP  - follow up with cardiology and podiatry/wound care    8. History of bladder cancer  Chronic; stable on current treatment plan; follow up with PCP  - follow up with Urology    9. Idiopathic gout of foot, unspecified chronicity, unspecified laterality  Chronic; stable on current treatment plan; follow up with PCP  - taking allopurinol     10. Screening for malignant neoplasm of colon  - due for colonoscopy, order placed  - Case Request Endoscopy: COLONOSCOPY      Provided Andrew with a  5-10 year written screening schedule and personal prevention plan. Recommendations were developed using the USPSTF age appropriate recommendations. Education, counseling, and referrals were provided as needed. After Visit Summary printed and given to patient which includes a list of additional screenings\tests needed.    Follow up in about 1 year (around 8/24/2024) for your next annual wellness visit.    Kim Vogt, FNP-C    Advance Care Planning     I offered to discuss advanced care planning, including how to pick a person who would make decisions for you if you were unable to make them for yourself, called a health care power of , and what kind of decisions you might make such as use of life sustaining treatments such as ventilators and tube feeding when faced with a life limiting illness recorded on a living will that they will need to know. (How you want to be cared for as you near the end of your natural life)     X  Patient has advanced directives on file, which we reviewed, and they do not wish to make changes.

## 2023-08-24 ENCOUNTER — OFFICE VISIT (OUTPATIENT)
Dept: FAMILY MEDICINE | Facility: CLINIC | Age: 68
End: 2023-08-24
Payer: MEDICARE

## 2023-08-24 ENCOUNTER — TELEPHONE (OUTPATIENT)
Dept: ADMINISTRATIVE | Facility: CLINIC | Age: 68
End: 2023-08-24
Payer: MEDICARE

## 2023-08-24 VITALS — WEIGHT: 268 LBS | HEIGHT: 70 IN | BODY MASS INDEX: 38.37 KG/M2

## 2023-08-24 DIAGNOSIS — E11.69 HYPERLIPIDEMIA ASSOCIATED WITH TYPE 2 DIABETES MELLITUS: ICD-10-CM

## 2023-08-24 DIAGNOSIS — I87.333 CHRONIC VENOUS HYPERTENSION (IDIOPATHIC) WITH ULCER AND INFLAMMATION OF BILATERAL LOWER EXTREMITY: ICD-10-CM

## 2023-08-24 DIAGNOSIS — I87.2 CHRONIC VENOUS STASIS DERMATITIS OF BOTH LOWER EXTREMITIES: ICD-10-CM

## 2023-08-24 DIAGNOSIS — E11.42 TYPE 2 DIABETES MELLITUS WITH DIABETIC POLYNEUROPATHY, WITH LONG-TERM CURRENT USE OF INSULIN: ICD-10-CM

## 2023-08-24 DIAGNOSIS — E78.5 HYPERLIPIDEMIA ASSOCIATED WITH TYPE 2 DIABETES MELLITUS: ICD-10-CM

## 2023-08-24 DIAGNOSIS — Z00.00 ENCOUNTER FOR PREVENTIVE HEALTH EXAMINATION: Primary | ICD-10-CM

## 2023-08-24 DIAGNOSIS — I15.2 HYPERTENSION ASSOCIATED WITH DIABETES: ICD-10-CM

## 2023-08-24 DIAGNOSIS — M10.079 IDIOPATHIC GOUT OF FOOT, UNSPECIFIED CHRONICITY, UNSPECIFIED LATERALITY: ICD-10-CM

## 2023-08-24 DIAGNOSIS — Z12.11 SCREENING FOR MALIGNANT NEOPLASM OF COLON: ICD-10-CM

## 2023-08-24 DIAGNOSIS — Z79.4 TYPE 2 DIABETES MELLITUS WITH DIABETIC POLYNEUROPATHY, WITH LONG-TERM CURRENT USE OF INSULIN: ICD-10-CM

## 2023-08-24 DIAGNOSIS — Z85.51 HISTORY OF BLADDER CANCER: ICD-10-CM

## 2023-08-24 DIAGNOSIS — E11.59 HYPERTENSION ASSOCIATED WITH DIABETES: ICD-10-CM

## 2023-08-24 DIAGNOSIS — E66.01 SEVERE OBESITY (BMI 35.0-39.9) WITH COMORBIDITY: ICD-10-CM

## 2023-08-24 PROCEDURE — 1170F FXNL STATUS ASSESSED: CPT | Mod: CPTII,95,, | Performed by: NURSE PRACTITIONER

## 2023-08-24 PROCEDURE — 4010F PR ACE/ARB THEARPY RXD/TAKEN: ICD-10-PCS | Mod: CPTII,95,, | Performed by: NURSE PRACTITIONER

## 2023-08-24 PROCEDURE — 3288F FALL RISK ASSESSMENT DOCD: CPT | Mod: CPTII,95,, | Performed by: NURSE PRACTITIONER

## 2023-08-24 PROCEDURE — 1159F MED LIST DOCD IN RCRD: CPT | Mod: CPTII,95,, | Performed by: NURSE PRACTITIONER

## 2023-08-24 PROCEDURE — 1170F PR FUNCTIONAL STATUS ASSESSED: ICD-10-PCS | Mod: CPTII,95,, | Performed by: NURSE PRACTITIONER

## 2023-08-24 PROCEDURE — G0439 PR MEDICARE ANNUAL WELLNESS SUBSEQUENT VISIT: ICD-10-PCS | Mod: 95,,, | Performed by: NURSE PRACTITIONER

## 2023-08-24 PROCEDURE — 3288F PR FALLS RISK ASSESSMENT DOCUMENTED: ICD-10-PCS | Mod: CPTII,95,, | Performed by: NURSE PRACTITIONER

## 2023-08-24 PROCEDURE — 1157F ADVNC CARE PLAN IN RCRD: CPT | Mod: CPTII,95,, | Performed by: NURSE PRACTITIONER

## 2023-08-24 PROCEDURE — 3066F NEPHROPATHY DOC TX: CPT | Mod: CPTII,95,, | Performed by: NURSE PRACTITIONER

## 2023-08-24 PROCEDURE — G0439 PPPS, SUBSEQ VISIT: HCPCS | Mod: 95,,, | Performed by: NURSE PRACTITIONER

## 2023-08-24 PROCEDURE — 1160F PR REVIEW ALL MEDS BY PRESCRIBER/CLIN PHARMACIST DOCUMENTED: ICD-10-PCS | Mod: CPTII,95,, | Performed by: NURSE PRACTITIONER

## 2023-08-24 PROCEDURE — 1101F PR PT FALLS ASSESS DOC 0-1 FALLS W/OUT INJ PAST YR: ICD-10-PCS | Mod: CPTII,95,, | Performed by: NURSE PRACTITIONER

## 2023-08-24 PROCEDURE — 1159F PR MEDICATION LIST DOCUMENTED IN MEDICAL RECORD: ICD-10-PCS | Mod: CPTII,95,, | Performed by: NURSE PRACTITIONER

## 2023-08-24 PROCEDURE — 3062F POS MACROALBUMINURIA REV: CPT | Mod: CPTII,95,, | Performed by: NURSE PRACTITIONER

## 2023-08-24 PROCEDURE — 1101F PT FALLS ASSESS-DOCD LE1/YR: CPT | Mod: CPTII,95,, | Performed by: NURSE PRACTITIONER

## 2023-08-24 PROCEDURE — 3062F PR POS MACROALBUMINURIA RESULT DOCUMENTED/REVIEW: ICD-10-PCS | Mod: CPTII,95,, | Performed by: NURSE PRACTITIONER

## 2023-08-24 PROCEDURE — 3008F BODY MASS INDEX DOCD: CPT | Mod: CPTII,95,, | Performed by: NURSE PRACTITIONER

## 2023-08-24 PROCEDURE — 1126F AMNT PAIN NOTED NONE PRSNT: CPT | Mod: CPTII,95,, | Performed by: NURSE PRACTITIONER

## 2023-08-24 PROCEDURE — 3008F PR BODY MASS INDEX (BMI) DOCUMENTED: ICD-10-PCS | Mod: CPTII,95,, | Performed by: NURSE PRACTITIONER

## 2023-08-24 PROCEDURE — 1126F PR PAIN SEVERITY QUANTIFIED, NO PAIN PRESENT: ICD-10-PCS | Mod: CPTII,95,, | Performed by: NURSE PRACTITIONER

## 2023-08-24 PROCEDURE — 4010F ACE/ARB THERAPY RXD/TAKEN: CPT | Mod: CPTII,95,, | Performed by: NURSE PRACTITIONER

## 2023-08-24 PROCEDURE — 3044F HG A1C LEVEL LT 7.0%: CPT | Mod: CPTII,95,, | Performed by: NURSE PRACTITIONER

## 2023-08-24 PROCEDURE — 3066F PR DOCUMENTATION OF TREATMENT FOR NEPHROPATHY: ICD-10-PCS | Mod: CPTII,95,, | Performed by: NURSE PRACTITIONER

## 2023-08-24 PROCEDURE — 1160F RVW MEDS BY RX/DR IN RCRD: CPT | Mod: CPTII,95,, | Performed by: NURSE PRACTITIONER

## 2023-08-24 PROCEDURE — 1157F PR ADVANCE CARE PLAN OR EQUIV PRESENT IN MEDICAL RECORD: ICD-10-PCS | Mod: CPTII,95,, | Performed by: NURSE PRACTITIONER

## 2023-08-24 PROCEDURE — 3044F PR MOST RECENT HEMOGLOBIN A1C LEVEL <7.0%: ICD-10-PCS | Mod: CPTII,95,, | Performed by: NURSE PRACTITIONER

## 2023-08-24 NOTE — Clinical Note
Hey,  Just FYI. This patient had BROOKLYN, hyperkalemia, hypermagnesemia, and was acidotic in May 2023 due to dehydration. He got fluids in ER at that time. He was treated with VANC a few weeks after for CDIFF and labs were not rechecked. He is feeling okay, just wanted to let you know in case you would like to recheck labs. Thanks

## 2023-08-24 NOTE — PATIENT INSTRUCTIONS
Counseling and Referral of Other Preventative  (Italic type indicates deductible and co-insurance are waived)    Patient Name: Andrew Beltran  Today's Date: 8/24/2023    Health Maintenance       Date Due Completion Date    Colorectal Cancer Screening 07/10/2023 7/10/2018    Shingles Vaccine (1 of 2) 08/23/2024 (Originally 6/29/2017) 5/4/2017    COVID-19 Vaccine (4 - Pfizer series) 08/23/2024 (Originally 12/17/2021) 10/22/2021    Influenza Vaccine (1) 09/01/2023 10/20/2022    Hemoglobin A1c 10/21/2023 4/21/2023    Foot Exam 10/28/2023 10/28/2022    Override on 4/12/2021: Done    Override on 1/5/2021: Done    Override on 12/7/2020: Done    Override on 3/26/2019: Done    Override on 5/14/2018: Done    Diabetes Urine Screening 01/19/2024 1/19/2023    Eye Exam 01/24/2024 1/24/2023    Override on 2/1/2017: Done    Lipid Panel 04/21/2024 4/21/2023    TETANUS VACCINE 01/19/2027 1/19/2017        No orders of the defined types were placed in this encounter.      The following information is provided to all patients.  This information is to help you find resources for any of the problems found today that may be affecting your health:                Living healthy guide: www.[facility].[place].gov      Understanding Diabetes: www.diabetes.org      Eating healthy: www.cdc.gov/healthyweight      CDC home safety checklist: www.cdc.gov/steadi/patient.html      Agency on Aging: www.goea.[place].[facility]      Alcoholics anonymous (AA): www.aa.org      Physical Activity: www.margareth.nih.gov/zy0nvuj      Tobacco use: www.quitwithusla.org

## 2023-08-25 ENCOUNTER — TELEPHONE (OUTPATIENT)
Dept: FAMILY MEDICINE | Facility: CLINIC | Age: 68
End: 2023-08-25
Payer: MEDICARE

## 2023-08-25 DIAGNOSIS — E11.42 TYPE 2 DIABETES MELLITUS WITH DIABETIC POLYNEUROPATHY, WITH LONG-TERM CURRENT USE OF INSULIN: Primary | ICD-10-CM

## 2023-08-25 DIAGNOSIS — N17.9 AKI (ACUTE KIDNEY INJURY): ICD-10-CM

## 2023-08-25 DIAGNOSIS — E87.5 HYPERKALEMIA: ICD-10-CM

## 2023-08-25 DIAGNOSIS — Z79.4 TYPE 2 DIABETES MELLITUS WITH DIABETIC POLYNEUROPATHY, WITH LONG-TERM CURRENT USE OF INSULIN: Primary | ICD-10-CM

## 2023-08-25 NOTE — TELEPHONE ENCOUNTER
----- Message from MOISE Philip-RODNEY sent at 8/24/2023  4:36 PM CDT -----  Hey,  Just FYI. This patient had BROOKLYN, hyperkalemia, hypermagnesemia, and was acidotic in May 2023 due to dehydration. He got fluids in ER at that time. He was treated with VANC a few weeks after for CDIFF and labs were not rechecked. He is feeling okay, just wanted to let you know in case you would like to recheck labs. Thanks

## 2023-08-25 NOTE — TELEPHONE ENCOUNTER
can we contact pt for follow up appt (lov 4/2023) since seen by dr batres, er for dehydration, Cdiff, kidney injury, etc. Can order labs below and have appt to follow    Orders Placed This Encounter   Procedures    CBC Auto Differential    Comprehensive Metabolic Panel    Hemoglobin A1C    Lipid Panel    MAGNESIUM

## 2023-08-30 ENCOUNTER — PATIENT MESSAGE (OUTPATIENT)
Dept: GASTROENTEROLOGY | Facility: CLINIC | Age: 68
End: 2023-08-30
Payer: MEDICARE

## 2023-08-30 RX ORDER — POLYETHYLENE GLYCOL 3350, SODIUM SULFATE ANHYDROUS, SODIUM BICARBONATE, SODIUM CHLORIDE, POTASSIUM CHLORIDE 236; 22.74; 6.74; 5.86; 2.97 G/4L; G/4L; G/4L; G/4L; G/4L
4 POWDER, FOR SOLUTION ORAL ONCE
Qty: 4000 ML | Refills: 0 | Status: SHIPPED | OUTPATIENT
Start: 2023-08-30 | End: 2023-09-15

## 2023-08-30 NOTE — TELEPHONE ENCOUNTER
Endoscopy Scheduling Questionnaire:         Are you taking any blood thinners? no               If Yes  Have you been on them for longer than one year? N/A    2. Have you been diagnosed with Diverticulitis in past three months?  no    3. Are you on dialysis or have Kidney Disease? no    4. Previous Colonoscopy?  yes         If yes Do you have a history of colon polyps?  yes    5. Family History of Colon Cancer: no         Relation: N/A       Age at Diagnosis: N/A      6. Are you a diabetic?  yes    7. Do you have a history of constipation?  no    8. Are you taking a GLP-1 Agonist? Yes - Trulicity - instructed to stop 1 week prior to procedure date      Procedure scheduled with Dr. El  on  10/5/2023    The prep being used is GOLYTELY     The patient's prep instructions were sent via patient portal    Pharmacy is [facility] [employer]    GOLYTELY/ COLYTE/ NULYTELY Instructions    You are scheduled for a colonoscopy with Dr. El on 10/5/2023 at [facility]. Enter through the [facility] and check in at Same Day Surgery.  To ensure that your test is accurate and complete, you MUST follow these instructions listed below.  If you have any questions, please call our office at [phone].  Plan on being at the hospital for your procedure for 3-4 hours.    IF YOU HAVE ANY QUESTIONS ABOUT YOUR ARRIVAL TIME YOU CAN CALL [phone].    1.  Follow a CLEAR LIQUID DIET for the entire day before your scheduled colonoscopy.  This means no solid food the entire day starting when you wake.  You may have as much of the clear liquids as you want throughout the day.   CLEAR LIQUID DIET:   NO DAIRY   - You can have:  Coffee with sugar (no creamer), tea, water, soda, apple or white grape juice, chicken or beef broth/bouillon (no meat, noodles, or veggies), popsicles,  Jell-O, lemonade.    2.  MIX GOLYTELY/COLYTE/NULYTELY (all names for same product) WITH ONE (1) GALLON OF WATER.  YOU MAY ADD A FLAVOR PACKET OR  YELLOW/GREEN POWDER DRINK MIX TO THIS.  PUT IN REFRIGERATOR.  This is easier to drink if this solution is cold, so you can mix the solution one day ahead of time and place in the refrigerator prior to drinking.  You have to drink the solution within 24 hours of mixing it.  Do NOT put this solution over ice.  It IS ok to drink with a straw.    3. AT 5 PM THE DAY BEFORE YOUR COLONOSCOPY, DRINK ONE (1) 8 OUNCE GLASS OF MIXTURE EVERY 10 MINUTES UNTIL HALF OF THE GALLON IS CONSUMED.  Keep this mixture cold and in refrigerator as much as you can while drinking it.  Place the remaining half of mixture in the refrigerator when you finish the first half.    4.  The endoscopy department will call you 1 day before your colonoscopy to tell you the exact time to arrive, AND to tell you the exact time to drink the 2nd portion of your prep (which will be FIVE HOURS BEFORE YOUR ARRIVAL TIME).  At this time given to you, DRINK ONE (1) 8 OUNCE GLASS OF MIXTURE EVERY 10 MINUTES UNTIL THE OTHER HALF IS CONSUMED. Keep the mixture cold while you are drinking it. Once this is complete, you may not have ANYTHING else by mouth!      5.  It is ok to take MOST of your REGULAR MEDICATIONS  in the morning of your test with a SIP of water.  THE ONLY MEDS YOU NEED TO HOLD ARE YOUR DIABETES MEDICATIONS,  SOME BLOOD PRESSURE MEDS, AND BLOOD THINNERS IF OK'D BY YOUR DOCTOR.  Do NOT have anything else to eat or drink the morning of your colonoscopy.  It is ok to brush your teeth.    6.  If you are on blood thinners THAT YOU HAVE BEEN INSTRUCTED TO HOLD BY YOUR DOCTOR FOR THIS PROCEDURE, then do NOT take this the morning of your colonoscopy.  Do NOT stop these medications on your own, they must be approved to be held by your doctor.  Your colonoscopy can NOT be done if you are on these medications.  Examples of blood thinners include: Coumadin, Aggrenox, Plavix, Pradaxa, Reapro, Pletal, Xarelto, Ticagrelor, Brilinta, Eliquis, and high dose aspirin  (325 mg).  You do not have to stop baby aspirin 81 mg.    7.  IF YOU ARE DIABETIC:  NO INSULIN OR ORAL MEDICATIONS THE MORNING OF THE COLONOSCOPY.  TAKE ONLY HALF THE DOSE OF YOUR INSULIN THE DAY BEFORE THE COLONOSCOPY.  DO NOT TAKE ANY ORAL DIABETIC MEDICATIONS THE DAY BEFORE THE COLONOSCOPY.  IF YOU ARE AN INSULIN DEPENDENT DIABETIC WITH UNSTABLE BLOOD SUGARS, NOTIFY YOUR PRIMARY CARE PHYSICIAN FOR INSTRUCTIONS.    8.  Please DO use your inhalers the morning of your procedure.

## 2023-09-12 ENCOUNTER — OFFICE VISIT (OUTPATIENT)
Dept: FAMILY MEDICINE | Facility: CLINIC | Age: 68
End: 2023-09-12
Payer: MEDICARE

## 2023-09-12 VITALS
BODY MASS INDEX: 35.29 KG/M2 | DIASTOLIC BLOOD PRESSURE: 68 MMHG | HEIGHT: 72 IN | SYSTOLIC BLOOD PRESSURE: 128 MMHG | WEIGHT: 260.56 LBS | HEART RATE: 98 BPM | OXYGEN SATURATION: 95 %

## 2023-09-12 DIAGNOSIS — Z79.4 TYPE 2 DIABETES MELLITUS WITH DIABETIC POLYNEUROPATHY, WITH LONG-TERM CURRENT USE OF INSULIN: ICD-10-CM

## 2023-09-12 DIAGNOSIS — N17.9 AKI (ACUTE KIDNEY INJURY): ICD-10-CM

## 2023-09-12 DIAGNOSIS — A04.72 C. DIFFICILE DIARRHEA: ICD-10-CM

## 2023-09-12 DIAGNOSIS — Z85.51 HISTORY OF BLADDER CANCER: ICD-10-CM

## 2023-09-12 DIAGNOSIS — Z23 NEED FOR VACCINATION FOR H FLU TYPE B: Primary | ICD-10-CM

## 2023-09-12 DIAGNOSIS — E87.5 HYPERKALEMIA: ICD-10-CM

## 2023-09-12 DIAGNOSIS — E66.01 SEVERE OBESITY (BMI 35.0-39.9) WITH COMORBIDITY: ICD-10-CM

## 2023-09-12 DIAGNOSIS — E11.42 TYPE 2 DIABETES MELLITUS WITH DIABETIC POLYNEUROPATHY, WITH LONG-TERM CURRENT USE OF INSULIN: ICD-10-CM

## 2023-09-12 PROCEDURE — 99999 PR PBB SHADOW E&M-EST. PATIENT-LVL IV: ICD-10-PCS | Mod: PBBFAC,,, | Performed by: FAMILY MEDICINE

## 2023-09-12 PROCEDURE — 3062F POS MACROALBUMINURIA REV: CPT | Mod: CPTII,S$GLB,, | Performed by: FAMILY MEDICINE

## 2023-09-12 PROCEDURE — 1160F PR REVIEW ALL MEDS BY PRESCRIBER/CLIN PHARMACIST DOCUMENTED: ICD-10-PCS | Mod: CPTII,S$GLB,, | Performed by: FAMILY MEDICINE

## 2023-09-12 PROCEDURE — 3078F PR MOST RECENT DIASTOLIC BLOOD PRESSURE < 80 MM HG: ICD-10-PCS | Mod: CPTII,S$GLB,, | Performed by: FAMILY MEDICINE

## 2023-09-12 PROCEDURE — 3044F PR MOST RECENT HEMOGLOBIN A1C LEVEL <7.0%: ICD-10-PCS | Mod: CPTII,S$GLB,, | Performed by: FAMILY MEDICINE

## 2023-09-12 PROCEDURE — 3078F DIAST BP <80 MM HG: CPT | Mod: CPTII,S$GLB,, | Performed by: FAMILY MEDICINE

## 2023-09-12 PROCEDURE — 1160F RVW MEDS BY RX/DR IN RCRD: CPT | Mod: CPTII,S$GLB,, | Performed by: FAMILY MEDICINE

## 2023-09-12 PROCEDURE — 1101F PT FALLS ASSESS-DOCD LE1/YR: CPT | Mod: CPTII,S$GLB,, | Performed by: FAMILY MEDICINE

## 2023-09-12 PROCEDURE — 4010F ACE/ARB THERAPY RXD/TAKEN: CPT | Mod: CPTII,S$GLB,, | Performed by: FAMILY MEDICINE

## 2023-09-12 PROCEDURE — 1157F ADVNC CARE PLAN IN RCRD: CPT | Mod: CPTII,S$GLB,, | Performed by: FAMILY MEDICINE

## 2023-09-12 PROCEDURE — 3044F HG A1C LEVEL LT 7.0%: CPT | Mod: CPTII,S$GLB,, | Performed by: FAMILY MEDICINE

## 2023-09-12 PROCEDURE — 3066F NEPHROPATHY DOC TX: CPT | Mod: CPTII,S$GLB,, | Performed by: FAMILY MEDICINE

## 2023-09-12 PROCEDURE — 3066F PR DOCUMENTATION OF TREATMENT FOR NEPHROPATHY: ICD-10-PCS | Mod: CPTII,S$GLB,, | Performed by: FAMILY MEDICINE

## 2023-09-12 PROCEDURE — 3074F SYST BP LT 130 MM HG: CPT | Mod: CPTII,S$GLB,, | Performed by: FAMILY MEDICINE

## 2023-09-12 PROCEDURE — 99214 PR OFFICE/OUTPT VISIT, EST, LEVL IV, 30-39 MIN: ICD-10-PCS | Mod: S$GLB,,, | Performed by: FAMILY MEDICINE

## 2023-09-12 PROCEDURE — 3074F PR MOST RECENT SYSTOLIC BLOOD PRESSURE < 130 MM HG: ICD-10-PCS | Mod: CPTII,S$GLB,, | Performed by: FAMILY MEDICINE

## 2023-09-12 PROCEDURE — 90694 FLU VACCINE - QUADRIVALENT - ADJUVANTED: ICD-10-PCS | Mod: S$GLB,,, | Performed by: FAMILY MEDICINE

## 2023-09-12 PROCEDURE — 1159F PR MEDICATION LIST DOCUMENTED IN MEDICAL RECORD: ICD-10-PCS | Mod: CPTII,S$GLB,, | Performed by: FAMILY MEDICINE

## 2023-09-12 PROCEDURE — 99214 OFFICE O/P EST MOD 30 MIN: CPT | Mod: S$GLB,,, | Performed by: FAMILY MEDICINE

## 2023-09-12 PROCEDURE — 1159F MED LIST DOCD IN RCRD: CPT | Mod: CPTII,S$GLB,, | Performed by: FAMILY MEDICINE

## 2023-09-12 PROCEDURE — 4010F PR ACE/ARB THEARPY RXD/TAKEN: ICD-10-PCS | Mod: CPTII,S$GLB,, | Performed by: FAMILY MEDICINE

## 2023-09-12 PROCEDURE — 3288F PR FALLS RISK ASSESSMENT DOCUMENTED: ICD-10-PCS | Mod: CPTII,S$GLB,, | Performed by: FAMILY MEDICINE

## 2023-09-12 PROCEDURE — 1101F PR PT FALLS ASSESS DOC 0-1 FALLS W/OUT INJ PAST YR: ICD-10-PCS | Mod: CPTII,S$GLB,, | Performed by: FAMILY MEDICINE

## 2023-09-12 PROCEDURE — 1126F PR PAIN SEVERITY QUANTIFIED, NO PAIN PRESENT: ICD-10-PCS | Mod: CPTII,S$GLB,, | Performed by: FAMILY MEDICINE

## 2023-09-12 PROCEDURE — 1157F PR ADVANCE CARE PLAN OR EQUIV PRESENT IN MEDICAL RECORD: ICD-10-PCS | Mod: CPTII,S$GLB,, | Performed by: FAMILY MEDICINE

## 2023-09-12 PROCEDURE — 90694 VACC AIIV4 NO PRSRV 0.5ML IM: CPT | Mod: S$GLB,,, | Performed by: FAMILY MEDICINE

## 2023-09-12 PROCEDURE — 3008F PR BODY MASS INDEX (BMI) DOCUMENTED: ICD-10-PCS | Mod: CPTII,S$GLB,, | Performed by: FAMILY MEDICINE

## 2023-09-12 PROCEDURE — G0008 FLU VACCINE - QUADRIVALENT - ADJUVANTED: ICD-10-PCS | Mod: S$GLB,,, | Performed by: FAMILY MEDICINE

## 2023-09-12 PROCEDURE — 99999 PR PBB SHADOW E&M-EST. PATIENT-LVL IV: CPT | Mod: PBBFAC,,, | Performed by: FAMILY MEDICINE

## 2023-09-12 PROCEDURE — 3008F BODY MASS INDEX DOCD: CPT | Mod: CPTII,S$GLB,, | Performed by: FAMILY MEDICINE

## 2023-09-12 PROCEDURE — 1126F AMNT PAIN NOTED NONE PRSNT: CPT | Mod: CPTII,S$GLB,, | Performed by: FAMILY MEDICINE

## 2023-09-12 PROCEDURE — 3062F PR POS MACROALBUMINURIA RESULT DOCUMENTED/REVIEW: ICD-10-PCS | Mod: CPTII,S$GLB,, | Performed by: FAMILY MEDICINE

## 2023-09-12 PROCEDURE — G0008 ADMIN INFLUENZA VIRUS VAC: HCPCS | Mod: S$GLB,,, | Performed by: FAMILY MEDICINE

## 2023-09-12 PROCEDURE — 3288F FALL RISK ASSESSMENT DOCD: CPT | Mod: CPTII,S$GLB,, | Performed by: FAMILY MEDICINE

## 2023-09-12 NOTE — PROGRESS NOTES
(Portions of this note were dictated using voice recognition software and may contain dictation related errors in spelling/grammar/syntax not found on text review)    CC:   Chief Complaint   Patient presents with    Follow-up     Hospital due to positive  C. diff     Urinary Frequency       HPI: 68 y.o. male   Medical follow-up visit on 04/28/2023.  Medical history is significant for venous stasis ulcers, diabetes type 2 on insulin, gout, dyslipidemia, recurrent TCC bladder, hypertension    In May had gone to ER for diarrhea with significant weakness.  Did have BROOKLYN, creatinine up to 1.9 from 0.9.  Potassium was 5.4, sodium 135.  Normal hemoglobin, WBC, platelets.  Was given 2 L of normal saline, discharged.      Had subsequent stool testing done which was positive for C diff antigen, toxin negative, PCR positive.  Given his symptoms and above testing, he was prescribed vancomycin orally q.i.d. for 10 days    Repeat labs were done last week as below showing improvement of renal function.  A1c is well controlled at 6.3    Overall feels better.  Diarrhea is resolved.  Working out if physical therapy center for his hips which seem to be doing well.  Does have some chronic urinary frequency given his bladder issues.  No worse than usual.  Sometimes does find that he has to wake up several times in the middle the night to urinate and has some difficulty with sleeping.  No dysuria, hematuria.  Takes Flomax 0.4 mg nightly  Past Medical History:   Diagnosis Date    BPH (benign prostatic hyperplasia)     Cancer     bladder    Diabetes     type 2    Diabetes mellitus, type 2     Gout     High cholesterol     Hypertension     Malignant neoplasm of urinary bladder 3/11/2020    Sciatica 2016    had injection 3 weeks ago    Urinary tract infection        Past Surgical History:   Procedure Laterality Date    APPENDECTOMY  1968    BALLOON DILATION OF URETER Left 9/18/2019    Procedure: DILATION, URETER, USING BALLOON;  Surgeon:  Monty Cee MD;  Location: [facility];  Service: Urology;  Laterality: Left;    BARBOTAGE OF BLADDER N/A 8/14/2019    Procedure: BARBOTAGE, BLADDER;  Surgeon: Monty Cee MD;  Location: [facility] OR;  Service: Urology;  Laterality: N/A;    BARBOTAGE OF URETER Bilateral 8/14/2019    Procedure: BARBOTAGE, URETER;  Surgeon: Monty Cee MD;  Location: [facility] OR;  Service: Urology;  Laterality: Bilateral;    BARBOTAGE OF URETER Left 9/18/2019    Procedure: BARBOTAGE, URETER;  Surgeon: Monty Cee MD;  Location: [facility] OR;  Service: Urology;  Laterality: Left;  left renal/ ureteral washing.    BLADDER SURGERY      COLONOSCOPY N/A 7/10/2018    Procedure: COLONOSCOPY;  Surgeon: Azalea Sanchez MD;  Location: [facility];  Service: Endoscopy;  Laterality: N/A;  [phone]    CYSTOSCOPY      CYSTOSCOPY W/ RETROGRADES Bilateral 8/14/2019    Procedure: CYSTOSCOPY, WITH RETROGRADE PYELOGRAM;  Surgeon: Monty Cee MD;  Location: [facility] OR;  Service: Urology;  Laterality: Bilateral;    CYSTOSCOPY W/ RETROGRADES Left 9/18/2019    Procedure: CYSTOSCOPY, WITH RETROGRADE PYELOGRAM;  Surgeon: Monty Cee MD;  Location: [facility];  Service: Urology;  Laterality: Left;  cystourethroscopy with left retrograde pyelogram     CYSTOSCOPY W/ RETROGRADES Bilateral 3/11/2020    Procedure: CYSTOSCOPY, WITH RETROGRADE PYELOGRAM Ureteral renal washings and bladder washings;  Surgeon: Monty Cee MD;  Location: [facility] OR;  Service: Urology;  Laterality: Bilateral;    CYSTOSCOPY WITH URETEROSCOPY, RETROGRADE PYELOGRAPHY, AND INSERTION OF STENT Bilateral 6/22/2020    Procedure: CYSTOSCOPY, WITH RETROGRADE PYELOGRAM AND URETERAL STENT INSERTION;  Surgeon: Monty Cee MD;  Location: [facility] OR;  Service: Urology;  Laterality: Bilateral;    CYSTOSCOPY WITH URETHRAL DILATION N/A 6/10/2021    Procedure: CYSTOSCOPY, WITH URETHRAL DILATION;  Surgeon: Monty Cee MD;   Location: [facility] OR;  Service: Urology;  Laterality: N/A;    HAND SURGERY Right 12/08/2016    trigger finger    INSTILLATION OF URINARY BLADDER N/A 3/11/2020    Procedure: INSTILLATION, BLADDER - mitomyin - c;  Surgeon: Monty Cee MD;  Location: [facility] OR;  Service: Urology;  Laterality: N/A;    INSTILLATION OF URINARY BLADDER  6/22/2020    Procedure: INSTILLATION, BLADDER;  Surgeon: Monty Cee MD;  Location: [facility] OR;  Service: Urology;;  mitomycin 40mg    LASER ABLATION Left 9/18/2019    Procedure: ABLATION, USING LASER;  Surgeon: Monty Cee MD;  Location: [facility] OR;  Service: Urology;  Laterality: Left;  laser ablation of lower pole calyceal transitional cell lesion    PYELOSCOPY Left 9/18/2019    Procedure: PYELOSCOPY;  Surgeon: Monty Cee MD;  Location: [facility] OR;  Service: Urology;  Laterality: Left;  Left ureteral pyeloscopy    TONSILLECTOMY  1960    TRANSURETHRAL RESECTION OF PROSTATE N/A 9/18/2019    Procedure: TURP (TRANSURETHRAL RESECTION OF PROSTATE);  Surgeon: Monty Cee MD;  Location: [facility] OR;  Service: Urology;  Laterality: N/A;  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    TRANSURETHRAL RESECTION OF PROSTATE USING BIPOLAR CAUTERY N/A 3/11/2020    Procedure: TURP, USING BIPOLAR CAUTERY;  Surgeon: Monty Cee MD;  Location: [facility] OR;  Service: Urology;  Laterality: N/A;    TRANSURETHRAL SURGICAL REMOVAL OF STRICTURE OF BLADDER NECK  8/14/2019    Procedure: EXCISION, CONTRACTURE, BLADDER NECK, TRANSURETHRAL;  Surgeon: Monty Cee MD;  Location: [facility] OR;  Service: Urology;;    TRANSURETHRAL SURGICAL REMOVAL OF STRICTURE OF BLADDER NECK N/A 6/10/2021    Procedure: EXCISION, CONTRACTURE, BLADDER NECK, TRANSURETHRAL;  Surgeon: Monty Cee MD;  Location: [facility] OR;  Service: Urology;  Laterality: N/A;  using laser    URETEROSCOPY Left 9/18/2019    Procedure: URETEROSCOPY;  Surgeon: Monty Cee MD;  Location: [facility] OR;  Service: Urology;   Laterality: Left;    URETEROSCOPY Right 2020    Procedure: URETEROSCOPY;  Surgeon: Monty Cee MD;  Location: [facility];  Service: Urology;  Laterality: Right;       Family History   Problem Relation Age of Onset    Diabetes Mother     Heart attack Father     Coronary artery disease Father     Arthritis Sister     No Known Problems Brother     Hyperlipidemia Son     Hypertension Son     No Known Problems Maternal Grandmother     No Known Problems Maternal Grandfather     No Known Problems Paternal Grandmother     Coronary artery disease Paternal Grandfather     Heart attack Paternal Grandfather     Prostate cancer Neg Hx     Kidney disease Neg Hx        Social History     Tobacco Use    Smoking status: Former     Types: Pipe     Quit date:      Years since quittin.7    Smokeless tobacco: Never    Tobacco comments:     quit 40 yrs ago   Substance Use Topics    Alcohol use: Yes     Alcohol/week: 0.0 standard drinks of alcohol     Comment: rarely    Drug use: No       Lab Results   Component Value Date    WBC 9.83 2023    HGB 14.8 2023    HCT 47.0 2023    MCV 87 2023     2023    CHOL 126 2023    TRIG 86 2023    HDL 42 2023    ALT 20 2023    AST 22 2023    BILITOT 0.5 2023    ALKPHOS 63 2023     2023    K 3.9 2023    CL 99 2023    CREATININE 1.12 2023    ESTGFRAFRICA >60.0 2022    EGFRNONAA >60.0 2022    EGFRNORACEVR >60.0 2023    CALCIUM 9.6 2023    ALBUMIN 4.1 2023    BUN 23 (H) 2023    CO2 28 2023    TSH 2.490 2022    PSA 0.27 2023    PSADIAG 1.6 2017    HGBA1C 6.3 (H) 2023    MICALBCREAT 430.8 (H) 2023    LDLCALC 66.8 2023     2023                 Vital signs reviewed  Vitals:    23 1337   BP: 128/68   BP Location: Right arm   Patient Position: Sitting   BP Method: Medium (Manual)   Pulse: 98    SpO2: 95%   Weight: 118.2 kg (260 lb 9.3 oz)   Height: 6' (1.829 m)       Wt Readings from Last 4 Encounters:   09/12/23 118.2 kg (260 lb 9.3 oz)   08/24/23 121.6 kg (268 lb)   07/26/23 121.7 kg (268 lb 4.8 oz)   05/10/23 116.1 kg (256 lb)       PE:   APPEARANCE: Well nourished, well developed, in no acute distress.    HEAD: Normocephalic, atraumatic.  EYES:Conjunctivae noninjected.  CHEST: Good inspiratory effort. Lungs clear to auscultation with no wheezes or crackles.  CARDIOVASCULAR: Normal S1, S2. No rubs, murmurs, or gallops.  ABDOMEN: Bowel sounds normal. Not distended. Soft. No tenderness or masses. No organomegaly.  EXTREMITIES:  Compression stockings in place,      IMPRESSION  1. BROOKLYN (acute kidney injury)    2. Hyperkalemia    3. Type 2 diabetes mellitus with diabetic polyneuropathy, with long-term current use of insulin    4. C. difficile diarrhea    5. History of bladder cancer    6. Severe obesity (BMI 35.0-39.9) with comorbidity            PLAN  Orders Placed This Encounter   Procedures    Influenza - Quadrivalent - High Dose (65+) (PF) (IM)     C diff diarrhea, resolved status post vancomycin orally.  Discussed no need to retest unless symptoms recur     Blood pressure stable     Diabetes stable     Can try to increase Flomax 0.8 mg nightly to see if this will help with some of his nocturia issues.  Continue follow-up with Urology as directed.  Counseled on watching out for orthostatic hypotension when initiating increase.  If beneficial, can send me a message to have this prescription updated.           SCREENINGS       Immunizations:      COVID x3, encourage booster  Flu up-to-date  Tdap 2017  Zostavax 2017, can get updated shingles vaccine at pharmacy  Prevnar 13:2015  Pneumovax 23:2016  Prevnar 20 07/15/2022   Flu shot today    Age/demographic appropriate health maintenance:  Colonoscopy 07/10/2018:  2 mm cecal polyp, otherwise normal, tubular adenoma, 5 year recall, scheduled for oct  PSA:   July 2022

## 2023-10-02 RX ORDER — SPIRONOLACTONE 25 MG/1
25 TABLET ORAL DAILY
Qty: 90 TABLET | Refills: 3 | Status: SHIPPED | OUTPATIENT
Start: 2023-10-02 | End: 2024-10-01

## 2023-10-02 NOTE — TELEPHONE ENCOUNTER
Refill Routing Note   Medication(s) are not appropriate for processing by [facility] for the following reason(s):      No active prescription written by provider    [facility] action(s):  Defer Care Due:  None identified            Appointments  past 12m or future 3m with PCP    Date Provider   Last Visit   9/12/2023 Lon Brock MD   Next Visit   3/12/2024 Lon Brock MD   ED visits in past 90 days: 0        Note composed:11:00 AM 10/02/2023

## 2023-10-02 NOTE — TELEPHONE ENCOUNTER
Care Due:                  Date            Visit Type   Department     Provider  --------------------------------------------------------------------------------                                EP -                              [facility]  Last Visit: 09-      CARE ([facility])   [facility]       Lonrefugio Brock                              [facility]  Next Visit: 03-      CARE ([facility])   [facility]                                                            Last  Test          Frequency    Reason                     Performed    Due Date  --------------------------------------------------------------------------------    Uric Acid...  12 months..  allopurinoL..............  07-   07-    Health [facility] Embedded Care Due Messages. Reference number: [id number].   10/02/2023 10:56:32 AM CDT

## 2023-11-02 NOTE — TELEPHONE ENCOUNTER
Refill Decision Note   Andrew Ruby  is requesting a refill authorization.  Brief Assessment and Rationale for Refill:  Approve     Medication Therapy Plan:         Comments:     Note composed:5:07 PM 11/02/2023             Appointments     Last Visit   9/12/2023 Lon Brock MD   Next Visit   3/12/2024 Lon Brock MD

## 2023-11-02 NOTE — TELEPHONE ENCOUNTER
No care due was identified.  Health [facility] Embedded Care Due Messages. Reference number: [id number].   11/02/2023 12:06:22 PM CDT

## 2023-11-27 DIAGNOSIS — M10.9 GOUT OF FOOT, UNSPECIFIED CAUSE, UNSPECIFIED CHRONICITY, UNSPECIFIED LATERALITY: ICD-10-CM

## 2023-11-27 RX ORDER — ALLOPURINOL 300 MG/1
300 TABLET ORAL DAILY
Qty: 90 TABLET | Refills: 4 | Status: SHIPPED | OUTPATIENT
Start: 2023-11-27

## 2023-11-27 NOTE — TELEPHONE ENCOUNTER
No care due was identified.  [employer] Embedded Care Due Messages. Reference number: [id number].   11/27/2023 10:09:13 AM CST

## 2023-12-05 NOTE — TELEPHONE ENCOUNTER
----- Message from Macrina Lawrence sent at 5/11/2023  8:42 AM CDT -----  Regarding: Stool orders  Good morning,    The patient dropped his stool sample off this morning, but I need you to please put the orders in again.    Thank you,  Macrina     Normal vision: sees adequately in most situations; can see medication labels, newsprint eyeglasses/Normal vision: sees adequately in most situations; can see medication labels, newsprint

## 2023-12-26 NOTE — TELEPHONE ENCOUNTER
Care Due:                  Date            Visit Type   Department     Provider  --------------------------------------------------------------------------------                                EP -                              PRIMARY      [facility] FAMILY  Last Visit: 09-      CARE ([facility])   MEDICINE       Lon Brock  Next Visit: None Scheduled  None         None Found                                                            Last  Test          Frequency    Reason                     Performed    Due Date  --------------------------------------------------------------------------------    HBA1C.......  6 months...  insulin..................  09- 03-    Uric Acid...  12 months..  allopurinoL..............  07-   07-    [employer] Embedded Care Due Messages. Reference number: [id number].   12/26/2023 12:06:06 PM CST

## 2023-12-27 RX ORDER — TAMSULOSIN HYDROCHLORIDE 0.4 MG/1
0.4 CAPSULE ORAL NIGHTLY
Qty: 90 CAPSULE | Refills: 3 | Status: SHIPPED | OUTPATIENT
Start: 2023-12-27

## 2023-12-27 NOTE — TELEPHONE ENCOUNTER
Refill Routing Note   Medication(s) are not appropriate for processing by [facility] for the following reason(s):        Other    [facility] action(s):  Route     Requires labs : Yes      Medication Therapy Plan: Prostate Cancer is on problem list      Appointments  past 12m or future 3m with PCP    Date Provider   Last Visit   9/12/2023 Lon Brock MD   Next Visit   3/12/2024 Lon Brock MD   ED visits in past 90 days: 0        Note composed:11:20 AM 12/27/2023

## 2024-01-17 ENCOUNTER — PATIENT MESSAGE (OUTPATIENT)
Dept: FAMILY MEDICINE | Facility: CLINIC | Age: 69
End: 2024-01-17
Payer: MEDICARE

## 2024-01-17 DIAGNOSIS — E11.42 TYPE 2 DIABETES MELLITUS WITH DIABETIC POLYNEUROPATHY, WITH LONG-TERM CURRENT USE OF INSULIN: Primary | ICD-10-CM

## 2024-01-17 DIAGNOSIS — Z79.4 TYPE 2 DIABETES MELLITUS WITH DIABETIC POLYNEUROPATHY, WITH LONG-TERM CURRENT USE OF INSULIN: Primary | ICD-10-CM

## 2024-01-19 ENCOUNTER — TELEPHONE (OUTPATIENT)
Dept: FAMILY MEDICINE | Facility: CLINIC | Age: 69
End: 2024-01-19
Payer: MEDICARE

## 2024-01-19 DIAGNOSIS — E11.42 TYPE 2 DIABETES MELLITUS WITH DIABETIC POLYNEUROPATHY, WITH LONG-TERM CURRENT USE OF INSULIN: Primary | ICD-10-CM

## 2024-01-19 DIAGNOSIS — Z79.4 TYPE 2 DIABETES MELLITUS WITH DIABETIC POLYNEUROPATHY, WITH LONG-TERM CURRENT USE OF INSULIN: Primary | ICD-10-CM

## 2024-01-19 RX ORDER — DULAGLUTIDE 1.5 MG/.5ML
1.5 INJECTION, SOLUTION SUBCUTANEOUS
Qty: 4 PEN | Refills: 11 | Status: SHIPPED | OUTPATIENT
Start: 2024-01-19 | End: 2024-01-19

## 2024-01-19 RX ORDER — DULAGLUTIDE 4.5 MG/.5ML
4.5 INJECTION, SOLUTION SUBCUTANEOUS
Qty: 4 PEN | Refills: 11 | Status: SHIPPED | OUTPATIENT
Start: 2024-01-19 | End: 2024-04-17

## 2024-01-19 NOTE — TELEPHONE ENCOUNTER
Patient sent message about availability of his Trulicity, was told at 1.5 mg was in stock but upon getting to pharmacy was told that it has not in stock.  Pharmacist sent me a message stating that 4.5 mg was not stock.  Initially on 3 mg.  Called patient needs willing to do the 4.5 mg weekly instead.  Will send prescription over.  Followed by endocrinology for diabetes, on insulin as well.  Did recommend a continue to follow blood sugars and if having any hypoglycemia, may need to decrease his insulin, to notify.

## 2024-01-26 ENCOUNTER — PATIENT MESSAGE (OUTPATIENT)
Dept: FAMILY MEDICINE | Facility: CLINIC | Age: 69
End: 2024-01-26
Payer: MEDICARE

## 2024-01-26 ENCOUNTER — TELEPHONE (OUTPATIENT)
Dept: FAMILY MEDICINE | Facility: CLINIC | Age: 69
End: 2024-01-26
Payer: MEDICARE

## 2024-01-26 DIAGNOSIS — R60.0 BILATERAL LOWER EXTREMITY EDEMA: ICD-10-CM

## 2024-01-26 DIAGNOSIS — I10 ESSENTIAL HYPERTENSION: ICD-10-CM

## 2024-01-26 RX ORDER — CARVEDILOL 12.5 MG/1
12.5 TABLET ORAL 2 TIMES DAILY WITH MEALS
Qty: 180 TABLET | Refills: 3 | Status: SHIPPED | OUTPATIENT
Start: 2024-01-26 | End: 2025-01-25

## 2024-02-22 ENCOUNTER — PATIENT MESSAGE (OUTPATIENT)
Dept: FAMILY MEDICINE | Facility: CLINIC | Age: 69
End: 2024-02-22
Payer: MEDICARE

## 2024-02-22 DIAGNOSIS — E78.2 MIXED HYPERLIPIDEMIA: ICD-10-CM

## 2024-02-22 RX ORDER — ROSUVASTATIN CALCIUM 40 MG/1
TABLET, COATED ORAL
Qty: 90 TABLET | Refills: 3 | Status: SHIPPED | OUTPATIENT
Start: 2024-02-22

## 2024-02-27 LAB
LEFT EYE DM RETINOPATHY: POSITIVE
RIGHT EYE DM RETINOPATHY: POSITIVE

## 2024-03-04 ENCOUNTER — PATIENT OUTREACH (OUTPATIENT)
Dept: ADMINISTRATIVE | Facility: HOSPITAL | Age: 69
End: 2024-03-04
Payer: MEDICARE

## 2024-03-04 DIAGNOSIS — I10 ESSENTIAL HYPERTENSION: ICD-10-CM

## 2024-03-04 RX ORDER — LOSARTAN POTASSIUM 100 MG/1
100 TABLET ORAL DAILY
Qty: 90 TABLET | Refills: 1 | Status: SHIPPED | OUTPATIENT
Start: 2024-03-04

## 2024-03-04 NOTE — TELEPHONE ENCOUNTER
Refill Routing Note   Medication(s) are not appropriate for processing by [facility] for the following reason(s):        Drug-disease interaction    [facility] action(s):  Defer   Requires labs : Yes      Medication Therapy Plan: Acute dehydration    Pharmacist review requested: Yes     Appointments  past 12m or future 3m with PCP    Date Provider   Last Visit   9/12/2023 Lon Brock MD   Next Visit   5/21/2024 Lon Brock MD   ED visits in past 90 days: 0        Note composed:12:30 PM 03/04/2024

## 2024-03-04 NOTE — PROGRESS NOTES
Population Health Chart Review & Patient Outreach Details      Additional Pop Health Notes:    Hyper linked eye exam       Updates Requested / Reviewed:      Updated Care Coordination Note, Care Everywhere, , Care Team Updated, and Immunizations Reconciliation Completed or Queried: [place]         Health Maintenance Topics Overdue:    Health Maintenance Due   Topic Date Due    RSV Vaccine (Age 60+ and Pregnant patients) (1 - 1-dose 60+ series) Never done    COVID-19 Vaccine (4 - 2023-24 season) 09/01/2023    Foot Exam  10/28/2023    Diabetes Urine Screening  01/19/2024    Hemoglobin A1c  03/06/2024         Health Maintenance Topic(s) Outreach Outcomes & Actions Taken:    Eye Exam - Outreach Outcomes & Actions Taken  : Diabetic Eye External Records Uploaded, Care Team & History Updated if Applicable

## 2024-03-04 NOTE — TELEPHONE ENCOUNTER
Care Due:                  Date            Visit Type   Department     Provider  --------------------------------------------------------------------------------                                EP -                              [facility]  Last Visit: 09-      CARE ([facility])   [facility]       Lon Brock                              [facility]  Next Visit: 03-      CARE ([facility])   [facility]                                                            Last  Test          Frequency    Reason                     Performed    Due Date  --------------------------------------------------------------------------------    HBA1C.......  6 months...  dulaglutide, insulin.....  09- 03-    Uric Acid...  12 months..  allopurinoL..............  07-   07-    Health [facility] Embedded Care Due Messages. Reference number: [id number].   3/04/2024 10:46:45 AM CST

## 2024-03-05 NOTE — TELEPHONE ENCOUNTER
Provider Staff:  Action required for this patient     Please see care gap opportunities below in Care Due Message:   Uric acid outdated  A1C due 3/4/24    Thanks!  [facility]     Appointments      Date Provider   Last Visit   9/12/2023 Lon Brock MD   Next Visit   5/21/2024 Lon Brock MD     Refill Decision Note   Andrew Beltran  is requesting a refill authorization.  Brief Assessment and Rationale for Refill:  Approve     Medication Therapy Plan:         Pharmacist review requested: Yes   Extended chart review required: Yes   Comments:     Note composed:6:28 PM 03/04/2024

## 2024-03-13 ENCOUNTER — PROCEDURE VISIT (OUTPATIENT)
Dept: UROLOGY | Facility: CLINIC | Age: 69
End: 2024-03-13
Payer: MEDICARE

## 2024-03-13 DIAGNOSIS — Z85.51 HISTORY OF BLADDER CANCER: Primary | ICD-10-CM

## 2024-03-13 PROCEDURE — 88112 CYTOPATH CELL ENHANCE TECH: CPT | Performed by: PATHOLOGY

## 2024-03-13 PROCEDURE — 52000 CYSTOURETHROSCOPY: CPT | Mod: S$GLB,,, | Performed by: UROLOGY

## 2024-03-13 PROCEDURE — 88112 CYTOPATH CELL ENHANCE TECH: CPT | Mod: 26,,, | Performed by: PATHOLOGY

## 2024-03-13 PROCEDURE — 88120 CYTP URNE 3-5 PROBES EA SPEC: CPT | Performed by: UROLOGY

## 2024-03-13 RX ORDER — CIPROFLOXACIN 500 MG/1
500 TABLET ORAL 2 TIMES DAILY
Qty: 30 TABLET | Refills: 0 | Status: SHIPPED | OUTPATIENT
Start: 2024-03-13 | End: 2024-03-28

## 2024-03-13 NOTE — PROCEDURES
Bladder Catheterization    Date/Time: 3/13/2024 1:30 PM  Location procedure was performed: [facility]    Performed by: Monty Cee MD  Authorized by: Monty Cee MD  Assisting provider: Monty Cee MD  Pre-operative diagnosis: hx of bladder cancer  Post-operative diagnosis: same  Consent Done: Not Needed  Indications: urine specimen collection  Local anesthesia used: yes    Anesthesia:  Local anesthesia used: yes  Local Anesthetic: topical anesthetic  Anesthetic total: 2 mL    Patient sedated: no  Preparation: Patient was prepped and draped in the usual sterile fashion.  Description of findings: normal meatus, urine clear yellow   Catheter insertion: non-indwelling  Catheter type: Dodd  Catheter size: 14 Fr  Complicated insertion: no  Altered anatomy: no  Bladder irrigation: no  Number of attempts: 1  Urine volume: 30 ml  Urine characteristics: yellow and clear  Technical procedures used: none  Significant surgical tasks conducted by the assistant(s): none  Complications: No  Estimated blood loss (mL): 0  Specimens: No  Implants: No  Patient tolerance: Patient tolerated the procedure well with no immediate complications

## 2024-03-13 NOTE — PATIENT INSTRUCTIONS
Cipro x 5 days  F/U 6 months with Cysto ,FISH and Cytology  Check Cytology and FISH today and notify pt of rsults

## 2024-03-13 NOTE — PROCEDURES
Cystoscopy    Date/Time: 3/13/2024 1:30 PM    Performed by: Monty Cee MD  Authorized by: Monty Cee MD    Consent Done?:  Yes (Written)  Timeout: prior to procedure the correct patient, procedure, and site was verified    Prep: patient was prepped and draped in usual sterile fashion    Local anesthesia used?: Yes    Anesthesia:  Intraurethral instillation  Local anesthetic:  Lidocaine 2% topical gel  Anesthetic total (ml):  8  Indications: history bladder cancer    Position:  Dorsal lithotomy  Anesthesia:  Intraurethral instillation  Patient sedated?: No    Preparation: Patient was prepped and draped in usual sterile fashion    Scope type:  Flexible cystoscopeNoNo  Stent inserted: No    Stent removed: No    External exam normal: Yes    Digital exam performed: No    Urethra normal: Yes    Prostate normal: Yes (TUR Defect)    Bladder neck normal: Yes    Bladder normal: No (Multiple inflamed areas.)    Comments:      Await Cytology and Fish

## 2024-03-15 ENCOUNTER — PATIENT MESSAGE (OUTPATIENT)
Dept: UROLOGY | Facility: CLINIC | Age: 69
End: 2024-03-15
Payer: MEDICARE

## 2024-03-15 LAB
FINAL PATHOLOGIC DIAGNOSIS: NORMAL
Lab: NORMAL

## 2024-03-21 ENCOUNTER — PATIENT MESSAGE (OUTPATIENT)
Dept: UROLOGY | Facility: CLINIC | Age: 69
End: 2024-03-21
Payer: MEDICARE

## 2024-03-27 DIAGNOSIS — E11.9 TYPE 2 DIABETES MELLITUS WITHOUT COMPLICATION: ICD-10-CM

## 2024-04-17 ENCOUNTER — PATIENT MESSAGE (OUTPATIENT)
Dept: FAMILY MEDICINE | Facility: CLINIC | Age: 69
End: 2024-04-17
Payer: MEDICARE

## 2024-04-17 DIAGNOSIS — Z79.4 TYPE 2 DIABETES MELLITUS WITH DIABETIC POLYNEUROPATHY, WITH LONG-TERM CURRENT USE OF INSULIN: Primary | ICD-10-CM

## 2024-04-17 DIAGNOSIS — E11.42 TYPE 2 DIABETES MELLITUS WITH DIABETIC POLYNEUROPATHY, WITH LONG-TERM CURRENT USE OF INSULIN: Primary | ICD-10-CM

## 2024-04-17 RX ORDER — DULAGLUTIDE 3 MG/.5ML
3 INJECTION, SOLUTION SUBCUTANEOUS
Qty: 4 PEN | Refills: 11 | Status: SHIPPED | OUTPATIENT
Start: 2024-04-17 | End: 2024-05-21

## 2024-05-01 DIAGNOSIS — I10 ESSENTIAL HYPERTENSION: ICD-10-CM

## 2024-05-01 RX ORDER — FUROSEMIDE 20 MG/1
20 TABLET ORAL DAILY
Qty: 90 TABLET | Refills: 1 | Status: SHIPPED | OUTPATIENT
Start: 2024-05-01

## 2024-05-01 NOTE — TELEPHONE ENCOUNTER
No care due was identified.  [facility] Embedded Care Due Messages. Reference number: [id number].   5/01/2024 5:08:44 AM CDT

## 2024-05-01 NOTE — TELEPHONE ENCOUNTER
Refill Decision Note   Andrew Beltran  is requesting a refill authorization.  Brief Assessment and Rationale for Refill:  Approve     Medication Therapy Plan:         Pharmacist review requested: Yes   Extended chart review required: Yes   Comments:     Note composed:4:50 PM 05/01/2024

## 2024-05-01 NOTE — TELEPHONE ENCOUNTER
Refill Routing Note   Medication(s) are not appropriate for processing by [facility] for the following reason(s):        Drug-drug interaction  Drug-disease interaction:     [facility] action(s):  Defer      Medication Therapy Plan:       Pharmacist review requested: Yes     Appointments  past 12m or future 3m with PCP    Date Provider   Last Visit   9/12/2023 Lon Brock MD   Next Visit   5/21/2024 Lon Brock MD   ED visits in past 90 days: 0        Note composed:4:11 PM 05/01/2024

## 2024-05-21 ENCOUNTER — OFFICE VISIT (OUTPATIENT)
Dept: FAMILY MEDICINE | Facility: CLINIC | Age: 69
End: 2024-05-21
Payer: MEDICARE

## 2024-05-21 VITALS
OXYGEN SATURATION: 96 % | SYSTOLIC BLOOD PRESSURE: 126 MMHG | HEIGHT: 69 IN | WEIGHT: 266.75 LBS | BODY MASS INDEX: 39.51 KG/M2 | DIASTOLIC BLOOD PRESSURE: 72 MMHG | TEMPERATURE: 98 F | HEART RATE: 92 BPM

## 2024-05-21 DIAGNOSIS — Z00.00 ROUTINE GENERAL MEDICAL EXAMINATION AT A HEALTH CARE FACILITY: Primary | ICD-10-CM

## 2024-05-21 DIAGNOSIS — I87.333 CHRONIC VENOUS HYPERTENSION (IDIOPATHIC) WITH ULCER AND INFLAMMATION OF BILATERAL LOWER EXTREMITY: ICD-10-CM

## 2024-05-21 DIAGNOSIS — E11.42 TYPE 2 DIABETES MELLITUS WITH DIABETIC POLYNEUROPATHY, WITH LONG-TERM CURRENT USE OF INSULIN: ICD-10-CM

## 2024-05-21 DIAGNOSIS — Z85.51 HISTORY OF BLADDER CANCER: ICD-10-CM

## 2024-05-21 DIAGNOSIS — Z12.5 SCREENING FOR MALIGNANT NEOPLASM OF PROSTATE: ICD-10-CM

## 2024-05-21 DIAGNOSIS — C61 TRANSITIONAL CELL CARCINOMA OF PROSTATE: ICD-10-CM

## 2024-05-21 DIAGNOSIS — I10 ESSENTIAL HYPERTENSION: ICD-10-CM

## 2024-05-21 DIAGNOSIS — M10.9 GOUT, UNSPECIFIED CAUSE, UNSPECIFIED CHRONICITY, UNSPECIFIED SITE: ICD-10-CM

## 2024-05-21 DIAGNOSIS — E66.01 SEVERE OBESITY (BMI 35.0-39.9) WITH COMORBIDITY: ICD-10-CM

## 2024-05-21 DIAGNOSIS — Z79.4 TYPE 2 DIABETES MELLITUS WITH DIABETIC POLYNEUROPATHY, WITH LONG-TERM CURRENT USE OF INSULIN: ICD-10-CM

## 2024-05-21 DIAGNOSIS — E78.2 MIXED HYPERLIPIDEMIA: ICD-10-CM

## 2024-05-21 PROCEDURE — 4010F ACE/ARB THERAPY RXD/TAKEN: CPT | Mod: CPTII,S$GLB,, | Performed by: FAMILY MEDICINE

## 2024-05-21 PROCEDURE — 3008F BODY MASS INDEX DOCD: CPT | Mod: CPTII,S$GLB,, | Performed by: FAMILY MEDICINE

## 2024-05-21 PROCEDURE — 3288F FALL RISK ASSESSMENT DOCD: CPT | Mod: CPTII,S$GLB,, | Performed by: FAMILY MEDICINE

## 2024-05-21 PROCEDURE — 99999 PR PBB SHADOW E&M-EST. PATIENT-LVL IV: CPT | Mod: PBBFAC,,, | Performed by: FAMILY MEDICINE

## 2024-05-21 PROCEDURE — 1157F ADVNC CARE PLAN IN RCRD: CPT | Mod: CPTII,S$GLB,, | Performed by: FAMILY MEDICINE

## 2024-05-21 PROCEDURE — 1126F AMNT PAIN NOTED NONE PRSNT: CPT | Mod: CPTII,S$GLB,, | Performed by: FAMILY MEDICINE

## 2024-05-21 PROCEDURE — 3078F DIAST BP <80 MM HG: CPT | Mod: CPTII,S$GLB,, | Performed by: FAMILY MEDICINE

## 2024-05-21 PROCEDURE — 1101F PT FALLS ASSESS-DOCD LE1/YR: CPT | Mod: CPTII,S$GLB,, | Performed by: FAMILY MEDICINE

## 2024-05-21 PROCEDURE — 3074F SYST BP LT 130 MM HG: CPT | Mod: CPTII,S$GLB,, | Performed by: FAMILY MEDICINE

## 2024-05-21 PROCEDURE — 99397 PER PM REEVAL EST PAT 65+ YR: CPT | Mod: S$GLB,,, | Performed by: FAMILY MEDICINE

## 2024-05-21 PROCEDURE — 1159F MED LIST DOCD IN RCRD: CPT | Mod: CPTII,S$GLB,, | Performed by: FAMILY MEDICINE

## 2024-05-21 RX ORDER — SEMAGLUTIDE 1.34 MG/ML
1 INJECTION, SOLUTION SUBCUTANEOUS
Qty: 3 ML | Refills: 11 | Status: SHIPPED | OUTPATIENT
Start: 2024-05-21

## 2024-05-21 NOTE — PROGRESS NOTES
(Portions of this note were dictated using voice recognition software and may contain dictation related errors in spelling/grammar/syntax not found on text review)    CC:   Chief Complaint   Patient presents with    Follow-up       HPI: 68 y.o. male   Medical follow-up visit on September 2023 Medical history is significant for venous stasis ulcers, diabetes type 2 on insulin, gout, dyslipidemia, recurrent TCC bladder, hypertension    Following with  wound care for chronic venous insufficiency with venous stasis ulcers bilateral lower extremities.  . Had normal BRISA.  Prior followup with Podiatry, Dr. Murrieta.  Ulcers have healed.  Wears compression stockings regularly.  Has not seen Podiatry in awhile.     Diabetes type 2 uncontrolled.  Since has established with endocrinology, 5/2023  On Humulin 70/30, was at 55 units b.i.d..    He was diagnosed with diabetes over 20 years ago, states that he has been on 70 30 since that time.  On Trulicity, titrated up to 3 mg weekly .  70/30 dose down to 45 units daily.  Denies any hypoglycemia concerns.  Has had difficulty getting Trulicity however.  Sometimes his able to get 3 mg, sometimes 4.5.      Eye exam up-to-date  , Dr. Scott cyr, prophylaxed with allopurinol, no recent issues     Dyslipidemia on Crestor 40 mg daily     Hypertension on losartan 100 mg daily, carvedilol 12.5 mg b.i.d.  , Aldactone 25 mg daily, Lasix 20 mg daily.  Follows with Cardiology     History of recurrent TCC bladder, follows with Urology., had recurrent resection and mitomycin-C installation.  Last cystoscopy was March 2024.  Inflammation noted.    Urine cytology was negative for malignant cells.   Takes flomax.        Past Medical History:   Diagnosis Date    BPH (benign prostatic hyperplasia)     Cancer     bladder    Diabetes     type 2    Diabetes mellitus, type 2     Gout     High cholesterol     Hypertension     Malignant neoplasm of urinary bladder 3/11/2020    Sciatica 2016    had  injection 3 weeks ago    Urinary tract infection        Past Surgical History:   Procedure Laterality Date    APPENDECTOMY  1968    BALLOON DILATION OF URETER Left 9/18/2019    Procedure: DILATION, URETER, USING BALLOON;  Surgeon: Monty Cee MD;  Location: [facility];  Service: Urology;  Laterality: Left;    BARBOTAGE OF BLADDER N/A 8/14/2019    Procedure: BARBOTAGE, BLADDER;  Surgeon: Monty Cee MD;  Location: [facility] OR;  Service: Urology;  Laterality: N/A;    BARBOTAGE OF URETER Bilateral 8/14/2019    Procedure: BARBOTAGE, URETER;  Surgeon: Monty Cee MD;  Location: [facility];  Service: Urology;  Laterality: Bilateral;    BARBOTAGE OF URETER Left 9/18/2019    Procedure: BARBOTAGE, URETER;  Surgeon: Monty Cee MD;  Location: [facility];  Service: Urology;  Laterality: Left;  left renal/ ureteral washing.    BLADDER SURGERY      COLONOSCOPY N/A 7/10/2018    Procedure: COLONOSCOPY;  Surgeon: Azalea Sanchez MD;  Location: [facility];  Service: Endoscopy;  Laterality: N/A;  [phone]    COLONOSCOPY N/A 10/5/2023    Procedure: COLONOSCOPY;  Surgeon: MAINOR El MD;  Location: [facility];  Service: Endoscopy;  Laterality: N/A;    CYSTOSCOPY      CYSTOSCOPY W/ RETROGRADES Bilateral 8/14/2019    Procedure: CYSTOSCOPY, WITH RETROGRADE PYELOGRAM;  Surgeon: Monty Cee MD;  Location: [facility];  Service: Urology;  Laterality: Bilateral;    CYSTOSCOPY W/ RETROGRADES Left 9/18/2019    Procedure: CYSTOSCOPY, WITH RETROGRADE PYELOGRAM;  Surgeon: Monty Cee MD;  Location: [facility];  Service: Urology;  Laterality: Left;  cystourethroscopy with left retrograde pyelogram     CYSTOSCOPY W/ RETROGRADES Bilateral 3/11/2020    Procedure: CYSTOSCOPY, WITH RETROGRADE PYELOGRAM Ureteral renal washings and bladder washings;  Surgeon: Monty Cee MD;  Location: [facility];  Service: Urology;  Laterality: Bilateral;    CYSTOSCOPY WITH  URETEROSCOPY, RETROGRADE PYELOGRAPHY, AND INSERTION OF STENT Bilateral 6/22/2020    Procedure: CYSTOSCOPY, WITH RETROGRADE PYELOGRAM AND URETERAL STENT INSERTION;  Surgeon: Monty Cee MD;  Location: [facility] OR;  Service: Urology;  Laterality: Bilateral;    CYSTOSCOPY WITH URETHRAL DILATION N/A 6/10/2021    Procedure: CYSTOSCOPY, WITH URETHRAL DILATION;  Surgeon: Monty Cee MD;  Location: [facility] OR;  Service: Urology;  Laterality: N/A;    HAND SURGERY Right 12/08/2016    trigger finger    INSTILLATION OF URINARY BLADDER N/A 3/11/2020    Procedure: INSTILLATION, BLADDER - mitomyin - c;  Surgeon: Monty Cee MD;  Location: [facility] OR;  Service: Urology;  Laterality: N/A;    INSTILLATION OF URINARY BLADDER  6/22/2020    Procedure: INSTILLATION, BLADDER;  Surgeon: Monty Cee MD;  Location: [facility] OR;  Service: Urology;;  mitomycin 40mg    LASER ABLATION Left 9/18/2019    Procedure: ABLATION, USING LASER;  Surgeon: Monty Cee MD;  Location: [facility] OR;  Service: Urology;  Laterality: Left;  laser ablation of lower pole calyceal transitional cell lesion    PYELOSCOPY Left 9/18/2019    Procedure: PYELOSCOPY;  Surgeon: Monty Cee MD;  Location: [facility];  Service: Urology;  Laterality: Left;  Left ureteral pyeloscopy    TONSILLECTOMY  1960    TRANSURETHRAL RESECTION OF PROSTATE N/A 9/18/2019    Procedure: TURP (TRANSURETHRAL RESECTION OF PROSTATE);  Surgeon: Monty Cee MD;  Location: [facility];  Service: Urology;  Laterality: N/A;  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    TRANSURETHRAL RESECTION OF PROSTATE USING BIPOLAR CAUTERY N/A 3/11/2020    Procedure: TURP, USING BIPOLAR CAUTERY;  Surgeon: Monty Cee MD;  Location: [facility];  Service: Urology;  Laterality: N/A;    TRANSURETHRAL SURGICAL REMOVAL OF STRICTURE OF BLADDER NECK  8/14/2019    Procedure: EXCISION, CONTRACTURE, BLADDER NECK, TRANSURETHRAL;  Surgeon: Monty Cee MD;  Location: [facility];   Service: Urology;;    TRANSURETHRAL SURGICAL REMOVAL OF STRICTURE OF BLADDER NECK N/A 6/10/2021    Procedure: EXCISION, CONTRACTURE, BLADDER NECK, TRANSURETHRAL;  Surgeon: Monty Cee MD;  [facility];  Service: Urology;  Laterality: N/A;  using laser    URETEROSCOPY Left 2019    Procedure: URETEROSCOPY;  Surgeon: Monty Cee MD;  [facility];  Service: Urology;  Laterality: Left;    URETEROSCOPY Right 2020    Procedure: URETEROSCOPY;  Surgeon: Monty Cee MD;  [facility];  Service: Urology;  Laterality: Right;       Family History   Problem Relation Name Age of Onset    Diabetes Mother      Heart attack Father      Coronary artery disease Father      Arthritis Sister x1     No Known Problems Brother x1     Hyperlipidemia Son x3     Hypertension Son x3     No Known Problems Maternal Grandmother 90s     No Known Problems Maternal Grandfather 90s     No Known Problems Paternal Grandmother 50s     Coronary artery disease Paternal Grandfather      Heart attack Paternal Grandfather      Prostate cancer Neg Hx      Kidney disease Neg Hx         Social History     Tobacco Use    Smoking status: Former     Types: Pipe     Quit date:      Years since quittin.4    Smokeless tobacco: Never    Tobacco comments:     quit 40 yrs ago   Substance Use Topics    Alcohol use: Yes     Alcohol/week: 0.0 standard drinks of alcohol     Comment: rarely    Drug use: Never       Lab Results   Component Value Date    WBC 9.83 2023    HGB 14.8 2023    HCT 47.0 2023    MCV 87 2023     2023    CHOL 126 2023    TRIG 86 2023    HDL 42 2023    ALT 20 2023    AST 22 2023    BILITOT 0.5 2023    ALKPHOS 63 2023     2023    K 3.9 2023    CL 99 2023    CREATININE 1.12 2023    ESTGFRAFRICA >60.0 2022    EGFRNONAA >60.0 2022    EGFRNORACEVR >60.0 2023    CALCIUM 9.6 2023  "   ALBUMIN 4.1 09/06/2023    BUN 23 (H) 09/06/2023    CO2 28 09/06/2023    TSH 2.490 07/20/2022    PSA 0.27 01/19/2023    PSADIAG 1.6 05/09/2017    HGBA1C 6.3 (H) 09/06/2023    MICALBCREAT 430.8 (H) 01/19/2023    LDLCALC 66.8 09/06/2023     09/06/2023                 Vital signs reviewed  Vitals:    05/21/24 1459   BP: 126/72   BP Location: Left arm   Patient Position: Sitting   BP Method: Medium (Manual)   Pulse: 92   Temp: 98.1 °F (36.7 °C)   TempSrc: Oral   SpO2: 96%   Weight: 121 kg (266 lb 12.1 oz)   Height: 5' 9" (1.753 m)         Wt Readings from Last 20 Encounters:   05/21/24 121 kg (266 lb 12.1 oz)   10/05/23 119.3 kg (263 lb 0.1 oz)   09/12/23 118.2 kg (260 lb 9.3 oz)   08/24/23 121.6 kg (268 lb)   07/26/23 121.7 kg (268 lb 4.8 oz)   05/10/23 116.1 kg (256 lb)   05/10/23 116.3 kg (256 lb 6.3 oz)   05/10/23 116.3 kg (256 lb 6.3 oz)   05/05/23 118.1 kg (260 lb 4.1 oz)   04/28/23 122.1 kg (269 lb 2.9 oz)   03/03/23 122.9 kg (270 lb 15.1 oz)   02/03/23 122.2 kg (269 lb 6.4 oz)   01/26/23 120.1 kg (264 lb 12.4 oz)   12/23/22 122.7 kg (270 lb 6.4 oz)   11/25/22 123.8 kg (273 lb)   10/20/22 124.4 kg (274 lb 4 oz)   07/25/22 125.2 kg (276 lb)   07/15/22 125.6 kg (276 lb 14.4 oz)   06/27/22 126.1 kg (278 lb)   06/20/22 126.1 kg (278 lb)       PE:   APPEARANCE: Well nourished, well developed, in no acute distress.    HEAD: Normocephalic, atraumatic.  EYES:Conjunctivae noninjected.  CHEST: Good inspiratory effort. Lungs clear to auscultation with no wheezes or crackles.  CARDIOVASCULAR: Normal S1, S2. No rubs, murmurs, or gallops.  ABDOMEN: Bowel sounds normal. Not distended. Soft. No tenderness or masses. No organomegaly.  EXTREMITIES:  Compression stockings in place, had wished for foot exam at future date when he can come without his compression stockings because they are hard to put back on.      IMPRESSION  1. Type 2 diabetes mellitus with diabetic polyneuropathy, with long-term current use of insulin  "   2. Mixed hyperlipidemia    3. Essential hypertension    4. History of bladder cancer    5. Screening for malignant neoplasm of prostate    6. Severe obesity (BMI 35.0-39.9) with comorbidity    7. Transitional cell carcinoma of prostate    8. Chronic venous hypertension (idiopathic) with ulcer and inflammation of bilateral lower extremity    9. Gout, unspecified cause, unspecified chronicity, unspecified site              PLAN  Orders Placed This Encounter   Procedures    CBC Auto Differential    Comprehensive Metabolic Panel    Lipid Panel    TSH    Hemoglobin A1C    Microalbumin/Creatinine Ratio, Urine    PSA, Screening    Uric Acid     HTN stable    Diabetes : Stable.  Given supply issue with Trulicity however and not able to get a consistent dose, will switch over to Ozempic 1 mg weekly.  Can continue to follow blood sugars.  Continues on 70 30 insulin 45 units b.i.d..  Keep goal fasting sugar less than 140 and goal 2 hour postprandial sugar less than 180.  If sugars are very well controlled, can try to reduce his 70 30 insulin dosing further.      Gout: Continue allopurinol prophylaxis.  Update labs     Continue urology follow-up for TCC surveillance     Continue regular exercise, dietary modification     Plan on recheck 3 months, can come without his compression stockings so we can do foot exam.     SCREENINGS       Immunizations:      COVID x3, encourage booster  Flu up-to-date  Tdap 2017  Zostavax 2017, can get updated shingles vaccine at pharmacy  Prevnar 13:2015  Pneumovax 23:2016  Prevnar 20 07/15/2022       Age/demographic appropriate health maintenance:  Colonoscopy 10/5/23: 7 mm and 3 mm polyp, TA, repeat 5 years  PSA:  1/19/23, update

## 2024-05-21 NOTE — PROGRESS NOTES
Subjective:       Patient ID: Andrew Beltran is a 62 y.o. male.    Chief Complaint: Follow-up    61 yo WM with Post-prostatectomy SAHARA. No improvement with Kegals  Over last 3 months.  Desires trial of bulking agent.      Other   This is a new (SAHARA due to ISD post-prostatectomy) problem. The current episode started more than 1 month ago. The problem occurs constantly (Saturateing 4-5 pads per day). The problem has been waxing and waning. Associated symptoms include urinary symptoms (SAHARA,UUI,Freq and Noct ). Pertinent negatives include no abdominal pain, anorexia, arthralgias, change in bowel habit, chest pain, chills, congestion, coughing, diaphoresis, fatigue, fever, headaches, joint swelling, myalgias, nausea, neck pain, numbness, rash, sore throat, swollen glands, vertigo, visual change, vomiting or weakness. The symptoms are aggravated by bending, coughing, exertion, standing, twisting, walking, sneezing and stress. Treatments tried: Kegal's. The treatment provided no relief.     Review of Systems   Constitutional: Negative for activity change, appetite change, chills, diaphoresis, fatigue, fever and unexpected weight change.        Obese   HENT: Negative for congestion, hearing loss, sinus pressure, sore throat and trouble swallowing.    Eyes: Negative for photophobia, pain, discharge and visual disturbance.   Respiratory: Negative for apnea, cough and shortness of breath.    Cardiovascular: Negative for chest pain, palpitations and leg swelling.   Gastrointestinal: Negative for abdominal distention, abdominal pain, anal bleeding, anorexia, blood in stool, change in bowel habit, constipation, diarrhea, nausea, rectal pain and vomiting.   Endocrine: Negative for cold intolerance, heat intolerance, polydipsia, polyphagia and polyuria.   Genitourinary: Negative for decreased urine volume, difficulty urinating, discharge, dysuria, enuresis, flank pain, frequency, genital sores, hematuria, penile pain, penile  swelling, scrotal swelling, testicular pain and urgency.   Musculoskeletal: Negative for arthralgias, back pain, joint swelling, myalgias and neck pain.   Skin: Negative for color change, pallor, rash and wound.   Allergic/Immunologic: Negative for environmental allergies, food allergies and immunocompromised state.   Neurological: Negative for dizziness, vertigo, seizures, weakness, numbness and headaches.   Hematological: Negative for adenopathy. Does not bruise/bleed easily.   Psychiatric/Behavioral: Negative.        Objective:      Physical Exam   Nursing note and vitals reviewed.  Constitutional: He is oriented to person, place, and time. He appears well-developed and well-nourished.   HENT:   Head: Normocephalic.   Nose: Nose normal.   Mouth/Throat: Oropharynx is clear and moist.   Eyes: Conjunctivae and EOM are normal. Pupils are equal, round, and reactive to light.   Neck: Normal range of motion. Neck supple.   Cardiovascular: Normal rate, regular rhythm, normal heart sounds and intact distal pulses.    Pulmonary/Chest: Effort normal and breath sounds normal.   Abdominal: Soft. Bowel sounds are normal.   Genitourinary: Penis normal.   Genitourinary Comments: TUR Defect   Musculoskeletal: Normal range of motion.   Neurological: He is alert and oriented to person, place, and time. He has normal reflexes.   Skin: Skin is warm and dry.     Psychiatric: He has a normal mood and affect. His behavior is normal. Judgment and thought content normal.       Assessment:       1. SAHARA (stress urinary incontinence), male    2. Benign non-nodular prostatic hyperplasia with lower urinary tract symptoms    3. Urinary frequency    4. Nocturia        Plan:       Patient Instructions   ISD - Continue Kegals, consider Urodynamics.   Investigate treatment with Durasphere for ISD treatment with Pt insurance company.  Stop Finasteride.        General

## 2024-05-21 NOTE — PATIENT INSTRUCTIONS
Look into getting the Shingles vaccine (SHINGRIX) at your local pharmacy (2 part series, done once at/after age 50)    Covid booster: updated formula omicron booster effective Sept 2023:  [facility] users can check availability and schedule their vaccinations via [facility]. If you don't have a [facility] account, you can sign up at [email or web address], call [phone] or visit [email or web address] for available locations and times     Finish your trulicity, then can go to ozempic 1 mg weekly  Continue your 70/30, ok to stay at 45 units twice a day but depending on sugars we may be able to reduce dose further    Goal fasting sugar less than 140  Goal 2 hour after meal sugar less than 180

## 2024-05-30 ENCOUNTER — PATIENT MESSAGE (OUTPATIENT)
Dept: FAMILY MEDICINE | Facility: CLINIC | Age: 69
End: 2024-05-30
Payer: MEDICARE

## 2024-05-30 DIAGNOSIS — Z79.4 TYPE 2 DIABETES MELLITUS WITH DIABETIC POLYNEUROPATHY, WITH LONG-TERM CURRENT USE OF INSULIN: Primary | ICD-10-CM

## 2024-05-30 DIAGNOSIS — E11.42 TYPE 2 DIABETES MELLITUS WITH DIABETIC POLYNEUROPATHY, WITH LONG-TERM CURRENT USE OF INSULIN: Primary | ICD-10-CM

## 2024-05-30 NOTE — TELEPHONE ENCOUNTER
Labs 3 months with visit to follow     Orders Placed This Encounter   Procedures    Hemoglobin A1C    Lipid Panel    Comprehensive Metabolic Panel

## 2024-07-23 ENCOUNTER — TELEPHONE (OUTPATIENT)
Dept: UROLOGY | Facility: CLINIC | Age: 69
End: 2024-07-23
Payer: MEDICARE

## 2024-07-23 NOTE — TELEPHONE ENCOUNTER
No care due was identified.  [facility] Embedded Care Due Messages. Reference number: [id number].   7/23/2024 11:42:24 AM CDT

## 2024-07-23 NOTE — TELEPHONE ENCOUNTER
Refill Routing Note   Medication(s) are not appropriate for processing by [facility] for the following reason(s):        No active prescription written by provider    [facility] action(s):  Defer               Appointments  past 12m or future 3m with PCP    Date Provider   Last Visit   5/21/2024 Lon Brock MD   Next Visit   9/5/2024 Lon Brock MD   ED visits in past 90 days: 0        Note composed:6:53 PM 07/23/2024

## 2024-07-23 NOTE — TELEPHONE ENCOUNTER
----- Message from Coby Muro sent at 7/23/2024  1:37 PM CDT -----  Needs advice from nurse:      Who Called:pt  Regarding:needs to reschedule procedure on 7/26  Would the patient rather a call back or VIA [facility]?  Best Call Back number: [phone]  Additional Info:

## 2024-08-12 ENCOUNTER — PATIENT OUTREACH (OUTPATIENT)
Dept: ADMINISTRATIVE | Facility: HOSPITAL | Age: 69
End: 2024-08-12
Payer: MEDICARE

## 2024-08-12 NOTE — PROGRESS NOTES
Health Maintenance Topic(s) Outreach Outcomes & Actions Taken:    Lab(s) - Outreach Outcomes & Actions Taken  : Overdue Lab(s) Scheduled

## 2024-08-23 DIAGNOSIS — I10 ESSENTIAL HYPERTENSION: ICD-10-CM

## 2024-08-23 RX ORDER — LOSARTAN POTASSIUM 100 MG/1
100 TABLET ORAL DAILY
Qty: 90 TABLET | Refills: 2 | Status: SHIPPED | OUTPATIENT
Start: 2024-08-23

## 2024-08-23 NOTE — TELEPHONE ENCOUNTER
Refill Decision Note   Andrew Ruby  is requesting a refill authorization.  Brief Assessment and Rationale for Refill:  Approve     Medication Therapy Plan:         Pharmacist review requested: Yes   Extended chart review required: Yes   Comments:     Note composed:1:49 PM 08/23/2024

## 2024-08-23 NOTE — TELEPHONE ENCOUNTER
Refill Routing Note   Medication(s) are not appropriate for processing by [facility] for the following reason(s):        Drug-disease interaction: losartan and Acute dehydration     [facility] action(s):  Defer             Pharmacist review requested: Yes     Appointments  past 12m or future 3m with PCP    Date Provider   Last Visit   5/21/2024 Lon Brock MD   Next Visit   9/5/2024 Lon Brock MD   ED visits in past 90 days: 0        Note composed:12:44 PM 08/23/2024

## 2024-08-23 NOTE — TELEPHONE ENCOUNTER
No care due was identified.  [facility] Embedded Care Due Messages. Reference number: [id number].   8/23/2024 5:08:42 AM CDT

## 2024-09-10 ENCOUNTER — PROCEDURE VISIT (OUTPATIENT)
Dept: UROLOGY | Facility: CLINIC | Age: 69
End: 2024-09-10
Payer: MEDICARE

## 2024-09-10 VITALS
HEIGHT: 69 IN | WEIGHT: 266.75 LBS | BODY MASS INDEX: 39.51 KG/M2 | SYSTOLIC BLOOD PRESSURE: 126 MMHG | DIASTOLIC BLOOD PRESSURE: 72 MMHG | HEART RATE: 92 BPM

## 2024-09-10 DIAGNOSIS — Z85.51 HISTORY OF BLADDER CANCER: Primary | ICD-10-CM

## 2024-09-10 PROCEDURE — 87088 URINE BACTERIA CULTURE: CPT | Performed by: UROLOGY

## 2024-09-10 PROCEDURE — 87186 SC STD MICRODIL/AGAR DIL: CPT | Mod: 59 | Performed by: UROLOGY

## 2024-09-10 PROCEDURE — 87086 URINE CULTURE/COLONY COUNT: CPT | Performed by: UROLOGY

## 2024-09-10 PROCEDURE — 52000 CYSTOURETHROSCOPY: CPT | Mod: S$GLB,,, | Performed by: UROLOGY

## 2024-09-10 RX ORDER — CIPROFLOXACIN 500 MG/1
500 TABLET ORAL 2 TIMES DAILY
Qty: 10 TABLET | Refills: 0 | Status: SHIPPED | OUTPATIENT
Start: 2024-09-10 | End: 2024-09-15

## 2024-09-10 NOTE — PROCEDURES
Cystoscopy    Date/Time: 9/10/2024 1:30 PM    Performed by: Monty Cee MD  Authorized by: Monty Cee MD    Consent Done?:  Yes (Written)  Timeout: prior to procedure the correct patient, procedure, and site was verified    Prep: patient was prepped and draped in usual sterile fashion    Local anesthesia used?: Yes    Anesthesia:  Intraurethral instillation  Local anesthetic:  Lidocaine 2% topical gel  Anesthetic total (ml):  10  Indications: history bladder cancer    Position:  Supine  Anesthesia:  Intraurethral instillation  Patient sedated?: No    Preparation: Patient was prepped and draped in usual sterile fashion    Scope type:  Flexible cystoscopeNoNo  Stent inserted: No    Stent removed: No    External exam normal: Yes    Digital exam performed: No    Urethra normal: Yes    Prostate normal: Yes (TUR defect)    Bladder neck normal: Yes (Open bladder neck)    Bladder normal: No    Number of tumors:  1  Tumor 1:     Size (mm):  25    Anatomy:  Sessile    Location:  R Lateral Wall   patient tolerated the procedure well with no immediate complications  Comments:      Patient with lesion on right lateral wall that increases in intensity with NBI lighting measuring greater than 25 mm.  Patient with a lesion on the dome of the bladder on the left side that does not change in intensity or look significantly like malignancy but is cystic appearing mucosa in that area.  Will plan on biopsy if necessary and resection of the lesion on the right side.

## 2024-09-10 NOTE — H&P
Subjective:       Patient ID: Andrew Beltran is a 69 y.o. male.    Chief Complaint: History of bladder cancer    Patient here for follow-up of bladder cancer with six-month cystoscopy.  Patient has been initiated with BCG therapy originally but had to stop due to national shortage.  The patient completed therapy with mitomycin C.  He has been on six-month follow-up and presents for cystoscopy today.  The patient without any new complaint.  Does have some urinary frequency and urge urinary incontinence requiring pad usage.  Past Medical History:   Diagnosis Date    BPH (benign prostatic hyperplasia)     Cancer     bladder    Diabetes     type 2    Diabetes mellitus, type 2     Gout     High cholesterol     Hypertension     Malignant neoplasm of urinary bladder 3/11/2020    Sciatica 2016    had injection 3 weeks ago    Urinary tract infection      Past Surgical History:   Procedure Laterality Date    APPENDECTOMY  1968    BALLOON DILATION OF URETER Left 9/18/2019    Procedure: DILATION, URETER, USING BALLOON;  Surgeon: Monty Cee MD;  [facility];  Service: Urology;  Laterality: Left;    BARBOTAGE OF BLADDER N/A 8/14/2019    Procedure: BARBOTAGE, BLADDER;  Surgeon: Monty Cee MD;  Location: [facility] OR;  Service: Urology;  Laterality: N/A;    BARBOTAGE OF URETER Bilateral 8/14/2019    Procedure: BARBOTAGE, URETER;  Surgeon: Monty Cee MD;  Location: [facility] OR;  Service: Urology;  Laterality: Bilateral;    BARBOTAGE OF URETER Left 9/18/2019    Procedure: BARBOTAGE, URETER;  Surgeon: Monty Cee MD;  Location: [facility] OR;  Service: Urology;  Laterality: Left;  left renal/ ureteral washing.    BLADDER SURGERY      COLONOSCOPY N/A 7/10/2018    Procedure: COLONOSCOPY;  Surgeon: Azalea Sanchez MD;  Location: [facility] ENDO;  Service: Endoscopy;  Laterality: N/A;  Schedule at [facility], faxed to [employer] [phone]    COLONOSCOPY N/A 10/5/2023    Procedure: COLONOSCOPY;  Surgeon:  MAINOR El MD;  Location: [facility];  Service: Endoscopy;  Laterality: N/A;    CYSTOSCOPY      CYSTOSCOPY W/ RETROGRADES Bilateral 8/14/2019    Procedure: CYSTOSCOPY, WITH RETROGRADE PYELOGRAM;  Surgeon: Monty Cee MD;  Location: [facility] OR;  Service: Urology;  Laterality: Bilateral;    CYSTOSCOPY W/ RETROGRADES Left 9/18/2019    Procedure: CYSTOSCOPY, WITH RETROGRADE PYELOGRAM;  Surgeon: Monty Cee MD;  Location: [facility];  Service: Urology;  Laterality: Left;  cystourethroscopy with left retrograde pyelogram     CYSTOSCOPY W/ RETROGRADES Bilateral 3/11/2020    Procedure: CYSTOSCOPY, WITH RETROGRADE PYELOGRAM Ureteral renal washings and bladder washings;  Surgeon: Monty Cee MD;  Location: [facility];  Service: Urology;  Laterality: Bilateral;    CYSTOSCOPY WITH URETEROSCOPY, RETROGRADE PYELOGRAPHY, AND INSERTION OF STENT Bilateral 6/22/2020    Procedure: CYSTOSCOPY, WITH RETROGRADE PYELOGRAM AND URETERAL STENT INSERTION;  Surgeon: Monty Cee MD;  Location: [facility];  Service: Urology;  Laterality: Bilateral;    CYSTOSCOPY WITH URETHRAL DILATION N/A 6/10/2021    Procedure: CYSTOSCOPY, WITH URETHRAL DILATION;  Surgeon: Monty Cee MD;  Location: [facility];  Service: Urology;  Laterality: N/A;    HAND SURGERY Right 12/08/2016    trigger finger    INSTILLATION OF URINARY BLADDER N/A 3/11/2020    Procedure: INSTILLATION, BLADDER - mitomyin - c;  Surgeon: Monty Cee MD;  Location: [facility];  Service: Urology;  Laterality: N/A;    INSTILLATION OF URINARY BLADDER  6/22/2020    Procedure: INSTILLATION, BLADDER;  Surgeon: Monty Cee MD;  Location: [facility] OR;  Service: Urology;;  mitomycin 40mg    LASER ABLATION Left 9/18/2019    Procedure: ABLATION, USING LASER;  Surgeon: Monty Cee MD;  Location: [facility];  Service: Urology;  Laterality: Left;  laser ablation of lower pole calyceal transitional cell lesion    PYELOSCOPY Left 9/18/2019    Procedure: PYELOSCOPY;  Surgeon: Monty SYLVESTER  MD Jaye;  Location: [facility];  Service: Urology;  Laterality: Left;  Left ureteral pyeloscopy    TONSILLECTOMY  1960    TRANSURETHRAL RESECTION OF PROSTATE N/A 9/18/2019    Procedure: TURP (TRANSURETHRAL RESECTION OF PROSTATE);  Surgeon: Monty Cee MD;  Location: [facility];  Service: Urology;  Laterality: N/A;  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    TRANSURETHRAL RESECTION OF PROSTATE USING BIPOLAR CAUTERY N/A 3/11/2020    Procedure: TURP, USING BIPOLAR CAUTERY;  Surgeon: Monty Cee MD;  Location: [facility];  Service: Urology;  Laterality: N/A;    TRANSURETHRAL SURGICAL REMOVAL OF STRICTURE OF BLADDER NECK  8/14/2019    Procedure: EXCISION, CONTRACTURE, BLADDER NECK, TRANSURETHRAL;  Surgeon: Monty Cee MD;  Location: [facility];  Service: Urology;;    TRANSURETHRAL SURGICAL REMOVAL OF STRICTURE OF BLADDER NECK N/A 6/10/2021    Procedure: EXCISION, CONTRACTURE, BLADDER NECK, TRANSURETHRAL;  Surgeon: Monty Cee MD;  Location: [facility];  Service: Urology;  Laterality: N/A;  using laser    URETEROSCOPY Left 9/18/2019    Procedure: URETEROSCOPY;  Surgeon: Monty Cee MD;  Location: [facility];  Service: Urology;  Laterality: Left;    URETEROSCOPY Right 6/22/2020    Procedure: URETEROSCOPY;  Surgeon: Monty Cee MD;  Location: [facility];  Service: Urology;  Laterality: Right;     Family History   Problem Relation Name Age of Onset    Diabetes Mother      Heart attack Father      Coronary artery disease Father      Arthritis Sister x1     No Known Problems Brother x1     Hyperlipidemia Son x3     Hypertension Son x3     No Known Problems Maternal Grandmother 90s     No Known Problems Maternal Grandfather 90s     No Known Problems Paternal Grandmother 50s     Coronary artery disease Paternal Grandfather      Heart attack Paternal Grandfather      Prostate cancer Neg Hx      Kidney disease Neg Hx       Social History     Socioeconomic History    Marital status:     Tobacco Use    Smoking status: Former     Types: Pipe     Quit date:      Years since quittin.7    Smokeless tobacco: Never    Tobacco comments:     quit 40 yrs ago   Substance and Sexual Activity    Alcohol use: Yes     Alcohol/week: 0.0 standard drinks of alcohol     Comment: rarely    Drug use: Never    Sexual activity: Yes     Partners: Female     Social Determinants of Health     Financial Resource Strain: Low Risk  (2024)    Overall Financial Resource Strain (CARDIA)     Difficulty of Paying Living Expenses: Not hard at all   Food Insecurity: No Food Insecurity (2024)    Hunger Vital Sign     Worried About Running Out of Food in the Last Year: Never true     Ran Out of Food in the Last Year: Never true   Transportation Needs: No Transportation Needs (2024)    PRAPARE - Transportation     Lack of Transportation (Medical): No     Lack of Transportation (Non-Medical): No   Physical Activity: Insufficiently Active (2024)    Exercise Vital Sign     Days of Exercise per Week: 2 days     Minutes of Exercise per Session: 60 min   Stress: No Stress Concern Present (2024)    [employer] - Occupational Stress Questionnaire     Feeling of Stress : Not at all   Housing Stability: Low Risk  (2023)    Housing Stability Vital Sign     Unable to Pay for Housing in the Last Year: No     Number of Places Lived in the Last Year: 1     Unstable Housing in the Last Year: No       Current Outpatient Medications   Medication Sig Dispense Refill    allopurinoL (ZYLOPRIM) 300 MG tablet Take 1 tablet (300 mg total) by mouth once daily. 90 tablet 4    aspirin (ECOTRIN) 81 MG EC tablet Take 81 mg by mouth once daily.      blood sugar diagnostic Strp by Misc.(Non-Drug; Combo Route) route.      carvediloL (COREG) 12.5 MG tablet Take 1 tablet (12.5 mg total) by mouth 2 (two) times daily with meals. 180 tablet 3    furosemide (LASIX) 20 MG tablet Take 1 tablet (20  mg total) by mouth once daily. 90 tablet 1    ibuprofen (ADVIL,MOTRIN) 400 MG tablet Take 400 mg by mouth every 6 (six) hours as needed for Other.      insulin NPH-insulin regular, 70/30, (HUMULIN 70/30 U-100 INSULIN) 100 unit/mL (70-30) injection Inject 60 Units into the skin 2 (two) times daily. 120 mL 1    insulin syringe-needle U-100 (BD INSULIN SYRINGE ULTRA-FINE) 1 mL 31 gauge x 5/16 Syrg USE AS DIRECTED TWICE A  each 4    lancets (PRODIGY TWIST TOP LANCET) 28 gauge Misc 1 lancet by Misc.(Non-Drug; Combo Route) route 2 (two) times daily. 180 each 4    losartan (COZAAR) 100 MG tablet Take 1 tablet (100 mg total) by mouth once daily. 90 tablet 2    MAGNESIUM ORAL Take 400 mg by mouth once daily at 6am. 800 mg everyday      rosuvastatin (CRESTOR) 40 MG Tab TAKE 1 TABLET BY MOUTH EVERY EVENING 90 tablet 3    semaglutide (OZEMPIC) 1 mg/dose (4 mg/3 mL) Inject 1 mg into the skin every 7 days. 3 mL 11    spironolactone (ALDACTONE) 25 MG tablet Take 1 tablet (25 mg total) by mouth once daily. 90 tablet 3    tamsulosin (FLOMAX) 0.4 mg Cap Take 1 capsule (0.4 mg total) by mouth every evening. 90 capsule 3    ciprofloxacin HCl (CIPRO) 500 MG tablet Take 1 tablet (500 mg total) by mouth 2 (two) times daily. for 5 days 10 tablet 0     No current facility-administered medications for this visit.     Review of patient's allergies indicates:   Allergen Reactions    Preservative      In eye drops for cataract surgery       Review of Systems   Constitutional:  Positive for activity change.   HENT: Negative.     Eyes: Negative.    Respiratory: Negative.     Cardiovascular: Negative.    Gastrointestinal:  Positive for constipation.   Endocrine:        Diabetic on Ozempic   Genitourinary:  Positive for frequency and urgency (Urge urinary incontinence).   Musculoskeletal:  Positive for arthralgias.   Skin: Negative.    Allergic/Immunologic: Negative.    Neurological: Negative.    Hematological: Negative.   "  Psychiatric/Behavioral: Negative.         Objective:      Vitals:    09/10/24 1332   BP: 126/72   Pulse: 92   Weight: 121 kg (266 lb 12.1 oz)   Height: 5' 9" (1.753 m)     Physical Exam  Constitutional:       General: He is not in acute distress.     Appearance: Normal appearance. He is obese. He is not ill-appearing, toxic-appearing or diaphoretic.   HENT:      Head: Normocephalic and atraumatic.      Right Ear: External ear normal.      Left Ear: External ear normal.      Nose: Nose normal. No congestion or rhinorrhea.      Mouth/Throat:      Mouth: Mucous membranes are moist.      Pharynx: Oropharynx is clear. No oropharyngeal exudate or posterior oropharyngeal erythema.   Cardiovascular:      Rate and Rhythm: Normal rate and regular rhythm.      Pulses: Normal pulses.      Heart sounds: Normal heart sounds.   Pulmonary:      Effort: Pulmonary effort is normal.   Abdominal:      General: Bowel sounds are normal.      Palpations: Abdomen is soft.      Tenderness: There is no right CVA tenderness or left CVA tenderness.   Genitourinary:     Penis: Normal.       Testes: Normal.   Musculoskeletal:         General: Normal range of motion.      Cervical back: Normal range of motion.   Skin:     General: Skin is warm and dry.      Capillary Refill: Capillary refill takes 2 to 3 seconds.   Neurological:      General: No focal deficit present.      Mental Status: He is alert and oriented to person, place, and time. Mental status is at baseline.   Psychiatric:         Mood and Affect: Mood normal.         Behavior: Behavior normal.         Thought Content: Thought content normal.         Judgment: Judgment normal.       Office cystoscopy revealed recurrent lesion with just possible CIS on right lateral wall, patient also has abnormal appearing lesion on left anterior wall.  Patient will undergo cystoscopy in the operating room with ureteral and renal washings, bladder washings, bilateral retrograde pyelograms and " Transurethral resection of bladder tumor and possible biopsy of other lesion.     Assessment:       1. History of bladder cancer      Possible recurrent bladder tumor  Plan:       Patient Instructions   Schedule TURBT bladder tumor less than 5 cm on 09/23/24 at [facility] with bilateral ureteral renal washings and bladder washings.  Cipro 500 mg p.o. for 5 days twice daily  Discontinue aspirin or hold aspirin after dose on 09/17/2024  Hold Ozempic after dose on 09/15/2024  Urine for C&S

## 2024-09-10 NOTE — PATIENT INSTRUCTIONS
Schedule TURBT bladder tumor less than 5 cm on 09/23/24 at [facility] with bilateral ureteral renal washings and bladder washings.  Cipro 500 mg p.o. for 5 days twice daily  Discontinue aspirin or hold aspirin after dose on 09/17/2024  Hold Ozempic after dose on 09/15/2024  Urine for C&S

## 2024-09-13 ENCOUNTER — PATIENT MESSAGE (OUTPATIENT)
Dept: UROLOGY | Facility: CLINIC | Age: 69
End: 2024-09-13
Payer: MEDICARE

## 2024-09-13 LAB — BACTERIA UR CULT: ABNORMAL

## 2024-09-18 DIAGNOSIS — Z85.51 HISTORY OF BLADDER CANCER: ICD-10-CM

## 2024-09-18 DIAGNOSIS — R35.0 URINARY FREQUENCY: ICD-10-CM

## 2024-09-18 DIAGNOSIS — D49.4 BLADDER TUMOR: Primary | ICD-10-CM

## 2024-09-18 RX ORDER — SODIUM CHLORIDE 9 MG/ML
INJECTION, SOLUTION INTRAVENOUS CONTINUOUS
OUTPATIENT
Start: 2024-09-18

## 2024-09-19 DIAGNOSIS — Z01.810 PREOPERATIVE CARDIOVASCULAR EXAMINATION: Primary | ICD-10-CM

## 2024-09-20 ENCOUNTER — DOCUMENTATION ONLY (OUTPATIENT)
Dept: UROLOGY | Facility: CLINIC | Age: 69
End: 2024-09-20
Payer: MEDICARE

## 2024-09-20 ENCOUNTER — PATIENT MESSAGE (OUTPATIENT)
Dept: UROLOGY | Facility: CLINIC | Age: 69
End: 2024-09-20
Payer: MEDICARE

## 2024-09-20 DIAGNOSIS — Z01.810 PREOPERATIVE CARDIOVASCULAR EXAMINATION: Primary | ICD-10-CM

## 2024-09-20 NOTE — PROGRESS NOTES
I consulted with Dr. Cee in clinic about pt's UA being abnormal, he states he plans to proceed with TURBT on 9/23/24. If his culture is resulted by then he will place patient on appropriate antibiotic at that time.

## 2024-09-23 PROBLEM — N28.86: Status: ACTIVE | Noted: 2024-09-23

## 2024-09-24 ENCOUNTER — CLINICAL SUPPORT (OUTPATIENT)
Dept: UROLOGY | Facility: CLINIC | Age: 69
End: 2024-09-24
Payer: MEDICARE

## 2024-09-24 VITALS — DIASTOLIC BLOOD PRESSURE: 82 MMHG | HEART RATE: 101 BPM | SYSTOLIC BLOOD PRESSURE: 158 MMHG

## 2024-09-24 DIAGNOSIS — Z85.51 HISTORY OF BLADDER CANCER: Primary | ICD-10-CM

## 2024-09-24 NOTE — TELEPHONE ENCOUNTER
I returned pt's call and instructed him to come in for nurse visit bladder scan and I can discuss next steps with Dr. Cee after scan. He states he will come in.

## 2024-09-24 NOTE — PROGRESS NOTES
Patient called clinic this am reporting strong urge to urinate but almost nothing comes out during urination. He reports that he has not had a full, complete urination since yesterday. I advised him to come in to clinic so we can bladder scan and discuss with Dr. Cee. Bladder scan showed zero and he did show me that his incontinent pads are wet. I consulted with Dr. Cee over the phone (on his way to clinic) who advised the patient start the Vibegron that he prescribed yesterday and call us for any issues. I confirmed his post op appt with him before departure.

## 2024-09-25 DIAGNOSIS — Z00.00 ENCOUNTER FOR MEDICARE ANNUAL WELLNESS EXAM: ICD-10-CM

## 2024-09-26 ENCOUNTER — PATIENT MESSAGE (OUTPATIENT)
Dept: UROLOGY | Facility: CLINIC | Age: 69
End: 2024-09-26
Payer: MEDICARE

## 2024-10-01 ENCOUNTER — PATIENT MESSAGE (OUTPATIENT)
Dept: UROLOGY | Facility: CLINIC | Age: 69
End: 2024-10-01
Payer: MEDICARE

## 2024-10-03 ENCOUNTER — PATIENT MESSAGE (OUTPATIENT)
Dept: UROLOGY | Facility: CLINIC | Age: 69
End: 2024-10-03
Payer: MEDICARE

## 2024-10-09 LAB
LEFT EYE DM RETINOPATHY: POSITIVE
RIGHT EYE DM RETINOPATHY: POSITIVE

## 2024-10-15 ENCOUNTER — OFFICE VISIT (OUTPATIENT)
Dept: UROLOGY | Facility: CLINIC | Age: 69
End: 2024-10-15
Payer: MEDICARE

## 2024-10-15 VITALS
BODY MASS INDEX: 38.89 KG/M2 | WEIGHT: 262.56 LBS | DIASTOLIC BLOOD PRESSURE: 81 MMHG | HEIGHT: 69 IN | HEART RATE: 91 BPM | SYSTOLIC BLOOD PRESSURE: 150 MMHG

## 2024-10-15 DIAGNOSIS — Z85.51 HISTORY OF BLADDER CANCER: ICD-10-CM

## 2024-10-15 DIAGNOSIS — R39.15 URINARY URGENCY: ICD-10-CM

## 2024-10-15 DIAGNOSIS — N13.70 VESICOURETERAL REFLUX, BILATERAL: ICD-10-CM

## 2024-10-15 DIAGNOSIS — N28.86 URETERITIS CYSTICA: ICD-10-CM

## 2024-10-15 DIAGNOSIS — Z98.890 POST-OPERATIVE STATE: Primary | ICD-10-CM

## 2024-10-15 DIAGNOSIS — R35.0 URINARY FREQUENCY: ICD-10-CM

## 2024-10-15 DIAGNOSIS — D49.4 BLADDER TUMOR: ICD-10-CM

## 2024-10-15 LAB
BACTERIA #/AREA URNS AUTO: ABNORMAL /HPF
BILIRUB UR QL STRIP: NEGATIVE
CLARITY UR REFRACT.AUTO: CLEAR
COLOR UR AUTO: YELLOW
GLUCOSE UR QL STRIP: NEGATIVE
HGB UR QL STRIP: ABNORMAL
HYALINE CASTS UR QL AUTO: 0 /LPF
KETONES UR QL STRIP: NEGATIVE
LEUKOCYTE ESTERASE UR QL STRIP: NEGATIVE
MICROSCOPIC COMMENT: ABNORMAL
NITRITE UR QL STRIP: NEGATIVE
PH UR STRIP: 7 [PH] (ref 5–8)
POC RESIDUAL URINE VOLUME: 0 ML (ref 0–100)
PROT UR QL STRIP: ABNORMAL
RBC #/AREA URNS AUTO: 9 /HPF (ref 0–4)
SP GR UR STRIP: 1.01 (ref 1–1.03)
SQUAMOUS #/AREA URNS AUTO: 0 /HPF
URN SPEC COLLECT METH UR: ABNORMAL
WBC #/AREA URNS AUTO: 5 /HPF (ref 0–5)

## 2024-10-15 PROCEDURE — 3077F SYST BP >= 140 MM HG: CPT | Mod: CPTII,S$GLB,, | Performed by: NURSE PRACTITIONER

## 2024-10-15 PROCEDURE — 1159F MED LIST DOCD IN RCRD: CPT | Mod: CPTII,S$GLB,, | Performed by: NURSE PRACTITIONER

## 2024-10-15 PROCEDURE — 87086 URINE CULTURE/COLONY COUNT: CPT | Performed by: NURSE PRACTITIONER

## 2024-10-15 PROCEDURE — 4010F ACE/ARB THERAPY RXD/TAKEN: CPT | Mod: CPTII,S$GLB,, | Performed by: NURSE PRACTITIONER

## 2024-10-15 PROCEDURE — 3062F POS MACROALBUMINURIA REV: CPT | Mod: CPTII,S$GLB,, | Performed by: NURSE PRACTITIONER

## 2024-10-15 PROCEDURE — 99024 POSTOP FOLLOW-UP VISIT: CPT | Mod: S$GLB,,, | Performed by: NURSE PRACTITIONER

## 2024-10-15 PROCEDURE — 1160F RVW MEDS BY RX/DR IN RCRD: CPT | Mod: CPTII,S$GLB,, | Performed by: NURSE PRACTITIONER

## 2024-10-15 PROCEDURE — 1101F PT FALLS ASSESS-DOCD LE1/YR: CPT | Mod: CPTII,S$GLB,, | Performed by: NURSE PRACTITIONER

## 2024-10-15 PROCEDURE — 99999 PR PBB SHADOW E&M-EST. PATIENT-LVL V: CPT | Mod: PBBFAC,,, | Performed by: NURSE PRACTITIONER

## 2024-10-15 PROCEDURE — 3288F FALL RISK ASSESSMENT DOCD: CPT | Mod: CPTII,S$GLB,, | Performed by: NURSE PRACTITIONER

## 2024-10-15 PROCEDURE — 1157F ADVNC CARE PLAN IN RCRD: CPT | Mod: CPTII,S$GLB,, | Performed by: NURSE PRACTITIONER

## 2024-10-15 PROCEDURE — 1126F AMNT PAIN NOTED NONE PRSNT: CPT | Mod: CPTII,S$GLB,, | Performed by: NURSE PRACTITIONER

## 2024-10-15 PROCEDURE — 51798 US URINE CAPACITY MEASURE: CPT | Mod: S$GLB,,, | Performed by: NURSE PRACTITIONER

## 2024-10-15 PROCEDURE — 3066F NEPHROPATHY DOC TX: CPT | Mod: CPTII,S$GLB,, | Performed by: NURSE PRACTITIONER

## 2024-10-15 PROCEDURE — 3051F HG A1C>EQUAL 7.0%<8.0%: CPT | Mod: CPTII,S$GLB,, | Performed by: NURSE PRACTITIONER

## 2024-10-15 PROCEDURE — 81001 URINALYSIS AUTO W/SCOPE: CPT | Performed by: NURSE PRACTITIONER

## 2024-10-15 PROCEDURE — 3079F DIAST BP 80-89 MM HG: CPT | Mod: CPTII,S$GLB,, | Performed by: NURSE PRACTITIONER

## 2024-10-15 NOTE — PROGRESS NOTES
Subjective:       Patient ID: Andrew Beltran is a 69 y.o. male.    Chief Complaint: Post-op Evaluation    Patient is here today for his post-evaluation. He is S/P TURBT by Dr. Cee on 9/23/2024. Pathology revealed severe chronic inflammation and denuded epithelium with no evidence of recurrent mucosal abnormality or cancer. This is consistent with response to chemotherapy.  Dr. Cee recommended monitoring with no need for further resection. Office cystoscopy in 3 months also recommended. Patient's mid ureteral biopsy did not reveal any abnormal epithelial tissue and all washings and cytologies appeared to have atypical cells but no significant evidence of malignancy. Results discussed with patient. Patient stated he does not like to have the in-clinic cystoscopy performed. However, he would like to know if can have cystoscopy done in 6 months instead of 3 months since no malignancy were noted on pathology. Will discuss with Dr. Cee.     Started a probiotic 1 week ago.   Started on gemtesa after sx. Helped settle bladder down, but didn't control urinary leakage.   Mixed urinary stress and urge incontinence present (chronic issue for years per patient).  Wears a male guard and changes 8 times daily.     Follow-up  Chronicity: S/P TURBT. Episode onset: 9/23/2024. Associated symptoms include urinary symptoms. Pertinent negatives include no abdominal pain, chills, fatigue, fever, nausea, swollen glands, vomiting or weakness. Nothing aggravates the symptoms. Treatments tried: TURBT. The treatment provided significant relief.     Review of Systems   Constitutional:  Negative for chills, fatigue and fever.   Gastrointestinal:  Positive for diarrhea (resolved). Negative for abdominal pain, nausea and vomiting.   Genitourinary:  Positive for bladder incontinence (chronic), frequency and urgency (resolved). Negative for decreased urine volume, difficulty urinating (resolved), discharge, dysuria (resolved), flank  pain, hematuria, penile pain, penile swelling, scrotal swelling and testicular pain.        Nocturia x2-3   Neurological:  Negative for weakness.   Psychiatric/Behavioral: Negative.           Objective:      Physical Exam  Vitals and nursing note reviewed.   Constitutional:       General: He is not in acute distress.     Appearance: He is well-developed. He is obese. He is not ill-appearing.   HENT:      Head: Normocephalic and atraumatic.   Eyes:      Pupils: Pupils are equal, round, and reactive to light.   Cardiovascular:      Rate and Rhythm: Normal rate.   Pulmonary:      Effort: Pulmonary effort is normal. No respiratory distress.   Abdominal:      Palpations: Abdomen is soft.      Tenderness: There is no abdominal tenderness.   Genitourinary:     Comments: PVR = 0 mL  Musculoskeletal:         General: Normal range of motion.      Cervical back: Normal range of motion.   Skin:     General: Skin is warm and dry.   Neurological:      Mental Status: He is alert and oriented to person, place, and time.      Coordination: Coordination normal.   Psychiatric:         Mood and Affect: Mood normal.         Behavior: Behavior normal.         Thought Content: Thought content normal.         Judgment: Judgment normal.         Assessment:       Problem List Items Addressed This Visit          Renal/    Urinary frequency    Relevant Orders    Urine culture    Urinalysis    POCT Bladder Scan (Completed)    Ureteritis cystica    Relevant Orders    Urine culture    Urinalysis    POCT Bladder Scan (Completed)    Vesicoureteral reflux, bilateral    Relevant Orders    Urine culture    Urinalysis    POCT Bladder Scan (Completed)       Oncology    Bladder tumor    Relevant Orders    Urine culture    Urinalysis    POCT Bladder Scan (Completed)    History of bladder cancer    Relevant Orders    Urine culture    Urinalysis    POCT Bladder Scan (Completed)     Other Visit Diagnoses       Post-operative state    -  Primary    Relevant  Orders    Urine culture    Urinalysis    POCT Bladder Scan (Completed)    Urinary urgency        Relevant Orders    Urine culture    Urinalysis    POCT Bladder Scan (Completed)            Plan:           Andrew was seen today for post-op evaluation.    Diagnoses and all orders for this visit:    Post-operative state  -     Urine culture  -     Urinalysis  -     POCT Bladder Scan    Bladder tumor  -     Urine culture  -     Urinalysis  -     POCT Bladder Scan    History of bladder cancer  -     Urine culture  -     Urinalysis  -     POCT Bladder Scan    Ureteritis cystica  -     Urine culture  -     Urinalysis  -     POCT Bladder Scan    Vesicoureteral reflux, bilateral  Comments:  grade 1; noted in sx report dated 9/23/2024.  Orders:  -     Urine culture  -     Urinalysis  -     POCT Bladder Scan    Urinary frequency  -     Urine culture  -     Urinalysis  -     POCT Bladder Scan    Urinary urgency  -     Urine culture  -     Urinalysis  -     POCT Bladder Scan    Other orders  Continue taking vibegron (Gemtesa) 75 mg daily for urinary urgency.  Start taking tamsulosin (Flomax) 0.4 mg every evening instead of daily.   Cystoscopy needs to be schedule. Will finalize date with Dr. Cee before scheduling.     Follow-up pending urine cx results.     Dequan Bronson, DNP

## 2024-10-15 NOTE — PATIENT INSTRUCTIONS
U/A and urine cx today  Bladder scan (PVR) today   Continue taking vibegron (Gemtesa) 75 mg daily for urinary urgency.  Start taking tamsulosin (Flomax) 0.4 mg every evening instead of daily.   Cystoscopy needs to be schedule. Will finalize date with Dr. Cee before scheduling.   Follow-up pending urine cx results.

## 2024-10-16 ENCOUNTER — TELEPHONE (OUTPATIENT)
Dept: UROLOGY | Facility: CLINIC | Age: 69
End: 2024-10-16
Payer: MEDICARE

## 2024-10-16 LAB — BACTERIA UR CULT: NORMAL

## 2024-10-16 NOTE — TELEPHONE ENCOUNTER
----- Message from Dequan Bronson DNP sent at 10/16/2024  4:16 PM CDT -----  Regarding: cysto in 6 months  Patient needs an in office cysto appt with Dr. Cee in 6 months for hx of bladder cancer. He's aware someone will call him today to help him schedule. Thanks.

## 2024-10-16 NOTE — TELEPHONE ENCOUNTER
Telephone call placed to patient to regarding in office cysto. Dr. Cee stated we can repeat cysto in 6 months instead of 3 months since patient is hesitant and his pathology was negative for malignancy. Patient informed and feels good about plan of care. U/A results also discussed with patient.     Dequan Bronson, DNP

## 2024-10-16 NOTE — TELEPHONE ENCOUNTER
Called patient to schedule cysto appointment, no answer, left message to let him know that I scheduled cysto and if he needs to change date he can call us to reschedule

## 2024-10-17 ENCOUNTER — TELEPHONE (OUTPATIENT)
Dept: UROLOGY | Facility: CLINIC | Age: 69
End: 2024-10-17
Payer: MEDICARE

## 2024-10-17 NOTE — TELEPHONE ENCOUNTER
----- Message from Dequan Bronson DNP sent at 10/17/2024  9:01 AM CDT -----  Please inform patient via telephone that his urine cx was normal. His UTI has resolved.

## 2024-10-26 DIAGNOSIS — I10 ESSENTIAL HYPERTENSION: ICD-10-CM

## 2024-10-28 RX ORDER — FUROSEMIDE 20 MG/1
20 TABLET ORAL DAILY
Qty: 90 TABLET | Refills: 1 | Status: SHIPPED | OUTPATIENT
Start: 2024-10-28

## 2024-10-31 ENCOUNTER — PATIENT OUTREACH (OUTPATIENT)
Dept: ADMINISTRATIVE | Facility: HOSPITAL | Age: 69
End: 2024-10-31
Payer: MEDICARE

## 2024-11-02 NOTE — TELEPHONE ENCOUNTER
No care due was identified.  [facility] Embedded Care Due Messages. Reference number: [id number].   11/02/2024 5:08:34 AM CDT

## 2024-11-04 RX ORDER — SPIRONOLACTONE 25 MG/1
25 TABLET ORAL DAILY
Qty: 90 TABLET | Refills: 3 | Status: SHIPPED | OUTPATIENT
Start: 2024-11-04 | End: 2025-11-04

## 2024-11-04 NOTE — TELEPHONE ENCOUNTER
Refill Routing Note   Medication(s) are not appropriate for processing by [facility] for the following reason(s):        Required vitals abnormal    [facility] action(s):  Defer             Appointments  past 12m or future 3m with PCP    Date Provider   Last Visit   5/21/2024 Lon Brock MD   Next Visit   11/5/2024 Lon Brock MD   ED visits in past 90 days: 0        Note composed:12:32 PM 11/04/2024

## 2024-11-05 ENCOUNTER — OFFICE VISIT (OUTPATIENT)
Dept: FAMILY MEDICINE | Facility: CLINIC | Age: 69
End: 2024-11-05
Payer: MEDICARE

## 2024-11-05 VITALS
TEMPERATURE: 98 F | SYSTOLIC BLOOD PRESSURE: 124 MMHG | BODY MASS INDEX: 38.63 KG/M2 | DIASTOLIC BLOOD PRESSURE: 70 MMHG | OXYGEN SATURATION: 95 % | HEIGHT: 69 IN | HEART RATE: 87 BPM | WEIGHT: 260.81 LBS

## 2024-11-05 DIAGNOSIS — Z79.4 TYPE 2 DIABETES MELLITUS WITH DIABETIC POLYNEUROPATHY, WITH LONG-TERM CURRENT USE OF INSULIN: Primary | ICD-10-CM

## 2024-11-05 DIAGNOSIS — Z23 NEEDS FLU SHOT: ICD-10-CM

## 2024-11-05 DIAGNOSIS — C67.9 TRANSITIONAL CELL CARCINOMA OF BLADDER: ICD-10-CM

## 2024-11-05 DIAGNOSIS — E11.29 TYPE 2 DIABETES MELLITUS WITH DIABETIC MICROALBUMINURIA, WITH LONG-TERM CURRENT USE OF INSULIN: ICD-10-CM

## 2024-11-05 DIAGNOSIS — I10 ESSENTIAL HYPERTENSION: ICD-10-CM

## 2024-11-05 DIAGNOSIS — Z79.4 TYPE 2 DIABETES MELLITUS WITH DIABETIC MICROALBUMINURIA, WITH LONG-TERM CURRENT USE OF INSULIN: ICD-10-CM

## 2024-11-05 DIAGNOSIS — E11.42 TYPE 2 DIABETES MELLITUS WITH DIABETIC POLYNEUROPATHY, WITH LONG-TERM CURRENT USE OF INSULIN: Primary | ICD-10-CM

## 2024-11-05 DIAGNOSIS — R80.9 TYPE 2 DIABETES MELLITUS WITH DIABETIC MICROALBUMINURIA, WITH LONG-TERM CURRENT USE OF INSULIN: ICD-10-CM

## 2024-11-05 PROCEDURE — 3066F NEPHROPATHY DOC TX: CPT | Mod: CPTII,S$GLB,, | Performed by: FAMILY MEDICINE

## 2024-11-05 PROCEDURE — 3062F POS MACROALBUMINURIA REV: CPT | Mod: CPTII,S$GLB,, | Performed by: FAMILY MEDICINE

## 2024-11-05 PROCEDURE — 1126F AMNT PAIN NOTED NONE PRSNT: CPT | Mod: CPTII,S$GLB,, | Performed by: FAMILY MEDICINE

## 2024-11-05 PROCEDURE — 1101F PT FALLS ASSESS-DOCD LE1/YR: CPT | Mod: CPTII,S$GLB,, | Performed by: FAMILY MEDICINE

## 2024-11-05 PROCEDURE — 1160F RVW MEDS BY RX/DR IN RCRD: CPT | Mod: CPTII,S$GLB,, | Performed by: FAMILY MEDICINE

## 2024-11-05 PROCEDURE — 99215 OFFICE O/P EST HI 40 MIN: CPT | Mod: 25,S$GLB,, | Performed by: FAMILY MEDICINE

## 2024-11-05 PROCEDURE — 3078F DIAST BP <80 MM HG: CPT | Mod: CPTII,S$GLB,, | Performed by: FAMILY MEDICINE

## 2024-11-05 PROCEDURE — 90653 IIV ADJUVANT VACCINE IM: CPT | Mod: S$GLB,,, | Performed by: FAMILY MEDICINE

## 2024-11-05 PROCEDURE — 1157F ADVNC CARE PLAN IN RCRD: CPT | Mod: CPTII,S$GLB,, | Performed by: FAMILY MEDICINE

## 2024-11-05 PROCEDURE — 3051F HG A1C>EQUAL 7.0%<8.0%: CPT | Mod: CPTII,S$GLB,, | Performed by: FAMILY MEDICINE

## 2024-11-05 PROCEDURE — 3288F FALL RISK ASSESSMENT DOCD: CPT | Mod: CPTII,S$GLB,, | Performed by: FAMILY MEDICINE

## 2024-11-05 PROCEDURE — 1159F MED LIST DOCD IN RCRD: CPT | Mod: CPTII,S$GLB,, | Performed by: FAMILY MEDICINE

## 2024-11-05 PROCEDURE — 99999 PR PBB SHADOW E&M-EST. PATIENT-LVL V: CPT | Mod: PBBFAC,,, | Performed by: FAMILY MEDICINE

## 2024-11-05 PROCEDURE — 3074F SYST BP LT 130 MM HG: CPT | Mod: CPTII,S$GLB,, | Performed by: FAMILY MEDICINE

## 2024-11-05 PROCEDURE — 4010F ACE/ARB THERAPY RXD/TAKEN: CPT | Mod: CPTII,S$GLB,, | Performed by: FAMILY MEDICINE

## 2024-11-05 PROCEDURE — 3008F BODY MASS INDEX DOCD: CPT | Mod: CPTII,S$GLB,, | Performed by: FAMILY MEDICINE

## 2024-11-05 PROCEDURE — G0008 ADMIN INFLUENZA VIRUS VAC: HCPCS | Mod: S$GLB,,, | Performed by: FAMILY MEDICINE

## 2024-11-05 RX ORDER — BLOOD-GLUCOSE SENSOR
EACH MISCELLANEOUS
Qty: 3 EACH | Refills: 11 | Status: SHIPPED | OUTPATIENT
Start: 2024-11-05

## 2024-11-05 RX ORDER — SEMAGLUTIDE 2.68 MG/ML
2 INJECTION, SOLUTION SUBCUTANEOUS
Qty: 3 ML | Refills: 11 | Status: SHIPPED | OUTPATIENT
Start: 2024-11-05

## 2024-11-05 NOTE — PROGRESS NOTES
(Portions of this note were dictated using voice recognition software and may contain dictation related errors in spelling/grammar/syntax not found on text review)    CC:   Chief Complaint   Patient presents with    Follow-up       HPI: 69 y.o. male   Last visit in May of 2024     Medical history is significant for venous stasis ulcers, diabetes type 2 on insulin, gout, dyslipidemia, recurrent TCC bladder, hypertension    Following with  wound care for chronic venous insufficiency with venous stasis ulcers bilateral lower extremities.  . Had normal BRISA.  Prior followup with Podiatry, Dr. Murrieta.  Ulcers have healed.  Wears compression stockings regularly.  Has not seen Podiatry in awhile.     Diabetes type 2 uncontrolled.  Since has established with endocrinology, 5/2023  On Humulin 70/30, was at 55 units b.i.d..    He was diagnosed with diabetes over 20 years ago, states that he has been on 70 30 since that time.  Was on Trulicity but because of availability issues was switched over to Ozempic at 1 mg weekly .  70/30 dose down to 40 units twice daily.  Denies any hypoglycemia concerns.  Blood sugars range in the morning anywhere from 01/20 on a good day to sometimes 200 when I have not been as good    Eye exam up-to-date  , Dr. Chavez     gout, prophylaxed with allopurinol 300 mg daily, no recent issues     Dyslipidemia on Crestor 40 mg daily     Hypertension on losartan 100 mg daily, carvedilol 12.5 mg b.i.d.  , Aldactone 25 mg daily, Lasix 20 mg daily.  Follows with Cardiology     History of recurrent TCC bladder, follows with Urology., had recurrent resection and mitomycin-C installation.  Last cystoscopy was 09/10/2024, lesion right lateral wall greater than 25 mm and lesion on dome of bladder on left side cystic appearing mucosa in the area .  Had right lesion resection on 09/23/2024   pathology showed severe acute and chronic cystitis with granulation tissue, no definitive evidence for malignancy  .   Washing cytology showed atypical urothelial cells.  Recommending office cystoscopy 3 months  Takes flomax.  Taking Vibegron for urinary urgency          Past Medical History:   Diagnosis Date    BPH (benign prostatic hyperplasia)     Bursitis     Diabetes mellitus, type 2     Gout     High cholesterol     Hypertension     Malignant neoplasm of urinary bladder 03/11/2020    Sciatica 2016    had injections-resolved    Urinary tract infection        Past Surgical History:   Procedure Laterality Date    APPENDECTOMY  1968    BALLOON DILATION OF URETER Left 09/18/2019    Procedure: DILATION, URETER, USING BALLOON;  Surgeon: Monty Cee MD;  Location: [facility] OR;  Service: Urology;  Laterality: Left;    BARBOTAGE OF BLADDER N/A 08/14/2019    Procedure: BARBOTAGE, BLADDER;  Surgeon: Monty Cee MD;  Location: [facility] OR;  Service: Urology;  Laterality: N/A;    BARBOTAGE OF BLADDER N/A 9/23/2024    Procedure: BARBOTAGE, BLADDER;  Surgeon: Monty Cee MD;  Location: [facility] OR;  Service: Urology;  Laterality: N/A;    BARBOTAGE OF URETER Bilateral 08/14/2019    Procedure: BARBOTAGE, URETER;  Surgeon: Monty Cee MD;  Location: [facility] OR;  Service: Urology;  Laterality: Bilateral;    BARBOTAGE OF URETER Left 09/18/2019    Procedure: BARBOTAGE, URETER;  Surgeon: Monty Cee MD;  Location: [facility] OR;  Service: Urology;  Laterality: Left;  left renal/ ureteral washing.    BARBOTAGE OF URETER Bilateral 9/23/2024    Procedure: BARBOTAGE, URETER;  Surgeon: Monty Cee MD;  Location: [facility] OR;  Service: Urology;  Laterality: Bilateral;    BLADDER SURGERY      CATARACT EXTRACTION, BILATERAL      COLONOSCOPY N/A 07/10/2018    Procedure: COLONOSCOPY;  Surgeon: Azalea Sanchez MD;  Location: [facility] ENDO;  Service: Endoscopy;  Laterality: N/A;  [phone]    COLONOSCOPY N/A 10/05/2023    Procedure: COLONOSCOPY;  Surgeon: MAINOR El MD;  Location:  [facility];  Service: Endoscopy;  Laterality: N/A;    CYSTOSCOPY      CYSTOSCOPY W/ RETROGRADES Bilateral 08/14/2019    Procedure: CYSTOSCOPY, WITH RETROGRADE PYELOGRAM;  Surgeon: Monty Cee MD;  Location: [facility] OR;  Service: Urology;  Laterality: Bilateral;    CYSTOSCOPY W/ RETROGRADES Left 09/18/2019    Procedure: CYSTOSCOPY, WITH RETROGRADE PYELOGRAM;  Surgeon: Monty Cee MD;  Location: [facility] OR;  Service: Urology;  Laterality: Left;  cystourethroscopy with left retrograde pyelogram     CYSTOSCOPY W/ RETROGRADES Bilateral 03/11/2020    Procedure: CYSTOSCOPY, WITH RETROGRADE PYELOGRAM Ureteral renal washings and bladder washings;  Surgeon: Monty Cee MD;  Location: [facility];  Service: Urology;  Laterality: Bilateral;    CYSTOSCOPY W/ RETROGRADES Bilateral 9/23/2024    Procedure: CYSTOSCOPY, WITH RETROGRADE PYELOGRAM;  Surgeon: Monty Cee MD;  Location: [facility] OR;  Service: Urology;  Laterality: Bilateral;  Bilateral ureteral/renal washings, bladder washings    CYSTOSCOPY WITH URETEROSCOPY, RETROGRADE PYELOGRAPHY, AND INSERTION OF STENT Bilateral 06/22/2020    Procedure: CYSTOSCOPY, WITH RETROGRADE PYELOGRAM AND URETERAL STENT INSERTION;  Surgeon: Monty Cee MD;  Location: [facility];  Service: Urology;  Laterality: Bilateral;    CYSTOSCOPY WITH URETHRAL DILATION N/A 06/10/2021    Procedure: CYSTOSCOPY, WITH URETHRAL DILATION;  Surgeon: Monty Cee MD;  Location: [facility] OR;  Service: Urology;  Laterality: N/A;    DILATION OF URETHRA N/A 9/23/2024    Procedure: DILATION, URETHRA;  Surgeon: Monty Cee MD;  Location: [facility] OR;  Service: Urology;  Laterality: N/A;    HAND SURGERY Right 12/08/2016    trigger finger    INSTILLATION OF URINARY BLADDER N/A 03/11/2020    Procedure: INSTILLATION, BLADDER - mitomyin - c;  Surgeon: Monty Cee MD;  Location: [facility] OR;  Service: Urology;  Laterality: N/A;    INSTILLATION OF URINARY BLADDER  06/22/2020    Procedure: INSTILLATION, BLADDER;   Surgeon: Monty Cee MD;  Location: [facility] OR;  Service: Urology;;  mitomycin 40mg    LASER ABLATION Left 09/18/2019    Procedure: ABLATION, USING LASER;  Surgeon: Monty Cee MD;  Location: [facility] OR;  Service: Urology;  Laterality: Left;  laser ablation of lower pole calyceal transitional cell lesion    PYELOSCOPY Left 09/18/2019    Procedure: PYELOSCOPY;  Surgeon: Monty Cee MD;  Location: [facility] OR;  Service: Urology;  Laterality: Left;  Left ureteral pyeloscopy    TONSILLECTOMY  1960    TRANSURETHRAL RESECTION OF PROSTATE N/A 09/18/2019    Procedure: TURP (TRANSURETHRAL RESECTION OF PROSTATE);  Surgeon: Monty Cee MD;  Location: [facility];  Service: Urology;  Laterality: N/A;  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    TRANSURETHRAL RESECTION OF PROSTATE USING BIPOLAR CAUTERY N/A 03/11/2020    Procedure: TURP, USING BIPOLAR CAUTERY;  Surgeon: Monty Cee MD;  Location: [facility];  Service: Urology;  Laterality: N/A;    TRANSURETHRAL SURGICAL REMOVAL OF STRICTURE OF BLADDER NECK  08/14/2019    Procedure: EXCISION, CONTRACTURE, BLADDER NECK, TRANSURETHRAL;  Surgeon: Monty Cee MD;  Location: [facility];  Service: Urology;;    TRANSURETHRAL SURGICAL REMOVAL OF STRICTURE OF BLADDER NECK N/A 06/10/2021    Procedure: EXCISION, CONTRACTURE, BLADDER NECK, TRANSURETHRAL;  Surgeon: Monty Cee MD;  Location: [facility];  Service: Urology;  Laterality: N/A;  using laser    TURBT (TRANSURETHRAL RESECTION OF BLADDER TUMOR) N/A 9/23/2024    Procedure: TURBT (TRANSURETHRAL RESECTION OF BLADDER TUMOR);  Surgeon: Monty Cee MD;  Location: [facility];  Service: Urology;  Laterality: N/A;    URETEROSCOPY Left 09/18/2019    Procedure: URETEROSCOPY;  Surgeon: Monty Cee MD;  Location: [facility] OR;  Service: Urology;  Laterality: Left;    URETEROSCOPY Right 06/22/2020    Procedure: URETEROSCOPY;  Surgeon: Monty Cee MD;  Location: [facility];  Service: Urology;   Laterality: Right;    URETEROSCOPY Left 2024    Procedure: URETEROSCOPY;  Surgeon: Monty Cee MD;  Location: [facility];  Service: Urology;  Laterality: Left;    WISDOM TOOTH EXTRACTION         Family History   Problem Relation Name Age of Onset    Diabetes Mother      Heart attack Father      Coronary artery disease Father      Arthritis Sister x1     No Known Problems Brother x1     Hyperlipidemia Son x3     Hypertension Son x3     No Known Problems Maternal Grandmother 90s     No Known Problems Maternal Grandfather 90s     No Known Problems Paternal Grandmother 50s     Coronary artery disease Paternal Grandfather      Heart attack Paternal Grandfather      Prostate cancer Neg Hx      Kidney disease Neg Hx         Social History     Tobacco Use    Smoking status: Former     Types: Pipe     Quit date:      Years since quittin.8    Smokeless tobacco: Never    Tobacco comments:     quit 40 yrs ago   Substance Use Topics    Alcohol use: Yes     Alcohol/week: 0.0 standard drinks of alcohol     Comment: rarely    Drug use: Never       Lab Results   Component Value Date    WBC 10.36 2024    HGB 14.6 2024    HCT 46.3 2024    MCV 88 2024     2024    CHOL 138 2024    TRIG 75 2024    HDL 47 2024    ALT 18 2024    AST 13 2024    BILITOT 0.4 2024    ALKPHOS 61 2024     2024    K 4.7 2024     2024    CREATININE 1.1 2024    ESTGFRAFRICA >60.0 2022    EGFRNONAA >60.0 2022    EGFRNORACEVR >60.0 2024    CALCIUM 10.1 2024    ALBUMIN 3.5 2024    BUN 25 (H) 2024    CO2 28 2024    TSH 1.565 2024    PSA 0.20 2024    PSADIAG 1.6 2017    HGBA1C 7.9 (H) 2024    MICALBCREAT 811.0 (H) 2024    LDLCALC 76.0 2024     (H) 2024                 Vital signs reviewed  Vitals:    24 1553   BP: 124/70   BP Location: Right arm  "  Patient Position: Sitting   Pulse: 87   Temp: 97.6 °F (36.4 °C)   TempSrc: Oral   SpO2: 95%   Weight: 118.3 kg (260 lb 12.9 oz)   Height: 5' 9" (1.753 m)           Wt Readings from Last 20 Encounters:   11/05/24 118.3 kg (260 lb 12.9 oz)   10/15/24 119.1 kg (262 lb 9.1 oz)   09/23/24 118.3 kg (260 lb 12.9 oz)   09/10/24 121 kg (266 lb 12.1 oz)   05/21/24 121 kg (266 lb 12.1 oz)   10/05/23 119.3 kg (263 lb 0.1 oz)   09/12/23 118.2 kg (260 lb 9.3 oz)   08/24/23 121.6 kg (268 lb)   07/26/23 121.7 kg (268 lb 4.8 oz)   05/10/23 116.1 kg (256 lb)   05/10/23 116.3 kg (256 lb 6.3 oz)   05/10/23 116.3 kg (256 lb 6.3 oz)   05/05/23 118.1 kg (260 lb 4.1 oz)   04/28/23 122.1 kg (269 lb 2.9 oz)   03/03/23 122.9 kg (270 lb 15.1 oz)   02/03/23 122.2 kg (269 lb 6.4 oz)   01/26/23 120.1 kg (264 lb 12.4 oz)   12/23/22 122.7 kg (270 lb 6.4 oz)   11/25/22 123.8 kg (273 lb)   10/20/22 124.4 kg (274 lb 4 oz)           IMPRESSION  1. Type 2 diabetes mellitus with diabetic polyneuropathy, with long-term current use of insulin    2. Essential hypertension    3. Transitional cell carcinoma of bladder    4. Type 2 diabetes mellitus with diabetic microalbuminuria, with long-term current use of insulin    5. Needs flu shot                PLAN  Orders Placed This Encounter   Procedures    Comprehensive Metabolic Panel    Lipid Panel    Hemoglobin A1C     HTN stable    Diabetes : Stable.  Increase Ozempic to 2 mg weekly.  He is interested in further decrease in his 70 30 insulin, would recommend 1st thing how the Ozempic further helps his sugars and then we can slowly try to titrate down.  He has been on metformin at 1 point years ago but nothing recently.  No allergies or tolerance issues.  We discussed at some point we could even add in further oral therapy like metformin and later consider further decreasing his overall insulin burden.  He was given blood sugar log to check his sugars regularly.  He was also enquiring about CGM and since he " is on insulin management I think that this would be a reasonable option.  I have sent in a prescription for Dexcom G7 CGM  Keep goal fasting sugar less than 140 and goal 2 hour postprandial sugar less than 180.  Will follow back up in one-month and review blood sugar readings and further discuss any titration of management.    Gout: Continue allopurinol prophylaxis.     Continue urology follow-up for TCC surveillance     Continue regular exercise, dietary modification .  He did physical therapy in the past for his hips which have helped.  He has continued to use the physical therapy center if as a gym as some of the equipment there is easier to get on and use given his hip mobility issues.  He was wondering if this could be covered by insurance.  He can check with his physical therapy center on if there is any specific order for a wellness program or similar that can be sent as a prescription in case that could be covered by his insurance    Total time spent including face-to-face time and chart review and documentation time:  40 min    SCREENINGS       Immunizations:      COVID x3, encourage booster  Flu up-to-date  Tdap 2017  Zostavax 2017, can get updated shingles vaccine at pharmacy  Prevnar 13:2015  Pneumovax 23:2016  Prevnar 20 07/15/2022   Flu shot today    Age/demographic appropriate health maintenance:  Colonoscopy 10/5/23: 7 mm and 3 mm polyp, TA, repeat 5 years  PSA:  05/2024

## 2024-11-19 LAB
LEFT EYE DM RETINOPATHY: POSITIVE
RIGHT EYE DM RETINOPATHY: POSITIVE

## 2024-12-10 ENCOUNTER — PATIENT OUTREACH (OUTPATIENT)
Dept: ADMINISTRATIVE | Facility: HOSPITAL | Age: 69
End: 2024-12-10
Payer: MEDICARE

## 2024-12-10 NOTE — PROGRESS NOTES
Population Health Chart Review & Patient Outreach Details      Additional [facility] Health Notes:               Updates Requested / Reviewed:      Updated Care Coordination Note, Care Everywhere, , and Immunizations Reconciliation Completed or Queried: [facility] Topics Overdue:      [facility] Score: 1     Foot Exam    Shingles/Zoster Vaccine  RSV Vaccine                  Health Maintenance Topic(s) Outreach Outcomes & Actions Taken:    Eye Exam - Outreach Outcomes & Actions Taken  : Diabetic Eye External Records Uploaded, Care Team & History Updated if Applicable

## 2024-12-17 RX ORDER — TAMSULOSIN HYDROCHLORIDE 0.4 MG/1
0.4 CAPSULE ORAL NIGHTLY
Qty: 90 CAPSULE | Refills: 3 | Status: SHIPPED | OUTPATIENT
Start: 2024-12-17

## 2024-12-17 NOTE — TELEPHONE ENCOUNTER
Refill Routing Note   Medication(s) are not appropriate for processing by [facility] for the following reason(s):      Failed Prostate Cancer is not on problem list    [facility] action(s):  Defer Care Due:  Labs due     Medication Therapy Plan: Failed Prostate Cancer is not on problem list      Appointments  past 12m or future 3m with PCP    Date Provider   Last Visit   11/5/2024 Lon Brock MD   Next Visit   2/20/2025 Lon Brock MD   ED visits in past 90 days: 0        Note composed:1:11 PM 12/17/2024

## 2024-12-17 NOTE — TELEPHONE ENCOUNTER
Care Due:                  Date            Visit Type   Department     Provider  --------------------------------------------------------------------------------                                EP -                              [facility]  Last Visit: 11-      CARE ([facility])   LEONIDES Brock                              [facility]  Next Visit: 02-      CARE ([facility])   [facility]       Lon [facility]                                                            Last  Test          Frequency    Reason                     Performed    Due Date  --------------------------------------------------------------------------------    HBA1C.......  6 months...  insulin, semaglutide.....  08- 02-    Health [facility] Embedded Care Due Messages. Reference number: [id number].   12/17/2024 1:09:06 PM CST

## 2025-02-05 DIAGNOSIS — E78.2 MIXED HYPERLIPIDEMIA: ICD-10-CM

## 2025-02-05 RX ORDER — ROSUVASTATIN CALCIUM 40 MG/1
TABLET, COATED ORAL
Qty: 90 TABLET | Refills: 1 | Status: SHIPPED | OUTPATIENT
Start: 2025-02-05

## 2025-02-05 NOTE — TELEPHONE ENCOUNTER
Refill Decision Note   Andrew Beltran  is requesting a refill authorization.  Brief Assessment and Rationale for Refill:  Approve     Medication Therapy Plan:         Pharmacist review requested: Yes   Extended chart review required: Yes   Comments:     Note composed:10:28 AM 02/05/2025

## 2025-02-05 NOTE — TELEPHONE ENCOUNTER
No care due was identified.  Health [facility] Embedded Care Due Messages. Reference number: [id number].   2/05/2025 5:08:36 AM CST

## 2025-02-05 NOTE — TELEPHONE ENCOUNTER
Refill Routing Note   Medication(s) are not appropriate for processing by [facility] for the following reason(s):        Drug-disease interaction:     [facility] action(s):  Defer    Medication Therapy Plan: Drug-Disease: rosuvastatin and Decreased urination    Pharmacist review requested: Yes     Appointments  past 12m or future 3m with PCP    Date Provider   Last Visit   11/5/2024 Lon Brock MD   Next Visit   2/20/2025 Lon Brock MD   ED visits in past 90 days: 0        Note composed:9:02 AM 02/05/2025

## 2025-02-17 RX ORDER — SYRINGE,SAFETY WITH NEEDLE,1ML 25GX1"
SYRINGE (EA) MISCELLANEOUS
Qty: 200 EACH | Refills: 3 | Status: SHIPPED | OUTPATIENT
Start: 2025-02-17

## 2025-02-17 NOTE — TELEPHONE ENCOUNTER
Care Due:                  Date            Visit Type   Department     Provider  --------------------------------------------------------------------------------                                EP -                              PRIMARY      [facility] FAMILY  Last Visit: 11-      CARE ([facility])   MEDICINE       Lon Brock  Next Visit: None Scheduled  None         None Found                                                            Last  Test          Frequency    Reason                     Performed    Due Date  --------------------------------------------------------------------------------    HBA1C.......  6 months...  insulin, semaglutide.....  08- 02-    [facility] Embedded Care Due Messages. Reference number: [id number].   2/17/2025 10:28:47 AM CST

## 2025-02-18 LAB
LEFT EYE DM RETINOPATHY: POSITIVE
RIGHT EYE DM RETINOPATHY: POSITIVE

## 2025-02-18 NOTE — TELEPHONE ENCOUNTER
Provider Staff:  Action required for this patient    Requires labs      Please see care gap opportunities below in Care Due Message.    Thanks!  [facility]     Appointments      Date Provider   Last Visit   11/5/2024 Lon Brock MD   Next Visit   2/20/2025 Lon Brock MD     Refill Decision Note   Andrew Beltran  is requesting a refill authorization.  Brief Assessment and Rationale for Refill:  Approve     Medication Therapy Plan:         Comments:     Note composed:10:21 PM 02/17/2025             Appointments     Last Visit   11/5/2024 Lon Brock MD   Next Visit   2/20/2025 Lon Brock MD

## 2025-02-20 ENCOUNTER — OFFICE VISIT (OUTPATIENT)
Dept: FAMILY MEDICINE | Facility: CLINIC | Age: 70
End: 2025-02-20
Payer: MEDICARE

## 2025-02-20 VITALS
HEART RATE: 95 BPM | HEIGHT: 69 IN | SYSTOLIC BLOOD PRESSURE: 128 MMHG | DIASTOLIC BLOOD PRESSURE: 70 MMHG | OXYGEN SATURATION: 99 % | WEIGHT: 262.13 LBS | BODY MASS INDEX: 38.82 KG/M2 | TEMPERATURE: 98 F

## 2025-02-20 DIAGNOSIS — Z23 NEED FOR COVID-19 VACCINE: Primary | ICD-10-CM

## 2025-02-20 DIAGNOSIS — E66.01 SEVERE OBESITY (BMI 35.0-39.9) WITH COMORBIDITY: ICD-10-CM

## 2025-02-20 DIAGNOSIS — M10.9 GOUT, UNSPECIFIED CAUSE, UNSPECIFIED CHRONICITY, UNSPECIFIED SITE: ICD-10-CM

## 2025-02-20 DIAGNOSIS — E11.42 TYPE 2 DIABETES MELLITUS WITH DIABETIC POLYNEUROPATHY, WITH LONG-TERM CURRENT USE OF INSULIN: ICD-10-CM

## 2025-02-20 DIAGNOSIS — M10.9 GOUT OF FOOT, UNSPECIFIED CAUSE, UNSPECIFIED CHRONICITY, UNSPECIFIED LATERALITY: ICD-10-CM

## 2025-02-20 DIAGNOSIS — E78.2 MIXED HYPERLIPIDEMIA: ICD-10-CM

## 2025-02-20 DIAGNOSIS — C67.9 TRANSITIONAL CELL CARCINOMA OF BLADDER: ICD-10-CM

## 2025-02-20 DIAGNOSIS — I10 ESSENTIAL HYPERTENSION: ICD-10-CM

## 2025-02-20 DIAGNOSIS — Z79.4 TYPE 2 DIABETES MELLITUS WITH DIABETIC POLYNEUROPATHY, WITH LONG-TERM CURRENT USE OF INSULIN: ICD-10-CM

## 2025-02-20 RX ORDER — ALLOPURINOL 300 MG/1
300 TABLET ORAL DAILY
Qty: 90 TABLET | Refills: 4 | Status: SHIPPED | OUTPATIENT
Start: 2025-02-20

## 2025-02-20 NOTE — PROGRESS NOTES
"(Portions of this note were dictated using voice recognition software and may contain dictation related errors in spelling/grammar/syntax not found on text review)    CC:   Chief Complaint   Patient presents with    Follow-up       HPI: 69 y.o. male   Last visit in 11/05/2024    Medical history is significant for venous stasis ulcers, diabetes type 2 on insulin, gout, dyslipidemia, recurrent TCC bladder, hypertension    Had been following wound care for chronic venous insufficiency with venous stasis ulcers bilateral lower extremities.  . Had normal BRISA.  Prior followup with Podiatry, Dr. Murrieta.  Ulcers have healed.  Wears compression stockings regularly.  Has not seen Podiatry in awhile plans to make appointment     Diabetes type 2 uncontrolled.  Since has established with endocrinology, 5/2023  On Humulin 70/30, was at 55 units b.i.d..    He was diagnosed with diabetes over 20 years ago, states that he has been on 70 30 since that time.  Was on Trulicity but because of availability issues was switched over to Ozempic at currently titrated to 2 mg weekly.  70/30 dose down to 40 units twice daily.  Denies any hypoglycemia concerns.   Labs in chart, need to be scheduled.  Blood sugars doing much better around  on a good day and around 120-140 on a bad day", not getting 200s anymore like he used to.  No hypoglycemia    Eye exam up-to-date  , Dr. Scott cyr, prophylaxed with allopurinol 300 mg daily, no recent issues     Dyslipidemia on Crestor 40 mg daily     Hypertension on losartan 100 mg daily, carvedilol 12.5 mg b.i.d.  , Aldactone 25 mg daily, Lasix 20 mg daily.  Follows with Cardiology     History of recurrent TCC bladder, follows with Urology., had recurrent resection and mitomycin-C installation.  Last cystoscopy was 09/10/2024, lesion right lateral wall greater than 25 mm and lesion on dome of bladder on left side cystic appearing mucosa in the area .  Had right lesion resection on 09/23/2024   " pathology showed severe acute and chronic cystitis with granulation tissue, no definitive evidence for malignancy  .  Washing cytology showed atypical urothelial cells.  Recommending office cystoscopy 3 months  Takes flomax.  Taking Vibegron for urinary urgency.  Urology appointment upcoming 04/16/2025          Past Medical History:   Diagnosis Date    BPH (benign prostatic hyperplasia)     Bursitis     Diabetes mellitus, type 2     Gout     High cholesterol     Hypertension     Malignant neoplasm of urinary bladder 03/11/2020    Sciatica 2016    had injections-resolved    Urinary tract infection        Past Surgical History:   Procedure Laterality Date    APPENDECTOMY  1968    BALLOON DILATION OF URETER Left 09/18/2019    Procedure: DILATION, URETER, USING BALLOON;  Surgeon: Monty Cee MD;  Location: [facility];  Service: Urology;  Laterality: Left;    BARBOTAGE OF BLADDER N/A 08/14/2019    Procedure: BARBOTAGE, BLADDER;  Surgeon: Monty Cee MD;  Location: [facility] OR;  Service: Urology;  Laterality: N/A;    BARBOTAGE OF BLADDER N/A 9/23/2024    Procedure: BARBOTAGE, BLADDER;  Surgeon: Monty Cee MD;  Location: [facility];  Service: Urology;  Laterality: N/A;    BARBOTAGE OF URETER Bilateral 08/14/2019    Procedure: BARBOTAGE, URETER;  Surgeon: Monty Cee MD;  Location: [facility] OR;  Service: Urology;  Laterality: Bilateral;    BARBOTAGE OF URETER Left 09/18/2019    Procedure: BARBOTAGE, URETER;  Surgeon: Monty Cee MD;  Location: [facility] OR;  Service: Urology;  Laterality: Left;  left renal/ ureteral washing.    BARBOTAGE OF URETER Bilateral 9/23/2024    Procedure: BARBOTAGE, URETER;  Surgeon: Monty Cee MD;  Location: [facility];  Service: Urology;  Laterality: Bilateral;    BLADDER SURGERY      CATARACT EXTRACTION, BILATERAL      COLONOSCOPY N/A 07/10/2018    Procedure: COLONOSCOPY;  Surgeon: Azalea Sanchez MD;  Location: [facility];  Service: Endoscopy;  Laterality: N/A;  Schedule at  [facility], faxed to [facility] [phone]    COLONOSCOPY N/A 10/05/2023    Procedure: COLONOSCOPY;  Surgeon: MAINOR El MD;  Location: [facility] ENDO;  Service: Endoscopy;  Laterality: N/A;    CYSTOSCOPY      CYSTOSCOPY W/ RETROGRADES Bilateral 08/14/2019    Procedure: CYSTOSCOPY, WITH RETROGRADE PYELOGRAM;  Surgeon: Monty Cee MD;  Location: [facility] OR;  Service: Urology;  Laterality: Bilateral;    CYSTOSCOPY W/ RETROGRADES Left 09/18/2019    Procedure: CYSTOSCOPY, WITH RETROGRADE PYELOGRAM;  Surgeon: Monty Cee MD;  Location: [facility] OR;  Service: Urology;  Laterality: Left;  cystourethroscopy with left retrograde pyelogram     CYSTOSCOPY W/ RETROGRADES Bilateral 03/11/2020    Procedure: CYSTOSCOPY, WITH RETROGRADE PYELOGRAM Ureteral renal washings and bladder washings;  Surgeon: Monty Cee MD;  Location: [facility] OR;  Service: Urology;  Laterality: Bilateral;    CYSTOSCOPY W/ RETROGRADES Bilateral 9/23/2024    Procedure: CYSTOSCOPY, WITH RETROGRADE PYELOGRAM;  Surgeon: Monty Cee MD;  Location: [facility] OR;  Service: Urology;  Laterality: Bilateral;  Bilateral ureteral/renal washings, bladder washings    CYSTOSCOPY WITH URETEROSCOPY, RETROGRADE PYELOGRAPHY, AND INSERTION OF STENT Bilateral 06/22/2020    Procedure: CYSTOSCOPY, WITH RETROGRADE PYELOGRAM AND URETERAL STENT INSERTION;  Surgeon: Monty Cee MD;  Location: [facility] OR;  Service: Urology;  Laterality: Bilateral;    CYSTOSCOPY WITH URETHRAL DILATION N/A 06/10/2021    Procedure: CYSTOSCOPY, WITH URETHRAL DILATION;  Surgeon: Monty Cee MD;  Location: [facility] OR;  Service: Urology;  Laterality: N/A;    DILATION OF URETHRA N/A 9/23/2024    Procedure: DILATION, URETHRA;  Surgeon: Monty Cee MD;  Location: [facility] OR;  Service: Urology;  Laterality: N/A;    HAND SURGERY Right 12/08/2016    trigger finger    INSTILLATION OF URINARY BLADDER N/A 03/11/2020    Procedure: INSTILLATION, BLADDER - mitomyin - c;  Surgeon:  Monty Cee MD;  Location: [facility] OR;  Service: Urology;  Laterality: N/A;    INSTILLATION OF URINARY BLADDER  06/22/2020    Procedure: INSTILLATION, BLADDER;  Surgeon: Monty Cee MD;  Location: [facility] OR;  Service: Urology;;  mitomycin 40mg    LASER ABLATION Left 09/18/2019    Procedure: ABLATION, USING LASER;  Surgeon: Monty Cee MD;  Location: [facility] OR;  Service: Urology;  Laterality: Left;  laser ablation of lower pole calyceal transitional cell lesion    PYELOSCOPY Left 09/18/2019    Procedure: PYELOSCOPY;  Surgeon: Monty Cee MD;  Location: [facility] OR;  Service: Urology;  Laterality: Left;  Left ureteral pyeloscopy    TONSILLECTOMY  1960    TRANSURETHRAL RESECTION OF PROSTATE N/A 09/18/2019    Procedure: TURP (TRANSURETHRAL RESECTION OF PROSTATE);  Surgeon: Monty Cee MD;  Location: [facility] OR;  Service: Urology;  Laterality: N/A;  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    TRANSURETHRAL RESECTION OF PROSTATE USING BIPOLAR CAUTERY N/A 03/11/2020    Procedure: TURP, USING BIPOLAR CAUTERY;  Surgeon: Monty Cee MD;  Location: [facility] OR;  Service: Urology;  Laterality: N/A;    TRANSURETHRAL SURGICAL REMOVAL OF STRICTURE OF BLADDER NECK  08/14/2019    Procedure: EXCISION, CONTRACTURE, BLADDER NECK, TRANSURETHRAL;  Surgeon: Monyt Cee MD;  Location: [facility] OR;  Service: Urology;;    TRANSURETHRAL SURGICAL REMOVAL OF STRICTURE OF BLADDER NECK N/A 06/10/2021    Procedure: EXCISION, CONTRACTURE, BLADDER NECK, TRANSURETHRAL;  Surgeon: Monty Cee MD;  Location: [facility] OR;  Service: Urology;  Laterality: N/A;  using laser    TURBT (TRANSURETHRAL RESECTION OF BLADDER TUMOR) N/A 9/23/2024    Procedure: TURBT (TRANSURETHRAL RESECTION OF BLADDER TUMOR);  Surgeon: Monty Cee MD;  Location: [facility] OR;  Service: Urology;  Laterality: N/A;    URETEROSCOPY Left 09/18/2019    Procedure: URETEROSCOPY;  Surgeon: Monty Cee MD;  Location: [facility] OR;  Service:  Urology;  Laterality: Left;    URETEROSCOPY Right 2020    Procedure: URETEROSCOPY;  Surgeon: Monty Cee MD;  Location: [facility] OR;  Service: Urology;  Laterality: Right;    URETEROSCOPY Left 2024    Procedure: URETEROSCOPY;  Surgeon: Monty Cee MD;  Location: [facility] OR;  Service: Urology;  Laterality: Left;    WISDOM TOOTH EXTRACTION         Family History   Problem Relation Name Age of Onset    Diabetes Mother      Heart attack Father      Coronary artery disease Father      Arthritis Sister x1     No Known Problems Brother x1     Hyperlipidemia Son x3     Hypertension Son x3     No Known Problems Maternal Grandmother 90s     No Known Problems Maternal Grandfather 90s     No Known Problems Paternal Grandmother 50s     Coronary artery disease Paternal Grandfather      Heart attack Paternal Grandfather      Prostate cancer Neg Hx      Kidney disease Neg Hx         Social History     Tobacco Use    Smoking status: Former     Types: Pipe     Quit date:      Years since quittin.1    Smokeless tobacco: Never    Tobacco comments:     quit 40 yrs ago   Substance Use Topics    Alcohol use: Yes     Alcohol/week: 0.0 standard drinks of alcohol     Comment: rarely    Drug use: Never       Lab Results   Component Value Date    WBC 10.36 2024    HGB 14.6 2024    HCT 46.3 2024    MCV 88 2024     2024    CHOL 138 2024    TRIG 75 2024    HDL 47 2024    ALT 18 2024    AST 13 2024    BILITOT 0.4 2024    ALKPHOS 61 2024     2024    K 4.7 2024     2024    CREATININE 1.1 2024    ESTGFRAFRICA >60.0 2022    EGFRNONAA >60.0 2022    EGFRNORACEVR >60.0 2024    CALCIUM 10.1 2024    ALBUMIN 3.5 2024    BUN 25 (H) 2024    CO2 28 2024    TSH 1.565 2024    PSA 0.20 2024    PSADIAG 1.6 2017    HGBA1C 7.9 (H) 2024    MICALBCREAT 811.0 (H)  "05/29/2024    LDLCALC 76.0 08/30/2024     (H) 08/30/2024       Hemoglobin A1C (%)   Date Value   08/30/2024 7.9 (H)   05/29/2024 8.0 (H)   09/06/2023 6.3 (H)   04/21/2023 6.3 (H)   01/19/2023 6.7 (H)   10/19/2022 7.7 (H)   07/20/2022 8.4 (H)   07/12/2021 9.3 (H)   04/01/2021 9.6 (H)   12/29/2020 8.5 (H)               Vital signs reviewed  Vitals:    02/20/25 0826   BP: 128/70   BP Location: Left arm   Patient Position: Sitting   Pulse: 95   Temp: 97.5 °F (36.4 °C)   TempSrc: Oral   SpO2: 99%   Weight: 118.9 kg (262 lb 2 oz)   Height: 5' 9" (1.753 m)             Wt Readings from Last 20 Encounters:   02/20/25 118.9 kg (262 lb 2 oz)   11/05/24 118.3 kg (260 lb 12.9 oz)   10/15/24 119.1 kg (262 lb 9.1 oz)   09/23/24 118.3 kg (260 lb 12.9 oz)   09/10/24 121 kg (266 lb 12.1 oz)   05/21/24 121 kg (266 lb 12.1 oz)   10/05/23 119.3 kg (263 lb 0.1 oz)   09/12/23 118.2 kg (260 lb 9.3 oz)   08/24/23 121.6 kg (268 lb)   07/26/23 121.7 kg (268 lb 4.8 oz)   05/10/23 116.1 kg (256 lb)   05/10/23 116.3 kg (256 lb 6.3 oz)   05/10/23 116.3 kg (256 lb 6.3 oz)   05/05/23 118.1 kg (260 lb 4.1 oz)   04/28/23 122.1 kg (269 lb 2.9 oz)   03/03/23 122.9 kg (270 lb 15.1 oz)   02/03/23 122.2 kg (269 lb 6.4 oz)   01/26/23 120.1 kg (264 lb 12.4 oz)   12/23/22 122.7 kg (270 lb 6.4 oz)   11/25/22 123.8 kg (273 lb)     Vital signs reviewed  PE:   APPEARANCE: Well nourished, well developed, in no acute distress.    HEAD: Normocephalic, atraumatic.  EYES: PERRL. EOMI.   Conjunctivae noninjected.  EARS: TM's intact. Light reflex normal. No retraction or perforation  NOSE: Mucosa pink. Airway clear.  MOUTH & THROAT: No tonsillar enlargement. No pharyngeal erythema or exudate.   NECK: Supple with no cervical lymphadenopathy.    CHEST: Good inspiratory effort. Lungs clear to auscultation with no wheezes or crackles.  CARDIOVASCULAR: Normal S1, S2. No rubs, murmurs, or gallops.  ABDOMEN: Bowel sounds normal. Not distended. Soft. No tenderness or " masses. No organomegaly.  EXTREMITIES: No edema no ulcerations   DIABETIC FOOT EXAM: Protective Sensation (w/ 10 gram monofilament):  Right: Intact  Left: Intact    Visual Inspection:  Onychomycosis -  Bilateral    Pedal Pulses:   Right: Present  Left: Present    Posterior Tibialis Pulses:   Right:Present  Left: Present                IMPRESSION  1. Need for COVID-19 vaccine    2. Gout of foot, unspecified cause, unspecified chronicity, unspecified laterality    3. Type 2 diabetes mellitus with diabetic polyneuropathy, with long-term current use of insulin    4. Essential hypertension    5. Transitional cell carcinoma of bladder    6. Mixed hyperlipidemia    7. Gout, unspecified cause, unspecified chronicity, unspecified site    8. Severe obesity (BMI 35.0-39.9) with comorbidity                  PLAN  No orders of the defined types were placed in this encounter.    HTN stable    Diabetes :    Ozempic 2 mg weekly   Humulin 70/30 currently at 40 b.i.d.   Keep goal fasting sugar less than 140 and goal 2 hour postprandial sugar less than 180.      Gout: Continue allopurinol prophylaxis.     Continue urology follow-up for TCC surveillance     Continue regular exercise, dietary modification .            SCREENINGS       Immunizations:      COVID x3, booster today  Flu up-to-date  Tdap 2017  Zostavax 2017, can get updated shingles vaccine at pharmacy  Prevnar 13:2015  Pneumovax 23:2016  Prevnar 20 07/15/2022       Age/demographic appropriate health maintenance:  Colonoscopy 10/5/23: 7 mm and 3 mm polyp, TA, repeat 5 years  PSA:  05/2024

## 2025-02-24 DIAGNOSIS — Z00.00 ENCOUNTER FOR MEDICARE ANNUAL WELLNESS EXAM: ICD-10-CM

## 2025-03-03 ENCOUNTER — PATIENT MESSAGE (OUTPATIENT)
Dept: FAMILY MEDICINE | Facility: CLINIC | Age: 70
End: 2025-03-03
Payer: MEDICARE

## 2025-03-03 DIAGNOSIS — E11.42 TYPE 2 DIABETES MELLITUS WITH DIABETIC POLYNEUROPATHY, WITH LONG-TERM CURRENT USE OF INSULIN: Primary | ICD-10-CM

## 2025-03-03 DIAGNOSIS — Z79.4 TYPE 2 DIABETES MELLITUS WITH DIABETIC POLYNEUROPATHY, WITH LONG-TERM CURRENT USE OF INSULIN: Primary | ICD-10-CM

## 2025-03-03 DIAGNOSIS — E66.01 SEVERE OBESITY (BMI 35.0-39.9) WITH COMORBIDITY: ICD-10-CM

## 2025-03-03 DIAGNOSIS — M10.9 GOUT, UNSPECIFIED CAUSE, UNSPECIFIED CHRONICITY, UNSPECIFIED SITE: ICD-10-CM

## 2025-03-03 DIAGNOSIS — C67.9 TRANSITIONAL CELL CARCINOMA OF BLADDER: ICD-10-CM

## 2025-03-03 DIAGNOSIS — E78.2 MIXED HYPERLIPIDEMIA: ICD-10-CM

## 2025-03-03 DIAGNOSIS — I10 ESSENTIAL HYPERTENSION: ICD-10-CM

## 2025-03-05 NOTE — TELEPHONE ENCOUNTER
3 mo visit pls schedule, and labs prior    Orders Placed This Encounter   Procedures    CBC Auto Differential    Comprehensive Metabolic Panel    Lipid Panel    TSH    Microalbumin/Creatinine Ratio, Urine    Hemoglobin A1C    Uric Acid

## 2025-03-06 ENCOUNTER — TELEPHONE (OUTPATIENT)
Dept: FAMILY MEDICINE | Facility: CLINIC | Age: 70
End: 2025-03-06
Payer: MEDICARE

## 2025-03-07 ENCOUNTER — PATIENT OUTREACH (OUTPATIENT)
Dept: ADMINISTRATIVE | Facility: HOSPITAL | Age: 70
End: 2025-03-07
Payer: MEDICARE

## 2025-03-10 DIAGNOSIS — I10 ESSENTIAL HYPERTENSION: ICD-10-CM

## 2025-03-10 DIAGNOSIS — R60.0 BILATERAL LOWER EXTREMITY EDEMA: ICD-10-CM

## 2025-03-10 NOTE — TELEPHONE ENCOUNTER
Care Due:                  Date            Visit Type   Department     Provider  --------------------------------------------------------------------------------                                EP -                              [facility]  Last Visit: 02-      CARE ([facility])   [facility]       Lon Brock                              [facility]  Next Visit: 06-      CARE ([facility])   [facility]                                                            Last  Test          Frequency    Reason                     Performed    Due Date  --------------------------------------------------------------------------------    Uric Acid...  12 months..  allopurinoL..............  05- 05-    Health [facility] Embedded Care Due Messages. Reference number: [id number].   3/10/2025 1:38:28 PM CDT

## 2025-03-11 RX ORDER — CARVEDILOL 12.5 MG/1
12.5 TABLET ORAL 2 TIMES DAILY WITH MEALS
Qty: 180 TABLET | Refills: 3 | Status: SHIPPED | OUTPATIENT
Start: 2025-03-11

## 2025-03-11 NOTE — TELEPHONE ENCOUNTER
Refill Routing Note   Medication(s) are not appropriate for processing by [facility] for the following reason(s):        No active prescription written by provider: Expiration Date: 01/25/25    OR action(s):  Defer      Medication Therapy Plan: FLOS      Appointments  past 12m or future 3m with PCP    Date Provider   Last Visit   2/20/2025 Lon Brock MD   Next Visit   6/11/2025 Lon Brock MD   ED visits in past 90 days: 0        Note composed:1:54 PM 03/11/2025

## 2025-03-27 ENCOUNTER — PATIENT OUTREACH (OUTPATIENT)
Dept: ADMINISTRATIVE | Facility: HOSPITAL | Age: 70
End: 2025-03-27
Payer: MEDICARE

## 2025-03-27 NOTE — PROGRESS NOTES
Population Health Chart Review & Patient Outreach Details      Additional [facility] Health Notes:               Updates Requested / Reviewed:      Updated Care Coordination Note, Care Everywhere, , and Immunizations Reconciliation Completed or Queried: [facility] Topics Overdue:      [facility] Score: 0     Patient is not due for any topics at this time.    Shingles/Zoster Vaccine  RSV Vaccine                  Health Maintenance Topic(s) Outreach Outcomes & Actions Taken:    Eye Exam - Outreach Outcomes & Actions Taken  : Diabetic Eye External Records Uploaded, Care Team & History Updated if Applicable

## 2025-04-16 ENCOUNTER — PROCEDURE VISIT (OUTPATIENT)
Dept: UROLOGY | Facility: CLINIC | Age: 70
End: 2025-04-16
Payer: MEDICARE

## 2025-04-16 VITALS — SYSTOLIC BLOOD PRESSURE: 144 MMHG | HEART RATE: 108 BPM | DIASTOLIC BLOOD PRESSURE: 78 MMHG

## 2025-04-16 DIAGNOSIS — Z85.51 HISTORY OF BLADDER CANCER: Primary | ICD-10-CM

## 2025-04-16 LAB
LEFT EYE DM RETINOPATHY: POSITIVE
RIGHT EYE DM RETINOPATHY: POSITIVE

## 2025-04-16 PROCEDURE — 88112 CYTOPATH CELL ENHANCE TECH: CPT | Mod: TC | Performed by: UROLOGY

## 2025-04-16 NOTE — PROCEDURES
Cystoscopy    Date/Time: 4/16/2025 1:30 PM    Performed by: Monty Cee MD  Authorized by: Monty Cee MD    Consent Done?:  Yes (Written)  Timeout: prior to procedure the correct patient, procedure, and site was verified    Prep: patient was prepped and draped in usual sterile fashion    Local anesthesia used?: Yes    Anesthesia:  Intraurethral instillation  Local anesthetic:  Lidocaine 2% topical gel  Anesthetic total (ml):  10  Indications: history bladder cancer    Position:  Supine  Anesthesia:  Intraurethral instillation  Patient sedated?: No    Preparation: Patient was prepped and draped in usual sterile fashion    Scope type:  Flexible cystoscopeNoNo  Stent inserted: No    Stent removed: No    External exam normal: Yes    Digital exam performed: No    Urethra normal: Yes    Prostate normal: No (TUR defect)    Bladder neck normal: Yes    Bladder normal: Yes     patient tolerated the procedure well with no immediate complications  Comments:      No mucosal abnormalities identified using normal cystoscopy light and narrow band imaging

## 2025-04-16 NOTE — PATIENT INSTRUCTIONS
Send urine for cytology and FISH testing  Cipro 500 mg twice daily for 2 days  Follow up yearly for cystoscopy  Notify patient of results of cytology and FISH testing

## 2025-04-17 LAB
ESTROGEN SERPL-MCNC: NORMAL PG/ML
INSULIN SERPL-ACNC: NORMAL U[IU]/ML
LAB AP GROSS DESCRIPTION: NORMAL
LAB AP PERFORMING LOCATION(S): NORMAL
LAB AP URINE CYTOLOGY INTERPRETATION SPECIMEN 1: NORMAL

## 2025-04-21 ENCOUNTER — TELEPHONE (OUTPATIENT)
Dept: UROLOGY | Facility: CLINIC | Age: 70
End: 2025-04-21
Payer: MEDICARE

## 2025-04-21 NOTE — TELEPHONE ENCOUNTER
----- Message from Monty Cee MD sent at 4/20/2025 11:23 AM CDT -----  Cytology negative for malignancy  ----- Message -----  From: Lab, Background User  Sent: 4/17/2025   1:17 PM CDT  To: Monty Cee MD

## 2025-04-26 DIAGNOSIS — I10 ESSENTIAL HYPERTENSION: ICD-10-CM

## 2025-04-26 NOTE — TELEPHONE ENCOUNTER
Refill Routing Note   Medication(s) are not appropriate for processing by [facility] for the following reason(s):        Required vitals abnormal  Required labs abnormal    [facility] action(s):  Defer             Appointments  past 12m or future 3m with PCP    Date Provider   Last Visit   2/20/2025 Lon Brock MD   Next Visit   6/11/2025 Lon Brock MD   ED visits in past 90 days: 0        Note composed:2:45 PM 04/26/2025

## 2025-04-26 NOTE — TELEPHONE ENCOUNTER
No care due was identified.  Health [facility] Embedded Care Due Messages. Reference number: [id number].   4/26/2025 5:08:42 AM CDT

## 2025-04-27 RX ORDER — FUROSEMIDE 20 MG/1
20 TABLET ORAL DAILY
Qty: 90 TABLET | Refills: 1 | Status: SHIPPED | OUTPATIENT
Start: 2025-04-27

## 2025-06-09 ENCOUNTER — RESULTS FOLLOW-UP (OUTPATIENT)
Dept: FAMILY MEDICINE | Facility: CLINIC | Age: 70
End: 2025-06-09

## 2025-06-09 NOTE — PATIENT INSTRUCTIONS
Please see the attached refill request.   Urgent Cysto in office this afternoon to evaluate Bladder mass

## 2025-06-11 ENCOUNTER — OFFICE VISIT (OUTPATIENT)
Dept: FAMILY MEDICINE | Facility: CLINIC | Age: 70
End: 2025-06-11
Payer: MEDICARE

## 2025-06-11 ENCOUNTER — PATIENT OUTREACH (OUTPATIENT)
Dept: ADMINISTRATIVE | Facility: HOSPITAL | Age: 70
End: 2025-06-11
Payer: MEDICARE

## 2025-06-11 VITALS
DIASTOLIC BLOOD PRESSURE: 70 MMHG | SYSTOLIC BLOOD PRESSURE: 120 MMHG | HEART RATE: 94 BPM | TEMPERATURE: 98 F | WEIGHT: 259.5 LBS | OXYGEN SATURATION: 95 % | BODY MASS INDEX: 38.44 KG/M2 | HEIGHT: 69 IN

## 2025-06-11 DIAGNOSIS — Z12.5 SCREENING FOR MALIGNANT NEOPLASM OF PROSTATE: ICD-10-CM

## 2025-06-11 DIAGNOSIS — Z79.4 TYPE 2 DIABETES MELLITUS WITH DIABETIC POLYNEUROPATHY, WITH LONG-TERM CURRENT USE OF INSULIN: ICD-10-CM

## 2025-06-11 DIAGNOSIS — C61 TRANSITIONAL CELL CARCINOMA OF PROSTATE: ICD-10-CM

## 2025-06-11 DIAGNOSIS — E78.2 MIXED HYPERLIPIDEMIA: ICD-10-CM

## 2025-06-11 DIAGNOSIS — I10 ESSENTIAL HYPERTENSION: Primary | ICD-10-CM

## 2025-06-11 DIAGNOSIS — E11.42 TYPE 2 DIABETES MELLITUS WITH DIABETIC POLYNEUROPATHY, WITH LONG-TERM CURRENT USE OF INSULIN: ICD-10-CM

## 2025-06-11 PROCEDURE — 99999 PR PBB SHADOW E&M-EST. PATIENT-LVL IV: CPT | Mod: PBBFAC,,, | Performed by: FAMILY MEDICINE

## 2025-06-11 RX ORDER — TIRZEPATIDE 2.5 MG/.5ML
2.5 INJECTION, SOLUTION SUBCUTANEOUS
Qty: 4 PEN | Refills: 11 | Status: SHIPPED | OUTPATIENT
Start: 2025-06-11

## 2025-06-11 NOTE — PROGRESS NOTES
Population Health Chart Review & Patient Outreach Details      Additional [facility] Health Notes:               Updates Requested / Reviewed:      Updated Care Coordination Note         Health Maintenance Topics Overdue:      VBHM Score: 0     Uncontrolled BP  Patient is not due for any topics at this time.    Shingles/Zoster Vaccine  RSV Vaccine                  Health Maintenance Topic(s) Outreach Outcomes & Actions Taken:    Eye Exam - Outreach Outcomes & Actions Taken  : Diabetic Eye External Records Uploaded, Care Team & History Updated if Applicable

## 2025-06-11 NOTE — PROGRESS NOTES
(Portions of this note were dictated using voice recognition software and may contain dictation related errors in spelling/grammar/syntax not found on text review)    CC:   Chief Complaint   Patient presents with    Follow-up       HPI: 69 y.o. male   Last visit in February of 2025    Medical history is significant for venous stasis ulcers, diabetes type 2 on insulin, gout, dyslipidemia, recurrent TCC bladder, hypertension    Had been following wound care for chronic venous insufficiency with venous stasis ulcers bilateral lower extremities.  . Had normal BRISA.  Prior followup with Podiatry, Dr. Murrieta.  Ulcers have healed.  Wears compression stockings regularly.  Has not seen Podiatry in awhile plans to make appointment     Diabetes type 2 uncontrolled.  Since has established with endocrinology, 5/2023  On Humulin 70/30, was at 55 units b.i.d..    He was diagnosed with diabetes over 20 years ago, states that he has been on 70 30 since that time.  Was on Trulicity but because of availability issues was switched over to Ozempic at currently titrated to 2 mg weekly.  70/30 dose down to 40 units twice daily.  Denies any hypoglycemia concerns.   A1c   8.0 down from 8.7.    Eye exam up-to-date  , Dr. Chavez     gout, prophylaxed with allopurinol 300 mg daily, no recent issues     Dyslipidemia on Crestor 40 mg daily     Hypertension on losartan 100 mg daily, carvedilol 12.5 mg b.i.d.  , Aldactone 25 mg daily, Lasix 20 mg daily.  Follows with Cardiology     History of recurrent TCC bladder, follows with Urology., had recurrent resection and mitomycin-C installation.   cystoscopy was 09/10/2024, lesion right lateral wall greater than 25 mm and lesion on dome of bladder on left side cystic appearing mucosa in the area .  Had right lesion resection on 09/23/2024   pathology showed severe acute and chronic cystitis with granulation tissue, no definitive evidence for malignancy  .  Washing cytology showed atypical urothelial  cells.     Takes flomax.  Taking Vibegron for urinary urgency.  Had cystoscopy 04/16/2025  No mucosal abnormalities..  Urine cytology negative          Past Medical History:   Diagnosis Date    BPH (benign prostatic hyperplasia)     Bursitis     Diabetes mellitus, type 2     Gout     High cholesterol     Hypertension     Malignant neoplasm of urinary bladder 03/11/2020    Sciatica 2016    had injections-resolved    Urinary tract infection        Past Surgical History:   Procedure Laterality Date    APPENDECTOMY  1968    BALLOON DILATION OF URETER Left 09/18/2019    Procedure: DILATION, URETER, USING BALLOON;  Surgeon: Monty Cee MD;  Location: [facility];  Service: Urology;  Laterality: Left;    BARBOTAGE OF BLADDER N/A 08/14/2019    Procedure: BARBOTAGE, BLADDER;  Surgeon: Monty Cee MD;  Location: [facility] OR;  Service: Urology;  Laterality: N/A;    BARBOTAGE OF BLADDER N/A 9/23/2024    Procedure: BARBOTAGE, BLADDER;  Surgeon: Monty Cee MD;  Location: [facility] OR;  Service: Urology;  Laterality: N/A;    BARBOTAGE OF URETER Bilateral 08/14/2019    Procedure: BARBOTAGE, URETER;  Surgeon: Monty Cee MD;  Location: [facility] OR;  Service: Urology;  Laterality: Bilateral;    BARBOTAGE OF URETER Left 09/18/2019    Procedure: BARBOTAGE, URETER;  Surgeon: Monty Cee MD;  Location: [facility] OR;  Service: Urology;  Laterality: Left;  left renal/ ureteral washing.    BARBOTAGE OF URETER Bilateral 9/23/2024    Procedure: BARBOTAGE, URETER;  Surgeon: Monty Cee MD;  Location: [facility] OR;  Service: Urology;  Laterality: Bilateral;    BLADDER SURGERY      CATARACT EXTRACTION, BILATERAL      COLONOSCOPY N/A 07/10/2018    Procedure: COLONOSCOPY;  Surgeon: Azalea Sanchez MD;  Location: [facility];  Service: Endoscopy;  Laterality: N/A;  Schedule at [facility], faxed to [employer] office [phone]    COLONOSCOPY N/A 10/05/2023    Procedure: COLONOSCOPY;  Surgeon: MAINOR El MD;  Location:  [facility];  Service: Endoscopy;  Laterality: N/A;    CYSTOSCOPY      CYSTOSCOPY W/ RETROGRADES Bilateral 08/14/2019    Procedure: CYSTOSCOPY, WITH RETROGRADE PYELOGRAM;  Surgeon: Monty Cee MD;  Location: [facility] OR;  Service: Urology;  Laterality: Bilateral;    CYSTOSCOPY W/ RETROGRADES Left 09/18/2019    Procedure: CYSTOSCOPY, WITH RETROGRADE PYELOGRAM;  Surgeon: Monty Cee MD;  Location: [facility] OR;  Service: Urology;  Laterality: Left;  cystourethroscopy with left retrograde pyelogram     CYSTOSCOPY W/ RETROGRADES Bilateral 03/11/2020    Procedure: CYSTOSCOPY, WITH RETROGRADE PYELOGRAM Ureteral renal washings and bladder washings;  Surgeon: Monty Cee MD;  Location: [facility];  Service: Urology;  Laterality: Bilateral;    CYSTOSCOPY W/ RETROGRADES Bilateral 9/23/2024    Procedure: CYSTOSCOPY, WITH RETROGRADE PYELOGRAM;  Surgeon: Monty Cee MD;  Location: [facility] OR;  Service: Urology;  Laterality: Bilateral;  Bilateral ureteral/renal washings, bladder washings    CYSTOSCOPY WITH URETEROSCOPY, RETROGRADE PYELOGRAPHY, AND INSERTION OF STENT Bilateral 06/22/2020    Procedure: CYSTOSCOPY, WITH RETROGRADE PYELOGRAM AND URETERAL STENT INSERTION;  Surgeon: Monty Cee MD;  Location: [facility];  Service: Urology;  Laterality: Bilateral;    CYSTOSCOPY WITH URETHRAL DILATION N/A 06/10/2021    Procedure: CYSTOSCOPY, WITH URETHRAL DILATION;  Surgeon: Monty Cee MD;  Location: [facility];  Service: Urology;  Laterality: N/A;    DILATION OF URETHRA N/A 9/23/2024    Procedure: DILATION, URETHRA;  Surgeon: Monty Cee MD;  Location: [facility] OR;  Service: Urology;  Laterality: N/A;    HAND SURGERY Right 12/08/2016    trigger finger    INSTILLATION OF URINARY BLADDER N/A 03/11/2020    Procedure: INSTILLATION, BLADDER - mitomyin - c;  Surgeon: Monty Cee MD;  Location: [facility] OR;  Service: Urology;  Laterality: N/A;    INSTILLATION OF URINARY BLADDER  06/22/2020    Procedure: INSTILLATION, BLADDER;   Surgeon: Monty Cee MD;  Location: [facility] OR;  Service: Urology;;  mitomycin 40mg    LASER ABLATION Left 09/18/2019    Procedure: ABLATION, USING LASER;  Surgeon: Monty Cee MD;  Location: [facility] OR;  Service: Urology;  Laterality: Left;  laser ablation of lower pole calyceal transitional cell lesion    PYELOSCOPY Left 09/18/2019    Procedure: PYELOSCOPY;  Surgeon: Monty Cee MD;  Location: [facility] OR;  Service: Urology;  Laterality: Left;  Left ureteral pyeloscopy    TONSILLECTOMY  1960    TRANSURETHRAL RESECTION OF PROSTATE N/A 09/18/2019    Procedure: TURP (TRANSURETHRAL RESECTION OF PROSTATE);  Surgeon: Monty Cee MD;  Location: [facility];  Service: Urology;  Laterality: N/A;  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    TRANSURETHRAL RESECTION OF PROSTATE USING BIPOLAR CAUTERY N/A 03/11/2020    Procedure: TURP, USING BIPOLAR CAUTERY;  Surgeon: Monty Cee MD;  Location: [facility];  Service: Urology;  Laterality: N/A;    TRANSURETHRAL SURGICAL REMOVAL OF STRICTURE OF BLADDER NECK  08/14/2019    Procedure: EXCISION, CONTRACTURE, BLADDER NECK, TRANSURETHRAL;  Surgeon: Monty Cee MD;  Location: [facility];  Service: Urology;;    TRANSURETHRAL SURGICAL REMOVAL OF STRICTURE OF BLADDER NECK N/A 06/10/2021    Procedure: EXCISION, CONTRACTURE, BLADDER NECK, TRANSURETHRAL;  Surgeon: Monty Cee MD;  Location: [facility];  Service: Urology;  Laterality: N/A;  using laser    TURBT (TRANSURETHRAL RESECTION OF BLADDER TUMOR) N/A 9/23/2024    Procedure: TURBT (TRANSURETHRAL RESECTION OF BLADDER TUMOR);  Surgeon: Monty Cee MD;  Location: [facility];  Service: Urology;  Laterality: N/A;    URETEROSCOPY Left 09/18/2019    Procedure: URETEROSCOPY;  Surgeon: Monty Cee MD;  Location: [facility];  Service: Urology;  Laterality: Left;    URETEROSCOPY Right 06/22/2020    Procedure: URETEROSCOPY;  Surgeon: Monty Cee MD;  Location: [facility];  Service: Urology;   Laterality: Right;    URETEROSCOPY Left 2024    Procedure: URETEROSCOPY;  Surgeon: Monty Cee MD;  Location: [facility];  Service: Urology;  Laterality: Left;    WISDOM TOOTH EXTRACTION         Family History   Problem Relation Name Age of Onset    Diabetes Mother      Heart attack Father      Coronary artery disease Father      Arthritis Sister x1     No Known Problems Brother x1     Hyperlipidemia Son x3     Hypertension Son x3     No Known Problems Maternal Grandmother 90s     No Known Problems Maternal Grandfather 90s     No Known Problems Paternal Grandmother 50s     Coronary artery disease Paternal Grandfather      Heart attack Paternal Grandfather      Prostate cancer Neg Hx      Kidney disease Neg Hx         Social History     Tobacco Use    Smoking status: Former     Types: Pipe     Quit date:      Years since quittin.4    Smokeless tobacco: Never    Tobacco comments:     quit 40 yrs ago   Substance Use Topics    Alcohol use: Yes     Alcohol/week: 0.0 standard drinks of alcohol     Comment: rarely    Drug use: Never       Lab Results   Component Value Date    WBC 9.14 2025    HGB 15.5 2025    HCT 49.5 2025    MCV 89 2025     2025    CHOL 125 2025    TRIG 90 2025    HDL 45 2025    ALT 15 2025    AST 13 2025    BILITOT 0.6 2025    ALKPHOS 64 2025     2025    K 4.8 2025     2025    CREATININE 1.2 2025    ESTGFRAFRICA >60.0 2022    EGFRNONAA >60.0 2022    EGFRNORACEVR >60 2025    CALCIUM 10.5 2025    ALBUMIN 3.7 2025    BUN 30 (H) 2025    CO2 27 2025    TSH 1.912 2025    PSA 0.20 2024    PSADIAG 1.6 2017    HGBA1C 8.0 (H) 2025    MICALBCREAT 381.3 (H) 2025    LDLCALC 62.0 (L) 2025     (H) 2025       Hemoglobin A1C (%)   Date Value   2025 8.7 (H)   2024 7.9 (H)   2024 8.0  "(H)   09/06/2023 6.3 (H)   04/21/2023 6.3 (H)   01/19/2023 6.7 (H)   10/19/2022 7.7 (H)   07/20/2022 8.4 (H)   07/12/2021 9.3 (H)   04/01/2021 9.6 (H)     Hemoglobin A1c (%)   Date Value   06/09/2025 8.0 (H)               Vital signs reviewed  Vitals:    06/11/25 1450   BP: 120/70   BP Location: Left arm   Patient Position: Sitting   Pulse: 94   Temp: 97.6 °F (36.4 °C)   TempSrc: Oral   SpO2: 95%   Weight: 117.7 kg (259 lb 7.7 oz)   Height: 5' 9" (1.753 m)               Wt Readings from Last 20 Encounters:   06/11/25 117.7 kg (259 lb 7.7 oz)   02/20/25 118.9 kg (262 lb 2 oz)   11/05/24 118.3 kg (260 lb 12.9 oz)   10/15/24 119.1 kg (262 lb 9.1 oz)   09/23/24 118.3 kg (260 lb 12.9 oz)   09/10/24 121 kg (266 lb 12.1 oz)   05/21/24 121 kg (266 lb 12.1 oz)   10/05/23 119.3 kg (263 lb 0.1 oz)   09/12/23 118.2 kg (260 lb 9.3 oz)   08/24/23 121.6 kg (268 lb)   07/26/23 121.7 kg (268 lb 4.8 oz)   05/10/23 116.1 kg (256 lb)   05/10/23 116.3 kg (256 lb 6.3 oz)   05/10/23 116.3 kg (256 lb 6.3 oz)   05/05/23 118.1 kg (260 lb 4.1 oz)   04/28/23 122.1 kg (269 lb 2.9 oz)   03/03/23 122.9 kg (270 lb 15.1 oz)   02/03/23 122.2 kg (269 lb 6.4 oz)   01/26/23 120.1 kg (264 lb 12.4 oz)   12/23/22 122.7 kg (270 lb 6.4 oz)     Vital signs reviewed  PE:   APPEARANCE: Well nourished, well developed, in no acute distress.    HEAD: Normocephalic, atraumatic.  EYES: PERRL. EOMI.   Conjunctivae noninjected.  NECK: Supple with no cervical lymphadenopathy.  No carotid bruits.  No thyromegaly  CHEST: Good inspiratory effort. Lungs clear to auscultation with no wheezes or crackles.  CARDIOVASCULAR: Normal S1, S2. No rubs, murmurs, or gallops.  ABDOMEN: Bowel sounds normal. Not distended. Soft. No tenderness or masses. No organomegaly.  EXTREMITIES:  Trace edema  DIABETIC FOOT EXAM:  February of 2025            IMPRESSION  1. Essential hypertension    2. Mixed hyperlipidemia    3. Type 2 diabetes mellitus with diabetic polyneuropathy, with long-term " current use of insulin    4. Transitional cell carcinoma of prostate    5. Screening for malignant neoplasm of prostate                    PLAN  Orders Placed This Encounter   Procedures    CBC Auto Differential    Comprehensive Metabolic Panel    Lipid Panel    Hemoglobin A1C    PSA, Screening     HTN stable    Diabetes :    Discussed potential switch over to Mounjaro 2.5-->can titrate upward monthly dependent on tolerance.  He has 3 more weeks on the Ozempic so can finish this before getting the Mounjaro.  Can notify me monthly through portal if interested in further up titration of Mounjaro dosing.  Humulin 70/30 currently at 40 b.i.d.   Keep goal fasting sugar less than 140 and goal 2 hour postprandial sugar less than 180.      Gout: Continue allopurinol prophylaxis.     Continue urology follow-up for TCC surveillance     Continue regular exercise, dietary modification .        Labs 4 months with visit to follow    SCREENINGS       Immunizations:      COVID up-to-date  Flu up-to-date  Tdap 2017  Zostavax 2017, can get updated shingles vaccine at pharmacy  Prevnar 13:2015  Pneumovax 23:2016  Prevnar 20 07/15/2022       Age/demographic appropriate health maintenance:  Colonoscopy 10/5/23: 7 mm and 3 mm polyp, TA, repeat 5 years  PSA:  05/2024

## 2025-06-11 NOTE — PATIENT INSTRUCTIONS
"Look into getting the Shingles vaccine (SHINGRIX) at your local pharmacy (2 part series, done once at/after age 50)      Over the counter pain therapies (creams/patch):    1. Lidocaine patch    2. aspercreme    3. "2 old goats"    4. "blue emu"    5. salonpas     7. biofreeze      Www.Diabetes.org (american diabetes association website)    SAMPLE BLOOD SUGAR CHART  Goal blood sugar when checked in the morning before meals: less than 130-140  Goal sugar when checked at least 2 hours after a meal: less than 180               DATE  Before breakfast Before lunch Before dinner Before bedtime                                                                                                                                                  CARBOHYDRATE GOALS: around 3-4 servings per meal (1 serving is 15 grams of total carbohydrates)        "

## 2025-06-17 LAB
LEFT EYE DM RETINOPATHY: POSITIVE
RIGHT EYE DM RETINOPATHY: POSITIVE

## 2025-06-17 NOTE — TELEPHONE ENCOUNTER
Refill Decision Note   Andrew Beltran  is requesting a refill authorization.  Brief Assessment and Rationale for Refill:  Approve     Medication Therapy Plan:         Comments:     Note composed:11:26 AM 06/17/2025

## 2025-06-17 NOTE — TELEPHONE ENCOUNTER
Please call the patient, the last time he saw Dr. Brock he was told to take 40 units twice a day of his insulin.    However the prescription says 60 units.  Please make sure he is on 40 units twice daily

## 2025-06-17 NOTE — TELEPHONE ENCOUNTER
No care due was identified.  [facility] Embedded Care Due Messages. Reference number: [id number].   6/17/2025 10:59:02 AM CDT

## 2025-07-30 DIAGNOSIS — E78.2 MIXED HYPERLIPIDEMIA: ICD-10-CM

## 2025-07-30 RX ORDER — ROSUVASTATIN CALCIUM 40 MG/1
TABLET, COATED ORAL
Qty: 90 TABLET | Refills: 3 | Status: SHIPPED | OUTPATIENT
Start: 2025-07-30

## 2025-07-30 NOTE — TELEPHONE ENCOUNTER
No care due was identified.  [facility] Embedded Care Due Messages. Reference number: [id number].   7/30/2025 5:08:24 AM CDT

## 2025-07-30 NOTE — TELEPHONE ENCOUNTER
Refill Decision Note   Andrew Ruby  is requesting a refill authorization.  Brief Assessment and Rationale for Refill:  Approve     Medication Therapy Plan:       Medication Reconciliation Completed: No   Comments:     No Care Gaps recommended.     Note composed:12:10 PM 07/30/2025

## 2025-08-14 ENCOUNTER — PATIENT OUTREACH (OUTPATIENT)
Dept: ADMINISTRATIVE | Facility: HOSPITAL | Age: 70
End: 2025-08-14
Payer: MEDICARE

## 2025-08-31 DIAGNOSIS — I10 ESSENTIAL HYPERTENSION: ICD-10-CM

## 2025-08-31 RX ORDER — LOSARTAN POTASSIUM 100 MG/1
100 TABLET ORAL DAILY
Qty: 90 TABLET | Refills: 3 | Status: SHIPPED | OUTPATIENT
Start: 2025-08-31